# Patient Record
Sex: FEMALE | Race: WHITE | Employment: UNEMPLOYED | URBAN - METROPOLITAN AREA
[De-identification: names, ages, dates, MRNs, and addresses within clinical notes are randomized per-mention and may not be internally consistent; named-entity substitution may affect disease eponyms.]

---

## 2017-01-03 DIAGNOSIS — F98.8 ADD (ATTENTION DEFICIT DISORDER): ICD-10-CM

## 2017-01-06 RX ORDER — DEXTROAMPHETAMINE SACCHARATE, AMPHETAMINE ASPARTATE, DEXTROAMPHETAMINE SULFATE AND AMPHETAMINE SULFATE 3.75; 3.75; 3.75; 3.75 MG/1; MG/1; MG/1; MG/1
TABLET ORAL
Qty: 30 TABLET | Refills: 0 | Status: SHIPPED | OUTPATIENT
Start: 2017-01-06 | End: 2017-02-03 | Stop reason: SDUPTHER

## 2017-01-06 RX ORDER — ONDANSETRON HYDROCHLORIDE 8 MG/1
TABLET, FILM COATED ORAL
Qty: 10 TABLET | Refills: 0 | Status: SHIPPED | OUTPATIENT
Start: 2017-01-06 | End: 2017-01-17 | Stop reason: SDUPTHER

## 2017-01-06 RX ORDER — ONDANSETRON HYDROCHLORIDE 8 MG/1
TABLET, FILM COATED ORAL
Qty: 10 TABLET | Refills: 2 | OUTPATIENT
Start: 2017-01-06

## 2017-01-19 RX ORDER — TRAMADOL HYDROCHLORIDE 50 MG/1
TABLET ORAL
Qty: 180 TABLET | Refills: 0
Start: 2017-01-19 | End: 2017-01-23 | Stop reason: SDUPTHER

## 2017-01-19 RX ORDER — ONDANSETRON HYDROCHLORIDE 8 MG/1
TABLET, FILM COATED ORAL
Qty: 10 TABLET | Refills: 0 | Status: SHIPPED | OUTPATIENT
Start: 2017-01-19 | End: 2017-02-02 | Stop reason: SDUPTHER

## 2017-01-20 RX ORDER — TRAMADOL HYDROCHLORIDE 50 MG/1
TABLET ORAL
Qty: 180 TABLET | Refills: 0 | OUTPATIENT
Start: 2017-01-20

## 2017-01-23 ENCOUNTER — OFFICE VISIT (OUTPATIENT)
Dept: PRIMARY CARE CLINIC | Age: 57
End: 2017-01-23

## 2017-01-23 VITALS
TEMPERATURE: 98.4 F | HEART RATE: 87 BPM | DIASTOLIC BLOOD PRESSURE: 72 MMHG | HEIGHT: 62 IN | RESPIRATION RATE: 18 BRPM | WEIGHT: 114 LBS | SYSTOLIC BLOOD PRESSURE: 126 MMHG | OXYGEN SATURATION: 99 % | BODY MASS INDEX: 20.98 KG/M2

## 2017-01-23 DIAGNOSIS — I10 ESSENTIAL HYPERTENSION: Primary | ICD-10-CM

## 2017-01-23 DIAGNOSIS — K58.1 IRRITABLE BOWEL SYNDROME WITH CONSTIPATION: ICD-10-CM

## 2017-01-23 DIAGNOSIS — M16.12 PRIMARY OSTEOARTHRITIS OF LEFT HIP: ICD-10-CM

## 2017-01-23 DIAGNOSIS — G89.29 OTHER CHRONIC PAIN: ICD-10-CM

## 2017-01-23 PROCEDURE — 99214 OFFICE O/P EST MOD 30 MIN: CPT | Performed by: FAMILY MEDICINE

## 2017-01-23 RX ORDER — DICYCLOMINE HCL 20 MG
TABLET ORAL
COMMUNITY
Start: 2017-01-11 | End: 2017-04-22

## 2017-01-23 RX ORDER — TRAMADOL HYDROCHLORIDE 50 MG/1
TABLET ORAL
Qty: 180 TABLET | Refills: 0 | Status: CANCELLED | OUTPATIENT
Start: 2017-01-23

## 2017-01-23 RX ORDER — TRAMADOL HYDROCHLORIDE 50 MG/1
TABLET ORAL
Qty: 180 TABLET | Refills: 0 | Status: SHIPPED | OUTPATIENT
Start: 2017-01-23 | End: 2017-02-20 | Stop reason: SDUPTHER

## 2017-01-23 RX ORDER — OMEPRAZOLE 40 MG/1
CAPSULE, DELAYED RELEASE ORAL
COMMUNITY
Start: 2017-01-11

## 2017-01-23 ASSESSMENT — ENCOUNTER SYMPTOMS
VOMITING: 1
CHEST TIGHTNESS: 0
CONSTIPATION: 0
DIARRHEA: 0
ABDOMINAL PAIN: 1
SHORTNESS OF BREATH: 0
NAUSEA: 1

## 2017-02-02 RX ORDER — ONDANSETRON HYDROCHLORIDE 8 MG/1
TABLET, FILM COATED ORAL
Qty: 10 TABLET | Refills: 0 | Status: SHIPPED | OUTPATIENT
Start: 2017-02-02 | End: 2017-02-11 | Stop reason: SDUPTHER

## 2017-02-03 DIAGNOSIS — F98.8 ADD (ATTENTION DEFICIT DISORDER): ICD-10-CM

## 2017-02-03 RX ORDER — DEXTROAMPHETAMINE SACCHARATE, AMPHETAMINE ASPARTATE, DEXTROAMPHETAMINE SULFATE AND AMPHETAMINE SULFATE 3.75; 3.75; 3.75; 3.75 MG/1; MG/1; MG/1; MG/1
TABLET ORAL
Qty: 30 TABLET | Refills: 0 | Status: SHIPPED | OUTPATIENT
Start: 2017-02-03 | End: 2017-03-06 | Stop reason: SDUPTHER

## 2017-02-12 RX ORDER — ONDANSETRON HYDROCHLORIDE 8 MG/1
TABLET, FILM COATED ORAL
Qty: 10 TABLET | Refills: 1 | Status: SHIPPED | OUTPATIENT
Start: 2017-02-12 | End: 2017-03-06 | Stop reason: SDUPTHER

## 2017-02-22 RX ORDER — TRAMADOL HYDROCHLORIDE 50 MG/1
TABLET ORAL
Qty: 180 TABLET | Refills: 0 | Status: SHIPPED | OUTPATIENT
Start: 2017-02-22 | End: 2017-03-15 | Stop reason: SDUPTHER

## 2017-02-22 RX ORDER — TRAMADOL HYDROCHLORIDE 50 MG/1
TABLET ORAL
Qty: 180 TABLET | Refills: 0 | OUTPATIENT
Start: 2017-02-22

## 2017-03-06 DIAGNOSIS — F98.8 ADD (ATTENTION DEFICIT DISORDER): ICD-10-CM

## 2017-03-08 RX ORDER — DEXTROAMPHETAMINE SACCHARATE, AMPHETAMINE ASPARTATE, DEXTROAMPHETAMINE SULFATE AND AMPHETAMINE SULFATE 3.75; 3.75; 3.75; 3.75 MG/1; MG/1; MG/1; MG/1
TABLET ORAL
Qty: 30 TABLET | Refills: 0 | Status: SHIPPED | OUTPATIENT
Start: 2017-03-08 | End: 2017-04-11 | Stop reason: SDUPTHER

## 2017-03-08 RX ORDER — ONDANSETRON HYDROCHLORIDE 8 MG/1
TABLET, FILM COATED ORAL
Qty: 10 TABLET | Refills: 1 | Status: SHIPPED | OUTPATIENT
Start: 2017-03-08 | End: 2017-04-01 | Stop reason: SDUPTHER

## 2017-03-18 RX ORDER — TRAMADOL HYDROCHLORIDE 50 MG/1
TABLET ORAL
Qty: 180 TABLET | Refills: 0 | Status: SHIPPED | OUTPATIENT
Start: 2017-03-18 | End: 2017-04-21 | Stop reason: SDUPTHER

## 2017-04-02 RX ORDER — ONDANSETRON HYDROCHLORIDE 8 MG/1
TABLET, FILM COATED ORAL
Qty: 10 TABLET | Refills: 3 | Status: SHIPPED | OUTPATIENT
Start: 2017-04-02 | End: 2017-04-22

## 2017-04-04 RX ORDER — ONDANSETRON HYDROCHLORIDE 8 MG/1
TABLET, FILM COATED ORAL
Qty: 20 TABLET | Refills: 3 | OUTPATIENT
Start: 2017-04-04

## 2017-04-08 ENCOUNTER — OFFICE VISIT (OUTPATIENT)
Dept: PRIMARY CARE CLINIC | Age: 57
End: 2017-04-08

## 2017-04-08 VITALS
TEMPERATURE: 98.4 F | HEIGHT: 62 IN | WEIGHT: 117 LBS | RESPIRATION RATE: 16 BRPM | HEART RATE: 76 BPM | SYSTOLIC BLOOD PRESSURE: 138 MMHG | DIASTOLIC BLOOD PRESSURE: 88 MMHG | BODY MASS INDEX: 21.53 KG/M2

## 2017-04-08 DIAGNOSIS — F98.8 ADD (ATTENTION DEFICIT DISORDER): ICD-10-CM

## 2017-04-08 DIAGNOSIS — K21.00 GASTROESOPHAGEAL REFLUX DISEASE WITH ESOPHAGITIS: ICD-10-CM

## 2017-04-08 DIAGNOSIS — Z72.0 TOBACCO ABUSE: ICD-10-CM

## 2017-04-08 DIAGNOSIS — R10.816 EPIGASTRIC ABDOMINAL TENDERNESS WITHOUT REBOUND TENDERNESS: ICD-10-CM

## 2017-04-08 DIAGNOSIS — R11.0 NAUSEA: Primary | ICD-10-CM

## 2017-04-08 PROCEDURE — 99214 OFFICE O/P EST MOD 30 MIN: CPT | Performed by: FAMILY MEDICINE

## 2017-04-08 RX ORDER — DICYCLOMINE HYDROCHLORIDE 10 MG/1
10 CAPSULE ORAL 3 TIMES DAILY PRN
Qty: 60 CAPSULE | Refills: 0 | Status: SHIPPED | OUTPATIENT
Start: 2017-04-08 | End: 2017-05-08

## 2017-04-08 RX ORDER — PANTOPRAZOLE SODIUM 40 MG/1
40 TABLET, DELAYED RELEASE ORAL DAILY
Qty: 30 TABLET | Refills: 11 | Status: SHIPPED | OUTPATIENT
Start: 2017-04-08 | End: 2018-04-08

## 2017-04-08 RX ORDER — DEXTROAMPHETAMINE SACCHARATE, AMPHETAMINE ASPARTATE, DEXTROAMPHETAMINE SULFATE AND AMPHETAMINE SULFATE 3.75; 3.75; 3.75; 3.75 MG/1; MG/1; MG/1; MG/1
TABLET ORAL
Qty: 30 TABLET | Refills: 0 | Status: CANCELLED | OUTPATIENT
Start: 2017-04-08

## 2017-04-08 ASSESSMENT — ENCOUNTER SYMPTOMS
ABDOMINAL PAIN: 1
NAUSEA: 1
DIARRHEA: 1
CONSTIPATION: 1
BACK PAIN: 0
VOMITING: 0

## 2017-04-11 DIAGNOSIS — F98.8 ADD (ATTENTION DEFICIT DISORDER): ICD-10-CM

## 2017-04-12 RX ORDER — DEXTROAMPHETAMINE SACCHARATE, AMPHETAMINE ASPARTATE, DEXTROAMPHETAMINE SULFATE AND AMPHETAMINE SULFATE 3.75; 3.75; 3.75; 3.75 MG/1; MG/1; MG/1; MG/1
TABLET ORAL
Qty: 30 TABLET | Refills: 0 | Status: SHIPPED | OUTPATIENT
Start: 2017-04-12 | End: 2017-05-22 | Stop reason: SDUPTHER

## 2017-04-22 ENCOUNTER — OFFICE VISIT (OUTPATIENT)
Dept: PRIMARY CARE CLINIC | Age: 57
End: 2017-04-22

## 2017-04-22 VITALS
BODY MASS INDEX: 20.8 KG/M2 | TEMPERATURE: 97.4 F | RESPIRATION RATE: 14 BRPM | HEIGHT: 62 IN | WEIGHT: 113 LBS | SYSTOLIC BLOOD PRESSURE: 126 MMHG | DIASTOLIC BLOOD PRESSURE: 84 MMHG | HEART RATE: 72 BPM

## 2017-04-22 DIAGNOSIS — R10.84 GENERALIZED ABDOMINAL PAIN: ICD-10-CM

## 2017-04-22 DIAGNOSIS — K57.32 DIVERTICULITIS OF LARGE INTESTINE WITHOUT PERFORATION OR ABSCESS WITHOUT BLEEDING: ICD-10-CM

## 2017-04-22 DIAGNOSIS — B18.2 HEP C W/O COMA, CHRONIC (HCC): ICD-10-CM

## 2017-04-22 DIAGNOSIS — R10.84 GENERALIZED ABDOMINAL PAIN: Primary | ICD-10-CM

## 2017-04-22 DIAGNOSIS — L30.9 ECZEMA, UNSPECIFIED TYPE: ICD-10-CM

## 2017-04-22 LAB
ALBUMIN SERPL-MCNC: 4.5 G/DL (ref 3.9–4.9)
ALP BLD-CCNC: 82 U/L (ref 40–130)
ALT SERPL-CCNC: 19 U/L (ref 0–33)
ANION GAP SERPL CALCULATED.3IONS-SCNC: 14 MEQ/L (ref 7–13)
AST SERPL-CCNC: 19 U/L (ref 0–35)
BASOPHILS ABSOLUTE: 0.1 K/UL (ref 0–0.2)
BASOPHILS RELATIVE PERCENT: 0.7 %
BILIRUB SERPL-MCNC: 0.2 MG/DL (ref 0–1.2)
BUN BLDV-MCNC: 14 MG/DL (ref 6–20)
CALCIUM SERPL-MCNC: 10 MG/DL (ref 8.6–10.2)
CHLORIDE BLD-SCNC: 101 MEQ/L (ref 98–107)
CO2: 26 MEQ/L (ref 22–29)
CREAT SERPL-MCNC: 0.64 MG/DL (ref 0.5–0.9)
EOSINOPHILS ABSOLUTE: 0.2 K/UL (ref 0–0.7)
EOSINOPHILS RELATIVE PERCENT: 3.3 %
GFR AFRICAN AMERICAN: >60
GFR NON-AFRICAN AMERICAN: >60
GLOBULIN: 2.6 G/DL (ref 2.3–3.5)
GLUCOSE BLD-MCNC: 81 MG/DL (ref 74–109)
HBV SURFACE AB TITR SER: REACTIVE MIU/ML
HCT VFR BLD CALC: 42.1 % (ref 37–47)
HEMOGLOBIN: 14.3 G/DL (ref 12–16)
LYMPHOCYTES ABSOLUTE: 2.5 K/UL (ref 1–4.8)
LYMPHOCYTES RELATIVE PERCENT: 35.2 %
MCH RBC QN AUTO: 31.5 PG (ref 27–31.3)
MCHC RBC AUTO-ENTMCNC: 33.8 % (ref 33–37)
MCV RBC AUTO: 93.2 FL (ref 82–100)
MONOCYTES ABSOLUTE: 0.5 K/UL (ref 0.2–0.8)
MONOCYTES RELATIVE PERCENT: 7.6 %
NEUTROPHILS ABSOLUTE: 3.7 K/UL (ref 1.4–6.5)
NEUTROPHILS RELATIVE PERCENT: 53.2 %
PDW BLD-RTO: 14.1 % (ref 11.5–14.5)
PLATELET # BLD: 240 K/UL (ref 130–400)
POTASSIUM SERPL-SCNC: 4.3 MEQ/L (ref 3.5–5.1)
RBC # BLD: 4.52 M/UL (ref 4.2–5.4)
SODIUM BLD-SCNC: 141 MEQ/L (ref 132–144)
TOTAL PROTEIN: 7.1 G/DL (ref 6.4–8.1)
WBC # BLD: 7.1 K/UL (ref 4.8–10.8)

## 2017-04-22 PROCEDURE — 99214 OFFICE O/P EST MOD 30 MIN: CPT | Performed by: FAMILY MEDICINE

## 2017-04-22 PROCEDURE — 96372 THER/PROPH/DIAG INJ SC/IM: CPT | Performed by: FAMILY MEDICINE

## 2017-04-22 RX ORDER — METRONIDAZOLE 500 MG/1
TABLET ORAL
Qty: 20 TABLET | Refills: 0 | Status: SHIPPED | OUTPATIENT
Start: 2017-04-22

## 2017-04-22 RX ORDER — DESOXIMETASONE 2.5 MG/G
CREAM TOPICAL
Qty: 30 G | Refills: 3 | Status: SHIPPED | OUTPATIENT
Start: 2017-04-22

## 2017-04-22 RX ORDER — TRAMADOL HYDROCHLORIDE 50 MG/1
TABLET ORAL
Qty: 180 TABLET | Refills: 0 | Status: SHIPPED | OUTPATIENT
Start: 2017-04-22 | End: 2017-05-22 | Stop reason: SDUPTHER

## 2017-04-22 RX ORDER — CIPROFLOXACIN 500 MG/1
TABLET, FILM COATED ORAL
Qty: 20 TABLET | Refills: 0 | Status: SHIPPED | OUTPATIENT
Start: 2017-04-22 | End: 2017-05-02

## 2017-04-22 RX ORDER — KETOROLAC TROMETHAMINE 30 MG/ML
60 INJECTION, SOLUTION INTRAMUSCULAR; INTRAVENOUS ONCE
Status: COMPLETED | OUTPATIENT
Start: 2017-04-22 | End: 2017-04-22

## 2017-04-22 RX ADMIN — KETOROLAC TROMETHAMINE 60 MG: 30 INJECTION, SOLUTION INTRAMUSCULAR; INTRAVENOUS at 12:01

## 2017-04-22 ASSESSMENT — ENCOUNTER SYMPTOMS
VOMITING: 1
NAUSEA: 1
ABDOMINAL PAIN: 1

## 2017-04-24 ENCOUNTER — TELEPHONE (OUTPATIENT)
Dept: PRIMARY CARE CLINIC | Age: 57
End: 2017-04-24

## 2017-04-24 LAB — HIV-1 WESTERN BLOT: NEGATIVE

## 2017-04-25 LAB
Lab: NORMAL
REPORT: NORMAL
THIS TEST SENT TO: NORMAL

## 2017-04-27 LAB
EER HCV RNA QNT PCR: NORMAL
HCV RNA QNT REAL-TIME PCR INTERP: NOT DETECTED
HCV RNA, QUANTITATIVE REAL TIME PCR: <1.2 LOG IU
HEPATITIS C RNA PCR QUANT: <15 IU/ML

## 2017-04-28 LAB
3-OH-COTININE: 178 NG/ML
COTININE: 368 NG/ML
NICOTINE: 16 NG/ML

## 2017-05-01 LAB — HEPATITIS C GENOTYPE: NORMAL

## 2017-05-07 ASSESSMENT — ENCOUNTER SYMPTOMS
WHEEZING: 0
ANAL BLEEDING: 0
APNEA: 0
BLOOD IN STOOL: 0
EYE DISCHARGE: 0
ABDOMINAL DISTENTION: 0
CHOKING: 0

## 2017-05-15 DIAGNOSIS — F98.8 ADD (ATTENTION DEFICIT DISORDER): ICD-10-CM

## 2017-05-16 RX ORDER — ONDANSETRON HYDROCHLORIDE 8 MG/1
TABLET, FILM COATED ORAL
Qty: 10 TABLET | Refills: 0 | OUTPATIENT
Start: 2017-05-16

## 2017-05-16 RX ORDER — DEXTROAMPHETAMINE SACCHARATE, AMPHETAMINE ASPARTATE, DEXTROAMPHETAMINE SULFATE AND AMPHETAMINE SULFATE 3.75; 3.75; 3.75; 3.75 MG/1; MG/1; MG/1; MG/1
TABLET ORAL
Qty: 30 TABLET | Refills: 0 | OUTPATIENT
Start: 2017-05-16

## 2017-05-18 RX ORDER — TRAMADOL HYDROCHLORIDE 50 MG/1
TABLET ORAL
Qty: 180 TABLET | Refills: 0 | OUTPATIENT
Start: 2017-05-18

## 2017-05-18 RX ORDER — ONDANSETRON HYDROCHLORIDE 8 MG/1
TABLET, FILM COATED ORAL
Qty: 10 TABLET | Refills: 0 | OUTPATIENT
Start: 2017-05-18

## 2017-05-19 ENCOUNTER — OFFICE VISIT (OUTPATIENT)
Dept: PRIMARY CARE CLINIC | Age: 57
End: 2017-05-19

## 2017-05-19 VITALS
HEIGHT: 62 IN | DIASTOLIC BLOOD PRESSURE: 80 MMHG | SYSTOLIC BLOOD PRESSURE: 132 MMHG | TEMPERATURE: 98 F | OXYGEN SATURATION: 99 % | WEIGHT: 109 LBS | RESPIRATION RATE: 14 BRPM | HEART RATE: 70 BPM | BODY MASS INDEX: 20.06 KG/M2

## 2017-05-19 DIAGNOSIS — M16.12 PRIMARY OSTEOARTHRITIS OF LEFT HIP: ICD-10-CM

## 2017-05-19 DIAGNOSIS — K21.00 GASTROESOPHAGEAL REFLUX DISEASE WITH ESOPHAGITIS: ICD-10-CM

## 2017-05-19 DIAGNOSIS — I10 ESSENTIAL HYPERTENSION: ICD-10-CM

## 2017-05-19 DIAGNOSIS — B18.2 HEP C W/O COMA, CHRONIC (HCC): ICD-10-CM

## 2017-05-19 DIAGNOSIS — L30.9 ECZEMA, UNSPECIFIED TYPE: Primary | ICD-10-CM

## 2017-05-19 DIAGNOSIS — F98.8 ADD (ATTENTION DEFICIT DISORDER): ICD-10-CM

## 2017-05-19 PROCEDURE — 99214 OFFICE O/P EST MOD 30 MIN: CPT | Performed by: FAMILY MEDICINE

## 2017-05-19 RX ORDER — DEXTROAMPHETAMINE SACCHARATE, AMPHETAMINE ASPARTATE, DEXTROAMPHETAMINE SULFATE AND AMPHETAMINE SULFATE 3.75; 3.75; 3.75; 3.75 MG/1; MG/1; MG/1; MG/1
TABLET ORAL
Qty: 30 TABLET | Refills: 0 | Status: CANCELLED | OUTPATIENT
Start: 2017-05-19

## 2017-05-19 RX ORDER — KETOROLAC TROMETHAMINE 30 MG/ML
60 INJECTION, SOLUTION INTRAMUSCULAR; INTRAVENOUS ONCE
Status: COMPLETED | OUTPATIENT
Start: 2017-05-19 | End: 2017-05-22

## 2017-05-19 RX ORDER — TRAMADOL HYDROCHLORIDE 50 MG/1
TABLET ORAL
Qty: 180 TABLET | Refills: 0 | Status: CANCELLED | OUTPATIENT
Start: 2017-05-19

## 2017-05-19 RX ORDER — ERGOCALCIFEROL 1.25 MG/1
50000 CAPSULE ORAL WEEKLY
Qty: 30 CAPSULE | Refills: 1 | Status: SHIPPED | OUTPATIENT
Start: 2017-05-19 | End: 2017-06-22 | Stop reason: SDUPTHER

## 2017-05-19 RX ORDER — ONDANSETRON HYDROCHLORIDE 8 MG/1
TABLET, FILM COATED ORAL
Qty: 10 TABLET | Refills: 0 | Status: SHIPPED | OUTPATIENT
Start: 2017-05-19 | End: 2017-06-02 | Stop reason: SDUPTHER

## 2017-05-19 ASSESSMENT — ENCOUNTER SYMPTOMS
HEMATOCHEZIA: 0
FLATUS: 0
RECTAL PAIN: 0
VOMITING: 0
CONSTIPATION: 0
ABDOMINAL DISTENTION: 0
ABDOMINAL PAIN: 1
ANAL BLEEDING: 0
DIARRHEA: 0
BLOOD IN STOOL: 0
NAUSEA: 0
BELCHING: 0

## 2017-05-19 ASSESSMENT — PATIENT HEALTH QUESTIONNAIRE - PHQ9
SUM OF ALL RESPONSES TO PHQ QUESTIONS 1-9: 1
SUM OF ALL RESPONSES TO PHQ9 QUESTIONS 1 & 2: 1
2. FEELING DOWN, DEPRESSED OR HOPELESS: 0
1. LITTLE INTEREST OR PLEASURE IN DOING THINGS: 1

## 2017-05-22 DIAGNOSIS — G89.29 OTHER CHRONIC PAIN: ICD-10-CM

## 2017-05-22 PROCEDURE — 96372 THER/PROPH/DIAG INJ SC/IM: CPT | Performed by: FAMILY MEDICINE

## 2017-05-22 RX ORDER — TRAMADOL HYDROCHLORIDE 50 MG/1
TABLET ORAL
Qty: 90 TABLET | Refills: 0 | Status: SHIPPED | OUTPATIENT
Start: 2017-05-22 | End: 2017-06-02 | Stop reason: SDUPTHER

## 2017-05-22 RX ORDER — DEXTROAMPHETAMINE SACCHARATE, AMPHETAMINE ASPARTATE, DEXTROAMPHETAMINE SULFATE AND AMPHETAMINE SULFATE 3.75; 3.75; 3.75; 3.75 MG/1; MG/1; MG/1; MG/1
TABLET ORAL
Qty: 15 TABLET | Refills: 0 | Status: SHIPPED | OUTPATIENT
Start: 2017-05-22 | End: 2017-06-12 | Stop reason: SDUPTHER

## 2017-05-22 RX ADMIN — KETOROLAC TROMETHAMINE 60 MG: 30 INJECTION, SOLUTION INTRAMUSCULAR; INTRAVENOUS at 10:03

## 2017-05-26 LAB
6-ACETYLMORPHINE: NOT DETECTED
7-AMINOCLONAZEPAM: NOT DETECTED
ALPHA-OH-ALPRAZOLAM: NOT DETECTED
ALPRAZOLAM: NOT DETECTED
AMPHETAMINE: PRESENT
BARBITURATES: NOT DETECTED
BENZOYLECGONINE: NOT DETECTED
BUPRENORPHINE: NOT DETECTED
CARISOPRODOL: NOT DETECTED
CLONAZEPAM: NOT DETECTED
CODEINE: NOT DETECTED
CREATININE URINE: 101.9 MG/DL (ref 20–400)
DIAZEPAM: NOT DETECTED
EER PAIN MGT DRUG PANEL, HIGH RES/EMIT U: NORMAL
ETHYL GLUCURONIDE: NOT DETECTED
FENTANYL: NOT DETECTED
HYDROCODONE: NOT DETECTED
HYDROMORPHONE: NOT DETECTED
LORAZEPAM: NOT DETECTED
MARIJUANA METABOLITE: PRESENT
MDA: NOT DETECTED
MDEA: NOT DETECTED
MDMA URINE: NOT DETECTED
MEPERIDINE: NOT DETECTED
METHADONE: NOT DETECTED
METHAMPHETAMINE: NOT DETECTED
METHYLPHENIDATE: NOT DETECTED
MIDAZOLAM: NOT DETECTED
MORPHINE: NOT DETECTED
NORBUPRENORPHINE, FREE: NOT DETECTED
NORDIAZEPAM: NOT DETECTED
NORFENTANYL: NOT DETECTED
NORHYDROCODONE, URINE: NOT DETECTED
NOROXYCODONE: NOT DETECTED
NOROXYMORPHONE, URINE: NOT DETECTED
OXAZEPAM: NOT DETECTED
OXYCODONE: NOT DETECTED
OXYMORPHONE: NOT DETECTED
PAIN MANAGEMENT DRUG PANEL: NORMAL
PCP: NOT DETECTED
PHENTERMINE: NOT DETECTED
PROPOXYPHENE: NOT DETECTED
TAPENTADOL, URINE: NOT DETECTED
TAPENTADOL-O-SULFATE, URINE: NOT DETECTED
TEMAZEPAM: NOT DETECTED
TRAMADOL: PRESENT
ZOLPIDEM: NOT DETECTED

## 2017-06-02 DIAGNOSIS — F98.8 ADD (ATTENTION DEFICIT DISORDER): ICD-10-CM

## 2017-06-02 RX ORDER — TRAMADOL HYDROCHLORIDE 50 MG/1
TABLET ORAL
Qty: 90 TABLET | Refills: 0 | Status: SHIPPED | OUTPATIENT
Start: 2017-06-02 | End: 2017-06-05 | Stop reason: CLARIF

## 2017-06-02 RX ORDER — DEXTROAMPHETAMINE SACCHARATE, AMPHETAMINE ASPARTATE, DEXTROAMPHETAMINE SULFATE AND AMPHETAMINE SULFATE 3.75; 3.75; 3.75; 3.75 MG/1; MG/1; MG/1; MG/1
TABLET ORAL
Qty: 15 TABLET | Refills: 0 | Status: CANCELLED | OUTPATIENT
Start: 2017-06-02

## 2017-06-02 RX ORDER — ONDANSETRON HYDROCHLORIDE 8 MG/1
TABLET, FILM COATED ORAL
Qty: 10 TABLET | Refills: 1 | Status: SHIPPED | OUTPATIENT
Start: 2017-06-02 | End: 2017-06-22 | Stop reason: SDUPTHER

## 2017-06-05 DIAGNOSIS — F98.8 ADD (ATTENTION DEFICIT DISORDER): ICD-10-CM

## 2017-06-05 RX ORDER — ONDANSETRON HYDROCHLORIDE 8 MG/1
TABLET, FILM COATED ORAL
Qty: 10 TABLET | Refills: 0 | OUTPATIENT
Start: 2017-06-05

## 2017-06-05 RX ORDER — TRAMADOL HYDROCHLORIDE 50 MG/1
TABLET ORAL
Qty: 90 TABLET | Refills: 0 | OUTPATIENT
Start: 2017-06-05

## 2017-06-05 RX ORDER — DEXTROAMPHETAMINE SACCHARATE, AMPHETAMINE ASPARTATE, DEXTROAMPHETAMINE SULFATE AND AMPHETAMINE SULFATE 3.75; 3.75; 3.75; 3.75 MG/1; MG/1; MG/1; MG/1
TABLET ORAL
Qty: 30 TABLET | Refills: 0 | OUTPATIENT
Start: 2017-06-05

## 2017-06-09 ENCOUNTER — TELEPHONE (OUTPATIENT)
Dept: PRIMARY CARE CLINIC | Age: 57
End: 2017-06-09

## 2017-06-12 ENCOUNTER — OFFICE VISIT (OUTPATIENT)
Dept: PRIMARY CARE CLINIC | Age: 57
End: 2017-06-12

## 2017-06-12 VITALS
SYSTOLIC BLOOD PRESSURE: 122 MMHG | OXYGEN SATURATION: 97 % | WEIGHT: 109 LBS | DIASTOLIC BLOOD PRESSURE: 84 MMHG | BODY MASS INDEX: 20.06 KG/M2 | RESPIRATION RATE: 14 BRPM | HEIGHT: 62 IN | TEMPERATURE: 97.1 F | HEART RATE: 68 BPM

## 2017-06-12 DIAGNOSIS — K21.00 GASTROESOPHAGEAL REFLUX DISEASE WITH ESOPHAGITIS: ICD-10-CM

## 2017-06-12 DIAGNOSIS — F98.8 ADD (ATTENTION DEFICIT DISORDER): ICD-10-CM

## 2017-06-12 DIAGNOSIS — I10 ESSENTIAL HYPERTENSION: Primary | ICD-10-CM

## 2017-06-12 DIAGNOSIS — M16.12 PRIMARY OSTEOARTHRITIS OF LEFT HIP: ICD-10-CM

## 2017-06-12 DIAGNOSIS — B18.2 HEP C W/O COMA, CHRONIC (HCC): ICD-10-CM

## 2017-06-12 PROCEDURE — 99214 OFFICE O/P EST MOD 30 MIN: CPT | Performed by: FAMILY MEDICINE

## 2017-06-12 RX ORDER — DEXTROAMPHETAMINE SACCHARATE, AMPHETAMINE ASPARTATE, DEXTROAMPHETAMINE SULFATE AND AMPHETAMINE SULFATE 3.75; 3.75; 3.75; 3.75 MG/1; MG/1; MG/1; MG/1
TABLET ORAL
Qty: 30 TABLET | Refills: 0 | Status: SHIPPED | OUTPATIENT
Start: 2017-06-12

## 2017-06-12 RX ORDER — TRAMADOL HYDROCHLORIDE 50 MG/1
TABLET ORAL
COMMUNITY
Start: 2017-06-06 | End: 2017-06-22 | Stop reason: SDUPTHER

## 2017-06-12 ASSESSMENT — ENCOUNTER SYMPTOMS
GLOBUS SENSATION: 1
HEARTBURN: 0
NAUSEA: 1
CONSTIPATION: 0
CHEST TIGHTNESS: 0
BLOOD IN STOOL: 0
ABDOMINAL PAIN: 1
SHORTNESS OF BREATH: 0
ABDOMINAL DISTENTION: 1

## 2017-06-13 ENCOUNTER — TELEPHONE (OUTPATIENT)
Dept: PRIMARY CARE CLINIC | Age: 57
End: 2017-06-13

## 2017-06-16 ENCOUNTER — OFFICE VISIT (OUTPATIENT)
Dept: INFECTIOUS DISEASES | Age: 57
End: 2017-06-16

## 2017-06-16 ENCOUNTER — TELEPHONE (OUTPATIENT)
Dept: PRIMARY CARE CLINIC | Age: 57
End: 2017-06-16

## 2017-06-16 VITALS
RESPIRATION RATE: 18 BRPM | TEMPERATURE: 98.1 F | HEIGHT: 62 IN | BODY MASS INDEX: 20.2 KG/M2 | HEART RATE: 76 BPM | SYSTOLIC BLOOD PRESSURE: 120 MMHG | DIASTOLIC BLOOD PRESSURE: 72 MMHG | WEIGHT: 109.8 LBS

## 2017-06-16 DIAGNOSIS — Z20.5 EXPOSURE TO HEPATITIS C: Primary | ICD-10-CM

## 2017-06-16 DIAGNOSIS — F12.90 MARIJUANA USE: ICD-10-CM

## 2017-06-16 DIAGNOSIS — Z72.0 TOBACCO USE: ICD-10-CM

## 2017-06-16 PROCEDURE — 99204 OFFICE O/P NEW MOD 45 MIN: CPT | Performed by: INTERNAL MEDICINE

## 2017-06-22 ENCOUNTER — OFFICE VISIT (OUTPATIENT)
Dept: PRIMARY CARE CLINIC | Age: 57
End: 2017-06-22

## 2017-06-22 VITALS
DIASTOLIC BLOOD PRESSURE: 86 MMHG | SYSTOLIC BLOOD PRESSURE: 130 MMHG | BODY MASS INDEX: 19.88 KG/M2 | HEART RATE: 66 BPM | TEMPERATURE: 97.4 F | WEIGHT: 108 LBS | HEIGHT: 62 IN | OXYGEN SATURATION: 99 % | RESPIRATION RATE: 16 BRPM

## 2017-06-22 DIAGNOSIS — S90.32XD CONTUSION OF LEFT FOOT, SUBSEQUENT ENCOUNTER: ICD-10-CM

## 2017-06-22 DIAGNOSIS — I10 ESSENTIAL HYPERTENSION: Primary | ICD-10-CM

## 2017-06-22 DIAGNOSIS — G89.29 OTHER CHRONIC PAIN: ICD-10-CM

## 2017-06-22 DIAGNOSIS — B18.2 HEP C W/O COMA, CHRONIC (HCC): ICD-10-CM

## 2017-06-22 DIAGNOSIS — E55.9 VITAMIN D DEFICIENCY: ICD-10-CM

## 2017-06-22 DIAGNOSIS — K21.00 GASTROESOPHAGEAL REFLUX DISEASE WITH ESOPHAGITIS: ICD-10-CM

## 2017-06-22 PROBLEM — R79.89 ELEVATED LFTS: Status: ACTIVE | Noted: 2017-06-22

## 2017-06-22 PROCEDURE — 99214 OFFICE O/P EST MOD 30 MIN: CPT | Performed by: FAMILY MEDICINE

## 2017-06-22 RX ORDER — ONDANSETRON HYDROCHLORIDE 8 MG/1
TABLET, FILM COATED ORAL
Qty: 10 TABLET | Refills: 1 | Status: SHIPPED | OUTPATIENT
Start: 2017-06-22

## 2017-06-22 RX ORDER — ERGOCALCIFEROL 1.25 MG/1
50000 CAPSULE ORAL WEEKLY
Qty: 30 CAPSULE | Refills: 1 | Status: SHIPPED | OUTPATIENT
Start: 2017-06-22

## 2017-06-22 RX ORDER — TRAMADOL HYDROCHLORIDE 50 MG/1
50 TABLET ORAL EVERY 6 HOURS PRN
Qty: 120 TABLET | Refills: 0 | Status: SHIPPED | OUTPATIENT
Start: 2017-06-22

## 2017-06-27 ENCOUNTER — TELEPHONE (OUTPATIENT)
Dept: PRIMARY CARE CLINIC | Age: 57
End: 2017-06-27

## 2017-06-29 ENCOUNTER — TELEPHONE (OUTPATIENT)
Dept: PRIMARY CARE CLINIC | Age: 57
End: 2017-06-29

## 2017-07-02 ASSESSMENT — ENCOUNTER SYMPTOMS
WHEEZING: 0
ABDOMINAL PAIN: 1
ABDOMINAL DISTENTION: 1
APNEA: 0
EYE DISCHARGE: 0

## 2017-07-10 ENCOUNTER — TELEPHONE (OUTPATIENT)
Dept: PRIMARY CARE CLINIC | Age: 57
End: 2017-07-10

## 2017-07-13 ENCOUNTER — TELEPHONE (OUTPATIENT)
Dept: PRIMARY CARE CLINIC | Age: 57
End: 2017-07-13

## 2019-06-12 ENCOUNTER — TELEPHONE (OUTPATIENT)
Dept: ADMINISTRATIVE | Age: 59
End: 2019-06-12

## 2023-03-13 DIAGNOSIS — R11.0 MILD NAUSEA: ICD-10-CM

## 2023-03-13 RX ORDER — ONDANSETRON HYDROCHLORIDE 8 MG/1
TABLET, FILM COATED ORAL
Qty: 90 TABLET | Refills: 0 | Status: SHIPPED | OUTPATIENT
Start: 2023-03-13 | End: 2023-04-11

## 2023-03-22 PROBLEM — F32.A DEPRESSION: Status: ACTIVE | Noted: 2023-03-22

## 2023-03-22 PROBLEM — K83.8 COMMON BILE DUCT DILATION: Status: ACTIVE | Noted: 2023-03-22

## 2023-03-22 PROBLEM — R06.02 SHORTNESS OF BREATH AT REST: Status: ACTIVE | Noted: 2023-03-22

## 2023-03-22 PROBLEM — G44.221 CHRONIC TENSION-TYPE HEADACHE, INTRACTABLE: Status: ACTIVE | Noted: 2023-03-22

## 2023-03-22 PROBLEM — R10.11 RIGHT UPPER QUADRANT ABDOMINAL PAIN: Status: ACTIVE | Noted: 2023-03-22

## 2023-03-22 PROBLEM — M54.16 LEFT LUMBAR RADICULOPATHY: Status: ACTIVE | Noted: 2023-03-22

## 2023-03-22 PROBLEM — F11.90 OPIOID USE: Status: ACTIVE | Noted: 2023-03-22

## 2023-03-22 PROBLEM — E16.2 HYPOGLYCEMIA: Status: ACTIVE | Noted: 2023-03-22

## 2023-03-22 PROBLEM — M48.061 LUMBAR SPINAL STENOSIS: Status: ACTIVE | Noted: 2023-03-22

## 2023-03-22 PROBLEM — K59.00 CONSTIPATION: Status: ACTIVE | Noted: 2023-03-22

## 2023-03-22 PROBLEM — G47.62 NOCTURNAL LEG CRAMPS: Status: ACTIVE | Noted: 2023-03-22

## 2023-03-22 PROBLEM — R47.9 SPEECH DISTURBANCE IN ADULT: Status: ACTIVE | Noted: 2023-03-22

## 2023-03-22 PROBLEM — F41.9 ANXIETY: Status: ACTIVE | Noted: 2023-03-22

## 2023-03-22 PROBLEM — I10 HTN (HYPERTENSION): Status: ACTIVE | Noted: 2023-03-22

## 2023-03-22 PROBLEM — G25.81 RESTLESS LEGS: Status: ACTIVE | Noted: 2023-03-22

## 2023-03-22 PROBLEM — N64.4 BREAST PAIN, LEFT: Status: ACTIVE | Noted: 2023-03-22

## 2023-03-22 PROBLEM — F20.9 SCHIZOPHRENIA (MULTI): Status: ACTIVE | Noted: 2023-03-22

## 2023-03-22 PROBLEM — G89.4 CHRONIC PAIN SYNDROME: Status: ACTIVE | Noted: 2023-03-22

## 2023-03-22 PROBLEM — E55.9 VITAMIN D DEFICIENCY: Status: ACTIVE | Noted: 2023-03-22

## 2023-03-22 PROBLEM — R49.9 VOICE DISTURBANCE: Status: ACTIVE | Noted: 2023-03-22

## 2023-03-22 PROBLEM — R10.9 ABDOMINAL PAIN: Status: ACTIVE | Noted: 2023-03-22

## 2023-03-22 PROBLEM — G93.40 ENCEPHALOPATHY: Status: ACTIVE | Noted: 2023-03-22

## 2023-03-22 PROBLEM — R93.89 ABNORMAL FINDING ON IMAGING: Status: ACTIVE | Noted: 2023-03-22

## 2023-03-22 PROBLEM — B37.0 THRUSH: Status: ACTIVE | Noted: 2023-03-22

## 2023-03-22 PROBLEM — R41.0 CONFUSION AND DISORIENTATION: Status: ACTIVE | Noted: 2023-03-22

## 2023-03-22 PROBLEM — E78.5 HYPERLIPIDEMIA: Status: ACTIVE | Noted: 2023-03-22

## 2023-03-22 PROBLEM — K76.9 CHRONIC LIVER DISEASE: Status: ACTIVE | Noted: 2023-03-22

## 2023-03-22 PROBLEM — F31.9 BIPOLAR AFFECTIVE DISORDER (MULTI): Status: ACTIVE | Noted: 2023-03-22

## 2023-03-22 PROBLEM — F17.200 NICOTINE DEPENDENCE: Status: ACTIVE | Noted: 2023-03-22

## 2023-03-22 PROBLEM — R07.9 CHEST PAIN: Status: ACTIVE | Noted: 2023-03-22

## 2023-03-22 PROBLEM — M79.18 MUSCULOSKELETAL PAIN: Status: ACTIVE | Noted: 2023-03-22

## 2023-03-22 PROBLEM — I73.9 CLAUDICATION, INTERMITTENT (CMS-HCC): Status: ACTIVE | Noted: 2023-03-22

## 2023-03-22 PROBLEM — K76.89 LIVER CYST: Status: ACTIVE | Noted: 2023-03-22

## 2023-03-22 PROBLEM — K21.9 GERD (GASTROESOPHAGEAL REFLUX DISEASE): Status: ACTIVE | Noted: 2023-03-22

## 2023-03-22 PROBLEM — F43.9 STRESS: Status: ACTIVE | Noted: 2023-03-22

## 2023-03-22 PROBLEM — E27.8 ADRENAL NODULE (MULTI): Status: ACTIVE | Noted: 2023-03-22

## 2023-03-22 PROBLEM — E87.6 HYPOKALEMIA: Status: ACTIVE | Noted: 2023-03-22

## 2023-03-22 PROBLEM — N39.0 UTI (URINARY TRACT INFECTION): Status: ACTIVE | Noted: 2023-03-22

## 2023-03-22 PROBLEM — E27.9 ADRENAL NODULE: Status: ACTIVE | Noted: 2023-03-22

## 2023-03-22 PROBLEM — R63.5 WEIGHT GAIN: Status: ACTIVE | Noted: 2023-03-22

## 2023-03-22 PROBLEM — F98.8 ADD (ATTENTION DEFICIT DISORDER): Status: ACTIVE | Noted: 2023-03-22

## 2023-03-22 PROBLEM — R53.83 FATIGUE: Status: ACTIVE | Noted: 2023-03-22

## 2023-03-22 PROBLEM — R56.9 OBSERVED SEIZURE-LIKE ACTIVITY (MULTI): Status: ACTIVE | Noted: 2023-03-22

## 2023-03-22 RX ORDER — GABAPENTIN 600 MG/1
1 TABLET ORAL 3 TIMES DAILY
COMMUNITY
Start: 2022-02-23 | End: 2023-03-23

## 2023-03-22 RX ORDER — NYSTATIN 100000 [USP'U]/ML
1 SUSPENSION ORAL 4 TIMES DAILY
COMMUNITY
Start: 2023-02-21 | End: 2023-03-23

## 2023-03-22 RX ORDER — NALOXONE HYDROCHLORIDE 4 MG/.1ML
SPRAY NASAL
COMMUNITY
Start: 2021-10-05

## 2023-03-22 RX ORDER — HYDROCODONE BITARTRATE AND ACETAMINOPHEN 10; 325 MG/1; MG/1
1 TABLET ORAL EVERY 4 HOURS
COMMUNITY
Start: 2021-11-30

## 2023-03-22 RX ORDER — SUCRALFATE 1 G/1
1 TABLET ORAL 2 TIMES DAILY
COMMUNITY
Start: 2022-06-07 | End: 2023-03-23 | Stop reason: ALTCHOICE

## 2023-03-22 RX ORDER — LISINOPRIL 10 MG/1
1 TABLET ORAL DAILY
COMMUNITY
Start: 2022-02-23 | End: 2023-08-24 | Stop reason: SDUPTHER

## 2023-03-23 ENCOUNTER — OFFICE VISIT (OUTPATIENT)
Dept: PRIMARY CARE | Facility: CLINIC | Age: 63
End: 2023-03-23
Payer: COMMERCIAL

## 2023-03-23 VITALS
WEIGHT: 132 LBS | BODY MASS INDEX: 24.29 KG/M2 | DIASTOLIC BLOOD PRESSURE: 76 MMHG | HEIGHT: 62 IN | OXYGEN SATURATION: 98 % | HEART RATE: 80 BPM | RESPIRATION RATE: 16 BRPM | SYSTOLIC BLOOD PRESSURE: 120 MMHG

## 2023-03-23 DIAGNOSIS — F20.9 SCHIZOPHRENIA, UNSPECIFIED TYPE (MULTI): ICD-10-CM

## 2023-03-23 DIAGNOSIS — I10 PRIMARY HYPERTENSION: Primary | ICD-10-CM

## 2023-03-23 DIAGNOSIS — N39.0 URINARY TRACT INFECTION WITHOUT HEMATURIA, SITE UNSPECIFIED: ICD-10-CM

## 2023-03-23 DIAGNOSIS — I73.9 CLAUDICATION, INTERMITTENT (CMS-HCC): ICD-10-CM

## 2023-03-23 DIAGNOSIS — F31.9 BIPOLAR AFFECTIVE DISORDER, REMISSION STATUS UNSPECIFIED (MULTI): ICD-10-CM

## 2023-03-23 DIAGNOSIS — E27.8 ADRENAL NODULE (MULTI): ICD-10-CM

## 2023-03-23 PROBLEM — R07.9 CHEST PAIN: Status: RESOLVED | Noted: 2023-03-22 | Resolved: 2023-03-23

## 2023-03-23 PROBLEM — R41.0 CONFUSION AND DISORIENTATION: Status: RESOLVED | Noted: 2023-03-22 | Resolved: 2023-03-23

## 2023-03-23 PROBLEM — R56.9 OBSERVED SEIZURE-LIKE ACTIVITY (MULTI): Status: RESOLVED | Noted: 2023-03-22 | Resolved: 2023-03-23

## 2023-03-23 PROBLEM — M79.18 MUSCULOSKELETAL PAIN: Status: RESOLVED | Noted: 2023-03-22 | Resolved: 2023-03-23

## 2023-03-23 PROBLEM — R93.89 ABNORMAL FINDING ON IMAGING: Status: RESOLVED | Noted: 2023-03-22 | Resolved: 2023-03-23

## 2023-03-23 PROBLEM — G47.62 NOCTURNAL LEG CRAMPS: Status: RESOLVED | Noted: 2023-03-22 | Resolved: 2023-03-23

## 2023-03-23 PROBLEM — R49.9 VOICE DISTURBANCE: Status: RESOLVED | Noted: 2023-03-22 | Resolved: 2023-03-23

## 2023-03-23 PROBLEM — M54.16 LEFT LUMBAR RADICULOPATHY: Status: RESOLVED | Noted: 2023-03-22 | Resolved: 2023-03-23

## 2023-03-23 PROBLEM — R11.0 MILD NAUSEA: Status: RESOLVED | Noted: 2023-03-13 | Resolved: 2023-03-23

## 2023-03-23 PROBLEM — R10.9 ABDOMINAL PAIN: Status: RESOLVED | Noted: 2023-03-22 | Resolved: 2023-03-23

## 2023-03-23 PROBLEM — Z86.19 HX OF HEPATITIS C: Status: ACTIVE | Noted: 2023-03-23

## 2023-03-23 PROBLEM — E16.2 HYPOGLYCEMIA: Status: RESOLVED | Noted: 2023-03-22 | Resolved: 2023-03-23

## 2023-03-23 PROBLEM — R06.02 SHORTNESS OF BREATH AT REST: Status: RESOLVED | Noted: 2023-03-22 | Resolved: 2023-03-23

## 2023-03-23 PROBLEM — B37.0 THRUSH: Status: RESOLVED | Noted: 2023-03-22 | Resolved: 2023-03-23

## 2023-03-23 PROBLEM — K59.00 CONSTIPATION: Status: RESOLVED | Noted: 2023-03-22 | Resolved: 2023-03-23

## 2023-03-23 PROBLEM — R63.5 WEIGHT GAIN: Status: RESOLVED | Noted: 2023-03-22 | Resolved: 2023-03-23

## 2023-03-23 PROBLEM — K83.8 COMMON BILE DUCT DILATION: Status: RESOLVED | Noted: 2023-03-22 | Resolved: 2023-03-23

## 2023-03-23 PROBLEM — N64.4 BREAST PAIN, LEFT: Status: RESOLVED | Noted: 2023-03-22 | Resolved: 2023-03-23

## 2023-03-23 PROBLEM — R10.11 RIGHT UPPER QUADRANT ABDOMINAL PAIN: Status: RESOLVED | Noted: 2023-03-22 | Resolved: 2023-03-23

## 2023-03-23 LAB
POC APPEARANCE, URINE: CLEAR
POC BILIRUBIN, URINE: NEGATIVE
POC BLOOD, URINE: NEGATIVE
POC COLOR, URINE: YELLOW
POC GLUCOSE, URINE: NEGATIVE MG/DL
POC KETONES, URINE: ABNORMAL MG/DL
POC LEUKOCYTES, URINE: NEGATIVE
POC NITRITE,URINE: NEGATIVE
POC PH, URINE: 5.5 PH
POC PROTEIN, URINE: NEGATIVE MG/DL
POC SPECIFIC GRAVITY, URINE: >=1.03
POC UROBILINOGEN, URINE: 0.2 EU/DL

## 2023-03-23 PROCEDURE — 81002 URINALYSIS NONAUTO W/O SCOPE: CPT | Performed by: FAMILY MEDICINE

## 2023-03-23 PROCEDURE — 99214 OFFICE O/P EST MOD 30 MIN: CPT | Performed by: FAMILY MEDICINE

## 2023-03-23 RX ORDER — CYCLOBENZAPRINE HCL 5 MG
5 TABLET ORAL 3 TIMES DAILY
COMMUNITY
Start: 2021-09-30

## 2023-03-23 RX ORDER — GABAPENTIN 800 MG/1
800 TABLET ORAL 3 TIMES DAILY
COMMUNITY
Start: 2023-03-21

## 2023-03-23 RX ORDER — NITROFURANTOIN 25; 75 MG/1; MG/1
CAPSULE ORAL
COMMUNITY
Start: 2023-03-16 | End: 2023-08-24 | Stop reason: ALTCHOICE

## 2023-03-23 ASSESSMENT — ENCOUNTER SYMPTOMS
CHILLS: 0
HEMATURIA: 0
VOMITING: 0
NAUSEA: 0
FREQUENCY: 1

## 2023-03-23 NOTE — PROGRESS NOTES
Subjective   Patient ID: Malika Casper is a 62 y.o. female who presents for UTI (Patient was at the ER on 3/15 for uti. She was prescribed macrobid with mild relief. She still c/o abdominal/pelvic pain and frequency. She denies any other associated symptoms.).    UTI   This is a new problem. The current episode started in the past 7 days. The problem has been unchanged. Associated symptoms include frequency. Pertinent negatives include no chills, hematuria, hesitancy, nausea or vomiting. She has tried antibiotics for the symptoms. The treatment provided mild relief.        Review of Systems   Constitutional:  Negative for chills.   Gastrointestinal:  Negative for nausea and vomiting.   Genitourinary:  Positive for frequency. Negative for hematuria and hesitancy.     12 Systems have been reviewed as follows.  Constitutional: Fever, weight gain, weight loss, appetite change, night sweats, fatigue, chills.  Eyes : blurry, double vision, vision, loss, tearing, redness, pain, sensitivity to light, glaucoma.  Ears, nose, mouth, and throat: Hearing loss, ringing in the ears, ear pain, nasal congestion, nasal drainage, nosebleeds, mouth, throat, irritation tooth problem.  Cardiovascular :chest pain, pressure, heart racing, palpitations, sweating, leg swelling, high or low blood pressure  Pulmonary: Cough, yellow or green sputum, blood and sputum, shortness of breath, wheezing  Gastrointestinal: Nausea, vomiting, diarrhea, constipation, pain, blood in stool, or vomitus, heartburn, difficulty swallowing  Genitourinary: incontinence, abnormal bleeding, abnormal discharge, urinary frequency, urinary hesitancy, pain, impotence sexual problem, infection, urinary retention  Musculoskeletal: Pain, stiffness, joint, redness or warmth, arthritis, back pain, weakness, muscle wasting, sprain or fracture  Neuro: Weight weakness, dizziness, change in voice, change in taste change in vision, change in hearing, loss, or change of  sensation, trouble walking, balance problems coordination problems, shaking, speech problem  Endocrine , cold or heat intolerance, blood sugar problem, weight gain or loss missed periods hot flashes, sweats, change in body hair, change in libido, increased thirst, increased urination  Heme/lymph: Swelling, bleeding, problem anemia, bruising, enlarged lymph nodes  Allergic/immunologic: H. plus nasal drip, watery itchy eyes, nasal drainage, immunosuppressed  The above were reviewed and noted negative except as noted in HPI and Problem List.      Objective   /76   Pulse 80   Wt 59.9 kg (132 lb)   BMI 24.14 kg/m²     Physical Exam  Constitutional: Well developed, well nourished, alert and in no acute distress   Eyes: Normal external exam. Pupils equally round and reactive to light with normal accommodation and extraocular movements intact.  Neck: Supple, no lymphadenopathy or masses.   Cardiovascular: Regular rate and rhythm, normal S1 and S2, no murmurs, gallops, or rubs. Radial pulses normal. No peripheral edema.  Pulmonary: No respiratory distress, lungs clear to auscultation bilaterally. No wheezes, rhonchi, rales.  Abdomen: soft,non tender, non distended, without masses or HSM  Skin: Warm, well perfused, normal skin turgor and color.   Neurologic: Cranial nerves II-XII grossly intact.   Psychiatric: Mood calm and affect normal  Musculoskeletal: Moving all extremities without restriction      Assessment/Plan   Problem List Items Addressed This Visit          Circulatory    HTN (hypertension) - Primary       Genitourinary    UTI (urinary tract infection)       Musculoskeletal    Claudication, intermittent (CMS/HCC)       Other    Adrenal nodule (CMS/HCC)    Bipolar affective disorder (CMS/HCC)    Schizophrenia (CMS/HCC)        sees Dr. Lam     To ER if CP     see Dr. Levy    see GYN      UA next

## 2023-04-08 DIAGNOSIS — R11.0 MILD NAUSEA: ICD-10-CM

## 2023-04-11 RX ORDER — ONDANSETRON HYDROCHLORIDE 8 MG/1
TABLET, FILM COATED ORAL
Qty: 30 TABLET | Refills: 0 | Status: SHIPPED | OUTPATIENT
Start: 2023-04-11 | End: 2023-04-21

## 2023-04-19 DIAGNOSIS — R11.0 MILD NAUSEA: ICD-10-CM

## 2023-04-20 ENCOUNTER — TELEPHONE (OUTPATIENT)
Dept: PRIMARY CARE | Facility: CLINIC | Age: 63
End: 2023-04-20
Payer: COMMERCIAL

## 2023-04-21 RX ORDER — ONDANSETRON HYDROCHLORIDE 8 MG/1
TABLET, FILM COATED ORAL
Qty: 30 TABLET | Refills: 0 | Status: SHIPPED | OUTPATIENT
Start: 2023-04-21 | End: 2023-04-28 | Stop reason: SDUPTHER

## 2023-04-21 RX ORDER — ONDANSETRON HYDROCHLORIDE 8 MG/1
TABLET, FILM COATED ORAL
Qty: 90 TABLET | Refills: 0 | OUTPATIENT
Start: 2023-04-21

## 2023-04-28 ENCOUNTER — TELEPHONE (OUTPATIENT)
Dept: PRIMARY CARE | Facility: CLINIC | Age: 63
End: 2023-04-28
Payer: COMMERCIAL

## 2023-04-28 DIAGNOSIS — R11.0 MILD NAUSEA: ICD-10-CM

## 2023-04-30 RX ORDER — ONDANSETRON HYDROCHLORIDE 8 MG/1
TABLET, FILM COATED ORAL
Qty: 90 TABLET | Refills: 0 | Status: SHIPPED | OUTPATIENT
Start: 2023-04-30 | End: 2023-05-31

## 2023-05-30 DIAGNOSIS — R11.0 MILD NAUSEA: ICD-10-CM

## 2023-05-31 RX ORDER — ONDANSETRON HYDROCHLORIDE 8 MG/1
TABLET, FILM COATED ORAL
Qty: 90 TABLET | Refills: 0 | Status: SHIPPED | OUTPATIENT
Start: 2023-05-31 | End: 2023-06-29 | Stop reason: SDUPTHER

## 2023-06-26 DIAGNOSIS — R11.0 MILD NAUSEA: ICD-10-CM

## 2023-06-26 DIAGNOSIS — J40 BRONCHITIS: Primary | ICD-10-CM

## 2023-06-26 RX ORDER — CEPHALEXIN 500 MG/1
500 CAPSULE ORAL 3 TIMES DAILY
Qty: 30 CAPSULE | Refills: 0 | Status: SHIPPED | OUTPATIENT
Start: 2023-06-26 | End: 2023-07-06

## 2023-06-26 NOTE — TELEPHONE ENCOUNTER
PT CALLED IN FOR A REFILL ON ANTIBIOTICS FOR UTI    [facility]

## 2023-06-27 RX ORDER — CEPHALEXIN 500 MG/1
CAPSULE ORAL
Qty: 30 CAPSULE | Refills: 0 | OUTPATIENT
Start: 2023-06-27

## 2023-06-29 NOTE — TELEPHONE ENCOUNTER
PT NEEDS ZOFRAN 8MG REFILLED  SHE SAID SHE KNOWS SHE IS EARLY, BUT DOES NOT WANT TO BE WITHOUT THIS ON THE 4TH.  [facility] [place]

## 2023-06-30 RX ORDER — ONDANSETRON HYDROCHLORIDE 8 MG/1
TABLET, FILM COATED ORAL
Qty: 30 TABLET | Refills: 0 | Status: SHIPPED | OUTPATIENT
Start: 2023-06-30 | End: 2023-07-17

## 2023-07-17 DIAGNOSIS — R11.0 MILD NAUSEA: ICD-10-CM

## 2023-07-17 RX ORDER — ONDANSETRON HYDROCHLORIDE 8 MG/1
TABLET, FILM COATED ORAL
Qty: 30 TABLET | Refills: 0 | Status: SHIPPED | OUTPATIENT
Start: 2023-07-17 | End: 2023-08-01 | Stop reason: SDUPTHER

## 2023-07-17 RX ORDER — ONDANSETRON HYDROCHLORIDE 8 MG/1
TABLET, FILM COATED ORAL
Qty: 30 TABLET | Refills: 0 | Status: SHIPPED | OUTPATIENT
Start: 2023-07-17 | End: 2023-08-15 | Stop reason: SDUPTHER

## 2023-07-18 RX ORDER — ONDANSETRON HYDROCHLORIDE 8 MG/1
TABLET, FILM COATED ORAL
Qty: 30 TABLET | Refills: 0 | OUTPATIENT
Start: 2023-07-18

## 2023-07-19 ENCOUNTER — TELEPHONE (OUTPATIENT)
Dept: PRIMARY CARE | Facility: CLINIC | Age: 63
End: 2023-07-19
Payer: COMMERCIAL

## 2023-07-19 DIAGNOSIS — N39.0 URINARY TRACT INFECTION WITHOUT HEMATURIA, SITE UNSPECIFIED: ICD-10-CM

## 2023-07-19 RX ORDER — NYSTATIN 100000 [USP'U]/ML
SUSPENSION ORAL
Qty: 1 ML | Refills: 1 | Status: SHIPPED | OUTPATIENT
Start: 2023-07-19 | End: 2023-08-24 | Stop reason: ALTCHOICE

## 2023-07-19 NOTE — TELEPHONE ENCOUNTER
Pt calling requesting Nystatin mouth rinse   Has a flare up after an oral surgery    Pharmacy- [facility] - [place], [place] - [id number]  [id number], [place] [address]  Phone: [phone]  Fax: [phone]

## 2023-08-01 DIAGNOSIS — R11.0 MILD NAUSEA: ICD-10-CM

## 2023-08-01 NOTE — TELEPHONE ENCOUNTER
PT OF RASHIDA     PT CALLED IN FOR MED REFILL AS NEEDED     ZOFRAN 8 MG     [facility] STORE [facility]

## 2023-08-02 RX ORDER — ONDANSETRON HYDROCHLORIDE 8 MG/1
TABLET, FILM COATED ORAL
Qty: 15 TABLET | Refills: 0 | Status: SHIPPED | OUTPATIENT
Start: 2023-08-02 | End: 2023-08-15 | Stop reason: SDUPTHER

## 2023-08-10 DIAGNOSIS — R11.0 MILD NAUSEA: ICD-10-CM

## 2023-08-10 NOTE — TELEPHONE ENCOUNTER
Dr Cochran pt    Pt phoned office and is requesting refill on    [facility]     Pt is asking for a 90 day supply

## 2023-08-12 RX ORDER — ONDANSETRON HYDROCHLORIDE 8 MG/1
TABLET, FILM COATED ORAL
Qty: 90 TABLET | Refills: 0 | OUTPATIENT
Start: 2023-08-12

## 2023-08-14 DIAGNOSIS — R11.0 MILD NAUSEA: ICD-10-CM

## 2023-08-15 ENCOUNTER — OFFICE VISIT (OUTPATIENT)
Dept: PRIMARY CARE | Facility: CLINIC | Age: 63
End: 2023-08-15
Payer: COMMERCIAL

## 2023-08-15 DIAGNOSIS — R11.0 MILD NAUSEA: Primary | ICD-10-CM

## 2023-08-15 DIAGNOSIS — J00 NASOPHARYNGITIS: ICD-10-CM

## 2023-08-15 DIAGNOSIS — R05.9 COUGH IN ADULT PATIENT: ICD-10-CM

## 2023-08-15 DIAGNOSIS — R68.89 FLU-LIKE SYMPTOMS: ICD-10-CM

## 2023-08-15 DIAGNOSIS — R06.2 WHEEZING: ICD-10-CM

## 2023-08-15 DIAGNOSIS — R09.81 NASAL CONGESTION WITH RHINORRHEA: ICD-10-CM

## 2023-08-15 DIAGNOSIS — J34.89 NASAL CONGESTION WITH RHINORRHEA: ICD-10-CM

## 2023-08-15 PROCEDURE — 99213 OFFICE O/P EST LOW 20 MIN: CPT | Performed by: NURSE PRACTITIONER

## 2023-08-15 PROCEDURE — 3078F DIAST BP <80 MM HG: CPT | Performed by: NURSE PRACTITIONER

## 2023-08-15 PROCEDURE — 87636 SARSCOV2 & INF A&B AMP PRB: CPT

## 2023-08-15 PROCEDURE — 3075F SYST BP GE 130 - 139MM HG: CPT | Performed by: NURSE PRACTITIONER

## 2023-08-15 RX ORDER — ONDANSETRON HYDROCHLORIDE 8 MG/1
TABLET, FILM COATED ORAL
Qty: 30 TABLET | Refills: 0 | Status: SHIPPED | OUTPATIENT
Start: 2023-08-15 | End: 2023-08-24 | Stop reason: SDUPTHER

## 2023-08-15 RX ORDER — ONDANSETRON HYDROCHLORIDE 8 MG/1
TABLET, FILM COATED ORAL
Qty: 30 TABLET | Refills: 0 | OUTPATIENT
Start: 2023-08-15

## 2023-08-15 RX ORDER — ALBUTEROL SULFATE 90 UG/1
2 AEROSOL, METERED RESPIRATORY (INHALATION) EVERY 4 HOURS PRN
Qty: 6.7 G | Refills: 0 | Status: SHIPPED | OUTPATIENT
Start: 2023-08-15 | End: 2023-10-09 | Stop reason: ALTCHOICE

## 2023-08-15 RX ORDER — CEPHALEXIN 500 MG/1
500 CAPSULE ORAL 2 TIMES DAILY
Qty: 20 CAPSULE | Refills: 0 | Status: SHIPPED | OUTPATIENT
Start: 2023-08-15 | End: 2023-08-25

## 2023-08-15 RX ORDER — ONDANSETRON HYDROCHLORIDE 8 MG/1
TABLET, FILM COATED ORAL
Qty: 90 TABLET | Refills: 0 | OUTPATIENT
Start: 2023-08-15

## 2023-08-15 ASSESSMENT — ENCOUNTER SYMPTOMS
FATIGUE: 1
FLU SYMPTOMS: 1
COUGH: 1

## 2023-08-15 ASSESSMENT — PATIENT HEALTH QUESTIONNAIRE - PHQ9
1. LITTLE INTEREST OR PLEASURE IN DOING THINGS: NOT AT ALL
SUM OF ALL RESPONSES TO PHQ9 QUESTIONS 1 AND 2: 0
2. FEELING DOWN, DEPRESSED OR HOPELESS: NOT AT ALL

## 2023-08-15 NOTE — PROGRESS NOTES
Subjective   Patient ID: Malika Casper is a 62 y.o. female who is with complaint of flu like symptoms.    HPI  Patient is a 62 y.o. female who CONSULTED AT [facility] today. Patient is with complaints of nasal congestion, nasal discharge, sore throat, post nasal drip, cough, intermittent wheezing, fatigue, muscle ache, loss of sense of taste, intermittent diarrhea, nausea, mild hoarseness, chills and fever. She denies having headache / sinus pain, loss of sense of smell, nor vomiting. Patient states that present condition started about 2 days ago after being exposed to her boyfriend who is having similar symptoms. she denies shortness of breath, chest pain, palpitations, nor edema. she stated that she  tried OTC medications which afforded only slight relief of symptoms. she denies nausea, vomiting, abdominal pain, nor any other symptoms. Patient states that a few months ago, she had the same symptoms and her PCP gave Keflex which worked very well.    Patient states she had her COVID vaccine.  Patient states she had the flu shot for this season.    Review of Systems  General: no weight loss, generally healthy, (+) fatigue  Head:  no headaches / sinus pain, no vertigo, no injury  Eyes: no diplopia, no tearing, no pain,   Ears: no change in hearing, no tinnitus, no bleeding, no vertigo  Mouth:  no dental difficulties, no gingival bleeding, (+) sore throat, (+) loss of sense of taste, (+) post nasal drip, (+) mild hoarseness  Nose: (+) congestion, (+) discharge, no bleeding, no obstruction, no loss of sense of smell  Neck: no stiffness, no pain, no tenderness, no masses, no bruit  Pulmonary: no dyspnea, (+) intermittent wheezing, no hemoptysis, no cough  Cardiovascular: no chest pain, no palpitations, no syncope, no orthopnea  Gastrointestinal: no change in appetite, no dysphagia, no abdominal pains, (+) intermittent diarrhea, (+) nausea, no emesis, no melena  Genito Urinary: no dysuria, no  urinary urgency, no nocturia, no incontinence, no change in nature of urine  Musculoskeletal: (+) muscle ache, no joint pain, no limitation of range of motion, no paresthesia, no numbness  Constitutional: (+) fever, (+) chills, no night sweats    Objective   Physical Exam  General: ambulatory, in no acute distress  Head: normocephalic, no lesions, no sinus tenderness  Eyes: pink palpebral conjunctiva, anicteric sclerae, PERRLA, EOM's full  Ears: clear external auditory canals, no ear discharge, no bleeding from the ears, tympanic membrane intact  Nose: (+) congested nasal mucosa, (+) yellow mucoid nasal discharge, no bleeding, no obstruction  Throat: (+) erythema, and (+) exudate on posterior pharyngeal wall, no lesion  Neck: supple, no masses, no bruits, no CLADP  Chest: symmetrical chest expansion, no lagging, no retractions, clear breath sounds, no rales, (+) intermittent wheezes  Heart: regular rate, normal rhythm, no heaves, no thrills, no murmurs    Assessment/Plan   Problem List Items Addressed This Visit    None  Visit Diagnoses       Mild nausea    -  Primary    Relevant Medications    ondansetron (Zofran) 8 mg tablet    Other Relevant Orders    Sars-CoV-2 PCR, Symptomatic    Influenza A, and B PCR    Flu-like symptoms        Relevant Orders    Sars-CoV-2 PCR, Symptomatic    Influenza A, and B PCR    Cough in adult patient        Relevant Medications    cephalexin (Keflex) 500 mg capsule    Other Relevant Orders    Sars-CoV-2 PCR, Symptomatic    Influenza A, and B PCR    Nasal congestion with rhinorrhea        Relevant Medications    cephalexin (Keflex) 500 mg capsule    Other Relevant Orders    Sars-CoV-2 PCR, Symptomatic    Influenza A, and B PCR    Nasopharyngitis        Relevant Medications    cephalexin (Keflex) 500 mg capsule    Other Relevant Orders    Sars-CoV-2 PCR, Symptomatic    Influenza A, and B PCR    Wheezing        Relevant Medications    albuterol (Proventil HFA) 90 mcg/actuation inhaler         DISCHARGE SUMMARY:   Diagnosis, treatment, treatment options, and possible complications of today's illness discussed and explained to patient. Patient to take medication/s associated with this visit. Patient may also take OTC analgesic/antipyretic if needed for pain/fever. Advised to increase oral fluid intake. Advised steam inhalation if needed to relieve congestion. Advised warm saline gargle if needed to relieve throat discomfort.  Advised Listerine antiseptic mouthwash gargle TID. Patient may use Cepacol oral spray as needed to relieve throat discomfort. Patient was advised to discard the old toothbrush and use a new toothbrush beginning on the third of antibiotics. Advised to follow instructions with regards to COVID testing. Advised on social distancing and communicability prevention. Patient to call back if with worsening or persistent symptoms. Patient verbalized understanding of plan of care.    Patient to come back in 7 - 10 days if needed for worsening symptoms.

## 2023-08-15 NOTE — PROGRESS NOTES
"Subjective   Patient ID: Malika Csaper is a 62 y.o. female who presents for Flu Symptoms.    Flu Symptoms  This is a new problem. The current episode started yesterday. The problem occurs constantly. Associated symptoms include chest pain, congestion, coughing and fatigue. The symptoms are aggravated by coughing. She has tried nothing for the symptoms. The treatment provided no relief.        Review of Systems   Constitutional:  Positive for fatigue.   HENT:  Positive for congestion.    Respiratory:  Positive for cough.    Cardiovascular:  Positive for chest pain.       Objective   BP (!) 174/118   Pulse 98   Temp 36.3 °C (97.3 °F) (Temporal)   Resp 16   Ht 1.575 m (5' 2\")   Wt 60.1 kg (132 lb 6.4 oz)   SpO2 95%   BMI 24.22 kg/m²     Physical Exam    Assessment/Plan          "

## 2023-08-15 NOTE — TELEPHONE ENCOUNTER
Pt sched w/ CB on 8/24.   Pt wants to know if you can send over a short script for the Zofran until appt date.    [facility]

## 2023-08-16 ENCOUNTER — DOCUMENTATION (OUTPATIENT)
Dept: PRIMARY CARE | Facility: CLINIC | Age: 63
End: 2023-08-16
Payer: COMMERCIAL

## 2023-08-16 VITALS
RESPIRATION RATE: 16 BRPM | HEIGHT: 62 IN | OXYGEN SATURATION: 95 % | WEIGHT: 132.4 LBS | TEMPERATURE: 97.3 F | HEART RATE: 98 BPM | DIASTOLIC BLOOD PRESSURE: 79 MMHG | BODY MASS INDEX: 24.37 KG/M2 | SYSTOLIC BLOOD PRESSURE: 130 MMHG

## 2023-08-16 LAB
FLU A RESULT: NOT DETECTED
FLU B RESULT: NOT DETECTED
SARS-COV-2 RESULT: NOT DETECTED

## 2023-08-16 ASSESSMENT — ENCOUNTER SYMPTOMS
LOSS OF SENSATION IN FEET: 0
DEPRESSION: 0
OCCASIONAL FEELINGS OF UNSTEADINESS: 0

## 2023-08-16 NOTE — PATIENT INSTRUCTIONS
DISCHARGE SUMMARY:   Diagnosis, treatment, treatment options, and possible complications of today's illness discussed and explained to patient. Patient to take medication/s associated with this visit. Patient may also take OTC analgesic/antipyretic if needed for pain/fever. Advised to increase oral fluid intake. Advised steam inhalation if needed to relieve congestion. Advised warm saline gargle if needed to relieve throat discomfort.  Advised Listerine antiseptic mouthwash gargle TID. Patient may use Cepacol oral spray as needed to relieve throat discomfort. Patient was advised to discard the old toothbrush and use a new toothbrush beginning on the third of antibiotics. Advised to follow instructions with regards to COVID testing. Advised on social distancing and communicability prevention. Patient to call back if with worsening or persistent symptoms. Patient verbalized understanding of plan of care.    Patient to come back in 7 - 10 days if needed for worsening symptoms.

## 2023-08-24 ENCOUNTER — LAB (OUTPATIENT)
Dept: LAB | Facility: LAB | Age: 63
End: 2023-08-24
Payer: COMMERCIAL

## 2023-08-24 ENCOUNTER — OFFICE VISIT (OUTPATIENT)
Dept: PRIMARY CARE | Facility: CLINIC | Age: 63
End: 2023-08-24
Payer: COMMERCIAL

## 2023-08-24 VITALS
HEART RATE: 72 BPM | BODY MASS INDEX: 24.29 KG/M2 | WEIGHT: 132 LBS | RESPIRATION RATE: 16 BRPM | SYSTOLIC BLOOD PRESSURE: 130 MMHG | OXYGEN SATURATION: 98 % | DIASTOLIC BLOOD PRESSURE: 70 MMHG | HEIGHT: 62 IN

## 2023-08-24 DIAGNOSIS — R11.0 MILD NAUSEA: ICD-10-CM

## 2023-08-24 DIAGNOSIS — E78.2 MIXED HYPERLIPIDEMIA: ICD-10-CM

## 2023-08-24 DIAGNOSIS — R53.83 FATIGUE, UNSPECIFIED TYPE: ICD-10-CM

## 2023-08-24 DIAGNOSIS — E55.9 VITAMIN D DEFICIENCY: ICD-10-CM

## 2023-08-24 DIAGNOSIS — I10 HYPERTENSION, UNSPECIFIED TYPE: Primary | ICD-10-CM

## 2023-08-24 DIAGNOSIS — F31.9 BIPOLAR AFFECTIVE DISORDER, REMISSION STATUS UNSPECIFIED (MULTI): ICD-10-CM

## 2023-08-24 DIAGNOSIS — F32.A DEPRESSION, UNSPECIFIED DEPRESSION TYPE: ICD-10-CM

## 2023-08-24 DIAGNOSIS — I10 HYPERTENSION, UNSPECIFIED TYPE: ICD-10-CM

## 2023-08-24 PROBLEM — B18.2 HEP C W/O COMA, CHRONIC (MULTI): Status: ACTIVE | Noted: 2017-04-22

## 2023-08-24 PROBLEM — L30.9 ECZEMA: Status: ACTIVE | Noted: 2017-04-22

## 2023-08-24 PROBLEM — N39.0 UTI (URINARY TRACT INFECTION): Status: RESOLVED | Noted: 2023-03-22 | Resolved: 2023-08-24

## 2023-08-24 PROBLEM — F43.21 ADJUSTMENT DISORDER WITH DEPRESSED MOOD: Status: ACTIVE | Noted: 2023-04-03

## 2023-08-24 PROBLEM — R79.89 ELEVATED LFTS: Status: ACTIVE | Noted: 2017-06-22

## 2023-08-24 PROBLEM — E87.6 HYPOKALEMIA: Status: RESOLVED | Noted: 2023-03-22 | Resolved: 2023-08-24

## 2023-08-24 LAB
ALANINE AMINOTRANSFERASE (SGPT) (U/L) IN SER/PLAS: 15 U/L (ref 7–45)
ALBUMIN (G/DL) IN SER/PLAS: 4.8 G/DL (ref 3.4–5)
ALKALINE PHOSPHATASE (U/L) IN SER/PLAS: 84 U/L (ref 33–136)
ANION GAP IN SER/PLAS: 14 MMOL/L (ref 10–20)
ASPARTATE AMINOTRANSFERASE (SGOT) (U/L) IN SER/PLAS: 18 U/L (ref 9–39)
BASOPHILS (10*3/UL) IN BLOOD BY AUTOMATED COUNT: 0.06 X10E9/L (ref 0–0.1)
BASOPHILS/100 LEUKOCYTES IN BLOOD BY AUTOMATED COUNT: 0.7 % (ref 0–2)
BILIRUBIN TOTAL (MG/DL) IN SER/PLAS: 0.5 MG/DL (ref 0–1.2)
CALCIDIOL (25 OH VITAMIN D3) (NG/ML) IN SER/PLAS: 50 NG/ML
CALCIUM (MG/DL) IN SER/PLAS: 10.1 MG/DL (ref 8.6–10.3)
CARBON DIOXIDE, TOTAL (MMOL/L) IN SER/PLAS: 27 MMOL/L (ref 21–32)
CHLORIDE (MMOL/L) IN SER/PLAS: 103 MMOL/L (ref 98–107)
CHOLESTEROL (MG/DL) IN SER/PLAS: 222 MG/DL (ref 0–199)
CHOLESTEROL IN HDL (MG/DL) IN SER/PLAS: 68.2 MG/DL
CHOLESTEROL/HDL RATIO: 3.3
CREATININE (MG/DL) IN SER/PLAS: 0.76 MG/DL (ref 0.5–1.05)
EOSINOPHILS (10*3/UL) IN BLOOD BY AUTOMATED COUNT: 0.21 X10E9/L (ref 0–0.7)
EOSINOPHILS/100 LEUKOCYTES IN BLOOD BY AUTOMATED COUNT: 2.3 % (ref 0–6)
ERYTHROCYTE DISTRIBUTION WIDTH (RATIO) BY AUTOMATED COUNT: 14.3 % (ref 11.5–14.5)
ERYTHROCYTE MEAN CORPUSCULAR HEMOGLOBIN CONCENTRATION (G/DL) BY AUTOMATED: 33.3 G/DL (ref 32–36)
ERYTHROCYTE MEAN CORPUSCULAR VOLUME (FL) BY AUTOMATED COUNT: 98 FL (ref 80–100)
ERYTHROCYTES (10*6/UL) IN BLOOD BY AUTOMATED COUNT: 4.49 X10E12/L (ref 4–5.2)
GFR FEMALE: 89 ML/MIN/1.73M2
GLUCOSE (MG/DL) IN SER/PLAS: 89 MG/DL (ref 74–99)
HEMATOCRIT (%) IN BLOOD BY AUTOMATED COUNT: 43.9 % (ref 36–46)
HEMOGLOBIN (G/DL) IN BLOOD: 14.6 G/DL (ref 12–16)
IMMATURE GRANULOCYTES/100 LEUKOCYTES IN BLOOD BY AUTOMATED COUNT: 0.3 % (ref 0–0.9)
LDL: 136 MG/DL (ref 0–99)
LEUKOCYTES (10*3/UL) IN BLOOD BY AUTOMATED COUNT: 9.1 X10E9/L (ref 4.4–11.3)
LYMPHOCYTES (10*3/UL) IN BLOOD BY AUTOMATED COUNT: 2.14 X10E9/L (ref 1.2–4.8)
LYMPHOCYTES/100 LEUKOCYTES IN BLOOD BY AUTOMATED COUNT: 23.6 % (ref 13–44)
MONOCYTES (10*3/UL) IN BLOOD BY AUTOMATED COUNT: 0.67 X10E9/L (ref 0.1–1)
MONOCYTES/100 LEUKOCYTES IN BLOOD BY AUTOMATED COUNT: 7.4 % (ref 2–10)
NEUTROPHILS (10*3/UL) IN BLOOD BY AUTOMATED COUNT: 5.97 X10E9/L (ref 1.2–7.7)
NEUTROPHILS/100 LEUKOCYTES IN BLOOD BY AUTOMATED COUNT: 65.7 % (ref 40–80)
PLATELETS (10*3/UL) IN BLOOD AUTOMATED COUNT: 288 X10E9/L (ref 150–450)
POTASSIUM (MMOL/L) IN SER/PLAS: 4.2 MMOL/L (ref 3.5–5.3)
PROTEIN TOTAL: 7.7 G/DL (ref 6.4–8.2)
SODIUM (MMOL/L) IN SER/PLAS: 140 MMOL/L (ref 136–145)
THYROTROPIN (MIU/L) IN SER/PLAS BY DETECTION LIMIT <= 0.05 MIU/L: 1.36 MIU/L (ref 0.44–3.98)
TRIGLYCERIDE (MG/DL) IN SER/PLAS: 90 MG/DL (ref 0–149)
UREA NITROGEN (MG/DL) IN SER/PLAS: 15 MG/DL (ref 6–23)
VLDL: 18 MG/DL (ref 0–40)

## 2023-08-24 PROCEDURE — 99214 OFFICE O/P EST MOD 30 MIN: CPT | Performed by: FAMILY MEDICINE

## 2023-08-24 PROCEDURE — 80053 COMPREHEN METABOLIC PANEL: CPT

## 2023-08-24 PROCEDURE — 84443 ASSAY THYROID STIM HORMONE: CPT

## 2023-08-24 PROCEDURE — 82306 VITAMIN D 25 HYDROXY: CPT

## 2023-08-24 PROCEDURE — 80061 LIPID PANEL: CPT

## 2023-08-24 PROCEDURE — 36415 COLL VENOUS BLD VENIPUNCTURE: CPT

## 2023-08-24 PROCEDURE — 85025 COMPLETE CBC W/AUTO DIFF WBC: CPT

## 2023-08-24 RX ORDER — PANTOPRAZOLE SODIUM 40 MG/1
40 TABLET, DELAYED RELEASE ORAL
COMMUNITY
Start: 2023-04-08 | End: 2023-10-09 | Stop reason: ALTCHOICE

## 2023-08-24 RX ORDER — LISINOPRIL 10 MG/1
10 TABLET ORAL DAILY
Qty: 90 TABLET | Refills: 1 | Status: SHIPPED | OUTPATIENT
Start: 2023-08-24 | End: 2023-12-19 | Stop reason: SDUPTHER

## 2023-08-24 RX ORDER — ONDANSETRON HYDROCHLORIDE 8 MG/1
8 TABLET, FILM COATED ORAL EVERY 8 HOURS PRN
Qty: 60 TABLET | Refills: 0 | Status: SHIPPED | OUTPATIENT
Start: 2023-08-24 | End: 2023-09-11 | Stop reason: SDUPTHER

## 2023-08-24 ASSESSMENT — ENCOUNTER SYMPTOMS
SEIZURES: 0
DYSURIA: 0
HEMATURIA: 0
SHORTNESS OF BREATH: 0
SLEEP DISTURBANCE: 0
COUGH: 0
CONSTIPATION: 0
DECREASED CONCENTRATION: 0
DYSPHORIC MOOD: 0
DIZZINESS: 0
DIARRHEA: 0
HYPERTENSION: 1
BLOOD IN STOOL: 0
FLANK PAIN: 0
SINUS PAIN: 0
PALPITATIONS: 0
CHEST TIGHTNESS: 0
FEVER: 0
POLYDIPSIA: 0
POLYPHAGIA: 0
ABDOMINAL DISTENTION: 0
FATIGUE: 0
RHINORRHEA: 0
SPEECH DIFFICULTY: 0
ARTHRALGIAS: 0
EYE PAIN: 0
SINUS PRESSURE: 0
PHOTOPHOBIA: 0
ADENOPATHY: 0
CONSTITUTIONAL NEGATIVE: 1
AGITATION: 0
RECTAL PAIN: 0
APPETITE CHANGE: 0
NERVOUS/ANXIOUS: 1
HEADACHES: 0
SORE THROAT: 0
STRIDOR: 0
ABDOMINAL PAIN: 0
MYALGIAS: 0
COLOR CHANGE: 0
NECK STIFFNESS: 0
TROUBLE SWALLOWING: 0
ACTIVITY CHANGE: 0
CONFUSION: 0

## 2023-08-24 NOTE — PROGRESS NOTES
Subjective   Patient ID: Malika Casper is a 62 y.o. female who presents for Hypertension ( Pt is currently taking Lisinopril and states she does not check her BP home).    Hypertension  This is a recurrent problem. The current episode started more than 1 month ago. The problem is unchanged. The problem is controlled. Pertinent negatives include no chest pain, headaches, palpitations or shortness of breath. Risk factors for coronary artery disease include dyslipidemia and stress. Past treatments include ACE inhibitors. The current treatment provides moderate improvement. There are no compliance problems.        Review of Systems   Constitutional: Negative.  Negative for activity change, appetite change, fatigue and fever.   HENT:  Negative for congestion, dental problem, ear discharge, ear pain, mouth sores, rhinorrhea, sinus pressure, sinus pain, sore throat, tinnitus and trouble swallowing.    Eyes:  Negative for photophobia, pain and visual disturbance.   Respiratory:  Negative for cough, chest tightness, shortness of breath and stridor.    Cardiovascular:  Negative for chest pain and palpitations.   Gastrointestinal:  Negative for abdominal distention, abdominal pain, blood in stool, constipation, diarrhea and rectal pain.   Endocrine: Negative for cold intolerance, heat intolerance, polydipsia, polyphagia and polyuria.   Genitourinary:  Negative for dysuria, flank pain, hematuria and urgency.   Musculoskeletal:  Negative for arthralgias, gait problem, myalgias and neck stiffness.   Skin:  Negative for color change and rash.   Allergic/Immunologic: Negative for environmental allergies and food allergies.   Neurological:  Negative for dizziness, seizures, syncope, speech difficulty and headaches.   Hematological:  Negative for adenopathy.   Psychiatric/Behavioral:  Negative for agitation, confusion, decreased concentration, dysphoric mood and sleep disturbance. The patient is nervous/anxious.   "      Objective   /70 (BP Location: Left arm, Patient Position: Sitting, BP Cuff Size: Adult)   Pulse 72   Resp 16   Ht 1.575 m (5' 2\")   Wt 59.9 kg (132 lb)   SpO2 98%   BMI 24.14 kg/m²     Physical Exam  Constitutional:       Appearance: Normal appearance.   HENT:      Head: Normocephalic and atraumatic.      Right Ear: Tympanic membrane, ear canal and external ear normal.      Left Ear: Tympanic membrane, ear canal and external ear normal.      Nose: Nose normal.      Mouth/Throat:      Mouth: Mucous membranes are moist.      Pharynx: Oropharynx is clear.   Eyes:      Extraocular Movements: Extraocular movements intact.      Conjunctiva/sclera: Conjunctivae normal.      Pupils: Pupils are equal, round, and reactive to light.   Cardiovascular:      Rate and Rhythm: Normal rate and regular rhythm.      Pulses: Normal pulses.      Heart sounds: Normal heart sounds.   Pulmonary:      Effort: Pulmonary effort is normal.      Breath sounds: Normal breath sounds.   Abdominal:      General: Abdomen is flat. Bowel sounds are normal.      Palpations: Abdomen is soft.   Musculoskeletal:         General: Normal range of motion.      Cervical back: Normal range of motion and neck supple.   Skin:     General: Skin is warm and dry.   Neurological:      General: No focal deficit present.      Mental Status: She is alert and oriented to person, place, and time.   Psychiatric:         Mood and Affect: Mood normal.         Behavior: Behavior normal.         Thought Content: Thought content normal.         Judgment: Judgment normal.         Assessment/Plan   Problem List Items Addressed This Visit       Bipolar affective disorder (CMS/HCC)    Relevant Orders    Follow Up In [facility] - PCP - Established    Fatigue    Relevant Orders    Thyroid Stimulating Hormone    Follow Up [facility] - PCP - Established    Depression    Relevant Orders    Follow Up [facility] - PCP - Established "    HTN (hypertension) - Primary    Relevant Medications    lisinopril 10 mg tablet    Other Relevant Orders    CBC and Auto Differential    Comprehensive Metabolic Panel    Lipid Panel    Follow Up In [facility] - PCP - Established    Hyperlipidemia    Relevant Orders    CBC and Auto Differential    Comprehensive Metabolic Panel    Lipid Panel    Follow Up [facility] - PCP - Established    Vitamin D deficiency    Relevant Orders    Vitamin D, Total    Follow Up [facility] - PCP - Established     Other Visit Diagnoses       Mild nausea        Relevant Medications    ondansetron (Zofran) 8 mg tablet    Other Relevant Orders    Follow Up In [facility] - PCP - Established          Patient was advised importance of proper diet/nutrition in addition adequate hydration. Patient was encouraged moderate exercise program to include 30 minutes daily for 5 days of the week or 150 minutes weekly. Patient will follow-up with us as scheduled.     I have personally reviewed the OAR[facility] report with the patient and have considered the risk of abuse, addiction, dependence and diversion.     Patient's use of medication is allowing patient to be able to perform  ADL's. Patient is always being evaluated for the possibility of lowering the medication dosage.     continue all current medications and therapy for chronic medical conditions     Fasting bw ordered.     sees Dr. Lam      To ER if CP      see Dr. Levy     see GYN     UA next     Scribe Attestation  By signing my name below, I, GE Taylor   , Lorena   attest that this documentation has been prepared under the direction and in the presence of Asaf Cochran MD.     Provider Attestation - Scribe documentation    All medical record entries made by the Scribe were at my direction and personally dictated by me. I have reviewed the chart and agree that the record accurately reflects my personal performance of the  history, physical exam, discussion and plan.

## 2023-09-07 ENCOUNTER — TELEPHONE (OUTPATIENT)
Dept: PRIMARY CARE | Facility: CLINIC | Age: 63
End: 2023-09-07
Payer: COMMERCIAL

## 2023-09-07 DIAGNOSIS — G89.4 CHRONIC PAIN SYNDROME: ICD-10-CM

## 2023-09-07 DIAGNOSIS — M25.532 LEFT WRIST PAIN: ICD-10-CM

## 2023-09-07 DIAGNOSIS — G89.29 OTHER CHRONIC PAIN: ICD-10-CM

## 2023-09-07 NOTE — TELEPHONE ENCOUNTER
Patient of Dr. Cochran    Patient was seen in ER with  arm pain. It is her left arm by her wrist and has a knot in her arm and traveling up her arm and very painful. She is worried about it being blood clot.  I told  her if it is getting worse she might want to go to ER. She wanted me to put message in to DR. Cochran first    She stated they gave her a shot in ER and it helped with pain and they only gave her one tablet to help.     She is asking for something to help with arm pain

## 2023-09-08 RX ORDER — PREDNISONE 10 MG/1
TABLET ORAL
Qty: 30 TABLET | Refills: 0 | Status: SHIPPED | OUTPATIENT
Start: 2023-09-08 | End: 2023-09-19 | Stop reason: SDUPTHER

## 2023-09-11 DIAGNOSIS — R11.0 MILD NAUSEA: ICD-10-CM

## 2023-09-12 RX ORDER — ONDANSETRON HYDROCHLORIDE 8 MG/1
8 TABLET, FILM COATED ORAL EVERY 8 HOURS PRN
Qty: 45 TABLET | Refills: 0 | Status: SHIPPED | OUTPATIENT
Start: 2023-09-12 | End: 2023-10-03

## 2023-09-15 DIAGNOSIS — R11.0 MILD NAUSEA: ICD-10-CM

## 2023-09-15 RX ORDER — ONDANSETRON HYDROCHLORIDE 8 MG/1
8 TABLET, FILM COATED ORAL EVERY 8 HOURS PRN
Qty: 45 TABLET | Refills: 0 | OUTPATIENT
Start: 2023-09-15

## 2023-09-19 ENCOUNTER — OFFICE VISIT (OUTPATIENT)
Dept: PRIMARY CARE | Facility: CLINIC | Age: 63
End: 2023-09-19
Payer: COMMERCIAL

## 2023-09-19 VITALS
SYSTOLIC BLOOD PRESSURE: 126 MMHG | HEART RATE: 105 BPM | BODY MASS INDEX: 21.71 KG/M2 | DIASTOLIC BLOOD PRESSURE: 74 MMHG | OXYGEN SATURATION: 98 % | HEIGHT: 62 IN | RESPIRATION RATE: 16 BRPM | WEIGHT: 118 LBS

## 2023-09-19 DIAGNOSIS — F41.9 ANXIETY: ICD-10-CM

## 2023-09-19 DIAGNOSIS — F31.9 BIPOLAR AFFECTIVE DISORDER, REMISSION STATUS UNSPECIFIED (MULTI): ICD-10-CM

## 2023-09-19 DIAGNOSIS — I10 PRIMARY HYPERTENSION: ICD-10-CM

## 2023-09-19 DIAGNOSIS — B18.2 HEP C W/O COMA, CHRONIC (MULTI): ICD-10-CM

## 2023-09-19 DIAGNOSIS — M16.12 PRIMARY OSTEOARTHRITIS OF LEFT HIP: ICD-10-CM

## 2023-09-19 DIAGNOSIS — I73.9 CLAUDICATION, INTERMITTENT (CMS-HCC): ICD-10-CM

## 2023-09-19 DIAGNOSIS — M10.9 GOUT, ARTHRITIS: ICD-10-CM

## 2023-09-19 DIAGNOSIS — G89.4 CHRONIC PAIN SYNDROME: ICD-10-CM

## 2023-09-19 DIAGNOSIS — F20.9 SCHIZOPHRENIA, UNSPECIFIED TYPE (MULTI): ICD-10-CM

## 2023-09-19 DIAGNOSIS — M25.532 LEFT WRIST PAIN: ICD-10-CM

## 2023-09-19 DIAGNOSIS — M10.9 GOUT OF LEFT ELBOW, UNSPECIFIED CAUSE, UNSPECIFIED CHRONICITY: ICD-10-CM

## 2023-09-19 PROCEDURE — 99214 OFFICE O/P EST MOD 30 MIN: CPT | Performed by: FAMILY MEDICINE

## 2023-09-19 RX ORDER — DICLOFENAC SODIUM 10 MG/G
GEL TOPICAL
COMMUNITY
Start: 2023-09-12 | End: 2023-12-19 | Stop reason: ALTCHOICE

## 2023-09-19 RX ORDER — LIDOCAINE 560 MG/1
PATCH PERCUTANEOUS; TOPICAL; TRANSDERMAL DAILY
COMMUNITY
Start: 2023-09-15

## 2023-09-19 RX ORDER — PREDNISONE 10 MG/1
TABLET ORAL
Qty: 30 TABLET | Refills: 0 | Status: SHIPPED | OUTPATIENT
Start: 2023-09-19 | End: 2023-09-30 | Stop reason: SDUPTHER

## 2023-09-19 RX ORDER — COLCHICINE 0.6 MG/1
0.6 CAPSULE ORAL 2 TIMES DAILY
COMMUNITY
Start: 2023-09-15 | End: 2023-10-09

## 2023-09-19 NOTE — PROGRESS NOTES
Subjective   Patient ID: Malika Casper is a 62 y.o. female who presents for Arm Pain and Hospital Follow-up.    Arm Pain          She went to [facility] on 9/12/23 and 9/15/23 for arm and leg pain. She has CT pelvis wo IV contrast, XR of pelvis, sacrum and wrist. She was given prednisone, colichine and diclofenac. Prednisone was picked up but the other two was not.    Patient is here for left arm pain x  for few weeks.  She believes the pain is due to gout. She has swelling on the thumb joint. Her elbow was red.    She has left leg pain in the groin and behind the knee x 2 days.     She has plates in the left ankle and can not walk. She could not get up her stairs yesterday to go to the bathroom. She did drive here. She has very cold left foot.     She alvin pain in the RUQ region where gall bladder was taken.    Review of Systems  12 Systems have been reviewed as follows.  Constitutional: Fever, weight gain, weight loss, appetite change, night sweats, fatigue, chills.  Eyes : blurry, double vision, vision, loss, tearing, redness, pain, sensitivity to light, glaucoma.  Ears, nose, mouth, and throat: Hearing loss, ringing in the ears, ear pain, nasal congestion, nasal drainage, nosebleeds, mouth, throat, irritation tooth problem.  Cardiovascular :chest pain, pressure, heart racing, palpitations, sweating, leg swelling, high or low blood pressure  Pulmonary: Cough, yellow or green sputum, blood and sputum, shortness of breath, wheezing  Gastrointestinal: Nausea, vomiting, diarrhea, constipation, pain, blood in stool, or vomitus, heartburn, difficulty swallowing  Genitourinary: incontinence, abnormal bleeding, abnormal discharge, urinary frequency, urinary hesitancy, pain, impotence sexual problem, infection, urinary retention  Musculoskeletal: Pain, stiffness, joint, redness or warmth, arthritis, back pain, weakness, muscle wasting, sprain or fracture  Neuro: Weight weakness, dizziness, change in voice, change in taste  "change in vision, change in hearing, loss, or change of sensation, trouble walking, balance problems coordination problems, shaking, speech problem  Endocrine , cold or heat intolerance, blood sugar problem, weight gain or loss missed periods hot flashes, sweats, change in body hair, change in libido, increased thirst, increased urination  Heme/lymph: Swelling, bleeding, problem anemia, bruising, enlarged lymph nodes  Allergic/immunologic: H. plus nasal drip, watery itchy eyes, nasal drainage, immunosuppressed  The above were reviewed and noted negative except as noted in HPI and Problem List.      Objective   /74 (BP Location: Left arm, Patient Position: Sitting, BP Cuff Size: Adult)   Pulse 105   Resp 16   Ht 1.575 m (5' 2\")   Wt 53.5 kg (118 lb)   SpO2 98%   BMI 21.58 kg/m²     Physical Exam  Constitutional: Well developed, well nourished, alert and in no acute distress   Eyes: Normal external exam. Pupils equally round and reactive to light with normal accommodation and extraocular movements intact.  Neck: Supple, no lymphadenopathy or masses.   Cardiovascular: Regular rate and rhythm, normal S1 and S2, no murmurs, gallops, or rubs. Radial pulses normal. No peripheral edema.  Pulmonary: No respiratory distress, lungs clear to auscultation bilaterally. No wheezes, rhonchi, rales.  Abdomen: soft,non tender, non distended, without masses or HSM  Skin: Warm, well perfused, normal skin turgor and color.   Neurologic: Cranial nerves II-XII grossly intact.   Psychiatric: Mood calm and affect normal  Musculoskeletal: Moving all extremities without restriction    Assessment/Plan   Problem List Items Addressed This Visit       Anxiety    Relevant Orders    Follow Up In [facility] - PCP - Established    Chronic pain syndrome    Relevant Medications    predniSONE (Deltasone) 10 mg tablet    Other Relevant Orders    Follow Up In [facility] - PCP - Established    Bipolar affective disorder " (CMS/[facility])    Relevant Orders    Follow Up In [facility] - PCP - Established    Claudication, intermittent (CMS/[facility])    Relevant Orders    Follow Up In [facility] - PCP - Established    HTN (hypertension)    Relevant Orders    Follow Up In [facility] - PCP - Established    Schizophrenia (CMS/[facility])    Relevant Orders    Follow Up In [facility] - PCP - Established    Primary osteoarthritis of left hip    Relevant Orders    Follow Up In [facility] - PCP - Established    Hep C w/o coma, chronic (CMS/[facility])     Continue current medications and therapy for chronic medical conditions           Gout of elbow    Relevant Orders    Follow Up In [facility] - PCP - Established    Gout, arthritis    Relevant Orders    Follow Up In [facility] - PCP - Established    Left wrist pain    Relevant Medications    predniSONE (Deltasone) 10 mg tablet    Other Relevant Orders    Follow Up In [facility] - PCP - Established          Continue current medications and therapy for chronic medical conditions    Provider Attestation - Scribe documentation    All medical record entries made by the Scribe were at my direction and personally dictated by me. I have reviewed the chart and agree that the record accurately reflects my personal performance of the history, physical exam, discussion and plan.    Scribe Attestation  By signing my name below, I, Asaf Cochran MD   , Scribe   attest that this documentation has been prepared under the direction and in the presence of Asaf Cochran MD.    sees Dr. Lam      To ER if CP      see Dr. Levy     see GYN      UA next        Tapering dose of prednisone    Patient advised to go to ER to rule out DVT

## 2023-09-27 ENCOUNTER — TELEMEDICINE (OUTPATIENT)
Dept: PRIMARY CARE | Facility: CLINIC | Age: 63
End: 2023-09-27
Payer: COMMERCIAL

## 2023-09-27 DIAGNOSIS — G89.4 CHRONIC PAIN SYNDROME: ICD-10-CM

## 2023-09-27 DIAGNOSIS — S60.212D CONTUSION OF LEFT WRIST, SUBSEQUENT ENCOUNTER: ICD-10-CM

## 2023-09-27 DIAGNOSIS — F20.9 SCHIZOPHRENIA, UNSPECIFIED TYPE (MULTI): ICD-10-CM

## 2023-09-27 DIAGNOSIS — M25.532 LEFT WRIST PAIN: ICD-10-CM

## 2023-09-27 DIAGNOSIS — K21.9 GASTROESOPHAGEAL REFLUX DISEASE WITHOUT ESOPHAGITIS: ICD-10-CM

## 2023-09-27 DIAGNOSIS — B18.2 HEP C W/O COMA, CHRONIC (MULTI): ICD-10-CM

## 2023-09-27 DIAGNOSIS — F31.77 BIPOLAR DISORDER, IN PARTIAL REMISSION, MOST RECENT EPISODE MIXED (MULTI): ICD-10-CM

## 2023-09-27 DIAGNOSIS — M48.062 SPINAL STENOSIS OF LUMBAR REGION WITH NEUROGENIC CLAUDICATION: Primary | ICD-10-CM

## 2023-09-27 PROBLEM — M79.605 LEG PAIN, LEFT: Status: ACTIVE | Noted: 2023-09-19

## 2023-09-27 PROCEDURE — 99214 OFFICE O/P EST MOD 30 MIN: CPT | Performed by: FAMILY MEDICINE

## 2023-09-27 ASSESSMENT — ENCOUNTER SYMPTOMS
TINGLING: 1
NUMBNESS: 1

## 2023-09-27 NOTE — TELEPHONE ENCOUNTER
PT OF RASHIDA     PT CALLED IN FOR MED REFILL    PREDNISONE       [facility]

## 2023-09-27 NOTE — PROGRESS NOTES
Subjective   Patient ID: Malika Casper is a 62 y.o. female who presents for Arm Pain.    Patient is for a follow up on left arm pain x  for few weeks. Pt states she is in pain    Arm Pain   The incident occurred more than 1 week ago. The pain is present in the left hand, left shoulder and left elbow. The quality of the pain is described as burning. The pain is at a severity of 8/10. Associated symptoms include numbness and tingling. Associated symptoms comments: Swelling . Treatments tried: brace. The treatment provided no relief.        Review of Systems   Neurological:  Positive for tingling and numbness.     12 Systems have been reviewed as follows.  Constitutional: Fever, weight gain, weight loss, appetite change, night sweats, fatigue, chills.  Eyes : blurry, double vision, vision, loss, tearing, redness, pain, sensitivity to light, glaucoma.  Ears, nose, mouth, and throat: Hearing loss, ringing in the ears, ear pain, nasal congestion, nasal drainage, nosebleeds, mouth, throat, irritation tooth problem.  Cardiovascular :chest pain, pressure, heart racing, palpitations, sweating, leg swelling, high or low blood pressure  Pulmonary: Cough, yellow or green sputum, blood and sputum, shortness of breath, wheezing  Gastrointestinal: Nausea, vomiting, diarrhea, constipation, pain, blood in stool, or vomitus, heartburn, difficulty swallowing  Genitourinary: incontinence, abnormal bleeding, abnormal discharge, urinary frequency, urinary hesitancy, pain, impotence sexual problem, infection, urinary retention  Musculoskeletal: Pain, stiffness, joint, redness or warmth, arthritis, back pain, weakness, muscle wasting, sprain or fracture  Neuro: Weight weakness, dizziness, change in voice, change in taste change in vision, change in hearing, loss, or change of sensation, trouble walking, balance problems coordination problems, shaking, speech problem  Endocrine , cold or heat intolerance, blood sugar problem, weight  gain or loss missed periods hot flashes, sweats, change in body hair, change in libido, increased thirst, increased urination  Heme/lymph: Swelling, bleeding, problem anemia, bruising, enlarged lymph nodes  Allergic/immunologic: H. plus nasal drip, watery itchy eyes, nasal drainage, immunosuppressed  The above were reviewed and noted negative except as noted in HPI and Problem List.      Objective   There were no vitals taken for this visit.    Physical Exam  Constitutional: Well developed, well nourished, alert and in no acute distress     Assessment/Plan   Problem List Items Addressed This Visit             ICD-10-CM    Bipolar affective disorder (CMS/HCC) F31.9    GERD (gastroesophageal reflux disease) K21.9    Lumbar spinal stenosis - Primary M48.061    Schizophrenia (CMS/HCC) F20.9    Hep C w/o coma, chronic (CMS/HCC) B18.2     Other Visit Diagnoses         Codes    Contusion of left wrist, subsequent encounter     S60.212D          sees Dr. Lam      To ER if CP      see Dr. Levy     see GYN      UA next     Continue current medications and therapy for chronic medical conditions    Go to ER     Fu after ER    Virtual Visit - Audio and Visual Communication Real Time     Continue current medications and therapy for chronic medical conditions

## 2023-10-01 RX ORDER — PREDNISONE 10 MG/1
TABLET ORAL
Qty: 30 TABLET | Refills: 0 | Status: SHIPPED | OUTPATIENT
Start: 2023-10-01 | End: 2023-10-09 | Stop reason: ALTCHOICE

## 2023-10-02 ENCOUNTER — TELEPHONE (OUTPATIENT)
Dept: PRIMARY CARE | Facility: CLINIC | Age: 63
End: 2023-10-02

## 2023-10-02 NOTE — TELEPHONE ENCOUNTER
Patient called to let Dr Cochran know she is having L arm pain. Patient also wants Dr Cochran to call her at home.

## 2023-10-02 NOTE — TELEPHONE ENCOUNTER
Patient wanted to ask Dr Cochran if she can have home care to help with daily care and her medications.

## 2023-10-03 DIAGNOSIS — R11.0 MILD NAUSEA: ICD-10-CM

## 2023-10-03 RX ORDER — ONDANSETRON HYDROCHLORIDE 8 MG/1
TABLET, FILM COATED ORAL
Qty: 45 TABLET | Refills: 0 | Status: SHIPPED | OUTPATIENT
Start: 2023-10-03 | End: 2023-10-25

## 2023-10-09 ENCOUNTER — OFFICE VISIT (OUTPATIENT)
Dept: PRIMARY CARE | Facility: CLINIC | Age: 63
End: 2023-10-09
Payer: COMMERCIAL

## 2023-10-09 VITALS
RESPIRATION RATE: 16 BRPM | HEART RATE: 100 BPM | WEIGHT: 129 LBS | HEIGHT: 62 IN | BODY MASS INDEX: 23.74 KG/M2 | DIASTOLIC BLOOD PRESSURE: 86 MMHG | SYSTOLIC BLOOD PRESSURE: 134 MMHG | OXYGEN SATURATION: 95 %

## 2023-10-09 DIAGNOSIS — S50.12XA CONTUSION OF LEFT ELBOW AND FOREARM, INITIAL ENCOUNTER: ICD-10-CM

## 2023-10-09 DIAGNOSIS — K21.9 GASTROESOPHAGEAL REFLUX DISEASE WITHOUT ESOPHAGITIS: ICD-10-CM

## 2023-10-09 DIAGNOSIS — S60.212A CONTUSION OF LEFT WRIST, INITIAL ENCOUNTER: Primary | ICD-10-CM

## 2023-10-09 DIAGNOSIS — S60.222A CONTUSION OF LEFT HAND, INITIAL ENCOUNTER: ICD-10-CM

## 2023-10-09 DIAGNOSIS — G89.4 CHRONIC PAIN SYNDROME: ICD-10-CM

## 2023-10-09 DIAGNOSIS — B18.2 HEP C W/O COMA, CHRONIC (MULTI): ICD-10-CM

## 2023-10-09 DIAGNOSIS — F31.9 BIPOLAR AFFECTIVE DISORDER, REMISSION STATUS UNSPECIFIED (MULTI): ICD-10-CM

## 2023-10-09 DIAGNOSIS — I10 PRIMARY HYPERTENSION: ICD-10-CM

## 2023-10-09 DIAGNOSIS — F20.9 SCHIZOPHRENIA, UNSPECIFIED TYPE (MULTI): ICD-10-CM

## 2023-10-09 PROCEDURE — 99214 OFFICE O/P EST MOD 30 MIN: CPT | Performed by: FAMILY MEDICINE

## 2023-10-09 NOTE — PROGRESS NOTES
Subjective   Patient ID: Malika Casper is a 62 y.o. female who presents for Wrist Pain and Bipolar.    HPI   Patient is following up on left wrist pain with shooting pain to the elbow. She stopped the prednisone taper due to causing memory issues.    She is having personal conflict at home and does not feel safe at her apartment. She would like to have a counselor if possible.      Review of Systems  12 Systems have been reviewed as follows.  Constitutional: Fever, weight gain, weight loss, appetite change, night sweats, fatigue, chills.  Eyes : blurry, double vision, vision, loss, tearing, redness, pain, sensitivity to light, glaucoma.  Ears, nose, mouth, and throat: Hearing loss, ringing in the ears, ear pain, nasal congestion, nasal drainage, nosebleeds, mouth, throat, irritation tooth problem.  Cardiovascular :chest pain, pressure, heart racing, palpitations, sweating, leg swelling, high or low blood pressure  Pulmonary: Cough, yellow or green sputum, blood and sputum, shortness of breath, wheezing  Gastrointestinal: Nausea, vomiting, diarrhea, constipation, pain, blood in stool, or vomitus, heartburn, difficulty swallowing  Genitourinary: incontinence, abnormal bleeding, abnormal discharge, urinary frequency, urinary hesitancy, pain, impotence sexual problem, infection, urinary retention  Musculoskeletal: Pain, stiffness, joint, redness or warmth, arthritis, back pain, weakness, muscle wasting, sprain or fracture  Neuro: Weight weakness, dizziness, change in voice, change in taste change in vision, change in hearing, loss, or change of sensation, trouble walking, balance problems coordination problems, shaking, speech problem  Endocrine , cold or heat intolerance, blood sugar problem, weight gain or loss missed periods hot flashes, sweats, change in body hair, change in libido, increased thirst, increased urination  Heme/lymph: Swelling, bleeding, problem anemia, bruising, enlarged lymph  "nodes  Allergic/immunologic: H. plus nasal drip, watery itchy eyes, nasal drainage, immunosuppressed  The above were reviewed and noted negative except as noted in HPI and Problem List.      Objective   /86 (BP Location: Right arm, Patient Position: Sitting, BP Cuff Size: Adult)   Pulse 100   Resp 16   Ht 1.575 m (5' 2\")   Wt 58.5 kg (129 lb)   SpO2 95%   BMI 23.59 kg/m²     Physical Exam  Constitutional: Well developed, well nourished, alert and in no acute distress   Eyes: Normal external exam. Pupils equally round and reactive to light with normal accommodation and extraocular movements intact.  Neck: Supple, no lymphadenopathy or masses.   Cardiovascular: Regular rate and rhythm, normal S1 and S2, no murmurs, gallops, or rubs. Radial pulses normal. No peripheral edema.  Pulmonary: No respiratory distress, lungs clear to auscultation bilaterally. No wheezes, rhonchi, rales.  Abdomen: soft,non tender, non distended, without masses or HSM  Skin: Warm, well perfused, normal skin turgor and color.   Neurologic: Cranial nerves II-XII grossly intact.   Psychiatric: Mood calm and affect normal  Musculoskeletal: Moving all extremities without restriction      Assessment/Plan   Problem List Items Addressed This Visit             ICD-10-CM    Chronic pain syndrome G89.4    Bipolar affective disorder (CMS/[facility]) F31.9    GERD (gastroesophageal reflux disease) K21.9    HTN (hypertension) I10    Schizophrenia (CMS/[facility]) F20.9    Hep C w/o coma, chronic (CMS/[facility]) B18.2     Other Visit Diagnoses         Codes    Contusion of left wrist, initial encounter    -  Primary S60.212A    Relevant Orders    XR wrist left 3+ views    XR hand left 3+ views    XR elbow left 3+ views    Follow Up In [facility] - PCP - Established    Follow Up In [facility] - Care Manager    Contusion of left elbow and forearm, initial encounter     S50.12XA    Relevant Orders    XR wrist left 3+ views    XR hand left 3+ views "    XR elbow left 3+ views    Follow Up In [facility] - PCP - Established    Follow Up In [facility] - Care Manager    Contusion of left hand, initial encounter     S60.222A    Relevant Orders    XR wrist left 3+ views    XR hand left 3+ views    XR elbow left 3+ views    Follow Up In [facility] - PCP - Established    Follow Up In [facility] - Care Manager          Continue current medications and therapy for chronic medical conditions    Take meds    Fu 3 days    sees Dr. Lam      To ER if CP      see Dr. Levy     see GYN      UA next

## 2023-10-12 ENCOUNTER — TELEMEDICINE (OUTPATIENT)
Dept: PRIMARY CARE | Facility: CLINIC | Age: 63
End: 2023-10-12
Payer: COMMERCIAL

## 2023-10-12 VITALS — BODY MASS INDEX: 23.74 KG/M2 | HEIGHT: 62 IN | WEIGHT: 129 LBS

## 2023-10-12 DIAGNOSIS — B18.2 HEP C W/O COMA, CHRONIC (MULTI): ICD-10-CM

## 2023-10-12 DIAGNOSIS — K58.1 IRRITABLE BOWEL SYNDROME WITH CONSTIPATION: ICD-10-CM

## 2023-10-12 DIAGNOSIS — K21.9 GASTROESOPHAGEAL REFLUX DISEASE WITHOUT ESOPHAGITIS: Primary | ICD-10-CM

## 2023-10-12 DIAGNOSIS — I73.9 CLAUDICATION, INTERMITTENT (CMS-HCC): ICD-10-CM

## 2023-10-12 DIAGNOSIS — E27.8 ADRENAL NODULE (MULTI): ICD-10-CM

## 2023-10-12 DIAGNOSIS — F20.9 SCHIZOPHRENIA, UNSPECIFIED TYPE (MULTI): ICD-10-CM

## 2023-10-12 PROCEDURE — 99214 OFFICE O/P EST MOD 30 MIN: CPT | Performed by: FAMILY MEDICINE

## 2023-10-12 NOTE — PROGRESS NOTES
Subjective   Patient ID: Malika Casper is a 62 y.o. female who presents for Arm Pain (Left side).    Patient is having left arm and hand pain for over a month. States she is having a lot of pain she can barely do anything. Rates pain 10 out of 10 currently and has trouble doing things as buckling her pants, taking a shower, putting clothes on. Pain is throbbing, aching, sharp, burning.     Denies any other concerns at this time.          Review of Systems  12 Systems have been reviewed as follows.  Constitutional: Fever, weight gain, weight loss, appetite change, night sweats, fatigue, chills.  Eyes : blurry, double vision, vision, loss, tearing, redness, pain, sensitivity to light, glaucoma.  Ears, nose, mouth, and throat: Hearing loss, ringing in the ears, ear pain, nasal congestion, nasal drainage, nosebleeds, mouth, throat, irritation tooth problem.  Cardiovascular :chest pain, pressure, heart racing, palpitations, sweating, leg swelling, high or low blood pressure  Pulmonary: Cough, yellow or green sputum, blood and sputum, shortness of breath, wheezing  Gastrointestinal: Nausea, vomiting, diarrhea, constipation, pain, blood in stool, or vomitus, heartburn, difficulty swallowing  Genitourinary: incontinence, abnormal bleeding, abnormal discharge, urinary frequency, urinary hesitancy, pain, impotence sexual problem, infection, urinary retention  Musculoskeletal: Pain, stiffness, joint, redness or warmth, arthritis, back pain, weakness, muscle wasting, sprain or fracture  Neuro: Weight weakness, dizziness, change in voice, change in taste change in vision, change in hearing, loss, or change of sensation, trouble walking, balance problems coordination problems, shaking, speech problem  Endocrine , cold or heat intolerance, blood sugar problem, weight gain or loss missed periods hot flashes, sweats, change in body hair, change in libido, increased thirst, increased urination  Heme/lymph: Swelling, bleeding,  "problem anemia, bruising, enlarged lymph nodes  Allergic/immunologic: H. plus nasal drip, watery itchy eyes, nasal drainage, immunosuppressed  The above were reviewed and noted negative except as noted in HPI and Problem List.    Objective   Ht 1.575 m (5' 2\")   Wt 58.5 kg (129 lb)   BMI 23.59 kg/m²     Physical Exam  Constitutional: Well developed, well nourished, alert and in no acute distress     Assessment/Plan   Problem List Items Addressed This Visit             ICD-10-CM    Adrenal nodule (CMS/HCC) E27.8    GERD (gastroesophageal reflux disease) - Primary K21.9    Schizophrenia (CMS/HCC) F20.9    Irritable bowel syndrome with constipation K58.1     Virtual Visit - Audio and Visual Communication Real Time     Continue current medications and therapy for chronic medical conditions    I have personally reviewed the [facility] report with the patient and have considered the risk of abuse, addiction, dependence and diversion.    ees Dr. Lam      To ER if CP      see Dr. Levy     see GYN      UA next     Be compliant    Take meds    See psychiatrist    No controlled meds     "

## 2023-10-16 ENCOUNTER — APPOINTMENT (OUTPATIENT)
Dept: RADIOLOGY | Facility: HOSPITAL | Age: 63
End: 2023-10-16
Payer: COMMERCIAL

## 2023-10-18 ENCOUNTER — TELEPHONE (OUTPATIENT)
Dept: PRIMARY CARE | Facility: CLINIC | Age: 63
End: 2023-10-18
Payer: COMMERCIAL

## 2023-10-18 NOTE — TELEPHONE ENCOUNTER
PT OF RASHIDA DAVIES CALLED IN AND ASKED FOR RASHIDA MORALES TO GIVE HER A CALL TO DISCUSS THING FROM HER VISIT 10/09

## 2023-10-22 ENCOUNTER — HOSPITAL ENCOUNTER (EMERGENCY)
Facility: HOSPITAL | Age: 63
Discharge: HOME | End: 2023-10-22
Attending: EMERGENCY MEDICINE
Payer: COMMERCIAL

## 2023-10-22 ENCOUNTER — APPOINTMENT (OUTPATIENT)
Dept: RADIOLOGY | Facility: HOSPITAL | Age: 63
End: 2023-10-22
Payer: COMMERCIAL

## 2023-10-22 VITALS
DIASTOLIC BLOOD PRESSURE: 88 MMHG | OXYGEN SATURATION: 96 % | WEIGHT: 131.06 LBS | HEIGHT: 62 IN | RESPIRATION RATE: 18 BRPM | HEART RATE: 88 BPM | SYSTOLIC BLOOD PRESSURE: 142 MMHG | TEMPERATURE: 98.2 F | BODY MASS INDEX: 24.12 KG/M2

## 2023-10-22 DIAGNOSIS — M19.90 ARTHRITIS: ICD-10-CM

## 2023-10-22 DIAGNOSIS — M25.539 PAIN IN WRIST, UNSPECIFIED LATERALITY: Primary | ICD-10-CM

## 2023-10-22 LAB
BASOPHILS # BLD AUTO: 0.04 X10*3/UL (ref 0–0.1)
BASOPHILS NFR BLD AUTO: 0.5 %
CRP SERPL-MCNC: 0.72 MG/DL
EOSINOPHIL # BLD AUTO: 0.17 X10*3/UL (ref 0–0.7)
EOSINOPHIL NFR BLD AUTO: 2.1 %
ERYTHROCYTE [DISTWIDTH] IN BLOOD BY AUTOMATED COUNT: 13.8 % (ref 11.5–14.5)
ERYTHROCYTE [SEDIMENTATION RATE] IN BLOOD BY WESTERGREN METHOD: 39 MM/H (ref 0–30)
HCT VFR BLD AUTO: 40.4 % (ref 36–46)
HGB BLD-MCNC: 13.3 G/DL (ref 12–16)
IMM GRANULOCYTES # BLD AUTO: 0.04 X10*3/UL (ref 0–0.7)
IMM GRANULOCYTES NFR BLD AUTO: 0.5 % (ref 0–0.9)
LYMPHOCYTES # BLD AUTO: 2.49 X10*3/UL (ref 1.2–4.8)
LYMPHOCYTES NFR BLD AUTO: 30.7 %
MCH RBC QN AUTO: 32.2 PG (ref 26–34)
MCHC RBC AUTO-ENTMCNC: 32.9 G/DL (ref 32–36)
MCV RBC AUTO: 98 FL (ref 80–100)
MONOCYTES # BLD AUTO: 0.55 X10*3/UL (ref 0.1–1)
MONOCYTES NFR BLD AUTO: 6.8 %
NEUTROPHILS # BLD AUTO: 4.83 X10*3/UL (ref 1.2–7.7)
NEUTROPHILS NFR BLD AUTO: 59.4 %
NRBC BLD-RTO: 0 /100 WBCS (ref 0–0)
PLATELET # BLD AUTO: 279 X10*3/UL (ref 150–450)
PMV BLD AUTO: 9.8 FL (ref 7.5–11.5)
RBC # BLD AUTO: 4.13 X10*6/UL (ref 4–5.2)
WBC # BLD AUTO: 8.1 X10*3/UL (ref 4.4–11.3)

## 2023-10-22 PROCEDURE — 73090 X-RAY EXAM OF FOREARM: CPT | Mod: LT,FY

## 2023-10-22 PROCEDURE — 73090 X-RAY EXAM OF FOREARM: CPT | Mod: LEFT SIDE | Performed by: STUDENT IN AN ORGANIZED HEALTH CARE EDUCATION/TRAINING PROGRAM

## 2023-10-22 PROCEDURE — 73130 X-RAY EXAM OF HAND: CPT | Mod: LEFT SIDE | Performed by: STUDENT IN AN ORGANIZED HEALTH CARE EDUCATION/TRAINING PROGRAM

## 2023-10-22 PROCEDURE — 99284 EMERGENCY DEPT VISIT MOD MDM: CPT | Mod: 25 | Performed by: EMERGENCY MEDICINE

## 2023-10-22 PROCEDURE — 73130 X-RAY EXAM OF HAND: CPT | Mod: LT

## 2023-10-22 PROCEDURE — 86140 C-REACTIVE PROTEIN: CPT

## 2023-10-22 PROCEDURE — 85025 COMPLETE CBC W/AUTO DIFF WBC: CPT

## 2023-10-22 PROCEDURE — 73110 X-RAY EXAM OF WRIST: CPT | Mod: LT,FY

## 2023-10-22 PROCEDURE — 85652 RBC SED RATE AUTOMATED: CPT

## 2023-10-22 PROCEDURE — 36415 COLL VENOUS BLD VENIPUNCTURE: CPT

## 2023-10-22 PROCEDURE — 99284 EMERGENCY DEPT VISIT MOD MDM: CPT | Performed by: EMERGENCY MEDICINE

## 2023-10-22 PROCEDURE — 73110 X-RAY EXAM OF WRIST: CPT | Mod: LEFT SIDE | Performed by: STUDENT IN AN ORGANIZED HEALTH CARE EDUCATION/TRAINING PROGRAM

## 2023-10-22 RX ORDER — HYDROMORPHONE HYDROCHLORIDE 1 MG/ML
1 INJECTION, SOLUTION INTRAMUSCULAR; INTRAVENOUS; SUBCUTANEOUS ONCE
Status: DISCONTINUED | OUTPATIENT
Start: 2023-10-22 | End: 2023-10-22

## 2023-10-22 ASSESSMENT — COLUMBIA-SUICIDE SEVERITY RATING SCALE - C-SSRS
2. HAVE YOU ACTUALLY HAD ANY THOUGHTS OF KILLING YOURSELF?: NO
1. IN THE PAST MONTH, HAVE YOU WISHED YOU WERE DEAD OR WISHED YOU COULD GO TO SLEEP AND NOT WAKE UP?: NO
6. HAVE YOU EVER DONE ANYTHING, STARTED TO DO ANYTHING, OR PREPARED TO DO ANYTHING TO END YOUR LIFE?: NO

## 2023-10-22 ASSESSMENT — LIFESTYLE VARIABLES
EVER HAD A DRINK FIRST THING IN THE MORNING TO STEADY YOUR NERVES TO GET RID OF A HANGOVER: NO
REASON UNABLE TO ASSESS: NO
EVER FELT BAD OR GUILTY ABOUT YOUR DRINKING: NO
HAVE PEOPLE ANNOYED YOU BY CRITICIZING YOUR DRINKING: NO
HAVE YOU EVER FELT YOU SHOULD CUT DOWN ON YOUR DRINKING: NO

## 2023-10-22 ASSESSMENT — PAIN SCALES - GENERAL: PAINLEVEL_OUTOF10: 10 - WORST POSSIBLE PAIN

## 2023-10-22 ASSESSMENT — PAIN DESCRIPTION - ORIENTATION: ORIENTATION: LEFT

## 2023-10-22 ASSESSMENT — PAIN DESCRIPTION - LOCATION: LOCATION: HAND

## 2023-10-22 ASSESSMENT — PAIN DESCRIPTION - PAIN TYPE: TYPE: ACUTE PAIN

## 2023-10-22 ASSESSMENT — PAIN - FUNCTIONAL ASSESSMENT: PAIN_FUNCTIONAL_ASSESSMENT: 0-10

## 2023-10-22 NOTE — ED PROVIDER NOTES
HPI   Chief Complaint   Patient presents with    Hand Injury     Left hand       Malika Casper is a 62-year-old female with a past medical history of chronic back pain who presents to the ED with chief complaint of left wrist pain.  Patient states she has had this pain for over a month.  Describes as a sharp pain that is constant.  States that occasionally radiates up her left forearm.  Rates the pain a 10/10 on the pain scale.  Patient has not experienced any other issues in other joints.  She reports she has recently had a fall about a week ago that progressively made the pain worse but notes that she had the pain prior to the incident.  Denies systemic symptoms.  No fevers, chills, shortness of breath, chest pain, abdominal pain, nausea, vomiting, diarrhea.  She currently sees a pain management specialist for her chronic low back pain.                     No data recorded                Patient History   Past Medical History:   Diagnosis Date    Abnormal findings on diagnostic imaging of other specified body structures     Abnormal finding on imaging    Encounter for screening for malignant neoplasm of colon 04/27/2022    Colon cancer screening    Liver disease, unspecified 12/16/2022    Chronic liver disease    Personal history of other medical treatment     History of transvaginal ultrasound    Personal history of other medical treatment     History of ultrasound, pelvic     Past Surgical History:   Procedure Laterality Date    ANKLE SURGERY  09/18/2017    Ankle Surgery    CT ABDOMEN PELVIS ANGIOGRAM W AND/OR WO IV CONTRAST  5/3/2020    CT ABDOMEN PELVIS ANGIOGRAM W AND/OR WO IV CONTRAST 5/3/2020 [facility]    KNEE SURGERY  09/18/2017    Knee Surgery    OTHER SURGICAL HISTORY  09/10/2015    Adrenal Gland Excision    OTHER SURGICAL HISTORY  12/02/2021    Gallbladder surgery     Family History   Problem Relation Name Age of Onset    Colon cancer Father      Prostate cancer Father       Social  History     Tobacco Use    Smoking status: Every Day     Packs/day: 0.50     Years: 25.00     Additional pack years: 0.00     Total pack years: 12.50     Types: Cigarettes    Smokeless tobacco: Never   Vaping Use    Vaping Use: Never used   Substance Use Topics    Alcohol use: Defer    Drug use: Defer       Physical Exam   ED Triage Vitals [10/22/23 1633]   Temp Heart Rate Resp BP   36.8 °C (98.2 °F) 90 18 147/89      SpO2 Temp Source Heart Rate Source Patient Position   96 % Temporal Monitor Sitting      BP Location FiO2 (%)     Right arm --       REVIEW OF SYSTEMS:    CONSTITUTIONAL: Denies fever, sweats, chills.   NEURO: Denies headache.   HEENT: Denies changes in vision.   CARDIO: Denies chest pain, palpitations.  PULM: Denies shortness of breath, cough.   GI: Denies abdominal pain, nausea, vomiting  MSK: Reports recent trauma, back pain, extremity pain         Physical Exam  Constitutional:       General: She is not in acute distress.  Eyes:      Extraocular Movements: Extraocular movements intact.   Cardiovascular:      Rate and Rhythm: Normal rate and regular rhythm.      Pulses: Normal pulses.      Heart sounds: Normal heart sounds.   Pulmonary:      Effort: Pulmonary effort is normal.      Breath sounds: Normal breath sounds.   Abdominal:      General: Abdomen is flat. Bowel sounds are normal.      Palpations: Abdomen is soft.      Tenderness: There is no abdominal tenderness.   Musculoskeletal:         General: Swelling and tenderness present. Normal range of motion.      Cervical back: Neck supple.      Comments: Erythema and tenderness present in left wrist joint. There was an area of swelling especially on the lateral side of the dorsal wrist.    Skin:     General: Skin is warm and dry.      Capillary Refill: Capillary refill takes less than 2 seconds.   Neurological:      Mental Status: She is alert and oriented to person, place, and time.       ED Course & MDM   ED Course as of 10/22/23 1829   [facility]  22, 2023 1804 XR hand left 3+ views [GB]      ED Course User Index  [GB] Manjit Rodriguez DO       Medical Decision Making  Malika Casper is a 62-year-old female with a past medical history of chronic back pain who presents to the ED with chief complaint of left wrist pain.  On physical exam, there was some erythema, tenderness and swelling in the left wrist joint.  Wrist was not warm to touch.  Patient denies systemic symptoms.  Although the joint is less likely to be septic, we did have some concerns that there may be an inflammatory process going on (RA vs gout vs pseudogout).  Due to her recent trauma, we did complete a x-ray of the left hand, wrist, and forearm.  We completed a CBC, ESR and CRP to evaluate for inflammatory process.  Patient CBC, CRP within normal limits.  ESR was elevated at 39.  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  X-ray of forearm was unremarkable.  Patient was placed in a thumb spica splint and arm sling.  Patient was discharged with instruction to follow-up with Ortho about scaphoid fracture and rheumatology about additional work-up for inflammatory joint process.  Patient was instructed to return to the ED if symptoms worsen or new symptoms arise.        Procedure  Procedures     Muna Daly MD  Resident  10/22/23 2119

## 2023-10-23 ENCOUNTER — PATIENT OUTREACH (OUTPATIENT)
Dept: PRIMARY CARE | Facility: CLINIC | Age: 63
End: 2023-10-23
Payer: COMMERCIAL

## 2023-10-23 ENCOUNTER — HOSPITAL ENCOUNTER (EMERGENCY)
Facility: HOSPITAL | Age: 63
Discharge: HOME | End: 2023-10-23
Attending: STUDENT IN AN ORGANIZED HEALTH CARE EDUCATION/TRAINING PROGRAM
Payer: COMMERCIAL

## 2023-10-23 VITALS
TEMPERATURE: 98.4 F | BODY MASS INDEX: 23.92 KG/M2 | RESPIRATION RATE: 18 BRPM | DIASTOLIC BLOOD PRESSURE: 90 MMHG | HEART RATE: 87 BPM | OXYGEN SATURATION: 100 % | HEIGHT: 62 IN | WEIGHT: 130 LBS | SYSTOLIC BLOOD PRESSURE: 135 MMHG

## 2023-10-23 DIAGNOSIS — M25.532 LEFT WRIST PAIN: Primary | ICD-10-CM

## 2023-10-23 DIAGNOSIS — F31.9 BIPOLAR AFFECTIVE DISORDER, REMISSION STATUS UNSPECIFIED (MULTI): ICD-10-CM

## 2023-10-23 DIAGNOSIS — F20.9 SCHIZOPHRENIA, UNSPECIFIED TYPE (MULTI): ICD-10-CM

## 2023-10-23 DIAGNOSIS — E27.8 ADRENAL NODULE (MULTI): ICD-10-CM

## 2023-10-23 PROCEDURE — 99490 CHRNC CARE MGMT STAFF 1ST 20: CPT | Performed by: FAMILY MEDICINE

## 2023-10-23 PROCEDURE — 96372 THER/PROPH/DIAG INJ SC/IM: CPT

## 2023-10-23 PROCEDURE — 99284 EMERGENCY DEPT VISIT MOD MDM: CPT | Performed by: STUDENT IN AN ORGANIZED HEALTH CARE EDUCATION/TRAINING PROGRAM

## 2023-10-23 PROCEDURE — 2500000004 HC RX 250 GENERAL PHARMACY W/ HCPCS (ALT 636 FOR OP/ED): Performed by: STUDENT IN AN ORGANIZED HEALTH CARE EDUCATION/TRAINING PROGRAM

## 2023-10-23 PROCEDURE — 99283 EMERGENCY DEPT VISIT LOW MDM: CPT | Performed by: STUDENT IN AN ORGANIZED HEALTH CARE EDUCATION/TRAINING PROGRAM

## 2023-10-23 PROCEDURE — 99283 EMERGENCY DEPT VISIT LOW MDM: CPT | Mod: 25

## 2023-10-23 RX ORDER — HYDROMORPHONE HYDROCHLORIDE 1 MG/ML
1 INJECTION, SOLUTION INTRAMUSCULAR; INTRAVENOUS; SUBCUTANEOUS ONCE
Status: COMPLETED | OUTPATIENT
Start: 2023-10-23 | End: 2023-10-23

## 2023-10-23 RX ADMIN — HYDROMORPHONE HYDROCHLORIDE 1 MG: 1 INJECTION, SOLUTION INTRAMUSCULAR; INTRAVENOUS; SUBCUTANEOUS at 07:50

## 2023-10-23 ASSESSMENT — PAIN SCALES - GENERAL: PAINLEVEL_OUTOF10: 10 - WORST POSSIBLE PAIN

## 2023-10-23 ASSESSMENT — COLUMBIA-SUICIDE SEVERITY RATING SCALE - C-SSRS
1. IN THE PAST MONTH, HAVE YOU WISHED YOU WERE DEAD OR WISHED YOU COULD GO TO SLEEP AND NOT WAKE UP?: NO
6. HAVE YOU EVER DONE ANYTHING, STARTED TO DO ANYTHING, OR PREPARED TO DO ANYTHING TO END YOUR LIFE?: NO
2. HAVE YOU ACTUALLY HAD ANY THOUGHTS OF KILLING YOURSELF?: NO

## 2023-10-23 ASSESSMENT — PAIN DESCRIPTION - LOCATION: LOCATION: HAND

## 2023-10-23 ASSESSMENT — LIFESTYLE VARIABLES
EVER HAD A DRINK FIRST THING IN THE MORNING TO STEADY YOUR NERVES TO GET RID OF A HANGOVER: NO
EVER FELT BAD OR GUILTY ABOUT YOUR DRINKING: NO
HAVE YOU EVER FELT YOU SHOULD CUT DOWN ON YOUR DRINKING: NO
REASON UNABLE TO ASSESS: NO
HAVE PEOPLE ANNOYED YOU BY CRITICIZING YOUR DRINKING: NO

## 2023-10-23 ASSESSMENT — PAIN - FUNCTIONAL ASSESSMENT: PAIN_FUNCTIONAL_ASSESSMENT: 0-10

## 2023-10-23 ASSESSMENT — PAIN DESCRIPTION - ORIENTATION: ORIENTATION: LEFT

## 2023-10-23 NOTE — ED PROVIDER NOTES
HPI   Chief Complaint   Patient presents with    Hand Injury     Left hand       HPI                    Christin Coma Scale Score: 15                  Patient History   Past Medical History:   Diagnosis Date    Abnormal findings on diagnostic imaging of other specified body structures     Abnormal finding on imaging    Encounter for screening for malignant neoplasm of colon 04/27/2022    Colon cancer screening    Liver disease, unspecified 12/16/2022    Chronic liver disease    Personal history of other medical treatment     History of transvaginal ultrasound    Personal history of other medical treatment     History of ultrasound, pelvic     Past Surgical History:   Procedure Laterality Date    ANKLE SURGERY  09/18/2017    Ankle Surgery    CT ABDOMEN PELVIS ANGIOGRAM W AND/OR WO IV CONTRAST  5/3/2020    CT ABDOMEN PELVIS ANGIOGRAM W AND/OR WO IV CONTRAST 5/3/2020 [facility]    KNEE SURGERY  09/18/2017    Knee Surgery    OTHER SURGICAL HISTORY  09/10/2015    Adrenal Gland Excision    OTHER SURGICAL HISTORY  12/02/2021    Gallbladder surgery     Family History   Problem Relation Name Age of Onset    Colon cancer Father      Prostate cancer Father       Social History     Tobacco Use    Smoking status: Every Day     Packs/day: 0.50     Years: 25.00     Additional pack years: 0.00     Total pack years: 12.50     Types: Cigarettes    Smokeless tobacco: Never   Vaping Use    Vaping Use: Never used   Substance Use Topics    Alcohol use: Defer    Drug use: Defer       Physical Exam   ED Triage Vitals [10/22/23 1633]   Temp Heart Rate Resp BP   36.8 °C (98.2 °F) 90 18 147/89      SpO2 Temp Source Heart Rate Source Patient Position   96 % Temporal Monitor Sitting      BP Location FiO2 (%)     Right arm --       Physical Exam    ED Course & MDM   ED Course as of 10/23/23 1616   Sun Oct 22, 2023   1804 XR hand left 3+ views [GB]      ED Course User Index  [GB] Manjit Rodriguez DO         Diagnoses as of 10/23/23  1616   Pain in wrist, unspecified laterality   Arthritis       Medical Decision Making    The patient was seen by the resident/fellow.  I have personally performed a substantive portion of the encounter.  I have seen and examined the patient; agree with the workup, evaluation, MDM, management and diagnosis.  The care plan has been discussed with the resident; I have reviewed the resident’s note and agree with the documented findings.    Procedure  Procedures     Manjit Rodriguez, DO  10/23/23 1614       Manjit Rodriguez, DO  10/23/23 1616

## 2023-10-23 NOTE — DISCHARGE INSTRUCTIONS
Please follow-up with your pain management physician as soon as possible for evaluation. Return to the emergency department for any new or worsening symptoms such as changes in strength or sensation, fever, chills, chest pain or difficulty breathing.

## 2023-10-23 NOTE — ED PROVIDER NOTES
"HPI   Chief Complaint   Patient presents with    Arm Injury     Seen here yesterday, states pain is \"too much\"       This is a 62-year-old female with past medical history of chronic back pain, presenting to the ED brought in by self for wrist pain.  Ongoing for over a month, established with chronic pain management.  She denies any subsequent recent injury to the affected extremity.    She denies any systemic symptoms, no fevers chills, nausea/vomiting, pain has been ongoing since her recent ED visit yesterday, where a significant work-up was done, notable for elevated ESR, she is due to visit rheumatology for further work-up. Splint was slightly loosened at patient's request. She was neurovascularly intact and had no evidence of infectious etiology                               No data recorded                Patient History   Past Medical History:   Diagnosis Date    Abnormal findings on diagnostic imaging of other specified body structures     Abnormal finding on imaging    Encounter for screening for malignant neoplasm of colon 04/27/2022    Colon cancer screening    Liver disease, unspecified 12/16/2022    Chronic liver disease    Personal history of other medical treatment     History of transvaginal ultrasound    Personal history of other medical treatment     History of ultrasound, pelvic     Past Surgical History:   Procedure Laterality Date    ANKLE SURGERY  09/18/2017    Ankle Surgery    CT ABDOMEN PELVIS ANGIOGRAM W AND/OR WO IV CONTRAST  5/3/2020    CT ABDOMEN PELVIS ANGIOGRAM W AND/OR WO IV CONTRAST 5/3/2020 [facility]    KNEE SURGERY  09/18/2017    Knee Surgery    OTHER SURGICAL HISTORY  09/10/2015    Adrenal Gland Excision    OTHER SURGICAL HISTORY  12/02/2021    Gallbladder surgery     Family History   Problem Relation Name Age of Onset    Colon cancer Father      Prostate cancer Father       Social History     Tobacco Use    Smoking status: Every Day     Packs/day: 0.50     Years: 25.00 "     Additional pack years: 0.00     Total pack years: 12.50     Types: Cigarettes    Smokeless tobacco: Never   Vaping Use    Vaping Use: Never used   Substance Use Topics    Alcohol use: Defer    Drug use: Defer       Physical Exam   ED Triage Vitals [10/23/23 0721]   Temp Heart Rate Resp BP   36.9 °C (98.4 °F) 87 18 135/90      SpO2 Temp Source Heart Rate Source Patient Position   100 % Temporal -- --      BP Location FiO2 (%)     -- --       Physical Exam  Constitutional:       Appearance: Normal appearance.   HENT:      Head: Normocephalic and atraumatic.      Mouth/Throat:      Mouth: Mucous membranes are moist.   Eyes:      Extraocular Movements: Extraocular movements intact.   Cardiovascular:      Rate and Rhythm: Normal rate and regular rhythm.      Heart sounds: Normal heart sounds. No murmur heard.  Pulmonary:      Effort: Pulmonary effort is normal. No respiratory distress.      Breath sounds: Normal breath sounds. No wheezing.   Abdominal:      General: There is no distension.      Palpations: Abdomen is soft.      Tenderness: There is no abdominal tenderness. There is no guarding.   Musculoskeletal:      Right lower leg: No edema.      Left lower leg: No edema.      Comments: The left upper extremity wrist is in thumb spica splint, neurovascularly intact, able to range all digits without difficulty, no numbness.   Skin:     General: Skin is warm and dry.   Neurological:      General: No focal deficit present.      Mental Status: She is alert and oriented to person, place, and time.   Psychiatric:         Mood and Affect: Mood normal.         Behavior: Behavior normal.         ED Course & MDM   Diagnoses as of 10/23/23 1539   Left wrist pain       Medical Decision Making  Hemodynamically stable with normal vital signs on arrival to the ED, does not appear mildly uncomfortable.  At this time, given that she has chronic pain, will attempt to control pain with IM Dilaudid.  Given previous extensive work-up  without other symptoms and no change in symptoms, do not believe any further laboratory or imaging work-up is indicated at this time given that she denies any subsequent injury.    Patient given dose of Dilaudid and discharged home with outpatient rheumatology follow-up    Discussed with the attending  Daquan Singh DO PGY-4  Emergency Medicine        Procedure  Procedures     Kurt Rosa DO  10/26/23 0848

## 2023-10-23 NOTE — PROGRESS NOTES
Introduced patient to Chronic Care Management Program.    Explained that in my role as Care Manager I will:  -Be a point of contact with questions and concerns  -Focus on chronic conditions and achieving health goals  -Make sure patient is receiving all preventative care she is due for    Verbal consent given to enroll.    My contact info was provided for any needs that may arise.    Aware I will call to check in in the next 2-3 weeks.    Patient's main concerns at this time are:   Pain in wrist  Seeing pain management  Recently moved- end of August 31 and still not settled

## 2023-10-24 ENCOUNTER — HOSPITAL ENCOUNTER (EMERGENCY)
Facility: HOSPITAL | Age: 63
Discharge: HOME | End: 2023-10-25
Attending: STUDENT IN AN ORGANIZED HEALTH CARE EDUCATION/TRAINING PROGRAM
Payer: COMMERCIAL

## 2023-10-24 ENCOUNTER — TELEPHONE (OUTPATIENT)
Dept: PRIMARY CARE | Facility: CLINIC | Age: 63
End: 2023-10-24
Payer: COMMERCIAL

## 2023-10-24 ENCOUNTER — APPOINTMENT (OUTPATIENT)
Dept: RADIOLOGY | Facility: HOSPITAL | Age: 63
End: 2023-10-24
Payer: COMMERCIAL

## 2023-10-24 DIAGNOSIS — J69.0: Primary | ICD-10-CM

## 2023-10-24 LAB
ALBUMIN SERPL BCP-MCNC: 5 G/DL (ref 3.4–5)
ALP SERPL-CCNC: 86 U/L (ref 33–136)
ALT SERPL W P-5'-P-CCNC: 16 U/L (ref 7–45)
ANION GAP SERPL CALC-SCNC: 14 MMOL/L (ref 10–20)
AST SERPL W P-5'-P-CCNC: 18 U/L (ref 9–39)
BASOPHILS # BLD AUTO: 0.05 X10*3/UL (ref 0–0.1)
BASOPHILS NFR BLD AUTO: 0.4 %
BILIRUB SERPL-MCNC: 0.3 MG/DL (ref 0–1.2)
BNP SERPL-MCNC: 93 PG/ML (ref 0–99)
BUN SERPL-MCNC: 12 MG/DL (ref 6–23)
CALCIUM SERPL-MCNC: 10.9 MG/DL (ref 8.6–10.3)
CARDIAC TROPONIN I PNL SERPL HS: 6 NG/L (ref 0–13)
CHLORIDE SERPL-SCNC: 101 MMOL/L (ref 98–107)
CO2 SERPL-SCNC: 29 MMOL/L (ref 21–32)
CREAT SERPL-MCNC: 0.71 MG/DL (ref 0.5–1.05)
D DIMER PPP FEU-MCNC: 649 NG/ML FEU
EOSINOPHIL # BLD AUTO: 0.05 X10*3/UL (ref 0–0.7)
EOSINOPHIL NFR BLD AUTO: 0.4 %
ERYTHROCYTE [DISTWIDTH] IN BLOOD BY AUTOMATED COUNT: 13.9 % (ref 11.5–14.5)
GFR SERPL CREATININE-BSD FRML MDRD: >90 ML/MIN/1.73M*2
GLUCOSE SERPL-MCNC: 120 MG/DL (ref 74–99)
HCT VFR BLD AUTO: 46.6 % (ref 36–46)
HGB BLD-MCNC: 15.8 G/DL (ref 12–16)
IMM GRANULOCYTES # BLD AUTO: 0.05 X10*3/UL (ref 0–0.7)
IMM GRANULOCYTES NFR BLD AUTO: 0.4 % (ref 0–0.9)
INR PPP: 1 (ref 0.9–1.1)
LYMPHOCYTES # BLD AUTO: 1.28 X10*3/UL (ref 1.2–4.8)
LYMPHOCYTES NFR BLD AUTO: 10.8 %
MCH RBC QN AUTO: 32.6 PG (ref 26–34)
MCHC RBC AUTO-ENTMCNC: 33.9 G/DL (ref 32–36)
MCV RBC AUTO: 96 FL (ref 80–100)
MONOCYTES # BLD AUTO: 0.16 X10*3/UL (ref 0.1–1)
MONOCYTES NFR BLD AUTO: 1.4 %
NEUTROPHILS # BLD AUTO: 10.24 X10*3/UL (ref 1.2–7.7)
NEUTROPHILS NFR BLD AUTO: 86.6 %
NRBC BLD-RTO: 0 /100 WBCS (ref 0–0)
PLATELET # BLD AUTO: 337 X10*3/UL (ref 150–450)
PMV BLD AUTO: 10.6 FL (ref 7.5–11.5)
POTASSIUM SERPL-SCNC: 4 MMOL/L (ref 3.5–5.3)
PROT SERPL-MCNC: 7.8 G/DL (ref 6.4–8.2)
PROTHROMBIN TIME: 11.5 SECONDS (ref 9.8–12.8)
RBC # BLD AUTO: 4.84 X10*6/UL (ref 4–5.2)
SODIUM SERPL-SCNC: 140 MMOL/L (ref 136–145)
WBC # BLD AUTO: 11.8 X10*3/UL (ref 4.4–11.3)

## 2023-10-24 PROCEDURE — 71045 X-RAY EXAM CHEST 1 VIEW: CPT | Performed by: RADIOLOGY

## 2023-10-24 PROCEDURE — 2500000005 HC RX 250 GENERAL PHARMACY W/O HCPCS: Performed by: STUDENT IN AN ORGANIZED HEALTH CARE EDUCATION/TRAINING PROGRAM

## 2023-10-24 PROCEDURE — 85610 PROTHROMBIN TIME: CPT | Performed by: STUDENT IN AN ORGANIZED HEALTH CARE EDUCATION/TRAINING PROGRAM

## 2023-10-24 PROCEDURE — 71045 X-RAY EXAM CHEST 1 VIEW: CPT | Mod: FY

## 2023-10-24 PROCEDURE — 71275 CT ANGIOGRAPHY CHEST: CPT | Performed by: RADIOLOGY

## 2023-10-24 PROCEDURE — 85025 COMPLETE CBC W/AUTO DIFF WBC: CPT | Performed by: STUDENT IN AN ORGANIZED HEALTH CARE EDUCATION/TRAINING PROGRAM

## 2023-10-24 PROCEDURE — 99285 EMERGENCY DEPT VISIT HI MDM: CPT | Performed by: STUDENT IN AN ORGANIZED HEALTH CARE EDUCATION/TRAINING PROGRAM

## 2023-10-24 PROCEDURE — 2500000004 HC RX 250 GENERAL PHARMACY W/ HCPCS (ALT 636 FOR OP/ED)

## 2023-10-24 PROCEDURE — 2550000001 HC RX 255 CONTRASTS: Performed by: STUDENT IN AN ORGANIZED HEALTH CARE EDUCATION/TRAINING PROGRAM

## 2023-10-24 PROCEDURE — 80053 COMPREHEN METABOLIC PANEL: CPT | Performed by: STUDENT IN AN ORGANIZED HEALTH CARE EDUCATION/TRAINING PROGRAM

## 2023-10-24 PROCEDURE — 71275 CT ANGIOGRAPHY CHEST: CPT

## 2023-10-24 PROCEDURE — 84484 ASSAY OF TROPONIN QUANT: CPT | Performed by: STUDENT IN AN ORGANIZED HEALTH CARE EDUCATION/TRAINING PROGRAM

## 2023-10-24 PROCEDURE — 96374 THER/PROPH/DIAG INJ IV PUSH: CPT | Mod: 59

## 2023-10-24 PROCEDURE — 83880 ASSAY OF NATRIURETIC PEPTIDE: CPT | Performed by: STUDENT IN AN ORGANIZED HEALTH CARE EDUCATION/TRAINING PROGRAM

## 2023-10-24 PROCEDURE — 85379 FIBRIN DEGRADATION QUANT: CPT | Performed by: STUDENT IN AN ORGANIZED HEALTH CARE EDUCATION/TRAINING PROGRAM

## 2023-10-24 PROCEDURE — 99284 EMERGENCY DEPT VISIT MOD MDM: CPT | Mod: 25 | Performed by: STUDENT IN AN ORGANIZED HEALTH CARE EDUCATION/TRAINING PROGRAM

## 2023-10-24 PROCEDURE — 36415 COLL VENOUS BLD VENIPUNCTURE: CPT | Performed by: STUDENT IN AN ORGANIZED HEALTH CARE EDUCATION/TRAINING PROGRAM

## 2023-10-24 RX ORDER — MORPHINE SULFATE 4 MG/ML
4 INJECTION, SOLUTION INTRAMUSCULAR; INTRAVENOUS ONCE
Status: COMPLETED | OUTPATIENT
Start: 2023-10-24 | End: 2023-10-24

## 2023-10-24 RX ADMIN — MORPHINE SULFATE 4 MG: 4 INJECTION, SOLUTION INTRAMUSCULAR; INTRAVENOUS at 21:02

## 2023-10-24 RX ADMIN — DIPHENHYDRAMINE HYDROCHLORIDE AND LIDOCAINE HYDROCHLORIDE AND ALUMINUM HYDROXIDE AND MAGNESIUM HYDRO 10 ML: KIT at 21:00

## 2023-10-24 RX ADMIN — IOHEXOL 60 ML: 350 INJECTION, SOLUTION INTRAVENOUS at 23:10

## 2023-10-24 ASSESSMENT — LIFESTYLE VARIABLES
EVER FELT BAD OR GUILTY ABOUT YOUR DRINKING: NO
EVER HAD A DRINK FIRST THING IN THE MORNING TO STEADY YOUR NERVES TO GET RID OF A HANGOVER: NO
HAVE PEOPLE ANNOYED YOU BY CRITICIZING YOUR DRINKING: NO
HAVE YOU EVER FELT YOU SHOULD CUT DOWN ON YOUR DRINKING: NO
REASON UNABLE TO ASSESS: NO

## 2023-10-24 ASSESSMENT — COLUMBIA-SUICIDE SEVERITY RATING SCALE - C-SSRS
2. HAVE YOU ACTUALLY HAD ANY THOUGHTS OF KILLING YOURSELF?: NO
6. HAVE YOU EVER DONE ANYTHING, STARTED TO DO ANYTHING, OR PREPARED TO DO ANYTHING TO END YOUR LIFE?: NO
1. IN THE PAST MONTH, HAVE YOU WISHED YOU WERE DEAD OR WISHED YOU COULD GO TO SLEEP AND NOT WAKE UP?: NO

## 2023-10-24 ASSESSMENT — PAIN SCALES - GENERAL
PAINLEVEL_OUTOF10: 10 - WORST POSSIBLE PAIN
PAINLEVEL_OUTOF10: 10 - WORST POSSIBLE PAIN
PAINLEVEL_OUTOF10: 8
PAINLEVEL_OUTOF10: 0 - NO PAIN

## 2023-10-24 ASSESSMENT — PAIN - FUNCTIONAL ASSESSMENT: PAIN_FUNCTIONAL_ASSESSMENT: 0-10

## 2023-10-24 ASSESSMENT — PAIN DESCRIPTION - DESCRIPTORS: DESCRIPTORS: PRESSURE

## 2023-10-25 VITALS
BODY MASS INDEX: 23.92 KG/M2 | HEART RATE: 74 BPM | RESPIRATION RATE: 18 BRPM | TEMPERATURE: 98.2 F | WEIGHT: 130 LBS | SYSTOLIC BLOOD PRESSURE: 115 MMHG | HEIGHT: 62 IN | OXYGEN SATURATION: 94 % | DIASTOLIC BLOOD PRESSURE: 67 MMHG

## 2023-10-25 DIAGNOSIS — R11.0 MILD NAUSEA: ICD-10-CM

## 2023-10-25 RX ORDER — AMOXICILLIN AND CLAVULANATE POTASSIUM 875; 125 MG/1; MG/1
875 TABLET, FILM COATED ORAL 2 TIMES DAILY
Qty: 20 TABLET | Refills: 0 | Status: SHIPPED | OUTPATIENT
Start: 2023-10-25 | End: 2023-11-04

## 2023-10-25 RX ORDER — ONDANSETRON HYDROCHLORIDE 8 MG/1
TABLET, FILM COATED ORAL
Qty: 45 TABLET | Refills: 0 | Status: SHIPPED | OUTPATIENT
Start: 2023-10-25 | End: 2023-11-09 | Stop reason: SDUPTHER

## 2023-10-25 NOTE — ED PROVIDER NOTES
CC: Chest Pain     HPI: Malika Casper is an 62 y.o. female with history including chronic back pain, hypertension, and GERD presenting to the emergency department for chest pain.  Patient does have a history of GERD and active reflux.  Patient states that 4 hours ago she started to have chest pain that the like someone was sitting on her chest.  Patient denies any shortness of breath with it.  She states that she has never had any cardiac disease no family history of cardiac disease.  Nothing has made the chest pain better or worse.  Patient denies any radiation of the pain elsewhere currently 10/10 pain.      PMHx/PSHx:  Per HPI.   - has a past medical history of Abnormal findings on diagnostic imaging of other specified body structures, Encounter for screening for malignant neoplasm of colon (04/27/2022), Liver disease, unspecified (12/16/2022), Personal history of other medical treatment, and Personal history of other medical treatment.  - has a past surgical history that includes Other surgical history (09/10/2015); Other surgical history (12/02/2021); Knee surgery (09/18/2017); Ankle surgery (09/18/2017); and CT angio abdomen pelvis w and or wo IV IV contrast (5/3/2020).    Social History:  - Tobacco:  reports that she has been smoking cigarettes. She has a 12.50 pack-year smoking history. She has never used smokeless tobacco.   - Alcohol: Alcohol use questions deferred to the physician.   - Drugs: Drug use questions deferred to the physician.     Medications: Reviewed in EMR.     Allergies:  Amlodipine, Ibuprofen, and Sulfamethoxazole-trimethoprim    ???????????????????????????????????????????????????????????????  Triage Vitals:  T 36.8 °C (98.2 °F)    /82  RR 16  O2 96 % None (Room air)    Physical Exam  Vitals and nursing note reviewed.   Constitutional:       General: She is not in acute distress.     Appearance: She is well-developed.   HENT:      Head: Normocephalic and atraumatic.    Eyes:      Conjunctiva/sclera: Conjunctivae normal.   Cardiovascular:      Rate and Rhythm: Normal rate and regular rhythm.      Heart sounds: No murmur heard.     No systolic murmur is present.      No diastolic murmur is present.      No S3 or S4 sounds.   Pulmonary:      Effort: Pulmonary effort is normal. No respiratory distress.      Breath sounds: Normal breath sounds.   Abdominal:      Palpations: Abdomen is soft.      Tenderness: There is no abdominal tenderness.   Musculoskeletal:         General: No swelling.      Cervical back: Neck supple.   Skin:     General: Skin is warm and dry.      Capillary Refill: Capillary refill takes less than 2 seconds.   Neurological:      Mental Status: She is alert.   Psychiatric:         Mood and Affect: Mood normal.       ???????????????????????????????????????????????????????????????    ED Course/Medical Decision Making:  Patient is a 62-year-old female with a history of chronic low back pain, GERD and hypertension presenting to the emergency department for acute chest pain.  Patient is hemodynamically stable on arrival.  Initial EKG shows no signs of acute cardiac ischemia.  Cardiac work-up ordered.  Patient was given 324 mg of aspirin by EMS.    External records reviewed: recent inpatient, clinic, and prior ED notes  Diagnostic imaging independently reviewed/interpreted by me (as reflected in MDM) includes: Chest x-ray shows no acute concerning findings.  CT PE is negative.    Labs independently reviewed/interpreted by me includes: Work-up is negative for troponin no major electrolyte abnormalities.  Patient does have a slightly elevated white count which could be secondary to acute stress.  Dimer is elevated CT imaging ordered.    Patient was given morphine and BMX for her discomfort.  She had improvement of her symptoms.      Impression:   Acute gastric reflux    Disposition: Discharged home      Procedures ? [email or web address] last updated 10/25/2023 5:21 PM         Mirian Archibald,   Resident  10/25/23 6563

## 2023-10-30 ENCOUNTER — HOSPITAL ENCOUNTER (OUTPATIENT)
Dept: CARDIOLOGY | Facility: HOSPITAL | Age: 63
Discharge: HOME | End: 2023-10-30
Payer: COMMERCIAL

## 2023-10-30 LAB
ATRIAL RATE: 99 BPM
P AXIS: 58 DEGREES
P OFFSET: 211 MS
P ONSET: 155 MS
PR INTERVAL: 140 MS
Q ONSET: 225 MS
QRS COUNT: 17 BEATS
QRS DURATION: 96 MS
QT INTERVAL: 372 MS
QTC CALCULATION(BAZETT): 477 MS
QTC FREDERICIA: 439 MS
R AXIS: 3 DEGREES
T AXIS: 50 DEGREES
T OFFSET: 411 MS
VENTRICULAR RATE: 99 BPM

## 2023-10-30 PROCEDURE — 93005 ELECTROCARDIOGRAM TRACING: CPT

## 2023-11-01 ENCOUNTER — APPOINTMENT (OUTPATIENT)
Dept: ORTHOPEDIC SURGERY | Facility: CLINIC | Age: 63
End: 2023-11-01
Payer: COMMERCIAL

## 2023-11-09 ENCOUNTER — TELEPHONE (OUTPATIENT)
Dept: PRIMARY CARE | Facility: CLINIC | Age: 63
End: 2023-11-09

## 2023-11-09 ENCOUNTER — PATIENT OUTREACH (OUTPATIENT)
Dept: PRIMARY CARE | Facility: CLINIC | Age: 63
End: 2023-11-09
Payer: COMMERCIAL

## 2023-11-09 DIAGNOSIS — G89.4 CHRONIC PAIN SYNDROME: ICD-10-CM

## 2023-11-09 DIAGNOSIS — F20.9 SCHIZOPHRENIA, UNSPECIFIED TYPE (MULTI): ICD-10-CM

## 2023-11-09 DIAGNOSIS — E27.8 ADRENAL NODULE (MULTI): ICD-10-CM

## 2023-11-09 DIAGNOSIS — R11.0 MILD NAUSEA: ICD-10-CM

## 2023-11-09 DIAGNOSIS — F31.9 BIPOLAR AFFECTIVE DISORDER, REMISSION STATUS UNSPECIFIED (MULTI): ICD-10-CM

## 2023-11-09 PROCEDURE — 99490 CHRNC CARE MGMT STAFF 1ST 20: CPT | Performed by: FAMILY MEDICINE

## 2023-11-09 RX ORDER — BISMUTH SUBSALICYLATE 262 MG
1 TABLET,CHEWABLE ORAL DAILY
COMMUNITY

## 2023-11-09 RX ORDER — ONDANSETRON HYDROCHLORIDE 8 MG/1
TABLET, FILM COATED ORAL
Qty: 45 TABLET | Refills: 0 | Status: SHIPPED | OUTPATIENT
Start: 2023-11-09 | End: 2023-12-07

## 2023-11-09 RX ORDER — ASCORBIC ACID 500 MG
500 TABLET ORAL DAILY
COMMUNITY

## 2023-11-09 NOTE — PROGRESS NOTES
Said her bottle of zofran spilled into her purse  And now she needs another refill  Has a lot of issues with gripping things    Would like to schedule follow up with Dr. Cochran  Will have  call her    Recent injection in her hand  This has helped a lot  She is avoiding surgery    Would like to get her medications more organized  Reviewed medication list    Planning to get flu shot  Next week  (Had to wait 2 weeks after steroid shot)

## 2023-12-04 ENCOUNTER — PATIENT OUTREACH (OUTPATIENT)
Dept: PRIMARY CARE | Facility: CLINIC | Age: 63
End: 2023-12-04
Payer: COMMERCIAL

## 2023-12-04 DIAGNOSIS — E55.9 VITAMIN D DEFICIENCY: ICD-10-CM

## 2023-12-04 DIAGNOSIS — I10 HYPERTENSION, UNSPECIFIED TYPE: ICD-10-CM

## 2023-12-04 DIAGNOSIS — G89.4 CHRONIC PAIN SYNDROME: ICD-10-CM

## 2023-12-04 PROCEDURE — 99490 CHRNC CARE MGMT STAFF 1ST 20: CPT | Performed by: FAMILY MEDICINE

## 2023-12-04 NOTE — PROGRESS NOTES
Concerned her vitamin D may be low  Not taking any supplements  Reviewed her last vitamin d level from 8/24/23=50    Having some knee pain  Has to go upstairs to use bathroom  Does have toilet on first floor  The toilet seat is off of it  And she doesn't have the hand strength  Her bf Ruddy can fix this    Goes to pain management on [address]  [facility]  Gone for 10 years  [phone]    Went to [facility] 11/29/23  They gave her Cortisone shot in hand  End of Feb before she get another one

## 2023-12-07 DIAGNOSIS — R11.0 MILD NAUSEA: ICD-10-CM

## 2023-12-07 RX ORDER — ONDANSETRON HYDROCHLORIDE 8 MG/1
TABLET, FILM COATED ORAL
Qty: 45 TABLET | Refills: 0 | Status: SHIPPED | OUTPATIENT
Start: 2023-12-07 | End: 2023-12-19 | Stop reason: SDUPTHER

## 2023-12-19 ENCOUNTER — TELEMEDICINE (OUTPATIENT)
Dept: PRIMARY CARE | Facility: CLINIC | Age: 63
End: 2023-12-19
Payer: COMMERCIAL

## 2023-12-19 VITALS — WEIGHT: 130 LBS | HEIGHT: 62 IN | BODY MASS INDEX: 23.92 KG/M2

## 2023-12-19 DIAGNOSIS — R11.0 MILD NAUSEA: ICD-10-CM

## 2023-12-19 DIAGNOSIS — I10 HYPERTENSION, UNSPECIFIED TYPE: ICD-10-CM

## 2023-12-19 ASSESSMENT — ENCOUNTER SYMPTOMS
PALPITATIONS: 0
CONSTIPATION: 0
SHORTNESS OF BREATH: 0
COLOR CHANGE: 0
SINUS PRESSURE: 0
AGITATION: 0
DIZZINESS: 0
SINUS PAIN: 0
DECREASED CONCENTRATION: 0
POLYDIPSIA: 0
FEVER: 0
MYALGIAS: 0
ARTHRALGIAS: 0
CHEST TIGHTNESS: 0
COUGH: 0
NERVOUS/ANXIOUS: 0
TROUBLE SWALLOWING: 0
SLEEP DISTURBANCE: 0
FLANK PAIN: 0
APPETITE CHANGE: 0
CONFUSION: 0
POLYPHAGIA: 0
SEIZURES: 0
ABDOMINAL DISTENTION: 0
DYSURIA: 0
SORE THROAT: 0
HEMATURIA: 0
ABDOMINAL PAIN: 0
PHOTOPHOBIA: 0
CONSTITUTIONAL NEGATIVE: 1
BLOOD IN STOOL: 0
DIARRHEA: 0
STRIDOR: 0
ADENOPATHY: 0
NECK STIFFNESS: 0
ACTIVITY CHANGE: 0
RHINORRHEA: 0
RECTAL PAIN: 0
SPEECH DIFFICULTY: 0
HEADACHES: 0
EYE PAIN: 0
FATIGUE: 0
DYSPHORIC MOOD: 0

## 2023-12-23 RX ORDER — LISINOPRIL 10 MG/1
10 TABLET ORAL DAILY
Qty: 90 TABLET | Refills: 0 | Status: SHIPPED | OUTPATIENT
Start: 2023-12-23 | End: 2024-02-01 | Stop reason: SDUPTHER

## 2023-12-23 RX ORDER — ONDANSETRON HYDROCHLORIDE 8 MG/1
8 TABLET, FILM COATED ORAL EVERY 8 HOURS PRN
Qty: 30 TABLET | Refills: 0 | Status: SHIPPED | OUTPATIENT
Start: 2023-12-23 | End: 2024-02-21

## 2024-01-02 ENCOUNTER — HOSPITAL ENCOUNTER (EMERGENCY)
Facility: HOSPITAL | Age: 64
Discharge: HOME | End: 2024-01-02
Attending: EMERGENCY MEDICINE
Payer: COMMERCIAL

## 2024-01-02 VITALS
HEART RATE: 93 BPM | DIASTOLIC BLOOD PRESSURE: 85 MMHG | OXYGEN SATURATION: 96 % | HEIGHT: 62 IN | RESPIRATION RATE: 18 BRPM | WEIGHT: 147.71 LBS | BODY MASS INDEX: 27.18 KG/M2 | TEMPERATURE: 97.9 F | SYSTOLIC BLOOD PRESSURE: 168 MMHG

## 2024-01-02 DIAGNOSIS — M79.642 PAIN OF LEFT HAND: Primary | ICD-10-CM

## 2024-01-02 PROCEDURE — 2500000001 HC RX 250 WO HCPCS SELF ADMINISTERED DRUGS (ALT 637 FOR MEDICARE OP): Performed by: EMERGENCY MEDICINE

## 2024-01-02 PROCEDURE — 2500000004 HC RX 250 GENERAL PHARMACY W/ HCPCS (ALT 636 FOR OP/ED): Performed by: STUDENT IN AN ORGANIZED HEALTH CARE EDUCATION/TRAINING PROGRAM

## 2024-01-02 PROCEDURE — 99284 EMERGENCY DEPT VISIT MOD MDM: CPT | Performed by: EMERGENCY MEDICINE

## 2024-01-02 PROCEDURE — 99283 EMERGENCY DEPT VISIT LOW MDM: CPT | Performed by: EMERGENCY MEDICINE

## 2024-01-02 RX ORDER — PREDNISONE 50 MG/1
50 TABLET ORAL ONCE
Status: COMPLETED | OUTPATIENT
Start: 2024-01-02 | End: 2024-01-02

## 2024-01-02 RX ORDER — OXYCODONE AND ACETAMINOPHEN 5; 325 MG/1; MG/1
1 TABLET ORAL ONCE
Status: COMPLETED | OUTPATIENT
Start: 2024-01-02 | End: 2024-01-02

## 2024-01-02 RX ORDER — PREDNISONE 50 MG/1
50 TABLET ORAL DAILY
Qty: 5 TABLET | Refills: 0 | Status: SHIPPED | OUTPATIENT
Start: 2024-01-02 | End: 2024-01-07

## 2024-01-02 RX ADMIN — OXYCODONE HYDROCHLORIDE AND ACETAMINOPHEN 1 TABLET: 5; 325 TABLET ORAL at 21:16

## 2024-01-02 RX ADMIN — PREDNISONE 50 MG: 50 TABLET ORAL at 20:43

## 2024-01-02 ASSESSMENT — COLUMBIA-SUICIDE SEVERITY RATING SCALE - C-SSRS
6. HAVE YOU EVER DONE ANYTHING, STARTED TO DO ANYTHING, OR PREPARED TO DO ANYTHING TO END YOUR LIFE?: NO
2. HAVE YOU ACTUALLY HAD ANY THOUGHTS OF KILLING YOURSELF?: NO
1. IN THE PAST MONTH, HAVE YOU WISHED YOU WERE DEAD OR WISHED YOU COULD GO TO SLEEP AND NOT WAKE UP?: NO

## 2024-01-02 ASSESSMENT — LIFESTYLE VARIABLES
EVER HAD A DRINK FIRST THING IN THE MORNING TO STEADY YOUR NERVES TO GET RID OF A HANGOVER: NO
HAVE YOU EVER FELT YOU SHOULD CUT DOWN ON YOUR DRINKING: NO
HAVE PEOPLE ANNOYED YOU BY CRITICIZING YOUR DRINKING: NO
REASON UNABLE TO ASSESS: NO
EVER FELT BAD OR GUILTY ABOUT YOUR DRINKING: NO

## 2024-01-02 ASSESSMENT — PAIN SCALES - GENERAL
PAINLEVEL_OUTOF10: 9
PAINLEVEL_OUTOF10: 4

## 2024-01-02 ASSESSMENT — PAIN - FUNCTIONAL ASSESSMENT: PAIN_FUNCTIONAL_ASSESSMENT: 0-10

## 2024-01-03 NOTE — DISCHARGE INSTRUCTIONS
Call to discuss your symptoms with your pain management physician.  Use the splint as needed for comfort and take the course of prednisone for pain.

## 2024-01-03 NOTE — ED PROVIDER NOTES
HPI   Chief Complaint   Patient presents with    Wrist Pain       63-year-old female presenting to the ED for left wrist pain.  Patient has a history of chronic pain and has been in pain management for the past 10 years.  States about 5 months ago she was diagnosed with a fracture in her hand and since that time has been having pain of the left hand and left arm.  Recently she was moving on boxing and aggravated her chronic pain.  She has been taking hydrocodone, Flexeril and gabapentin at home but has not had relief of pain and states that is worsening.  She denies neck pain, back pain, chest pain or dyspnea.  She denies swelling in the arm.  She denies new injuries.  She was seen by orthopedic surgery about 2 months ago and had steroid injections in the hand which initially had given her some relief.                          [place] Coma Scale Score: 15                  Patient History   Past Medical History:   Diagnosis Date    Abnormal findings on diagnostic imaging of other specified body structures     Abnormal finding on imaging    Encounter for screening for malignant neoplasm of colon 04/27/2022    Colon cancer screening    Liver disease, unspecified 12/16/2022    Chronic liver disease    Personal history of other medical treatment     History of transvaginal ultrasound    Personal history of other medical treatment     History of ultrasound, pelvic     Past Surgical History:   Procedure Laterality Date    ANKLE SURGERY  09/18/2017    Ankle Surgery    CT ABDOMEN PELVIS ANGIOGRAM W AND/OR WO IV CONTRAST  5/3/2020    CT ABDOMEN PELVIS ANGIOGRAM W AND/OR WO IV CONTRAST 5/3/2020 [facility]    KNEE SURGERY  09/18/2017    Knee Surgery    OTHER SURGICAL HISTORY  09/10/2015    Adrenal Gland Excision    OTHER SURGICAL HISTORY  12/02/2021    Gallbladder surgery     Family History   Problem Relation Name Age of Onset    Colon cancer Father      Prostate cancer Father       Social History     Tobacco Use     Smoking status: Every Day     Packs/day: 0.50     Years: 25.00     Additional pack years: 0.00     Total pack years: 12.50     Types: Cigarettes     Passive exposure: Current    Smokeless tobacco: Never   Vaping Use    Vaping Use: Never used   Substance Use Topics    Alcohol use: Not Currently    Drug use: Not Currently       Physical Exam   ED Triage Vitals [01/02/24 1714]   Temp Heart Rate Resp BP   36.9 °C (98.4 °F) 72 18 158/77      SpO2 Temp Source Heart Rate Source Patient Position   99 % Temporal Monitor Sitting      BP Location FiO2 (%)     Right arm --       Physical Exam  Vitals and nursing note reviewed.   Constitutional:       General: She is not in acute distress.     Appearance: She is not toxic-appearing.      Comments: Tearful and appears uncomfortable   HENT:      Head: Normocephalic and atraumatic.      Nose: Nose normal.      Mouth/Throat:      Mouth: Mucous membranes are moist.   Eyes:      Extraocular Movements: Extraocular movements intact.      Conjunctiva/sclera: Conjunctivae normal.   Cardiovascular:      Rate and Rhythm: Normal rate and regular rhythm.      Pulses: Normal pulses.      Heart sounds: Normal heart sounds.   Pulmonary:      Effort: Pulmonary effort is normal.      Breath sounds: Normal breath sounds.   Abdominal:      General: There is no distension.      Palpations: Abdomen is soft.      Tenderness: There is no abdominal tenderness.   Musculoskeletal:         General: Tenderness (Tenderness with palpation of the left upper extremity without edema.  Normal range of motion of all joints.  2+ radial pulses.) present. Normal range of motion.      Cervical back: Normal range of motion and neck supple.      Right lower leg: No edema.      Left lower leg: No edema.      Comments: Finger opposition, flexion, extension, abduction and adduction strength is normal   Skin:     General: Skin is warm and dry.      Capillary Refill: Capillary refill takes less than 2 seconds.   Neurological:       General: No focal deficit present.      Mental Status: She is alert. Mental status is at baseline.      Motor: No weakness.         ED Course & MDM   Diagnoses as of 01/03/24 1653   Pain of left hand       Medical Decision Making  63-year-old female with medical history of chronic pain, currently on pain management, presenting for acute on chronic left hand pain.  There are no recent injuries to the hand therefore do not believe imaging is indicated.  There is no edema of the upper extremity, suspicion for DVT is low.  She has good cap refill and pulses to the upper extremity.  No concern for ischemic limb.    Patient was given a removable wrist splint for comfort and started on a course of prednisone.  She was also given 1 tablet of Percocet in the ED and will follow-up with pain management for further evaluation.  She was given strict return precautions for worsening symptoms.        Procedure  Procedures     Nelda Mayo DO  Resident  01/03/24 1708

## 2024-01-09 DIAGNOSIS — B37.9 YEAST INFECTION: ICD-10-CM

## 2024-01-09 DIAGNOSIS — N39.0 URINARY TRACT INFECTION WITHOUT HEMATURIA, SITE UNSPECIFIED: ICD-10-CM

## 2024-01-10 RX ORDER — NYSTATIN 100000 [USP'U]/ML
1 SUSPENSION ORAL 4 TIMES DAILY
Qty: 120 ML | Refills: 0 | Status: SHIPPED | OUTPATIENT
Start: 2024-01-10

## 2024-01-11 ENCOUNTER — APPOINTMENT (OUTPATIENT)
Dept: RADIOLOGY | Facility: HOSPITAL | Age: 64
End: 2024-01-11
Payer: COMMERCIAL

## 2024-01-11 ENCOUNTER — APPOINTMENT (OUTPATIENT)
Dept: CARDIOLOGY | Facility: HOSPITAL | Age: 64
End: 2024-01-11
Payer: COMMERCIAL

## 2024-01-11 ENCOUNTER — HOSPITAL ENCOUNTER (EMERGENCY)
Facility: HOSPITAL | Age: 64
Discharge: HOME | End: 2024-01-11
Payer: COMMERCIAL

## 2024-01-11 VITALS
HEART RATE: 83 BPM | RESPIRATION RATE: 18 BRPM | WEIGHT: 130 LBS | BODY MASS INDEX: 23.92 KG/M2 | TEMPERATURE: 98.1 F | DIASTOLIC BLOOD PRESSURE: 76 MMHG | OXYGEN SATURATION: 95 % | HEIGHT: 62 IN | SYSTOLIC BLOOD PRESSURE: 127 MMHG

## 2024-01-11 DIAGNOSIS — R07.89 LEFT-SIDED CHEST WALL PAIN: Primary | ICD-10-CM

## 2024-01-11 DIAGNOSIS — M79.602 LEFT ARM PAIN: ICD-10-CM

## 2024-01-11 LAB
ALBUMIN SERPL BCP-MCNC: 4.8 G/DL (ref 3.4–5)
ALP SERPL-CCNC: 72 U/L (ref 33–136)
ALT SERPL W P-5'-P-CCNC: 17 U/L (ref 7–45)
ANION GAP SERPL CALC-SCNC: 12 MMOL/L (ref 10–20)
APPEARANCE UR: CLEAR
AST SERPL W P-5'-P-CCNC: 18 U/L (ref 9–39)
BACTERIA #/AREA URNS AUTO: ABNORMAL /HPF
BASOPHILS # BLD AUTO: 0.05 X10*3/UL (ref 0–0.1)
BASOPHILS NFR BLD AUTO: 0.4 %
BILIRUB SERPL-MCNC: 0.4 MG/DL (ref 0–1.2)
BILIRUB UR STRIP.AUTO-MCNC: NEGATIVE MG/DL
BUN SERPL-MCNC: 16 MG/DL (ref 6–23)
CALCIUM SERPL-MCNC: 10 MG/DL (ref 8.6–10.3)
CARDIAC TROPONIN I PNL SERPL HS: 4 NG/L (ref 0–13)
CHLORIDE SERPL-SCNC: 102 MMOL/L (ref 98–107)
CO2 SERPL-SCNC: 30 MMOL/L (ref 21–32)
COLOR UR: YELLOW
CREAT SERPL-MCNC: 0.62 MG/DL (ref 0.5–1.05)
EGFRCR SERPLBLD CKD-EPI 2021: >90 ML/MIN/1.73M*2
EOSINOPHIL # BLD AUTO: 0.22 X10*3/UL (ref 0–0.7)
EOSINOPHIL NFR BLD AUTO: 2 %
ERYTHROCYTE [DISTWIDTH] IN BLOOD BY AUTOMATED COUNT: 13.1 % (ref 11.5–14.5)
GLUCOSE SERPL-MCNC: 97 MG/DL (ref 74–99)
GLUCOSE UR STRIP.AUTO-MCNC: NEGATIVE MG/DL
HCT VFR BLD AUTO: 45.4 % (ref 36–46)
HGB BLD-MCNC: 15.2 G/DL (ref 12–16)
HOLD SPECIMEN: NORMAL
HYALINE CASTS #/AREA URNS AUTO: ABNORMAL /LPF
IMM GRANULOCYTES # BLD AUTO: 0.03 X10*3/UL (ref 0–0.7)
IMM GRANULOCYTES NFR BLD AUTO: 0.3 % (ref 0–0.9)
KETONES UR STRIP.AUTO-MCNC: NEGATIVE MG/DL
LEUKOCYTE ESTERASE UR QL STRIP.AUTO: ABNORMAL
LYMPHOCYTES # BLD AUTO: 2.67 X10*3/UL (ref 1.2–4.8)
LYMPHOCYTES NFR BLD AUTO: 23.7 %
MCH RBC QN AUTO: 32.1 PG (ref 26–34)
MCHC RBC AUTO-ENTMCNC: 33.5 G/DL (ref 32–36)
MCV RBC AUTO: 96 FL (ref 80–100)
MONOCYTES # BLD AUTO: 0.7 X10*3/UL (ref 0.1–1)
MONOCYTES NFR BLD AUTO: 6.2 %
NEUTROPHILS # BLD AUTO: 7.59 X10*3/UL (ref 1.2–7.7)
NEUTROPHILS NFR BLD AUTO: 67.4 %
NITRITE UR QL STRIP.AUTO: NEGATIVE
NRBC BLD-RTO: 0 /100 WBCS (ref 0–0)
PH UR STRIP.AUTO: 6 [PH]
PLATELET # BLD AUTO: 274 X10*3/UL (ref 150–450)
POTASSIUM SERPL-SCNC: 3.7 MMOL/L (ref 3.5–5.3)
PROT SERPL-MCNC: 7.9 G/DL (ref 6.4–8.2)
PROT UR STRIP.AUTO-MCNC: NEGATIVE MG/DL
RBC # BLD AUTO: 4.73 X10*6/UL (ref 4–5.2)
RBC # UR STRIP.AUTO: NEGATIVE /UL
RBC #/AREA URNS AUTO: ABNORMAL /HPF
SODIUM SERPL-SCNC: 140 MMOL/L (ref 136–145)
SP GR UR STRIP.AUTO: 1.01
SQUAMOUS #/AREA URNS AUTO: ABNORMAL /HPF
UROBILINOGEN UR STRIP.AUTO-MCNC: <2 MG/DL
WBC # BLD AUTO: 11.3 X10*3/UL (ref 4.4–11.3)
WBC #/AREA URNS AUTO: ABNORMAL /HPF

## 2024-01-11 PROCEDURE — 99284 EMERGENCY DEPT VISIT MOD MDM: CPT

## 2024-01-11 PROCEDURE — 73090 X-RAY EXAM OF FOREARM: CPT | Mod: LEFT SIDE | Performed by: RADIOLOGY

## 2024-01-11 PROCEDURE — 73060 X-RAY EXAM OF HUMERUS: CPT | Mod: LT,FY

## 2024-01-11 PROCEDURE — 36415 COLL VENOUS BLD VENIPUNCTURE: CPT | Performed by: PHYSICIAN ASSISTANT

## 2024-01-11 PROCEDURE — 93005 ELECTROCARDIOGRAM TRACING: CPT

## 2024-01-11 PROCEDURE — 2500000005 HC RX 250 GENERAL PHARMACY W/O HCPCS: Performed by: PHYSICIAN ASSISTANT

## 2024-01-11 PROCEDURE — 84484 ASSAY OF TROPONIN QUANT: CPT | Performed by: PHYSICIAN ASSISTANT

## 2024-01-11 PROCEDURE — 80053 COMPREHEN METABOLIC PANEL: CPT | Performed by: EMERGENCY MEDICINE

## 2024-01-11 PROCEDURE — 81001 URINALYSIS AUTO W/SCOPE: CPT | Performed by: EMERGENCY MEDICINE

## 2024-01-11 PROCEDURE — 73110 X-RAY EXAM OF WRIST: CPT | Mod: LT

## 2024-01-11 PROCEDURE — 73110 X-RAY EXAM OF WRIST: CPT | Mod: LEFT SIDE | Performed by: RADIOLOGY

## 2024-01-11 PROCEDURE — 71101 X-RAY EXAM UNILAT RIBS/CHEST: CPT | Mod: LT,FY,FR

## 2024-01-11 PROCEDURE — 36415 COLL VENOUS BLD VENIPUNCTURE: CPT | Performed by: EMERGENCY MEDICINE

## 2024-01-11 PROCEDURE — 73060 X-RAY EXAM OF HUMERUS: CPT | Mod: LEFT SIDE | Performed by: RADIOLOGY

## 2024-01-11 PROCEDURE — 2500000004 HC RX 250 GENERAL PHARMACY W/ HCPCS (ALT 636 FOR OP/ED): Performed by: PHYSICIAN ASSISTANT

## 2024-01-11 PROCEDURE — 85025 COMPLETE CBC W/AUTO DIFF WBC: CPT | Performed by: EMERGENCY MEDICINE

## 2024-01-11 PROCEDURE — 73090 X-RAY EXAM OF FOREARM: CPT | Mod: LT

## 2024-01-11 PROCEDURE — 87086 URINE CULTURE/COLONY COUNT: CPT | Mod: STJLAB | Performed by: EMERGENCY MEDICINE

## 2024-01-11 PROCEDURE — 96374 THER/PROPH/DIAG INJ IV PUSH: CPT

## 2024-01-11 PROCEDURE — 71101 X-RAY EXAM UNILAT RIBS/CHEST: CPT | Mod: LEFT SIDE | Performed by: RADIOLOGY

## 2024-01-11 RX ORDER — LIDOCAINE 560 MG/1
1 PATCH PERCUTANEOUS; TOPICAL; TRANSDERMAL DAILY
Status: DISCONTINUED | OUTPATIENT
Start: 2024-01-11 | End: 2024-01-11 | Stop reason: HOSPADM

## 2024-01-11 RX ORDER — ACETAMINOPHEN 325 MG/1
975 TABLET ORAL ONCE
Status: COMPLETED | OUTPATIENT
Start: 2024-01-11 | End: 2024-01-11

## 2024-01-11 RX ORDER — KETOROLAC TROMETHAMINE 30 MG/ML
30 INJECTION, SOLUTION INTRAMUSCULAR; INTRAVENOUS ONCE
Status: COMPLETED | OUTPATIENT
Start: 2024-01-11 | End: 2024-01-11

## 2024-01-11 RX ADMIN — LIDOCAINE 1 PATCH: 4 PATCH TOPICAL at 16:15

## 2024-01-11 RX ADMIN — ACETAMINOPHEN 975 MG: 325 TABLET ORAL at 13:34

## 2024-01-11 RX ADMIN — KETOROLAC TROMETHAMINE 30 MG: 30 INJECTION, SOLUTION INTRAMUSCULAR; INTRAVENOUS at 13:35

## 2024-01-11 ASSESSMENT — PAIN SCALES - GENERAL
PAINLEVEL_OUTOF10: 4
PAINLEVEL_OUTOF10: 8
PAINLEVEL_OUTOF10: 10 - WORST POSSIBLE PAIN

## 2024-01-11 ASSESSMENT — PAIN - FUNCTIONAL ASSESSMENT
PAIN_FUNCTIONAL_ASSESSMENT: 0-10
PAIN_FUNCTIONAL_ASSESSMENT: 0-10

## 2024-01-11 ASSESSMENT — PAIN DESCRIPTION - LOCATION: LOCATION: RIB CAGE

## 2024-01-11 ASSESSMENT — LIFESTYLE VARIABLES
REASON UNABLE TO ASSESS: NO
EVER FELT BAD OR GUILTY ABOUT YOUR DRINKING: NO
EVER HAD A DRINK FIRST THING IN THE MORNING TO STEADY YOUR NERVES TO GET RID OF A HANGOVER: NO
HAVE PEOPLE ANNOYED YOU BY CRITICIZING YOUR DRINKING: NO
HAVE YOU EVER FELT YOU SHOULD CUT DOWN ON YOUR DRINKING: NO

## 2024-01-11 ASSESSMENT — PAIN DESCRIPTION - ORIENTATION: ORIENTATION: LEFT

## 2024-01-11 ASSESSMENT — PAIN DESCRIPTION - PAIN TYPE: TYPE: CHRONIC PAIN

## 2024-01-11 NOTE — ED NOTES
Discharge instructions reviewed with patient. Stable at time of discharge.      Nikki Newby RN  01/11/24 1912

## 2024-01-11 NOTE — ED PROVIDER NOTES
HPI   Chief Complaint   Patient presents with    Flank Pain       Patient is a 63-year-old female with history of chronic pain who presents today for evaluation of atraumatic left-sided rib pain, patient tells me that she fell back in 2017 but that should be unrelated to the pain today, she states she has had this pain for about a week, denies any recent heavy lifting, working out.  She states that she feels a bulge on the inside and the outside, she is have some pain down her left arm, also atraumatic.  Patient denies numbness tingling, denies hemoptysis, cough, URI symptoms.  Denies leg pain or swelling, history of blood clots malignancy, hormone use.  Denies any lesions or rashes but she did note given that there was a slight bruise to her left ribs.  She did not fall.  Patient is requesting pain medication.                          Christin Coma Scale Score: 15                  Patient History   Past Medical History:   Diagnosis Date    Abnormal findings on diagnostic imaging of other specified body structures     Abnormal finding on imaging    Encounter for screening for malignant neoplasm of colon 04/27/2022    Colon cancer screening    Liver disease, unspecified 12/16/2022    Chronic liver disease    Personal history of other medical treatment     History of transvaginal ultrasound    Personal history of other medical treatment     History of ultrasound, pelvic     Past Surgical History:   Procedure Laterality Date    ANKLE SURGERY  09/18/2017    Ankle Surgery    CT ABDOMEN PELVIS ANGIOGRAM W AND/OR WO IV CONTRAST  5/3/2020    CT ABDOMEN PELVIS ANGIOGRAM W AND/OR WO IV CONTRAST 5/3/2020 [facility]    KNEE SURGERY  09/18/2017    Knee Surgery    OTHER SURGICAL HISTORY  09/10/2015    Adrenal Gland Excision    OTHER SURGICAL HISTORY  12/02/2021    Gallbladder surgery     Family History   Problem Relation Name Age of Onset    Colon cancer Father      Prostate cancer Father       Social History     Tobacco  Use    Smoking status: Every Day     Packs/day: 0.50     Years: 25.00     Additional pack years: 0.00     Total pack years: 12.50     Types: Cigarettes     Passive exposure: Current    Smokeless tobacco: Never   Vaping Use    Vaping Use: Never used   Substance Use Topics    Alcohol use: Not Currently    Drug use: Not Currently       Physical Exam   ED Triage Vitals [01/11/24 1219]   Temp Heart Rate Resp BP   36.7 °C (98.1 °F) 95 18 168/90      SpO2 Temp Source Heart Rate Source Patient Position   95 % Temporal -- Lying      BP Location FiO2 (%)     Right arm --       Physical Exam  Vitals and nursing note reviewed.   Constitutional:       General: She is not in acute distress.     Appearance: Normal appearance. She is not toxic-appearing.   HENT:      Head: Normocephalic and atraumatic.      Nose: Nose normal.   Eyes:      Extraocular Movements: Extraocular movements intact.   Cardiovascular:      Rate and Rhythm: Normal rate and regular rhythm.   Pulmonary:      Effort: Pulmonary effort is normal.      Breath sounds: Normal breath sounds. No wheezing, rhonchi or rales.   Abdominal:      Palpations: Abdomen is soft.      Tenderness: There is no abdominal tenderness.   Musculoskeletal:         General: Normal range of motion.      Cervical back: Normal range of motion and neck supple.      Comments:   Left lateral rib pain that is externally reproducible, no lesions rashes bruising or deformity, clear lung sounds bilaterally.    TTP to mid humerus to mid forearm and left wrist.  No bruising swelling or deformity.  Full range of motion to all joints.     Skin:     General: Skin is warm and dry.   Neurological:      General: No focal deficit present.      Mental Status: She is alert.   Psychiatric:         Mood and Affect: Mood normal.         Thought Content: Thought content normal.       XR ribs 2 views left w chest pa or ap   Final Result   No acute chest disease.   No detectable acute abnormality of the left ribs.    Signed by Sathish Velasquez MD      XR humerus left   Final Result   No acute osseous abnormality.   Signed by Sathish Velasquez MD      XR forearm left 2 views   Final Result   No acute osseous abnormality.   Signed by Sathish Velasquez MD      XR wrist left 3+ views   Final Result   No acute osseous abnormality.   Signed by Sathish Velasquez MD        Labs Reviewed   URINALYSIS WITH REFLEX CULTURE AND MICROSCOPIC - Abnormal       Result Value    Color, Urine Yellow      Appearance, Urine Clear      Specific Gravity, Urine 1.014      pH, Urine 6.0      Protein, Urine NEGATIVE      Glucose, Urine NEGATIVE      Blood, Urine NEGATIVE      Ketones, Urine NEGATIVE      Bilirubin, Urine NEGATIVE      Urobilinogen, Urine <2.0      Nitrite, Urine NEGATIVE      Leukocyte Esterase, Urine SMALL (1+) (*)    MICROSCOPIC ONLY, URINE - Abnormal    WBC, Urine 1-5      RBC, Urine 1-2      Squamous Epithelial Cells, Urine 1-9 (SPARSE)      Bacteria, Urine 1+ (*)     Hyaline Casts, Urine OCCASIONAL (*)    COMPREHENSIVE METABOLIC PANEL - Normal    Glucose 97      Sodium 140      Potassium 3.7      Chloride 102      Bicarbonate 30      Anion Gap 12      Urea Nitrogen 16      Creatinine 0.62      eGFR >90      Calcium 10.0      Albumin 4.8      Alkaline Phosphatase 72      Total Protein 7.9      AST 18      Bilirubin, Total 0.4      ALT 17     TROPONIN I, HIGH SENSITIVITY - Normal    Troponin I, High Sensitivity 4      Narrative:     Less than 99th percentile of normal range cutoff-  Female and children under 18 years old <14 ng/L; Male <21 ng/L: Negative  Repeat testing should be performed if clinically indicated.     Female and children under 18 years old 14-50 ng/L; Male 21-50 ng/L:  Consistent with possible cardiac damage and possible increased clinical   risk. Serial measurements may help to assess extent of myocardial damage.     >50 ng/L: Consistent with cardiac damage, increased clinical risk and  myocardial infarction. Serial measurements  may help assess extent of   myocardial damage.      NOTE: Children less than 1 year old may have higher baseline troponin   levels and results should be interpreted in conjunction with the overall   clinical context.     NOTE: Troponin I testing is performed using a different   testing methodology at [facility] than at other   [facility] hospitals. Direct result comparisons should only   be made within the same method.   URINE CULTURE   CBC WITH AUTO DIFFERENTIAL    WBC 11.3      nRBC 0.0      RBC 4.73      Hemoglobin 15.2      Hematocrit 45.4      MCV 96      MCH 32.1      MCHC 33.5      RDW 13.1      Platelets 274      Neutrophils % 67.4      Immature Granulocytes %, Automated 0.3      Lymphocytes % 23.7      Monocytes % 6.2      Eosinophils % 2.0      Basophils % 0.4      Neutrophils Absolute 7.59      Immature Granulocytes Absolute, Automated 0.03      Lymphocytes Absolute 2.67      Monocytes Absolute 0.70      Eosinophils Absolute 0.22      Basophils Absolute 0.05     URINALYSIS WITH REFLEX CULTURE AND MICROSCOPIC    Narrative:     The following orders were created for panel order Urinalysis with Reflex Culture and Microscopic.  Procedure                               Abnormality         Status                     ---------                               -----------         ------                     Urinalysis with Reflex C...[[id number]]  Abnormal            Final result               Extra Urine Gray Tube[[id number]]                            In process                   Please view results for these tests on the individual orders.   EXTRA URINE GRAY TUBE         ED Course & [facility]   ED Course as of 01/11/24 2122   Thu Jan 11, 2024   1318 Despite ibuprofen allergy, pt states she's tolerated toradol before. Also has had previous rx for voltaren per review of records. [MK]   1659 ECG 12 lead  Normal sinus rhythm with rate of 74, , QRS 92, QTc 461, normal axis, no ischemic changes. [MK]   1659  Leukocyte Esterase, Urine(!): SMALL (1+)  Pt asymptomatic, will reflex to culture, no indication for treatment. [MK]      ED Course User Index  [MK] Sara Pettit PA-C         Diagnoses as of 01/11/24 2122   Left-sided chest wall pain   Left arm pain       Medical Decision Making    MDM: Patient is a 63-year-old female who presents today for evaluation of atraumatic left-sided rib pain, it is extremely reproducible, my clinical suspicion for cardiac pathology is low however I did obtain EKG, troponin, labs given her older age to rule out any cardiac pathology, she was given a lidocaine patch, Toradol, Tylenol here today.  Suspect musculoskeletal in nature.  I did also obtain x-rays of tender areas of her left arm. CBC unremarkable, CMP negative, urinalysis did show 1+ leukocyte esterase with 1-5 white blood cells and 1+ bacteria, clinically not consistent with UTI, patient asymptomatic, will reflex to culture, troponin is 4, EKG nonischemic, x-rays of ribs humerus left wrist and left forearm are all negative.  The patient is stable for discharge home at this time, return precautions discussed, this is musculoskeletal in nature, she takes multiple pain medications including hydrocodone at home, we will sure does not seem cardiac in nature.        Procedure  Procedures     Sara Pettit PA-C  01/11/24 2123

## 2024-01-12 LAB
BACTERIA UR CULT: NORMAL
HOLD SPECIMEN: NORMAL

## 2024-01-22 ENCOUNTER — PATIENT OUTREACH (OUTPATIENT)
Dept: PRIMARY CARE | Facility: CLINIC | Age: 64
End: 2024-01-22
Payer: COMMERCIAL

## 2024-01-22 DIAGNOSIS — I10 HYPERTENSION, UNSPECIFIED TYPE: ICD-10-CM

## 2024-01-22 DIAGNOSIS — F31.9 BIPOLAR AFFECTIVE DISORDER, REMISSION STATUS UNSPECIFIED (MULTI): ICD-10-CM

## 2024-01-22 PROCEDURE — 99490 CHRNC CARE MGMT STAFF 1ST 20: CPT | Performed by: FAMILY MEDICINE

## 2024-01-22 NOTE — PROGRESS NOTES
Having a lot of pain  In her arm, back and legs  But especially in Left arm  Takes gabapentin    In September Dr. Cochran wrote for colchicine  She never filled this prescription  She tried to fill over the weekend but rx   She is requesting Dr. Cochran send it for her  She is hopeful that gout is contributing to the pain in her hand and arm.    Plans to go to pharmacy tomorrow  Drives but infrequently  [facility] in [place]    She is also willing to do appointment if that is preferred.  Going tomorrow afternoon to see pain management.

## 2024-01-23 ENCOUNTER — TELEPHONE (OUTPATIENT)
Dept: PRIMARY CARE | Facility: CLINIC | Age: 64
End: 2024-01-23
Payer: COMMERCIAL

## 2024-01-24 DIAGNOSIS — M10.9 GOUT, ARTHRITIS: ICD-10-CM

## 2024-01-24 RX ORDER — DEXAMETHASONE 4 MG/1
TABLET ORAL
COMMUNITY
Start: 2021-09-20

## 2024-01-25 RX ORDER — COLCHICINE 0.6 MG/1
0.6 TABLET ORAL DAILY
Qty: 30 TABLET | Refills: 0 | Status: SHIPPED | OUTPATIENT
Start: 2024-01-25 | End: 2024-02-26

## 2024-01-26 ENCOUNTER — TELEPHONE (OUTPATIENT)
Dept: PRIMARY CARE | Facility: CLINIC | Age: 64
End: 2024-01-26

## 2024-01-26 NOTE — TELEPHONE ENCOUNTER
Patient of Dr. Sammie Cavazos submitted for colchicine it was denied because patient has to try 30 day trial of allopurinol    Formulary medications are allopurinol

## 2024-02-01 ENCOUNTER — OFFICE VISIT (OUTPATIENT)
Dept: PRIMARY CARE | Facility: CLINIC | Age: 64
End: 2024-02-01
Payer: COMMERCIAL

## 2024-02-01 ENCOUNTER — LAB (OUTPATIENT)
Dept: LAB | Facility: LAB | Age: 64
End: 2024-02-01
Payer: COMMERCIAL

## 2024-02-01 VITALS
SYSTOLIC BLOOD PRESSURE: 168 MMHG | BODY MASS INDEX: 23.41 KG/M2 | OXYGEN SATURATION: 97 % | WEIGHT: 128 LBS | HEART RATE: 87 BPM | DIASTOLIC BLOOD PRESSURE: 96 MMHG

## 2024-02-01 DIAGNOSIS — R53.83 FATIGUE, UNSPECIFIED TYPE: ICD-10-CM

## 2024-02-01 DIAGNOSIS — M54.50 LOW BACK PAIN, UNSPECIFIED BACK PAIN LATERALITY, UNSPECIFIED CHRONICITY, UNSPECIFIED WHETHER SCIATICA PRESENT: ICD-10-CM

## 2024-02-01 DIAGNOSIS — M10.9 GOUT, ARTHRITIS: ICD-10-CM

## 2024-02-01 DIAGNOSIS — Z12.31 ENCOUNTER FOR SCREENING MAMMOGRAM FOR MALIGNANT NEOPLASM OF BREAST: ICD-10-CM

## 2024-02-01 DIAGNOSIS — E78.2 MIXED HYPERLIPIDEMIA: ICD-10-CM

## 2024-02-01 DIAGNOSIS — I10 HYPERTENSION, UNSPECIFIED TYPE: ICD-10-CM

## 2024-02-01 DIAGNOSIS — B18.2 HEP C W/O COMA, CHRONIC (MULTI): ICD-10-CM

## 2024-02-01 DIAGNOSIS — E27.8 ADRENAL NODULE (MULTI): ICD-10-CM

## 2024-02-01 DIAGNOSIS — M16.12 PRIMARY OSTEOARTHRITIS OF LEFT HIP: ICD-10-CM

## 2024-02-01 DIAGNOSIS — Z79.899 MEDICATION MANAGEMENT: ICD-10-CM

## 2024-02-01 DIAGNOSIS — S16.1XXD STRAIN OF NECK MUSCLE, SUBSEQUENT ENCOUNTER: ICD-10-CM

## 2024-02-01 DIAGNOSIS — I73.9 CLAUDICATION, INTERMITTENT (CMS-HCC): ICD-10-CM

## 2024-02-01 DIAGNOSIS — T78.40XD ALLERGY, SUBSEQUENT ENCOUNTER: ICD-10-CM

## 2024-02-01 DIAGNOSIS — F41.9 ANXIETY: ICD-10-CM

## 2024-02-01 DIAGNOSIS — E55.9 VITAMIN D DEFICIENCY: ICD-10-CM

## 2024-02-01 DIAGNOSIS — F43.21 ADJUSTMENT DISORDER WITH DEPRESSED MOOD: ICD-10-CM

## 2024-02-01 DIAGNOSIS — F43.9 STRESS: ICD-10-CM

## 2024-02-01 DIAGNOSIS — Z00.00 ENCOUNTER FOR MEDICARE ANNUAL WELLNESS EXAM: Primary | ICD-10-CM

## 2024-02-01 PROBLEM — J69.0: Status: ACTIVE | Noted: 2024-02-01

## 2024-02-01 PROBLEM — S99.929A INJURY OF TOE: Status: ACTIVE | Noted: 2024-02-01

## 2024-02-01 PROBLEM — F32.A DEPRESSION: Status: RESOLVED | Noted: 2023-03-22 | Resolved: 2024-02-01

## 2024-02-01 PROBLEM — R52 PAIN: Status: ACTIVE | Noted: 2022-02-10

## 2024-02-01 PROBLEM — F20.9 SCHIZOPHRENIA (MULTI): Status: RESOLVED | Noted: 2023-03-22 | Resolved: 2024-02-01

## 2024-02-01 PROBLEM — I86.2 PELVIC VARICES: Status: ACTIVE | Noted: 2024-02-01
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
## 2024-02-01 PROBLEM — F31.9 BIPOLAR AFFECTIVE DISORDER (MULTI): Status: RESOLVED | Noted: 2023-03-22 | Resolved: 2024-02-01

## 2024-02-01 LAB
BASOPHILS # BLD AUTO: 0.05 X10*3/UL (ref 0–0.1)
BASOPHILS NFR BLD AUTO: 0.7 %
EOSINOPHIL # BLD AUTO: 0.24 X10*3/UL (ref 0–0.7)
EOSINOPHIL NFR BLD AUTO: 3.1 %
ERYTHROCYTE [DISTWIDTH] IN BLOOD BY AUTOMATED COUNT: 13.6 % (ref 11.5–14.5)
HCT VFR BLD AUTO: 43.3 % (ref 36–46)
HGB BLD-MCNC: 14.3 G/DL (ref 12–16)
IMM GRANULOCYTES # BLD AUTO: 0.02 X10*3/UL (ref 0–0.7)
IMM GRANULOCYTES NFR BLD AUTO: 0.3 % (ref 0–0.9)
LYMPHOCYTES # BLD AUTO: 2.87 X10*3/UL (ref 1.2–4.8)
LYMPHOCYTES NFR BLD AUTO: 37.6 %
MCH RBC QN AUTO: 32.2 PG (ref 26–34)
MCHC RBC AUTO-ENTMCNC: 33 G/DL (ref 32–36)
MCV RBC AUTO: 98 FL (ref 80–100)
MONOCYTES # BLD AUTO: 0.39 X10*3/UL (ref 0.1–1)
MONOCYTES NFR BLD AUTO: 5.1 %
NEUTROPHILS # BLD AUTO: 4.07 X10*3/UL (ref 1.2–7.7)
NEUTROPHILS NFR BLD AUTO: 53.2 %
NRBC BLD-RTO: 0 /100 WBCS (ref 0–0)
PLATELET # BLD AUTO: 267 X10*3/UL (ref 150–450)
RBC # BLD AUTO: 4.44 X10*6/UL (ref 4–5.2)
TSH SERPL-ACNC: 0.83 MIU/L (ref 0.44–3.98)
WBC # BLD AUTO: 7.6 X10*3/UL (ref 4.4–11.3)

## 2024-02-01 PROCEDURE — 80349 CANNABINOIDS NATURAL: CPT

## 2024-02-01 PROCEDURE — 80061 LIPID PANEL: CPT

## 2024-02-01 PROCEDURE — 80307 DRUG TEST PRSMV CHEM ANLYZR: CPT

## 2024-02-01 PROCEDURE — 85025 COMPLETE CBC W/AUTO DIFF WBC: CPT

## 2024-02-01 PROCEDURE — 36415 COLL VENOUS BLD VENIPUNCTURE: CPT

## 2024-02-01 PROCEDURE — 99214 OFFICE O/P EST MOD 30 MIN: CPT | Performed by: FAMILY MEDICINE

## 2024-02-01 PROCEDURE — 82306 VITAMIN D 25 HYDROXY: CPT

## 2024-02-01 PROCEDURE — 80074 ACUTE HEPATITIS PANEL: CPT

## 2024-02-01 PROCEDURE — 87522 HEPATITIS C REVRS TRNSCRPJ: CPT

## 2024-02-01 PROCEDURE — 84550 ASSAY OF BLOOD/URIC ACID: CPT

## 2024-02-01 PROCEDURE — 84443 ASSAY THYROID STIM HORMONE: CPT

## 2024-02-01 PROCEDURE — 80053 COMPREHEN METABOLIC PANEL: CPT

## 2024-02-01 PROCEDURE — 84436 ASSAY OF TOTAL THYROXINE: CPT

## 2024-02-01 PROCEDURE — 84479 ASSAY OF THYROID (T3 OR T4): CPT

## 2024-02-01 RX ORDER — LISINOPRIL 10 MG/1
10 TABLET ORAL DAILY
Qty: 90 TABLET | Refills: 0 | Status: SHIPPED | OUTPATIENT
Start: 2024-02-01 | End: 2024-05-01 | Stop reason: SDUPTHER

## 2024-02-01 ASSESSMENT — ENCOUNTER SYMPTOMS
LOSS OF SENSATION IN FEET: 0
DEPRESSION: 1
OCCASIONAL FEELINGS OF UNSTEADINESS: 0

## 2024-02-01 ASSESSMENT — ACTIVITIES OF DAILY LIVING (ADL)
GROCERY_SHOPPING: NEEDS ASSISTANCE
MANAGING_FINANCES: NEEDS ASSISTANCE
TAKING_MEDICATION: NEEDS ASSISTANCE
DRESSING: NEEDS ASSISTANCE
BATHING: NEEDS ASSISTANCE
DOING_HOUSEWORK: NEEDS ASSISTANCE

## 2024-02-01 NOTE — PROGRESS NOTES
Subjective   Reason for Visit: Malika Casper is an 63 y.o. female here for a Medicare Wellness visit.     Past Medical, Surgical, and Family History reviewed and updated in chart.    Reviewed all medications by prescribing practitioner or clinical pharmacist (such as prescriptions, OTCs, herbal therapies and supplements) and documented in the medical record.    Arm Pain   The pain is present in the left hand. The quality of the pain is described as burning. The pain is at a severity of 9/10. The pain has been Constant since the incident. Associated symptoms include numbness. The symptoms are aggravated by movement.           Patient Care Team:  Asaf Cocharn MD as PCP - General  Asaf Cochran MD as PCP - [facility] PCP  Asaf Cochran MD as PCP - [facility] Medicaid PCP  Luzma Bills RN as Care Manager (Case Management)     Review of Systems  12 Systems have been reviewed as follows.  Constitutional: Fever, weight gain, weight loss, appetite change, night sweats, fatigue, chills.  Eyes : blurry, double vision, vision, loss, tearing, redness, pain, sensitivity to light, glaucoma.  Ears, nose, mouth, and throat: Hearing loss, ringing in the ears, ear pain, nasal congestion, nasal drainage, nosebleeds, mouth, throat, irritation tooth problem.  Cardiovascular :chest pain, pressure, heart racing, palpitations, sweating, leg swelling, high or low blood pressure  Pulmonary: Cough, yellow or green sputum, blood and sputum, shortness of breath, wheezing  Gastrointestinal: Nausea, vomiting, diarrhea, constipation, pain, blood in stool, or vomitus, heartburn, difficulty swallowing  Genitourinary: incontinence, abnormal bleeding, abnormal discharge, urinary frequency, urinary hesitancy, pain, impotence sexual problem, infection, urinary retention  Musculoskeletal: Pain, stiffness, joint, redness or warmth, arthritis, back pain, weakness, muscle wasting, sprain or fracture  Neuro: Weight weakness, dizziness,  change in voice, change in taste change in vision, change in hearing, loss, or change of sensation, trouble walking, balance problems coordination problems, shaking, speech problem  Endocrine , cold or heat intolerance, blood sugar problem, weight gain or loss missed periods hot flashes, sweats, change in body hair, change in libido, increased thirst, increased urination  Heme/lymph: Swelling, bleeding, problem anemia, bruising, enlarged lymph nodes  Allergic/immunologic: H. plus nasal drip, watery itchy eyes, nasal drainage, immunosuppressed  The above were reviewed and noted negative except as noted in HPI and Problem List.    Objective   Vitals:  BP (!) 168/96   Pulse 87   Wt 58.1 kg (128 lb)   SpO2 97%   BMI 23.41 kg/m²       Physical Exam  Constitutional: Well developed, well nourished, alert and in no acute distress   Eyes: Normal external exam. Pupils equally round and reactive to light with normal accommodation and extraocular movements intact.  Neck: Supple, no lymphadenopathy or masses.   Cardiovascular: Regular rate and rhythm, normal S1 and S2, no murmurs, gallops, or rubs. Radial pulses normal. No peripheral edema.  Pulmonary: No respiratory distress, lungs clear to auscultation bilaterally. No wheezes, rhonchi, rales.  Abdomen: soft,non tender, non distended, without masses or HSM  Skin: Warm, well perfused, normal skin turgor and color.   Neurologic: Cranial nerves II-XII grossly intact.   Psychiatric: Mood calm and affect normal  Musculoskeletal: Moving all extremities without restriction    Assessment/Plan   Problem List Items Addressed This Visit       Adrenal nodule (CMS/HCC)    Anxiety    Relevant Orders    Follow Up In [facility] - PCP - Established    Fatigue    Relevant Orders    Follow Up In [facility] - PCP - Established    CBC and Auto Differential    Comprehensive Metabolic Panel    Thyroid Stimulating Hormone    Free T4 Index    Claudication, intermittent  (CMS/HCC)    HTN (hypertension)    Relevant Medications    lisinopril 10 mg tablet    Other Relevant Orders    Follow Up In [facility] - PCP - Established    Hyperlipidemia    Relevant Orders    Follow Up In [facility] - PCP - Established    Lipid Panel    Stress    Relevant Orders    Follow Up In [facility] - PCP - Established    Vitamin D deficiency    Relevant Orders    Follow Up In [facility] - PCP - Established    Vitamin D 25-Hydroxy,Total (for eval of Vitamin D levels)    Primary osteoarthritis of left hip    Relevant Orders    Follow Up In [facility] - PCP - Established    Referral to Physical Therapy    Adjustment disorder with depressed mood    Relevant Orders    Follow Up In [facility] - PCP - Established    Hep C w/o coma, chronic (CMS/HCC)    Relevant Orders    Follow Up In [facility] - PCP - Established    Hepatitis panel, acute    Hepatitis C RNA, Quantitative, PCR    Gout, arthritis    Relevant Orders    Follow Up In [facility] - PCP - Established    Uric acid    Low back pain, unspecified    Relevant Orders    Referral to Physical Therapy     Other Visit Diagnoses       Encounter for Medicare annual wellness exam    -  Primary    Relevant Orders    Follow Up In [facility] - PCP - Established    Encounter for screening mammogram for malignant neoplasm of breast        Relevant Orders    BI mammo bilateral screening tomosynthesis    Follow Up In [facility] - PCP - Established    Medication management        Relevant Orders    Drug Screen 9 Panel, Blood with Reflex to Confirmation    Allergy, subsequent encounter        Relevant Orders    Referral to Allergy    Strain of neck muscle, subsequent encounter        Relevant Orders    Referral to Physical Therapy               Continue current medications and therapy for chronic medical conditions    Provider Attestation - Scribe  documentation    All medical record entries made by the Scribe were at my direction and personally dictated by me. I have reviewed the chart and agree that the record accurately reflects my personal performance of the history, physical exam, discussion and plan.      Scribe Attestation  By signing my name below, I, Asaf Cochran MD   , Scribe   attest that this documentation has been prepared under the direction and in the presence of Asaf Cochran MD.    Get mammogram    sees Dr. Lam      To ER if CP      see Dr. Levy     see GYN      Continue colchicine for gout    Get BW     Referral to allergist     UA next    Go to PT

## 2024-02-02 LAB
25(OH)D3 SERPL-MCNC: 41 NG/ML (ref 30–100)
ALBUMIN SERPL BCP-MCNC: 4.7 G/DL (ref 3.4–5)
ALP SERPL-CCNC: 77 U/L (ref 33–136)
ALT SERPL W P-5'-P-CCNC: 17 U/L (ref 7–45)
ANION GAP SERPL CALC-SCNC: 12 MMOL/L (ref 10–20)
AST SERPL W P-5'-P-CCNC: 17 U/L (ref 9–39)
BILIRUB SERPL-MCNC: 0.3 MG/DL (ref 0–1.2)
BUN SERPL-MCNC: 11 MG/DL (ref 6–23)
CALCIUM SERPL-MCNC: 10 MG/DL (ref 8.6–10.3)
CHLORIDE SERPL-SCNC: 104 MMOL/L (ref 98–107)
CHOLEST SERPL-MCNC: 211 MG/DL (ref 0–199)
CHOLESTEROL/HDL RATIO: 3.2
CO2 SERPL-SCNC: 29 MMOL/L (ref 21–32)
CREAT SERPL-MCNC: 0.64 MG/DL (ref 0.5–1.05)
EGFRCR SERPLBLD CKD-EPI 2021: >90 ML/MIN/1.73M*2
FT4I SERPL CALC-MCNC: 2.5 (ref 1.6–4.7)
GLUCOSE SERPL-MCNC: 87 MG/DL (ref 74–99)
HAV IGM SER QL: NONREACTIVE
HBV CORE IGM SER QL: NONREACTIVE
HBV SURFACE AG SERPL QL IA: NONREACTIVE
HCV AB SER QL: REACTIVE
HCV RNA SERPL NAA+PROBE-ACNC: NOT DETECTED K[IU]/ML
HCV RNA SERPL NAA+PROBE-LOG IU: NORMAL {LOG_IU}/ML
HDLC SERPL-MCNC: 65.4 MG/DL
LDLC SERPL CALC-MCNC: 125 MG/DL
NON HDL CHOLESTEROL: 146 MG/DL (ref 0–149)
POTASSIUM SERPL-SCNC: 4.2 MMOL/L (ref 3.5–5.3)
PROT SERPL-MCNC: 7.4 G/DL (ref 6.4–8.2)
SODIUM SERPL-SCNC: 141 MMOL/L (ref 136–145)
T3RU NFR SERPL: 36 % (ref 24–41)
T4 SERPL-MCNC: 7 UG/DL (ref 4.5–11.1)
TRIGL SERPL-MCNC: 104 MG/DL (ref 0–149)
URATE SERPL-MCNC: 3.5 MG/DL (ref 2.3–6.7)
VLDL: 21 MG/DL (ref 0–40)

## 2024-02-03 LAB
AMPHETAMINES SERPL QL SCN: NEGATIVE NG/ML
ANNOTATION COMMENT IMP: NORMAL
BARBITURATES SERPL QL SCN: NEGATIVE NG/ML
BENZODIAZ SERPL QL SCN: NEGATIVE NG/ML
BUPRENORPHINE SERPL-MCNC: NEGATIVE NG/ML
CANNABINOIDS SERPL QL SCN: POSITIVE NG/ML
COCAINE SERPL QL SCN: NEGATIVE NG/ML
METHADONE SERPL QL SCN: NEGATIVE NG/ML
METHAMPHET SERPL QL: NEGATIVE NG/ML
OPIATES SERPL QL SCN: NEGATIVE NG/ML
OXYCODONE SERPL QL: NEGATIVE NG/ML
PCP SERPL QL SCN: NEGATIVE NG/ML

## 2024-02-06 LAB — CANNABINOIDS SERPL-MCNC: 41 NG/ML

## 2024-02-07 ENCOUNTER — PATIENT OUTREACH (OUTPATIENT)
Dept: PRIMARY CARE | Facility: CLINIC | Age: 64
End: 2024-02-07
Payer: COMMERCIAL

## 2024-02-07 DIAGNOSIS — I10 HYPERTENSION, UNSPECIFIED TYPE: ICD-10-CM

## 2024-02-07 DIAGNOSIS — G89.4 CHRONIC PAIN SYNDROME: ICD-10-CM

## 2024-02-07 DIAGNOSIS — E27.8 ADRENAL NODULE (MULTI): ICD-10-CM

## 2024-02-07 PROCEDURE — 99490 CHRNC CARE MGMT STAFF 1ST 20: CPT | Performed by: FAMILY MEDICINE

## 2024-02-07 NOTE — PROGRESS NOTES
Patient having a lot of burning in her hand today  It's ice cold to the touch  But hurts and burns like fire  To the point of tears  This has been ongoing for months   She feels the bumps in her arms are getting larger    She would like to review lab results  She would like to see if this is r/t gout  Confirmed she has appointment 2/15/24 with Dr. Cochran  Reviewed Vitamin D results

## 2024-02-08 NOTE — PROGRESS NOTES
Confirmed with patient her labs show  Past infection of hep C  No longer has Hep C anymore  Labs do not look like she has gout  Malika wants to know what she can do to treat her arm  To relieve the burning

## 2024-02-14 LAB
ATRIAL RATE: 74 BPM
P AXIS: 65 DEGREES
P OFFSET: 210 MS
P ONSET: 155 MS
PR INTERVAL: 144 MS
Q ONSET: 227 MS
QRS COUNT: 12 BEATS
QRS DURATION: 92 MS
QT INTERVAL: 416 MS
QTC CALCULATION(BAZETT): 461 MS
QTC FREDERICIA: 446 MS
R AXIS: -11 DEGREES
T AXIS: 50 DEGREES
T OFFSET: 435 MS
VENTRICULAR RATE: 74 BPM

## 2024-02-15 ENCOUNTER — APPOINTMENT (OUTPATIENT)
Dept: PRIMARY CARE | Facility: CLINIC | Age: 64
End: 2024-02-15
Payer: COMMERCIAL

## 2024-02-20 DIAGNOSIS — R11.0 MILD NAUSEA: ICD-10-CM

## 2024-02-21 RX ORDER — ONDANSETRON HYDROCHLORIDE 8 MG/1
TABLET, FILM COATED ORAL
Qty: 45 TABLET | Refills: 0 | Status: SHIPPED | OUTPATIENT
Start: 2024-02-21 | End: 2024-03-29

## 2024-02-23 ENCOUNTER — PATIENT OUTREACH (OUTPATIENT)
Dept: PRIMARY CARE | Facility: CLINIC | Age: 64
End: 2024-02-23
Payer: COMMERCIAL

## 2024-02-23 DIAGNOSIS — G89.4 CHRONIC PAIN SYNDROME: ICD-10-CM

## 2024-02-23 DIAGNOSIS — I10 HYPERTENSION, UNSPECIFIED TYPE: ICD-10-CM

## 2024-02-23 DIAGNOSIS — E27.8 ADRENAL NODULE (MULTI): ICD-10-CM

## 2024-02-23 NOTE — PROGRESS NOTES
Requesting refill for zofran  Confirmed that Dr. Cochran sent this earlier this week    She has poor appetite  Really forces herself to eat  Typically eats yogurt with fruit    Sleeps a lot   Feels depressed   Discussed trying a medication for anxiety/depression  She already feels like she is on too many medications  She recently broke up with her boyfriend Eduardo  He was cheating on her  And has problems with alcohol    She will consider talking with Dr. Cochran about this at her next visit

## 2024-02-24 DIAGNOSIS — M10.9 GOUT, ARTHRITIS: ICD-10-CM

## 2024-02-26 ENCOUNTER — HOSPITAL ENCOUNTER (EMERGENCY)
Facility: HOSPITAL | Age: 64
Discharge: HOME | End: 2024-02-26
Payer: COMMERCIAL

## 2024-02-26 VITALS
HEART RATE: 90 BPM | WEIGHT: 117 LBS | BODY MASS INDEX: 21.53 KG/M2 | OXYGEN SATURATION: 98 % | DIASTOLIC BLOOD PRESSURE: 74 MMHG | HEIGHT: 62 IN | TEMPERATURE: 96.8 F | RESPIRATION RATE: 18 BRPM | SYSTOLIC BLOOD PRESSURE: 138 MMHG

## 2024-02-26 DIAGNOSIS — H04.129 DRY EYE: Primary | ICD-10-CM

## 2024-02-26 PROCEDURE — 99284 EMERGENCY DEPT VISIT MOD MDM: CPT | Performed by: PHYSICIAN ASSISTANT

## 2024-02-26 PROCEDURE — 99283 EMERGENCY DEPT VISIT LOW MDM: CPT

## 2024-02-26 RX ORDER — COLCHICINE 0.6 MG/1
TABLET ORAL
Qty: 30 TABLET | Refills: 0 | Status: SHIPPED | OUTPATIENT
Start: 2024-02-26 | End: 2024-03-30

## 2024-02-26 ASSESSMENT — VISUAL ACUITY
OS: 20/25
OD: 20/30
OU: 20/30

## 2024-02-27 NOTE — ED PROVIDER NOTES
"HPI   Chief Complaint   Patient presents with    Eye Pain     Patient states her eyes have been burning for 6 months from \"furnace fumes\"        Patient is a 63-year-old female who presents today for evaluation of bilateral eye burning for the last 6 months from \"furnace fumes.  Patient states that she feels like her eyes are always teary and watery, she states she scheduled an eye doctor appointment at [facility] this upcoming Friday but states that her eyes were bothering her and she feels like they might need to be flushed.  Patient denies any pain, trauma, visual disturbances.  Visual acuity is normal.  She denies any recent injury, denies any redness, she states she wakes up with sleep in her eyes in the morning skin no mucopurulent discharge.  No pain with eye movements.                          Christin Coma Scale Score: 15                     Patient History   Past Medical History:   Diagnosis Date    Abnormal findings on diagnostic imaging of other specified body structures     Abnormal finding on imaging    Encounter for screening for malignant neoplasm of colon 04/27/2022    Colon cancer screening    Liver disease, unspecified 12/16/2022    Chronic liver disease    Personal history of other medical treatment     History of transvaginal ultrasound    Personal history of other medical treatment     History of ultrasound, pelvic     Past Surgical History:   Procedure Laterality Date    ANKLE SURGERY  09/18/2017    Ankle Surgery    CT ABDOMEN PELVIS ANGIOGRAM W AND/OR WO IV CONTRAST  5/3/2020    CT ABDOMEN PELVIS ANGIOGRAM W AND/OR WO IV CONTRAST 5/3/2020 [facility]    KNEE SURGERY  09/18/2017    Knee Surgery    OTHER SURGICAL HISTORY  09/10/2015    Adrenal Gland Excision    OTHER SURGICAL HISTORY  12/02/2021    Gallbladder surgery     Family History   Problem Relation Name Age of Onset    Colon cancer Father      Prostate cancer Father       Social History     Tobacco Use    Smoking status: " Every Day     Packs/day: 0.50     Years: 25.00     Additional pack years: 0.00     Total pack years: 12.50     Types: Cigarettes     Passive exposure: Current    Smokeless tobacco: Never   Vaping Use    Vaping Use: Never used   Substance Use Topics    Alcohol use: Not Currently    Drug use: Not Currently       Physical Exam   ED Triage Vitals [02/26/24 1817]   Temperature Heart Rate Respirations BP   36 °C (96.8 °F) 92 18 142/77      Pulse Ox Temp Source Heart Rate Source Patient Position   99 % Temporal Monitor Sitting      BP Location FiO2 (%)     Right arm --       Physical Exam  Vitals and nursing note reviewed.   Constitutional:       General: She is not in acute distress.     Appearance: Normal appearance. She is not toxic-appearing.   HENT:      Head: Normocephalic and atraumatic.      Nose: Nose normal.   Eyes:      General: Lids are normal. Lids are everted, no foreign bodies appreciated.         Right eye: No foreign body, discharge or hordeolum.         Left eye: No foreign body, discharge or hordeolum.      Extraocular Movements: Extraocular movements intact.      Right eye: Normal extraocular motion and no nystagmus.      Left eye: Normal extraocular motion and no nystagmus.      Conjunctiva/sclera: Conjunctivae normal.      Right eye: Right conjunctiva is not injected. No chemosis, exudate or hemorrhage.     Left eye: Left conjunctiva is not injected. No chemosis, exudate or hemorrhage.     Pupils: Pupils are equal, round, and reactive to light. Pupils are equal.      Right eye: Pupil is round, reactive and not sluggish. No corneal abrasion or fluorescein uptake. Conrado exam negative.      Left eye: Pupil is round, reactive and not sluggish. No corneal abrasion or fluorescein uptake. Conrado exam negative.     Slit lamp exam:     Right eye: No foreign body.      Left eye: No foreign body.   Cardiovascular:      Rate and Rhythm: Normal rate and regular rhythm.   Pulmonary:      Effort: Pulmonary effort  is normal.   Abdominal:      Palpations: Abdomen is soft.   Musculoskeletal:         General: Normal range of motion.      Cervical back: Normal range of motion and neck supple.   Skin:     General: Skin is warm and dry.   Neurological:      General: No focal deficit present.      Mental Status: She is alert.   Psychiatric:         Mood and Affect: Mood normal.         Thought Content: Thought content normal.     Visual acuity:  R: 20/30  L: 20/25  Bilat: 20/30    ED Course & MDM   Diagnoses as of 02/26/24 2127   Dry eye       Medical Decision Making      MDM: Patient is a 63-year-old female presents today for evaluation of bilateral eye tearing and burning, her eyes appear completely normal on examination, visual acuity on the right is 20/30, on the left 20/25, bilaterally 20/30, she has no fluorescein dye uptake on examination, normal extraocular movements, pupils equal round reactive, there are no external abnormalities.  She has close ophthalmology follow-up on Friday, I did flush out her eyes with normal saline per her request we will prescribe her artificial tears.  With chronicity of symptoms feel that she is safe and stable to follow-up with ophthalmology as an outpatient.  She is encouraged to return with new or worsening symptoms as needed.        Procedure  Procedures     Sara Pettit PA-C  02/26/24 2127

## 2024-03-04 ENCOUNTER — APPOINTMENT (OUTPATIENT)
Dept: CARDIOLOGY | Facility: HOSPITAL | Age: 64
End: 2024-03-04
Payer: COMMERCIAL

## 2024-03-04 ENCOUNTER — APPOINTMENT (OUTPATIENT)
Dept: RADIOLOGY | Facility: HOSPITAL | Age: 64
End: 2024-03-04
Payer: COMMERCIAL

## 2024-03-04 ENCOUNTER — HOSPITAL ENCOUNTER (EMERGENCY)
Facility: HOSPITAL | Age: 64
Discharge: HOME | End: 2024-03-04
Payer: COMMERCIAL

## 2024-03-04 VITALS
WEIGHT: 123.9 LBS | TEMPERATURE: 98.1 F | HEIGHT: 62 IN | OXYGEN SATURATION: 100 % | SYSTOLIC BLOOD PRESSURE: 124 MMHG | RESPIRATION RATE: 18 BRPM | DIASTOLIC BLOOD PRESSURE: 82 MMHG | BODY MASS INDEX: 22.8 KG/M2 | HEART RATE: 86 BPM

## 2024-03-04 DIAGNOSIS — M94.0 COSTOCHONDRITIS, ACUTE: Primary | ICD-10-CM

## 2024-03-04 LAB
ALBUMIN SERPL BCP-MCNC: 4.7 G/DL (ref 3.4–5)
ALP SERPL-CCNC: 72 U/L (ref 33–136)
ALT SERPL W P-5'-P-CCNC: 18 U/L (ref 7–45)
ANION GAP SERPL CALC-SCNC: 13 MMOL/L (ref 10–20)
AST SERPL W P-5'-P-CCNC: 19 U/L (ref 9–39)
BASOPHILS # BLD AUTO: 0.04 X10*3/UL (ref 0–0.1)
BASOPHILS NFR BLD AUTO: 0.4 %
BILIRUB SERPL-MCNC: 0.3 MG/DL (ref 0–1.2)
BUN SERPL-MCNC: 15 MG/DL (ref 6–23)
CALCIUM SERPL-MCNC: 9.9 MG/DL (ref 8.6–10.3)
CARDIAC TROPONIN I PNL SERPL HS: 3 NG/L (ref 0–13)
CARDIAC TROPONIN I PNL SERPL HS: 3 NG/L (ref 0–13)
CHLORIDE SERPL-SCNC: 103 MMOL/L (ref 98–107)
CO2 SERPL-SCNC: 25 MMOL/L (ref 21–32)
CREAT SERPL-MCNC: 0.7 MG/DL (ref 0.5–1.05)
D DIMER PPP FEU-MCNC: 359 NG/ML FEU
EGFRCR SERPLBLD CKD-EPI 2021: >90 ML/MIN/1.73M*2
EOSINOPHIL # BLD AUTO: 0.2 X10*3/UL (ref 0–0.7)
EOSINOPHIL NFR BLD AUTO: 2.2 %
ERYTHROCYTE [DISTWIDTH] IN BLOOD BY AUTOMATED COUNT: 13.7 % (ref 11.5–14.5)
FLUAV RNA RESP QL NAA+PROBE: NOT DETECTED
FLUBV RNA RESP QL NAA+PROBE: NOT DETECTED
GLUCOSE SERPL-MCNC: 157 MG/DL (ref 74–99)
HCT VFR BLD AUTO: 43.4 % (ref 36–46)
HGB BLD-MCNC: 14.1 G/DL (ref 12–16)
IMM GRANULOCYTES # BLD AUTO: 0.02 X10*3/UL (ref 0–0.7)
IMM GRANULOCYTES NFR BLD AUTO: 0.2 % (ref 0–0.9)
LYMPHOCYTES # BLD AUTO: 1.87 X10*3/UL (ref 1.2–4.8)
LYMPHOCYTES NFR BLD AUTO: 20.2 %
MCH RBC QN AUTO: 32 PG (ref 26–34)
MCHC RBC AUTO-ENTMCNC: 32.5 G/DL (ref 32–36)
MCV RBC AUTO: 99 FL (ref 80–100)
MONOCYTES # BLD AUTO: 0.33 X10*3/UL (ref 0.1–1)
MONOCYTES NFR BLD AUTO: 3.6 %
NEUTROPHILS # BLD AUTO: 6.79 X10*3/UL (ref 1.2–7.7)
NEUTROPHILS NFR BLD AUTO: 73.4 %
NRBC BLD-RTO: 0 /100 WBCS (ref 0–0)
PLATELET # BLD AUTO: 286 X10*3/UL (ref 150–450)
POTASSIUM SERPL-SCNC: 3.9 MMOL/L (ref 3.5–5.3)
PROT SERPL-MCNC: 7.9 G/DL (ref 6.4–8.2)
RBC # BLD AUTO: 4.4 X10*6/UL (ref 4–5.2)
RSV RNA RESP QL NAA+PROBE: NOT DETECTED
SARS-COV-2 RNA RESP QL NAA+PROBE: NOT DETECTED
SODIUM SERPL-SCNC: 137 MMOL/L (ref 136–145)
WBC # BLD AUTO: 9.3 X10*3/UL (ref 4.4–11.3)

## 2024-03-04 PROCEDURE — 84484 ASSAY OF TROPONIN QUANT: CPT | Performed by: EMERGENCY MEDICINE

## 2024-03-04 PROCEDURE — 71046 X-RAY EXAM CHEST 2 VIEWS: CPT

## 2024-03-04 PROCEDURE — 85025 COMPLETE CBC W/AUTO DIFF WBC: CPT | Performed by: EMERGENCY MEDICINE

## 2024-03-04 PROCEDURE — 36415 COLL VENOUS BLD VENIPUNCTURE: CPT | Performed by: EMERGENCY MEDICINE

## 2024-03-04 PROCEDURE — 99283 EMERGENCY DEPT VISIT LOW MDM: CPT | Mod: 25

## 2024-03-04 PROCEDURE — 87637 SARSCOV2&INF A&B&RSV AMP PRB: CPT | Performed by: PHYSICIAN ASSISTANT

## 2024-03-04 PROCEDURE — 99285 EMERGENCY DEPT VISIT HI MDM: CPT | Performed by: PHYSICIAN ASSISTANT

## 2024-03-04 PROCEDURE — 71046 X-RAY EXAM CHEST 2 VIEWS: CPT | Mod: FOREIGN READ | Performed by: RADIOLOGY

## 2024-03-04 PROCEDURE — 85379 FIBRIN DEGRADATION QUANT: CPT | Performed by: EMERGENCY MEDICINE

## 2024-03-04 PROCEDURE — 2500000005 HC RX 250 GENERAL PHARMACY W/O HCPCS: Performed by: PHYSICIAN ASSISTANT

## 2024-03-04 PROCEDURE — 93005 ELECTROCARDIOGRAM TRACING: CPT

## 2024-03-04 PROCEDURE — 84075 ASSAY ALKALINE PHOSPHATASE: CPT | Performed by: EMERGENCY MEDICINE

## 2024-03-04 PROCEDURE — 2500000001 HC RX 250 WO HCPCS SELF ADMINISTERED DRUGS (ALT 637 FOR MEDICARE OP): Performed by: PHYSICIAN ASSISTANT

## 2024-03-04 RX ORDER — OXYCODONE AND ACETAMINOPHEN 5; 325 MG/1; MG/1
1 TABLET ORAL ONCE
Status: COMPLETED | OUTPATIENT
Start: 2024-03-04 | End: 2024-03-04

## 2024-03-04 RX ORDER — LIDOCAINE 560 MG/1
1 PATCH PERCUTANEOUS; TOPICAL; TRANSDERMAL ONCE
Status: DISCONTINUED | OUTPATIENT
Start: 2024-03-04 | End: 2024-03-04 | Stop reason: HOSPADM

## 2024-03-04 RX ADMIN — LIDOCAINE 1 PATCH: 4 PATCH TOPICAL at 16:27

## 2024-03-04 RX ADMIN — OXYCODONE HYDROCHLORIDE AND ACETAMINOPHEN 1 TABLET: 5; 325 TABLET ORAL at 16:27

## 2024-03-04 ASSESSMENT — LIFESTYLE VARIABLES
EVER HAD A DRINK FIRST THING IN THE MORNING TO STEADY YOUR NERVES TO GET RID OF A HANGOVER: NO
HAVE YOU EVER FELT YOU SHOULD CUT DOWN ON YOUR DRINKING: NO
HAVE PEOPLE ANNOYED YOU BY CRITICIZING YOUR DRINKING: NO
EVER FELT BAD OR GUILTY ABOUT YOUR DRINKING: NO

## 2024-03-04 ASSESSMENT — HEART SCORE
RISK FACTORS: 1-2 RISK FACTORS
AGE: 45-64
TROPONIN: LESS THAN OR EQUAL TO NORMAL LIMIT
HEART SCORE: 2
HISTORY: SLIGHTLY SUSPICIOUS
ECG: NORMAL

## 2024-03-04 ASSESSMENT — PAIN DESCRIPTION - LOCATION: LOCATION: RIB CAGE

## 2024-03-04 ASSESSMENT — PAIN DESCRIPTION - ORIENTATION: ORIENTATION: LEFT

## 2024-03-04 ASSESSMENT — PAIN SCALES - GENERAL: PAINLEVEL_OUTOF10: 10 - WORST POSSIBLE PAIN

## 2024-03-04 ASSESSMENT — PAIN - FUNCTIONAL ASSESSMENT: PAIN_FUNCTIONAL_ASSESSMENT: 0-10

## 2024-03-04 NOTE — ED PROVIDER NOTES
"This is a 63-year-old female with past medical history of gout, hypertension as well as chronic pain and remote history of PE not currently on anticoagulation who presents to the ED with left-sided rib cage pain for the past 1 day.  She has noticed a productive cough for the past 2 to 3 days and today woke up with left-sided rib cage pain.  She states that she does have some chronic pain in this area due to a fall approximately 10 years ago.  Her pain is worse with coughing, deep inspiration and movement.  No lower extremity swelling or pain.  No recent travel or prolonged immobility.  She is not on exogenous hormones.  She is unsure of why she had the blood clot previously and if it was provoked.  She denies any fevers but does endorse chills.  She denies any recent falls, trauma, or injuries.  She states that she took her home gabapentin without any relief of her pain.  She does follow with pain management.      History provided by:  Patient   used: No             Visit Vitals  /82   Pulse 86   Temp 36.7 °C (98.1 °F) (Temporal)   Resp 18   Ht 1.575 m (5' 2\")   Wt 56.2 kg (123 lb 14.4 oz)   SpO2 100%   BMI 22.66 kg/m²   Smoking Status Every Day   BSA 1.57 m²          Physical Exam     Physical exam:   General: Vitals noted, no distress. Afebrile.   EENT:  Hearing grossly intact. Normal phonation. MMM. Airway patient. PERRL. EOMI.   Neck: No midline tenderness or paraspinal tenderness. FROM.   Cardiac: Regular, rate, rhythm. Normal S1 and S2.  No murmurs, gallops, rubs.  Tender to palpation across left lateral chest wall without any obvious deformity or step-off.  No rashes or lesions.  Pulmonary: Good air exchange. Lungs clear bilaterally. No wheezes, rhonchi, rales. No accessory muscle use.   Abdomen: Soft, nonsurgical. Nontender. No peritoneal signs. Normoactive bowel sounds.   Back: No CVA tenderness. No midline tenderness or paraspinal tenderness. No obvious deformity.   Extremities: " No peripheral edema.  Full range of motion. Moves all extremities freely. No tenderness throughout extremities.  Specifically no posterior calf tenderness to palpation and no lower extremity edema.  Skin: No rash. Warm and Dry.   Neuro: No focal neurologic deficits. CN 2-12 grossly intact. Sensation equal bilaterally. No weakness.         Labs Reviewed   COMPREHENSIVE METABOLIC PANEL - Abnormal       Result Value    Glucose 157 (*)     Sodium 137      Potassium 3.9      Chloride 103      Bicarbonate 25      Anion Gap 13      Urea Nitrogen 15      Creatinine 0.70      eGFR >90      Calcium 9.9      Albumin 4.7      Alkaline Phosphatase 72      Total Protein 7.9      AST 19      Bilirubin, Total 0.3      ALT 18     SERIAL TROPONIN-INITIAL - Normal    Troponin I, High Sensitivity 3      Narrative:     Less than 99th percentile of normal range cutoff-  Female and children under 18 years old <14 ng/L; Male <21 ng/L: Negative  Repeat testing should be performed if clinically indicated.     Female and children under 18 years old 14-50 ng/L; Male 21-50 ng/L:  Consistent with possible cardiac damage and possible increased clinical   risk. Serial measurements may help to assess extent of myocardial damage.     >50 ng/L: Consistent with cardiac damage, increased clinical risk and  myocardial infarction. Serial measurements may help assess extent of   myocardial damage.      NOTE: Children less than 1 year old may have higher baseline troponin   levels and results should be interpreted in conjunction with the overall   clinical context.     NOTE: Troponin I testing is performed using a different   testing methodology at [facility] than at other   [facility]. Direct result comparisons should only   be made within the same method.   D-DIMER, NON VTE - Normal    D-Dimer Non VTE, Quant (ng/mL FEU) 359      Narrative:     The D-Dimer assay is reported in ng/mL Fibrinogen Equivalent Units (FEU). The results of  this assay should NOT be used for the exclusion of Deep Vein Thrombosis and/or Pulmonary Embolism.   SERIAL TROPONIN, 1 HOUR - Normal    Troponin I, High Sensitivity 3      Narrative:     Less than 99th percentile of normal range cutoff-  Female and children under 18 years old <14 ng/L; Male <21 ng/L: Negative  Repeat testing should be performed if clinically indicated.     Female and children under 18 years old 14-50 ng/L; Male 21-50 ng/L:  Consistent with possible cardiac damage and possible increased clinical   risk. Serial measurements may help to assess extent of myocardial damage.     >50 ng/L: Consistent with cardiac damage, increased clinical risk and  myocardial infarction. Serial measurements may help assess extent of   myocardial damage.      NOTE: Children less than 1 year old may have higher baseline troponin   levels and results should be interpreted in conjunction with the overall   clinical context.     NOTE: Troponin I testing is performed using a different   testing methodology at [facility] than at other   [facility]. Direct result comparisons should only   be made within the same method.   SARS-COV-2 AND INFLUENZA A/B PCR - Normal    Flu A Result Not Detected      Flu B Result Not Detected      Coronavirus 2019, PCR Not Detected      Narrative:     This assay has received FDA Emergency Use Authorization (EUA) and  is only authorized for the duration of time that circumstances exist to justify the authorization of the emergency use of in vitro diagnostic tests for the detection of SARS-CoV-2 virus and/or diagnosis of COVID-19 infection under section 564(b)(1) of the Act, 21 U.S.C. 360bbb-3(b)(1). Testing for SARS-CoV-2 is only recommended for patients who meet current clinical and/or epidemiological criteria as defined by federal, state, or local public health directives. This assay is an in vitro diagnostic nucleic acid amplification test for the qualitative detection of  SARS-CoV-2, Influenza A, and Influenza B from nasopharyngeal specimens and has been validated for use at [facility]. Negative results do not preclude COVID-19 infections or Influenza A/B infections, and should not be used as the sole basis for diagnosis, treatment, or other management decisions. If Influenza A/B and RSV PCR results are negative, testing for Parainfluenza virus, Adenovirus and Metapneumovirus is routinely performed for [facility] pediatric oncology and intensive care inpatients, and is available on other patients by placing an add-on request.    RSV PCR - Normal    RSV PCR Not Detected      Narrative:     This assay is an FDA-cleared, in vitro diagnostic nucleic acid amplification test for the detection of RSV from nasopharyngeal specimens, and has been validated for use at [facility]. Negative results do not preclude RSV infections, and should not be used as the sole basis for diagnosis, treatment, or other management decisions. If Influenza A/B and RSV PCR results are negative, testing for Parainfluenza virus, Adenovirus and Metapneumovirus is routinely performed for pediatric oncology and intensive care inpatients at [facility], and is available on other patients by placing an add-on request.       CBC WITH AUTO DIFFERENTIAL    WBC 9.3      nRBC 0.0      RBC 4.40      Hemoglobin 14.1      Hematocrit 43.4      MCV 99      MCH 32.0      MCHC 32.5      RDW 13.7      Platelets 286      Neutrophils % 73.4      Immature Granulocytes %, Automated 0.2      Lymphocytes % 20.2      Monocytes % 3.6      Eosinophils % 2.2      Basophils % 0.4      Neutrophils Absolute 6.79      Immature Granulocytes Absolute, Automated 0.02      Lymphocytes Absolute 1.87      Monocytes Absolute 0.33      Eosinophils Absolute 0.20      Basophils Absolute 0.04     TROPONIN SERIES- (INITIAL, 1 HR)    Narrative:     The following orders were created for panel order Troponin Series, (0, 1  HR).  Procedure                               Abnormality         Status                     ---------                               -----------         ------                     Troponin I, High Sensiti...[[id number]]  Normal              Final result               Troponin, High Sensitivi...[[id number]]  Normal              Final result                 Please view results for these tests on the individual orders.       XR chest 2 views   Final Result   No acute pulmonary pathology.   Signed by Seng Pretty MD            ED Course & MDM     Medical Decision Making  This is a 63-year-old female with a past medical history of chronic pain, gout, hypertension as well as remote PE not on anticoagulation who presents to the ED with left-sided rib cage pain with associated cough.  Vital stable upon arrival to the ED.  On examination she had no increased work of breathing.  She was able to ambulate without difficulty.  Lungs clear to auscultation.  No audible cardiac murmurs.  She did have reproducible left lateral chest wall tenderness palpation without any obvious deformity or step-off.  No rashes or lesions that would be suggestive of shingles.  Laboratory studies had already been obtained prior to my evaluation which showed normal troponin.  CBC and CMP both grossly unremarkable.  D-dimer had also been obtained which was within normal limits so I have low suspicion for PE venous source of patient's symptoms today.  Being the source of the patient's symptoms today.  She was ordered 1 Percocet and a Lidoderm patch for her symptoms.  Viral swab ordered as well as repeat troponin level.  EKG had been obtained which showed no acute ischemic changes and was grossly unchanged from previous EKGs.  Viral swab is negative.  Repeat troponin within normal limits.  On reevaluation she was feeling significantly improved.  Patient's heart score today is 2.  Based on her description of her symptoms and her unremarkable workup of a  very low clinical suspicion for ACS at this time.  Patient's exam and description of symptoms is consistent with costochondritis.  She will be treated for this today and was advised to use over-the-counter medications as well as lidocaine patches as needed for her symptoms.  She was agreeable to this plan.  She was offered prescriptions for the lidocaine patches, however declined as she states she already has this at home.  She was advised to follow-up with her primary care provider.  She is given sided symptoms return to the ED with and was discharged from the ED in stable condition.    Amount and/or Complexity of Data Reviewed  Labs: ordered.  Radiology: ordered and independent interpretation performed.     Details: Chest x-ray without visualized pneumonia or pneumothorax  ECG/medicine tests: ordered and independent interpretation performed.     Details: EKG normal sinus rhythm at 95 bpm without any acute ST elevation or depression.  No T wave inversions.  Normal axis.  EKG grossly unchanged from EKG performed on January 11, 2024.    Risk  OTC drugs.         Diagnoses as of 03/04/24 1739   Costochondritis, acute       Procedures    DONNY Middleton, PA-C     Lissette Stanford PA-C  03/04/24 1742

## 2024-03-05 ENCOUNTER — PATIENT OUTREACH (OUTPATIENT)
Dept: PRIMARY CARE | Facility: CLINIC | Age: 64
End: 2024-03-05
Payer: COMMERCIAL

## 2024-03-05 DIAGNOSIS — I10 HYPERTENSION, UNSPECIFIED TYPE: ICD-10-CM

## 2024-03-05 DIAGNOSIS — E27.8 ADRENAL NODULE (MULTI): ICD-10-CM

## 2024-03-05 DIAGNOSIS — G89.4 CHRONIC PAIN SYNDROME: ICD-10-CM

## 2024-03-05 PROCEDURE — 99490 CHRNC CARE MGMT STAFF 1ST 20: CPT | Performed by: FAMILY MEDICINE

## 2024-03-05 PROCEDURE — 99439 CHRNC CARE MGMT STAF EA ADDL: CPT | Performed by: FAMILY MEDICINE

## 2024-03-05 NOTE — PROGRESS NOTES
Patient went to ER for L-sided chest discomfort  Doing a little better    She is going to call the [place] center  She would like help with counseling/mental health  She would also like to discuss getting more affordable housing  She has the phone number and will reach out    CHART REVIEW  3/4/24 ER Note Reviewed  MDM  Diagnosis: Costochondritis, acute   ..Based on her description of her symptoms and her unremarkable workup of a very low clinical suspicion for ACS at this time. Patient's exam and description of symptoms is consistent with costochondritis. She will be treated for this today and was advised to use over-the-counter medications as well as lidocaine patches as needed for her symptoms. She was agreeable to this plan. She was offered prescriptions for the lidocaine patches, however declined as she states she already has this at home. She was advised to follow-up with her primary care provider. She is given sided symptoms return to the ED with and was discharged from the ED in stable condition.

## 2024-03-08 LAB
ATRIAL RATE: 95 BPM
P AXIS: 63 DEGREES
P OFFSET: 210 MS
P ONSET: 157 MS
PR INTERVAL: 138 MS
Q ONSET: 226 MS
QRS COUNT: 15 BEATS
QRS DURATION: 80 MS
QT INTERVAL: 382 MS
QTC CALCULATION(BAZETT): 480 MS
QTC FREDERICIA: 445 MS
R AXIS: -23 DEGREES
T AXIS: 39 DEGREES
T OFFSET: 417 MS
VENTRICULAR RATE: 95 BPM

## 2024-03-13 DIAGNOSIS — N64.4 BREAST PAIN: Primary | ICD-10-CM

## 2024-03-15 ENCOUNTER — PATIENT OUTREACH (OUTPATIENT)
Dept: PRIMARY CARE | Facility: CLINIC | Age: 64
End: 2024-03-15
Payer: COMMERCIAL

## 2024-03-15 DIAGNOSIS — E27.8 ADRENAL NODULE (MULTI): ICD-10-CM

## 2024-03-15 DIAGNOSIS — G89.4 CHRONIC PAIN SYNDROME: ICD-10-CM

## 2024-03-15 DIAGNOSIS — I10 HYPERTENSION, UNSPECIFIED TYPE: ICD-10-CM

## 2024-03-15 NOTE — PROGRESS NOTES
Has knots on her elbow  wants to see Dr Cochran  Suggested she call  to schedule appointment    Feeling depressed- a lot of crying  Again recommended she call [facility]  She told me several years ago she was working with [facility]  Eventually her  left and she did not establish with a new one  She missed too many appointments and they dismissed her    Discussed if she would be open to medications  She is unsure about this    Encouraged her again to see psychiatrist  Both in [place] as driving is very difficult for her  Dr. Onelia Kaplan [phone]  Lara Gnozalez [phone]

## 2024-03-29 DIAGNOSIS — M10.9 GOUT, ARTHRITIS: ICD-10-CM

## 2024-03-29 DIAGNOSIS — R11.0 MILD NAUSEA: ICD-10-CM

## 2024-03-29 RX ORDER — ONDANSETRON HYDROCHLORIDE 8 MG/1
TABLET, FILM COATED ORAL
Qty: 30 TABLET | Refills: 0 | Status: SHIPPED | OUTPATIENT
Start: 2024-03-29 | End: 2024-04-23

## 2024-03-30 RX ORDER — COLCHICINE 0.6 MG/1
TABLET ORAL
Qty: 30 TABLET | Refills: 0 | Status: SHIPPED | OUTPATIENT
Start: 2024-03-30 | End: 2024-04-23

## 2024-04-05 ENCOUNTER — APPOINTMENT (OUTPATIENT)
Dept: RADIOLOGY | Facility: HOSPITAL | Age: 64
End: 2024-04-05
Payer: COMMERCIAL

## 2024-04-05 ENCOUNTER — PATIENT OUTREACH (OUTPATIENT)
Dept: PRIMARY CARE | Facility: CLINIC | Age: 64
End: 2024-04-05
Payer: COMMERCIAL

## 2024-04-05 DIAGNOSIS — G89.4 CHRONIC PAIN SYNDROME: ICD-10-CM

## 2024-04-05 DIAGNOSIS — I10 HYPERTENSION, UNSPECIFIED TYPE: ICD-10-CM

## 2024-04-05 DIAGNOSIS — E27.8 ADRENAL NODULE (MULTI): ICD-10-CM
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
## 2024-04-18 ENCOUNTER — HOSPITAL ENCOUNTER (EMERGENCY)
Facility: HOSPITAL | Age: 64
Discharge: HOME | End: 2024-04-19
Attending: EMERGENCY MEDICINE
Payer: COMMERCIAL

## 2024-04-18 ENCOUNTER — APPOINTMENT (OUTPATIENT)
Dept: RADIOLOGY | Facility: HOSPITAL | Age: 64
End: 2024-04-18
Payer: COMMERCIAL

## 2024-04-18 DIAGNOSIS — N94.89 PELVIC CONGESTION SYNDROME: ICD-10-CM

## 2024-04-18 DIAGNOSIS — K45.8 HERNIA OF FLANK: Primary | ICD-10-CM

## 2024-04-18 LAB
ALBUMIN SERPL BCP-MCNC: 4.4 G/DL (ref 3.4–5)
ALP SERPL-CCNC: 62 U/L (ref 33–136)
ALT SERPL W P-5'-P-CCNC: 15 U/L (ref 7–45)
ANION GAP SERPL CALC-SCNC: 11 MMOL/L (ref 10–20)
AST SERPL W P-5'-P-CCNC: 28 U/L (ref 9–39)
BASOPHILS # BLD AUTO: 0.05 X10*3/UL (ref 0–0.1)
BASOPHILS NFR BLD AUTO: 0.6 %
BILIRUB SERPL-MCNC: 0.4 MG/DL (ref 0–1.2)
BUN SERPL-MCNC: 9 MG/DL (ref 6–23)
CALCIUM SERPL-MCNC: 9.4 MG/DL (ref 8.6–10.3)
CHLORIDE SERPL-SCNC: 100 MMOL/L (ref 98–107)
CO2 SERPL-SCNC: 29 MMOL/L (ref 21–32)
CREAT SERPL-MCNC: 0.61 MG/DL (ref 0.5–1.05)
EGFRCR SERPLBLD CKD-EPI 2021: >90 ML/MIN/1.73M*2
EOSINOPHIL # BLD AUTO: 0.26 X10*3/UL (ref 0–0.7)
EOSINOPHIL NFR BLD AUTO: 3.3 %
ERYTHROCYTE [DISTWIDTH] IN BLOOD BY AUTOMATED COUNT: 13.5 % (ref 11.5–14.5)
GLUCOSE SERPL-MCNC: 93 MG/DL (ref 74–99)
HCT VFR BLD AUTO: 41.2 % (ref 36–46)
HGB BLD-MCNC: 13.9 G/DL (ref 12–16)
IMM GRANULOCYTES # BLD AUTO: 0.01 X10*3/UL (ref 0–0.7)
IMM GRANULOCYTES NFR BLD AUTO: 0.1 % (ref 0–0.9)
LYMPHOCYTES # BLD AUTO: 2.7 X10*3/UL (ref 1.2–4.8)
LYMPHOCYTES NFR BLD AUTO: 33.9 %
MCH RBC QN AUTO: 32.6 PG (ref 26–34)
MCHC RBC AUTO-ENTMCNC: 33.7 G/DL (ref 32–36)
MCV RBC AUTO: 97 FL (ref 80–100)
MONOCYTES # BLD AUTO: 0.51 X10*3/UL (ref 0.1–1)
MONOCYTES NFR BLD AUTO: 6.4 %
NEUTROPHILS # BLD AUTO: 4.44 X10*3/UL (ref 1.2–7.7)
NEUTROPHILS NFR BLD AUTO: 55.7 %
NRBC BLD-RTO: 0 /100 WBCS (ref 0–0)
PLATELET # BLD AUTO: 254 X10*3/UL (ref 150–450)
POTASSIUM SERPL-SCNC: 4.5 MMOL/L (ref 3.5–5.3)
PROT SERPL-MCNC: 7 G/DL (ref 6.4–8.2)
RBC # BLD AUTO: 4.27 X10*6/UL (ref 4–5.2)
SODIUM SERPL-SCNC: 135 MMOL/L (ref 136–145)
WBC # BLD AUTO: 8 X10*3/UL (ref 4.4–11.3)

## 2024-04-18 PROCEDURE — 2550000001 HC RX 255 CONTRASTS: Performed by: EMERGENCY MEDICINE

## 2024-04-18 PROCEDURE — 2500000004 HC RX 250 GENERAL PHARMACY W/ HCPCS (ALT 636 FOR OP/ED)

## 2024-04-18 PROCEDURE — 80053 COMPREHEN METABOLIC PANEL: CPT

## 2024-04-18 PROCEDURE — 96374 THER/PROPH/DIAG INJ IV PUSH: CPT | Mod: 59

## 2024-04-18 PROCEDURE — 36415 COLL VENOUS BLD VENIPUNCTURE: CPT

## 2024-04-18 PROCEDURE — 99284 EMERGENCY DEPT VISIT MOD MDM: CPT | Mod: 25

## 2024-04-18 PROCEDURE — 99285 EMERGENCY DEPT VISIT HI MDM: CPT | Performed by: EMERGENCY MEDICINE

## 2024-04-18 PROCEDURE — 85025 COMPLETE CBC W/AUTO DIFF WBC: CPT

## 2024-04-18 PROCEDURE — 96361 HYDRATE IV INFUSION ADD-ON: CPT

## 2024-04-18 PROCEDURE — 74177 CT ABD & PELVIS W/CONTRAST: CPT | Mod: FOREIGN READ | Performed by: RADIOLOGY

## 2024-04-18 PROCEDURE — 74177 CT ABD & PELVIS W/CONTRAST: CPT

## 2024-04-18 RX ORDER — MORPHINE SULFATE 4 MG/ML
4 INJECTION, SOLUTION INTRAMUSCULAR; INTRAVENOUS ONCE
Status: COMPLETED | OUTPATIENT
Start: 2024-04-18 | End: 2024-04-18

## 2024-04-18 RX ADMIN — MORPHINE SULFATE 4 MG: 4 INJECTION, SOLUTION INTRAMUSCULAR; INTRAVENOUS at 21:22

## 2024-04-18 RX ADMIN — IOHEXOL 75 ML: 350 INJECTION, SOLUTION INTRAVENOUS at 22:43

## 2024-04-18 RX ADMIN — SODIUM CHLORIDE 1000 ML: 9 INJECTION, SOLUTION INTRAVENOUS at 21:23

## 2024-04-18 ASSESSMENT — PAIN - FUNCTIONAL ASSESSMENT
PAIN_FUNCTIONAL_ASSESSMENT: 0-10

## 2024-04-18 ASSESSMENT — PAIN DESCRIPTION - LOCATION
LOCATION: BACK
LOCATION: BACK

## 2024-04-18 ASSESSMENT — LIFESTYLE VARIABLES
TOTAL SCORE: 0
HAVE YOU EVER FELT YOU SHOULD CUT DOWN ON YOUR DRINKING: NO
HAVE PEOPLE ANNOYED YOU BY CRITICIZING YOUR DRINKING: NO
EVER HAD A DRINK FIRST THING IN THE MORNING TO STEADY YOUR NERVES TO GET RID OF A HANGOVER: NO
EVER FELT BAD OR GUILTY ABOUT YOUR DRINKING: NO

## 2024-04-18 ASSESSMENT — PAIN DESCRIPTION - DESCRIPTORS: DESCRIPTORS: CRUSHING

## 2024-04-18 ASSESSMENT — PAIN DESCRIPTION - FREQUENCY: FREQUENCY: CONSTANT/CONTINUOUS

## 2024-04-18 ASSESSMENT — PAIN SCALES - GENERAL
PAINLEVEL_OUTOF10: 8
PAINLEVEL_OUTOF10: 1
PAINLEVEL_OUTOF10: 8

## 2024-04-18 ASSESSMENT — PAIN DESCRIPTION - PAIN TYPE
TYPE: ACUTE PAIN
TYPE: ACUTE PAIN

## 2024-04-18 ASSESSMENT — COLUMBIA-SUICIDE SEVERITY RATING SCALE - C-SSRS
1. IN THE PAST MONTH, HAVE YOU WISHED YOU WERE DEAD OR WISHED YOU COULD GO TO SLEEP AND NOT WAKE UP?: NO
2. HAVE YOU ACTUALLY HAD ANY THOUGHTS OF KILLING YOURSELF?: NO
6. HAVE YOU EVER DONE ANYTHING, STARTED TO DO ANYTHING, OR PREPARED TO DO ANYTHING TO END YOUR LIFE?: NO

## 2024-04-18 ASSESSMENT — PAIN DESCRIPTION - PROGRESSION: CLINICAL_PROGRESSION: RAPIDLY IMPROVING

## 2024-04-18 NOTE — ED PROVIDER NOTES
EMERGENCY DEPARTMENT ENCOUNTER      Pt Name: Malika Casper  MRN: [id number]  Birthdate 1960  Date of evaluation: 4/18/2024  Provider: Eze Mccoy MD    CHIEF COMPLAINT       Chief Complaint   Patient presents with    Back Pain     Crushing Lower Back Pain - Pain Scale 8/10. Predominately right sided. Patient denies injury or recent fall. Patient denies chest pain/SOB.           HISTORY OF PRESENT ILLNESS    HPI  Malika is a 63-year-old female who presents to the ED with complaint of right lower back pain.  Medical history is significant for lumbar spinal stenosis, hypertension, hyperlipidemia, encephalopathy, GERD, hepatitis C.  Her pain started a week ago with associated 9 x 6 cm palpable mass paraspinal of the right lumbar region. She describes the pain as sharp, constant, localized, with 3-4 severity, and 10/ 10 tenderness.  She reports diffuse lower quadrant abdominal discomfort.  She denies any trauma to the back, or fall.  The patient endorses smoking cigarettes and marijuana, and denies alcohol or any other illicit drug use.  The patient denies chest pain, shortness of breath, fever, nausea, vomiting, diarrhea or urinary symptom.             Nursing Notes were reviewed.    PAST MEDICAL HISTORY     Past Medical History:   Diagnosis Date    Abnormal findings on diagnostic imaging of other specified body structures     Abnormal finding on imaging    Encounter for screening for malignant neoplasm of colon 04/27/2022    Colon cancer screening    Liver disease, unspecified 12/16/2022    Chronic liver disease    Personal history of other medical treatment     History of transvaginal ultrasound    Personal history of other medical treatment     History of ultrasound, pelvic         SURGICAL HISTORY       Past Surgical History:   Procedure Laterality Date    ANKLE SURGERY  09/18/2017    Ankle Surgery    CT ABDOMEN PELVIS ANGIOGRAM W AND/OR WO IV CONTRAST  5/3/2020    CT ABDOMEN PELVIS ANGIOGRAM W  AND/OR WO IV CONTRAST 5/3/2020 [facility]    KNEE SURGERY  09/18/2017    Knee Surgery    OTHER SURGICAL HISTORY  09/10/2015    Adrenal Gland Excision    OTHER SURGICAL HISTORY  12/02/2021    Gallbladder surgery         CURRENT MEDICATIONS       Previous Medications    ASCORBIC ACID (VITAMIN C) 500 MG TABLET    Take 1 tablet (500 mg) by mouth once daily.    COLCHICINE 0.6 MG TABLET    TAKE ONE TABLET (0.6 MG) BY MOUTH DAILY    CYCLOBENZAPRINE (FLEXERIL) 5 MG TABLET    Take 1 tablet (5 mg) by mouth 3 times a day.    DEXAMETHASONE (DECADRON) 4 MG TABLET    Take by mouth.    GABAPENTIN (NEURONTIN) 800 MG TABLET    Take 1 tablet (800 mg) by mouth 3 times a day.    HYDROCODONE-ACETAMINOPHEN (NORCO)  MG TABLET    1 tablet every 4 hours. Max 5 tablets daily    LIDOCAINE 4 % PATCH    Place on the skin once daily.    LISINOPRIL 10 MG TABLET    Take 1 tablet (10 mg) by mouth once daily.    MULTIVITAMIN TABLET    Take 1 tablet by mouth once daily.    NALOXONE (NARCAN) 4 MG/0.1 ML NASAL SPRAY    Narcan 4 MG/0.1ML Nasal Liquid   Quantity: 2  Refills: 0        Start : 5-Oct-2021   Active    NYSTATIN (MYCOSTATIN) 100,000 UNIT/ML SUSPENSION    Take 1 mL (100,000 Units) by mouth 4 times a day.    ONDANSETRON (ZOFRAN) 8 MG TABLET    TAKE ONE TABLET (8 MG) BY MOUTH EVERY EIGHT HOURS IF NEEDED FOR NAUSEA OR VOMITING.       ALLERGIES     Amlodipine, Ibuprofen, and Sulfamethoxazole-trimethoprim    FAMILY HISTORY       Family History   Problem Relation Name Age of Onset    Colon cancer Father      Prostate cancer Father            SOCIAL HISTORY       Social History     Socioeconomic History    Marital status: Single     Spouse name: Not on file    Number of children: Not on file    Years of education: Not on file    Highest education level: Not on file   Occupational History    Not on file   Tobacco Use    Smoking status: Every Day     Current packs/day: 0.50     Average packs/day: 0.5 packs/day for 25.0 years (12.5 ttl  pk-yrs)     Types: Cigarettes     Passive exposure: Current    Smokeless tobacco: Never   Vaping Use    Vaping status: Never Used   Substance and Sexual Activity    Alcohol use: Not Currently    Drug use: Not Currently    Sexual activity: Defer   Other Topics Concern    Not on file   Social History Narrative    Not on file     Social Determinants of Health     Financial Resource Strain: At Risk (4/3/2023)    Received from [facility]     Financial Resource Strain     Financial Resource Strain: 2   Food Insecurity: Not at Risk (4/3/2023)    Received from [facility]     Food Insecurity     Food: 1   Transportation Needs: Not at Risk (4/3/2023)    Received from [facility]     Transportation Needs     Transportation: 1   Physical Activity: Not on File (3/28/2023)    Received from [facility]     Physical Activity     Physical Activity: 0   Stress: Not at Risk (4/3/2023)    Received from [facility]     Stress     Stress: 1   Social Connections: Not at Risk (4/3/2023)    Received from [facility]     Social Connections     Social Connections and Isolation: 1   Intimate Partner Violence: Not on file   Housing Stability: Not at Risk (4/3/2023)    Received from [facility]     Housing Stability     Housin       SCREENINGS                        PHYSICAL EXAM    (up to 7 for level 4, 8 or more for level 5)     ED Triage Vitals [24 1804]   Temperature Heart Rate Respirations BP   36.4 °C (97.5 °F) 88 18 166/88      Pulse Ox Temp Source Heart Rate Source Patient Position   96 % Temporal -- Sitting      BP Location FiO2 (%)     Right arm --       Physical Exam  Vitals reviewed.   Constitutional:       General: She is not in acute distress.     Appearance: Normal appearance. She is normal weight. She is not ill-appearing, toxic-appearing or diaphoretic.   HENT:      Head: Normocephalic and atraumatic.      Right Ear: External ear normal.      Left Ear: External ear normal.      Nose: No congestion or rhinorrhea.      Mouth/Throat:      Mouth: Mucous  membranes are moist.      Pharynx: Oropharynx is clear. No oropharyngeal exudate or posterior oropharyngeal erythema.   Eyes:      General: No scleral icterus.        Right eye: No discharge.         Left eye: No discharge.      Extraocular Movements: Extraocular movements intact.      Conjunctiva/sclera: Conjunctivae normal.      Pupils: Pupils are equal, round, and reactive to light.   Cardiovascular:      Rate and Rhythm: Normal rate and regular rhythm.      Pulses: Normal pulses.      Heart sounds: Normal heart sounds. No murmur heard.     No friction rub. No gallop.   Pulmonary:      Effort: Pulmonary effort is normal. No respiratory distress.      Breath sounds: Normal breath sounds. No stridor. No wheezing, rhonchi or rales.   Chest:      Chest wall: No tenderness.   Abdominal:      General: Abdomen is flat. Bowel sounds are normal. There is no distension.      Palpations: Abdomen is soft. There is no mass.      Tenderness: There is no abdominal tenderness. There is no guarding or rebound.      Hernia: No hernia is present.   Musculoskeletal:         General: Swelling (9X6 cm mass paraspinal of the right lumbar region) and tenderness (Right lumbar/flank) present. No deformity or signs of injury.      Cervical back: Normal range of motion and neck supple. No rigidity or tenderness.      Right lower leg: No edema.      Left lower leg: No edema.   Skin:     General: Skin is warm.      Capillary Refill: Capillary refill takes less than 2 seconds.      Coloration: Skin is not jaundiced or pale.      Findings: No bruising, erythema, lesion or rash.   Neurological:      General: No focal deficit present.      Mental Status: She is alert and oriented to person, place, and time. Mental status is at baseline.      Sensory: No sensory deficit.      Motor: No weakness.   Psychiatric:         Mood and Affect: Mood normal.         Behavior: Behavior normal.         Thought Content: Thought content normal.         Judgment:  Judgment normal.        DIAGNOSTIC RESULTS     LABS:  Labs Reviewed   CBC WITH AUTO DIFFERENTIAL   COMPREHENSIVE METABOLIC PANEL   URINALYSIS WITH REFLEX CULTURE AND MICROSCOPIC    Narrative:     The following orders were created for panel order Urinalysis with Reflex Culture and Microscopic.  Procedure                               Abnormality         Status                     ---------                               -----------         ------                     Urinalysis with Reflex C...[[id number]]                                                 Extra Urine Gray Tube[[id number]]                                                         Please view results for these tests on the individual orders.   URINALYSIS WITH REFLEX CULTURE AND MICROSCOPIC   EXTRA URINE GRAY TUBE       All other labs were within normal range or not returned as of this dictation.    Imaging  CT abdomen pelvis w IV contrast    (Results Pending)        Procedures  Procedures     EMERGENCY DEPARTMENT COURSE/MDM:     ED Course as of 04/19/24 0140   Fri Apr 19, 2024   0135 CT scan with a concern of hernia containing fat.  The hernia is very reducible and it goes and is spontaneously upon changing the posterior.  Pain improved, will be able to discharge the patient home to follow-up with general surgery team. [ME]   0136 Will also provide the patient with OB/GYN referral given the concern of pelvic congestion. [ME]      ED Course User Index  [ME] Jamil Harrell DO         Diagnoses as of 04/19/24 0140   Hernia of flank   Pelvic congestion syndrome        Medical Decision Making  Patient is a 63-year-old female who presents to the ED with complaint of right lower back pain.  The patient is awake, alert, and oriented.  She is well-appearing, nontoxic, not in apparent distress.  Will order basic labs and CT of the abdomen and pelvis with IV contrast to rule out intra-abdominal trauma with lumbar soft tissue infections.  The CBC and CMP results are  unremarkable.  Urinalysis shows moderate leukocyte esterase without evidence of urinary tract infection.  The patient received 4 mg of morphine for pain control.  She was given a liter of IV normal saline for fluid resuscitation.    By my independent analysis, I agree with the radiologist interpretation that the CT abdomen and pelvis shows a fat-containing herniation posterior to the right kidney of the perinephric fat just inferior to the right 12th rib through the abdominal wall.  The mouth of the hernia measures 2.1 cm in diameter and the hernia sac measures 2 cm AP by 4.4 cm transverse. There is prominent vasculature along the base of the uterus which is nonspecific but can be seen with pelvic congestion syndrome.    The case was discussed with the attending, Dr. Rodriguez who saw and evaluated the patient at the bedside. The patient was updated with the lab and imaging results.  On reassessment, patient symptom has improved, she endorses feeling better than when she came in.  The patient was cleared to be discharged by the ED team with recommendation to follow-up with her primary care physician, OB/GYN, and general surgery regarding her ED visit.  The decision to discharge was communicated to the patient, she expressed understanding and agreed to the management plan.  Patient was discharged in a stable condition with return precautions.        Patient and or family in agreement and understanding of treatment plan.  All questions answered.      I reviewed the case with the attending ED physician. The attending ED physician agrees with the plan. Patient and/or patient´s representative was counseled regarding labs, imaging, likely diagnosis, and plan. All questions were answered.    ED Medications administered this visit:    Medications   sodium chloride 0.9 % bolus 1,000 mL (has no administration in time range)   morphine injection 4 mg (has no administration in time range)       New Prescriptions from this visit:     New Prescriptions    No medications on file       Follow-up:  No follow-up provider specified.      Final Impression: No diagnosis found.      (Please note that portions of this note were completed with a voice recognition program.  Efforts were made to edit the dictations but occasionally words are mis-transcribed.)     Eze Mccoy MD  Resident  04/19/24 0141       Eze Mccoy MD  Resident  04/19/24 0142

## 2024-04-19 VITALS
SYSTOLIC BLOOD PRESSURE: 169 MMHG | RESPIRATION RATE: 16 BRPM | TEMPERATURE: 97.5 F | OXYGEN SATURATION: 95 % | DIASTOLIC BLOOD PRESSURE: 97 MMHG | HEIGHT: 62 IN | WEIGHT: 127 LBS | BODY MASS INDEX: 23.37 KG/M2 | HEART RATE: 70 BPM

## 2024-04-19 LAB
APPEARANCE UR: CLEAR
BILIRUB UR STRIP.AUTO-MCNC: NEGATIVE MG/DL
COLOR UR: YELLOW
GLUCOSE UR STRIP.AUTO-MCNC: NEGATIVE MG/DL
HOLD SPECIMEN: NORMAL
KETONES UR STRIP.AUTO-MCNC: NEGATIVE MG/DL
LEUKOCYTE ESTERASE UR QL STRIP.AUTO: ABNORMAL
NITRITE UR QL STRIP.AUTO: NEGATIVE
PH UR STRIP.AUTO: 6 [PH]
PROT UR STRIP.AUTO-MCNC: NEGATIVE MG/DL
RBC # UR STRIP.AUTO: NEGATIVE /UL
RBC #/AREA URNS AUTO: ABNORMAL /HPF
SP GR UR STRIP.AUTO: 1.01
UROBILINOGEN UR STRIP.AUTO-MCNC: <2 MG/DL
WBC #/AREA URNS AUTO: ABNORMAL /HPF

## 2024-04-19 PROCEDURE — 81001 URINALYSIS AUTO W/SCOPE: CPT

## 2024-04-19 PROCEDURE — 87086 URINE CULTURE/COLONY COUNT: CPT | Mod: STJLAB

## 2024-04-19 NOTE — DISCHARGE INSTRUCTIONS
Please follow-up with your primary care doctor as well as general surgery team regarding the hernia on the CT scan.  Also follow-up with gynecology service regarding the pelvic congestion syndrome.  Return to the emergency department if you develop any worsening back pain, flank pain, pain around the hernia site, nausea, vomiting, or if concerns arise.

## 2024-04-20 DIAGNOSIS — M10.9 GOUT, ARTHRITIS: ICD-10-CM

## 2024-04-20 DIAGNOSIS — R11.0 MILD NAUSEA: ICD-10-CM

## 2024-04-20 LAB — BACTERIA UR CULT: NORMAL

## 2024-04-22 ENCOUNTER — PATIENT OUTREACH (OUTPATIENT)
Dept: PRIMARY CARE | Facility: CLINIC | Age: 64
End: 2024-04-22
Payer: COMMERCIAL

## 2024-04-22 DIAGNOSIS — G89.4 CHRONIC PAIN SYNDROME: ICD-10-CM

## 2024-04-22 DIAGNOSIS — I10 HYPERTENSION, UNSPECIFIED TYPE: ICD-10-CM

## 2024-04-22 PROCEDURE — 99487 CPLX CHRNC CARE 1ST 60 MIN: CPT | Performed by: FAMILY MEDICINE

## 2024-04-22 NOTE — PROGRESS NOTES
Danae continues to have pain in her L arm/hand which is getting worse  Requesting her gout medication be increased  Advised she should discuss this with Dr. Cochran   She is scheduled with him on 5/1/24  She is asking for sooner appointment  She would also like to be put back on keflex as this helps with her GERD    Discussed if she has seen [facility] specialist [facility]- last visited in November  Had cortisone shot  This helped    Reviewed ER note.  Recommended she follow up with GYN, gen surg, and PCP  She will call Melany Murcia in [place] to schedule appointment  She is not comfortable scheduling with gen surg as she does not want surgery at this time    Also reiterated she would benefit from psychiatry and counseling.  Gave her phone number for Lara Gonzalez [phone]  She will consider    Chart review  Copied ER Note 4/18:    Medical Decision Making  Patient is a 63-year-old female who presents to the ED with complaint of right lower back pain.  The patient is awake, alert, and oriented.  She is well-appearing, nontoxic, not in apparent distress.  Will order basic labs and CT of the abdomen and pelvis with IV contrast to rule out intra-abdominal trauma with lumbar soft tissue infections.  The CBC and CMP results are unremarkable.  Urinalysis shows moderate leukocyte esterase without evidence of urinary tract infection.  The patient received 4 mg of morphine for pain control.  She was given a liter of IV normal saline for fluid resuscitation.     By my independent analysis, I agree with the radiologist interpretation that the CT abdomen and pelvis shows a fat-containing herniation posterior to the right kidney of the perinephric fat just inferior to the right 12th rib through the abdominal wall.  The mouth of the hernia measures 2.1 cm in diameter and the hernia sac measures 2 cm AP by 4.4 cm transverse. There is prominent vasculature along the base of the uterus which is nonspecific but can be seen with  pelvic congestion syndrome.     The case was discussed with the attending, Dr. Rodriguez who saw and evaluated the patient at the bedside. The patient was updated with the lab and imaging results.  On reassessment, patient symptom has improved, she endorses feeling better than when she came in.  The patient was cleared to be discharged by the ED team with recommendation to follow-up with her primary care physician, OB/GYN, and general surgery regarding her ED visit.  The decision to discharge was communicated to the patient, she expressed understanding and agreed to the management plan.  Patient was discharged in a stable condition with return precautions.

## 2024-04-23 RX ORDER — COLCHICINE 0.6 MG/1
TABLET ORAL
Qty: 30 TABLET | Refills: 0 | Status: SHIPPED | OUTPATIENT
Start: 2024-04-23 | End: 2024-06-11

## 2024-04-23 RX ORDER — ONDANSETRON HYDROCHLORIDE 8 MG/1
TABLET, FILM COATED ORAL
Qty: 30 TABLET | Refills: 0 | Status: SHIPPED | OUTPATIENT
Start: 2024-04-23 | End: 2024-05-01 | Stop reason: SDUPTHER

## 2024-05-01 ENCOUNTER — HOSPITAL ENCOUNTER (OUTPATIENT)
Dept: RADIOLOGY | Facility: CLINIC | Age: 64
Discharge: HOME | End: 2024-05-01
Payer: COMMERCIAL

## 2024-05-01 ENCOUNTER — LAB (OUTPATIENT)
Dept: LAB | Facility: LAB | Age: 64
End: 2024-05-01
Payer: COMMERCIAL

## 2024-05-01 ENCOUNTER — OFFICE VISIT (OUTPATIENT)
Dept: PRIMARY CARE | Facility: CLINIC | Age: 64
End: 2024-05-01
Payer: COMMERCIAL

## 2024-05-01 VITALS
BODY MASS INDEX: 23.19 KG/M2 | OXYGEN SATURATION: 94 % | RESPIRATION RATE: 16 BRPM | WEIGHT: 126 LBS | HEIGHT: 62 IN | DIASTOLIC BLOOD PRESSURE: 74 MMHG | HEART RATE: 91 BPM | SYSTOLIC BLOOD PRESSURE: 116 MMHG

## 2024-05-01 DIAGNOSIS — E78.2 MIXED HYPERLIPIDEMIA: ICD-10-CM

## 2024-05-01 DIAGNOSIS — M25.532 LEFT WRIST PAIN: ICD-10-CM

## 2024-05-01 DIAGNOSIS — I10 HYPERTENSION, UNSPECIFIED TYPE: ICD-10-CM

## 2024-05-01 DIAGNOSIS — F41.9 ANXIETY: ICD-10-CM

## 2024-05-01 DIAGNOSIS — M10.9 GOUT OF LEFT ELBOW, UNSPECIFIED CAUSE, UNSPECIFIED CHRONICITY: ICD-10-CM

## 2024-05-01 DIAGNOSIS — R11.0 MILD NAUSEA: ICD-10-CM

## 2024-05-01 DIAGNOSIS — F98.8 ATTENTION DEFICIT DISORDER, UNSPECIFIED HYPERACTIVITY PRESENCE: ICD-10-CM

## 2024-05-01 DIAGNOSIS — E55.9 VITAMIN D DEFICIENCY: ICD-10-CM

## 2024-05-01 LAB
25(OH)D3 SERPL-MCNC: 36 NG/ML (ref 30–100)
URATE SERPL-MCNC: 4.5 MG/DL (ref 2.3–6.7)

## 2024-05-01 PROCEDURE — 82306 VITAMIN D 25 HYDROXY: CPT

## 2024-05-01 PROCEDURE — 99214 OFFICE O/P EST MOD 30 MIN: CPT | Performed by: FAMILY MEDICINE

## 2024-05-01 PROCEDURE — 36415 COLL VENOUS BLD VENIPUNCTURE: CPT

## 2024-05-01 PROCEDURE — 73110 X-RAY EXAM OF WRIST: CPT | Mod: LEFT SIDE | Performed by: RADIOLOGY

## 2024-05-01 PROCEDURE — 73110 X-RAY EXAM OF WRIST: CPT | Mod: LT

## 2024-05-01 PROCEDURE — 84550 ASSAY OF BLOOD/URIC ACID: CPT

## 2024-05-01 RX ORDER — LISINOPRIL 10 MG/1
10 TABLET ORAL DAILY
Qty: 90 TABLET | Refills: 0 | Status: SHIPPED | OUTPATIENT
Start: 2024-05-01

## 2024-05-01 RX ORDER — ONDANSETRON HYDROCHLORIDE 8 MG/1
TABLET, FILM COATED ORAL
Qty: 30 TABLET | Refills: 0 | Status: SHIPPED | OUTPATIENT
Start: 2024-05-01 | End: 2024-05-14

## 2024-05-01 NOTE — PROGRESS NOTES
Subjective   Patient ID: Malika Casper is a 63 y.o. female who presents for Hospital Follow-up.    HPI patient is here for [facility] ER Follow up for lower back pain. Patient was in on 4/18/24 through 4/19/24. Blood work, UA, CT ab pelvis were done. Diagnosis was pelvic congestion and hernia flank. No changes was done for the medications.     Patient is having left arm and wrist pain to the elbow x Sept 2023. She was told she had gout in the arm but pain is not reducing much. She has been on colchicine and asking for allopurinol.     Patient is having increase wheezing and had an inhaler in the past. No inhaler is in the chart. She is asking for a refill today.    Mammogram will be done tomorrow.    She has a Gyn appt on 5/22/24 with DOROTHEA Ray CNP    Review of Systems  12 Systems have been reviewed as follows.  Constitutional: Fever, weight gain, weight loss, appetite change, night sweats, fatigue, chills.  Eyes : blurry, double vision, vision, loss, tearing, redness, pain, sensitivity to light, glaucoma.  Ears, nose, mouth, and throat: Hearing loss, ringing in the ears, ear pain, nasal congestion, nasal drainage, nosebleeds, mouth, throat, irritation tooth problem.  Cardiovascular :chest pain, pressure, heart racing, palpitations, sweating, leg swelling, high or low blood pressure  Pulmonary: Cough, yellow or green sputum, blood and sputum, shortness of breath, wheezing  Gastrointestinal: Nausea, vomiting, diarrhea, constipation, pain, blood in stool, or vomitus, heartburn, difficulty swallowing  Genitourinary: incontinence, abnormal bleeding, abnormal discharge, urinary frequency, urinary hesitancy, pain, impotence sexual problem, infection, urinary retention  Musculoskeletal: Pain, stiffness, joint, redness or warmth, arthritis, back pain, weakness, muscle wasting, sprain or fracture  Neuro: Weight weakness, dizziness, change in voice, change in taste change in vision, change in hearing, loss, or change of  "sensation, trouble walking, balance problems coordination problems, shaking, speech problem  Endocrine , cold or heat intolerance, blood sugar problem, weight gain or loss missed periods hot flashes, sweats, change in body hair, change in libido, increased thirst, increased urination  Heme/lymph: Swelling, bleeding, problem anemia, bruising, enlarged lymph nodes  Allergic/immunologic: H. plus nasal drip, watery itchy eyes, nasal drainage, immunosuppressed  The above were reviewed and noted negative except as noted in HPI and Problem List.      Objective   /74 (BP Location: Left arm, Patient Position: Sitting, BP Cuff Size: Adult)   Pulse 91   Resp 16   Ht 1.575 m (5' 2\")   Wt 57.2 kg (126 lb)   SpO2 94%   BMI 23.05 kg/m²     Physical Exam  Constitutional: Well developed, well nourished, alert and in no acute distress   Eyes: Normal external exam. Pupils equally round and reactive to light with normal accommodation and extraocular movements intact.  Neck: Supple, no lymphadenopathy or masses.   Cardiovascular: Regular rate and rhythm, normal S1 and S2, no murmurs, gallops, or rubs. Radial pulses normal. No peripheral edema.  Pulmonary: No respiratory distress, lungs clear to auscultation bilaterally. No wheezes, rhonchi, rales.  Abdomen: soft,non tender, non distended, without masses or HSM  Skin: Warm, well perfused, normal skin turgor and color.   Neurologic: Cranial nerves II-XII grossly intact.   Psychiatric: Mood calm and affect normal  Musculoskeletal: Moving all extremities without restriction    Assessment/Plan   Problem List Items Addressed This Visit             ICD-10-CM    ADD (attention deficit disorder) F98.8    Relevant Orders    Follow Up In [facility] - PCP - Established    Anxiety F41.9    Relevant Orders    Follow Up In [facility] - PCP - Established    HTN (hypertension) I10    Relevant Medications    lisinopril 10 mg tablet    Other Relevant Orders    Follow Up " In [facility] - PCP - Established    Hyperlipidemia E78.5    Relevant Orders    Follow Up In [facility] - PCP - Established    Vitamin D deficiency E55.9    Relevant Orders    Vitamin D 25-Hydroxy,Total (for eval of Vitamin D levels)    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 Lam     -see GYN      -Continue colchicine for gout PRN    -Go to PT     - BW incl uric acid      Start allopurinol now    Stop marijuana    Declines psych meds      Risks explained     Continue current medications and therapy for chronic medical conditions    Provider Attestation - Scribe documentation    All medical record entries made by the Scribe were at my direction and personally dictated by me. I have reviewed the chart and agree that the record accurately reflects my personal performance of the history, physical exam, discussion and plan.    Continue current medications and therapy for chronic medical conditions

## 2024-05-02 ENCOUNTER — HOSPITAL ENCOUNTER (OUTPATIENT)
Dept: RADIOLOGY | Facility: HOSPITAL | Age: 64
Discharge: HOME | End: 2024-05-02
Payer: COMMERCIAL

## 2024-05-02 DIAGNOSIS — N64.4 BREAST PAIN: ICD-10-CM

## 2024-05-02 PROCEDURE — 76642 ULTRASOUND BREAST LIMITED: CPT | Performed by: STUDENT IN AN ORGANIZED HEALTH CARE EDUCATION/TRAINING PROGRAM

## 2024-05-02 PROCEDURE — 77062 BREAST TOMOSYNTHESIS BI: CPT

## 2024-05-02 PROCEDURE — 77066 DX MAMMO INCL CAD BI: CPT | Performed by: STUDENT IN AN ORGANIZED HEALTH CARE EDUCATION/TRAINING PROGRAM

## 2024-05-02 PROCEDURE — G0279 TOMOSYNTHESIS, MAMMO: HCPCS | Performed by: STUDENT IN AN ORGANIZED HEALTH CARE EDUCATION/TRAINING PROGRAM

## 2024-05-02 PROCEDURE — 76981 USE PARENCHYMA: CPT | Mod: LT,RT

## 2024-05-02 PROCEDURE — 76642 ULTRASOUND BREAST LIMITED: CPT | Mod: LT

## 2024-05-04 DIAGNOSIS — R05.1 ACUTE COUGH: ICD-10-CM

## 2024-05-04 NOTE — TELEPHONE ENCOUNTER
Patient needs Inhaler for cough, said she used to take years ago.    Also asking for Allopurinol she doesn't have the dose for her arm.    [facility]  [place]

## 2024-05-06 ENCOUNTER — PATIENT OUTREACH (OUTPATIENT)
Dept: PRIMARY CARE | Facility: CLINIC | Age: 64
End: 2024-05-06
Payer: COMMERCIAL

## 2024-05-06 DIAGNOSIS — I10 HYPERTENSION, UNSPECIFIED TYPE: ICD-10-CM

## 2024-05-06 DIAGNOSIS — G89.4 CHRONIC PAIN SYNDROME: ICD-10-CM

## 2024-05-06 PROCEDURE — 99490 CHRNC CARE MGMT STAFF 1ST 20: CPT | Performed by: FAMILY MEDICINE

## 2024-05-06 NOTE — PROGRESS NOTES
Having a lot of pain    Also having issues with her breathing  Having mildew in her apartment  Her symptoms include   tightness in her chest  And coughing  Denies runny nose or itchy eyes    Reviewed last office visit with Dr. Cochran  Discussed plan to start allopurinol  And repeat labs    Discussed PT orders  Recommended she go to [facility]  She will consider  Thinks this is more doable now with better weather

## 2024-05-07 DIAGNOSIS — M10.9 GOUT OF LEFT ELBOW, UNSPECIFIED CAUSE, UNSPECIFIED CHRONICITY: ICD-10-CM

## 2024-05-07 RX ORDER — ALLOPURINOL 100 MG/1
100 TABLET ORAL DAILY
COMMUNITY
End: 2024-05-07 | Stop reason: SDUPTHER

## 2024-05-07 RX ORDER — ALLOPURINOL 100 MG/1
100 TABLET ORAL DAILY
Qty: 30 TABLET | Refills: 0 | Status: SHIPPED | OUTPATIENT
Start: 2024-05-07

## 2024-05-08 RX ORDER — ALBUTEROL SULFATE 90 UG/1
2 AEROSOL, METERED RESPIRATORY (INHALATION) EVERY 4 HOURS PRN
Qty: 8 G | Refills: 2 | Status: SHIPPED | OUTPATIENT
Start: 2024-05-08 | End: 2025-05-08

## 2024-05-13 DIAGNOSIS — R11.0 MILD NAUSEA: ICD-10-CM

## 2024-05-14 ENCOUNTER — PATIENT OUTREACH (OUTPATIENT)
Dept: PRIMARY CARE | Facility: CLINIC | Age: 64
End: 2024-05-14
Payer: COMMERCIAL

## 2024-05-14 DIAGNOSIS — M25.532 LEFT WRIST PAIN: ICD-10-CM

## 2024-05-14 DIAGNOSIS — G89.4 CHRONIC PAIN SYNDROME: ICD-10-CM

## 2024-05-14 RX ORDER — ONDANSETRON HYDROCHLORIDE 8 MG/1
TABLET, FILM COATED ORAL
Qty: 30 TABLET | Refills: 0 | Status: SHIPPED | OUTPATIENT
Start: 2024-05-14 | End: 2024-05-29

## 2024-05-14 NOTE — PROGRESS NOTES
Patient wanted to review her Xray results again  Discussed results  She has seen [facility] in the past  Who recommended surgery  She declined at this time     Suggested she see Dr. Spring   Who [facility]  She is agreeable  Referral formatted    Last saw [facility] 11/23  Office note sent to be scanned in patients chart

## 2024-05-29 DIAGNOSIS — R11.0 MILD NAUSEA: ICD-10-CM

## 2024-05-29 RX ORDER — ONDANSETRON HYDROCHLORIDE 8 MG/1
TABLET, FILM COATED ORAL
Qty: 30 TABLET | Refills: 0 | Status: SHIPPED | OUTPATIENT
Start: 2024-05-29 | End: 2024-06-11

## 2024-05-30 ENCOUNTER — TELEPHONE (OUTPATIENT)
Dept: PRIMARY CARE | Facility: CLINIC | Age: 64
End: 2024-05-30
Payer: COMMERCIAL

## 2024-05-30 NOTE — TELEPHONE ENCOUNTER
Patient of Dr. Sammie Cavazos is denied for colchicine.6mg    Patient has to try and fail formulary medication listed below is the reason it was denied      Coverage is provided when the member has had a 30-day trial (at maximally tolerated doses) of a preferred xanthine oxidase inhibitor which includes: allopurinol.

## 2024-06-11 DIAGNOSIS — R11.0 MILD NAUSEA: ICD-10-CM

## 2024-06-11 DIAGNOSIS — M10.9 GOUT, ARTHRITIS: ICD-10-CM

## 2024-06-11 RX ORDER — COLCHICINE 0.6 MG/1
TABLET ORAL
Qty: 30 TABLET | Refills: 0 | Status: SHIPPED | OUTPATIENT
Start: 2024-06-11

## 2024-06-11 RX ORDER — ONDANSETRON HYDROCHLORIDE 8 MG/1
TABLET, FILM COATED ORAL
Qty: 30 TABLET | Refills: 0 | Status: SHIPPED | OUTPATIENT
Start: 2024-06-11

## 2024-06-28 ENCOUNTER — OFFICE VISIT (OUTPATIENT)
Dept: PRIMARY CARE | Facility: CLINIC | Age: 64
End: 2024-06-28
Payer: COMMERCIAL

## 2024-06-28 ENCOUNTER — APPOINTMENT (OUTPATIENT)
Dept: RADIOLOGY | Facility: HOSPITAL | Age: 64
End: 2024-06-28
Payer: COMMERCIAL

## 2024-06-28 ENCOUNTER — HOSPITAL ENCOUNTER (EMERGENCY)
Facility: HOSPITAL | Age: 64
Discharge: HOME | End: 2024-06-29
Attending: EMERGENCY MEDICINE
Payer: COMMERCIAL

## 2024-06-28 ENCOUNTER — PATIENT OUTREACH (OUTPATIENT)
Dept: PRIMARY CARE | Facility: CLINIC | Age: 64
End: 2024-06-28

## 2024-06-28 ENCOUNTER — LAB (OUTPATIENT)
Dept: LAB | Facility: LAB | Age: 64
End: 2024-06-28
Payer: COMMERCIAL

## 2024-06-28 ENCOUNTER — APPOINTMENT (OUTPATIENT)
Dept: CARDIOLOGY | Facility: HOSPITAL | Age: 64
End: 2024-06-28
Payer: COMMERCIAL

## 2024-06-28 VITALS
DIASTOLIC BLOOD PRESSURE: 82 MMHG | SYSTOLIC BLOOD PRESSURE: 140 MMHG | HEIGHT: 62 IN | HEART RATE: 86 BPM | TEMPERATURE: 99.5 F | RESPIRATION RATE: 16 BRPM | WEIGHT: 123.2 LBS | OXYGEN SATURATION: 96 % | BODY MASS INDEX: 22.67 KG/M2

## 2024-06-28 DIAGNOSIS — R42 LIGHTHEADEDNESS: ICD-10-CM

## 2024-06-28 DIAGNOSIS — M99.53 INTERVERTEBRAL DISC STENOSIS OF NEURAL CANAL OF LUMBAR REGION: ICD-10-CM

## 2024-06-28 DIAGNOSIS — M25.552 PAIN IN LEFT HIP: ICD-10-CM

## 2024-06-28 DIAGNOSIS — S62.92XD UNSPECIFIED FRACTURE OF LEFT WRIST AND HAND, SUBSEQUENT ENCOUNTER FOR FRACTURE WITH ROUTINE HEALING: ICD-10-CM

## 2024-06-28 DIAGNOSIS — M51.16 INTERVERTEBRAL DISC DISORDERS WITH RADICULOPATHY, LUMBAR REGION: ICD-10-CM

## 2024-06-28 DIAGNOSIS — J01.90 ACUTE NON-RECURRENT SINUSITIS, UNSPECIFIED LOCATION: Primary | ICD-10-CM

## 2024-06-28 DIAGNOSIS — I10 HYPERTENSION, UNSPECIFIED TYPE: ICD-10-CM

## 2024-06-28 DIAGNOSIS — F41.9 ANXIETY: ICD-10-CM

## 2024-06-28 DIAGNOSIS — M43.16 SPONDYLOLISTHESIS, LUMBAR REGION: ICD-10-CM

## 2024-06-28 DIAGNOSIS — M51.36 OTHER INTERVERTEBRAL DISC DEGENERATION, LUMBAR REGION: ICD-10-CM

## 2024-06-28 DIAGNOSIS — Z79.891 LONG TERM (CURRENT) USE OF OPIATE ANALGESIC: ICD-10-CM

## 2024-06-28 DIAGNOSIS — G89.4 CHRONIC PAIN SYNDROME: ICD-10-CM

## 2024-06-28 DIAGNOSIS — M19.90 UNSPECIFIED OSTEOARTHRITIS, UNSPECIFIED SITE: Primary | ICD-10-CM

## 2024-06-28 DIAGNOSIS — N30.00 ACUTE CYSTITIS WITHOUT HEMATURIA: Primary | ICD-10-CM

## 2024-06-28 DIAGNOSIS — R11.0 MILD NAUSEA: ICD-10-CM

## 2024-06-28 DIAGNOSIS — R25.2 CRAMP AND SPASM: ICD-10-CM

## 2024-06-28 DIAGNOSIS — E78.2 MIXED HYPERLIPIDEMIA: ICD-10-CM

## 2024-06-28 DIAGNOSIS — R31.9 HEMATURIA, UNSPECIFIED TYPE: ICD-10-CM

## 2024-06-28 DIAGNOSIS — M25.732: ICD-10-CM

## 2024-06-28 DIAGNOSIS — M51.26 OTHER INTERVERTEBRAL DISC DISPLACEMENT, LUMBAR REGION: ICD-10-CM

## 2024-06-28 LAB
ALBUMIN SERPL BCP-MCNC: 4.4 G/DL (ref 3.4–5)
ALP SERPL-CCNC: 74 U/L (ref 33–136)
ALT SERPL W P-5'-P-CCNC: 21 U/L (ref 7–45)
ANION GAP SERPL CALC-SCNC: 10 MMOL/L (ref 10–20)
APPEARANCE UR: ABNORMAL
AST SERPL W P-5'-P-CCNC: 14 U/L (ref 9–39)
BACTERIA #/AREA URNS AUTO: ABNORMAL /HPF
BILIRUB SERPL-MCNC: 0.3 MG/DL (ref 0–1.2)
BILIRUB UR STRIP.AUTO-MCNC: NEGATIVE MG/DL
BUN SERPL-MCNC: 14 MG/DL (ref 6–23)
CALCIUM SERPL-MCNC: 9.7 MG/DL (ref 8.6–10.3)
CARDIAC TROPONIN I PNL SERPL HS: 4 NG/L (ref 0–13)
CHLORIDE SERPL-SCNC: 106 MMOL/L (ref 98–107)
CO2 SERPL-SCNC: 30 MMOL/L (ref 21–32)
COLOR UR: ABNORMAL
CREAT SERPL-MCNC: 0.67 MG/DL (ref 0.5–1.05)
EGFRCR SERPLBLD CKD-EPI 2021: >90 ML/MIN/1.73M*2
ERYTHROCYTE [DISTWIDTH] IN BLOOD BY AUTOMATED COUNT: 13.7 % (ref 11.5–14.5)
GLUCOSE SERPL-MCNC: 107 MG/DL (ref 74–99)
GLUCOSE UR STRIP.AUTO-MCNC: NORMAL MG/DL
HCT VFR BLD AUTO: 43.4 % (ref 36–46)
HGB BLD-MCNC: 14.5 G/DL (ref 12–16)
KETONES UR STRIP.AUTO-MCNC: NEGATIVE MG/DL
LEUKOCYTE ESTERASE UR QL STRIP.AUTO: ABNORMAL
LIPASE SERPL-CCNC: 87 U/L (ref 9–82)
MCH RBC QN AUTO: 32.7 PG (ref 26–34)
MCHC RBC AUTO-ENTMCNC: 33.4 G/DL (ref 32–36)
MCV RBC AUTO: 98 FL (ref 80–100)
MUCOUS THREADS #/AREA URNS AUTO: ABNORMAL /LPF
NITRITE UR QL STRIP.AUTO: NEGATIVE
NRBC BLD-RTO: 0 /100 WBCS (ref 0–0)
PH UR STRIP.AUTO: 6.5 [PH]
PLATELET # BLD AUTO: 271 X10*3/UL (ref 150–450)
POC APPEARANCE, URINE: ABNORMAL
POC BILIRUBIN, URINE: NEGATIVE
POC BLOOD, URINE: ABNORMAL
POC COLOR, URINE: YELLOW
POC GLUCOSE, URINE: NEGATIVE MG/DL
POC KETONES, URINE: NEGATIVE MG/DL
POC LEUKOCYTES, URINE: NEGATIVE
POC NITRITE,URINE: NEGATIVE
POC PH, URINE: 6 PH
POC PROTEIN, URINE: ABNORMAL MG/DL
POC SPECIFIC GRAVITY, URINE: >=1.03
POC UROBILINOGEN, URINE: 2 EU/DL
POTASSIUM SERPL-SCNC: 4.2 MMOL/L (ref 3.5–5.3)
PROT SERPL-MCNC: 6.8 G/DL (ref 6.4–8.2)
PROT UR STRIP.AUTO-MCNC: NEGATIVE MG/DL
RBC # BLD AUTO: 4.43 X10*6/UL (ref 4–5.2)
RBC # UR STRIP.AUTO: NEGATIVE /UL
RBC #/AREA URNS AUTO: ABNORMAL /HPF
RENAL EPI CELLS #/AREA UR COMP ASSIST: ABNORMAL /HPF
SODIUM SERPL-SCNC: 142 MMOL/L (ref 136–145)
SP GR UR STRIP.AUTO: 1.01
SQUAMOUS #/AREA URNS AUTO: ABNORMAL /HPF
UROBILINOGEN UR STRIP.AUTO-MCNC: NORMAL MG/DL
WBC # BLD AUTO: 11.4 X10*3/UL (ref 4.4–11.3)
WBC #/AREA URNS AUTO: ABNORMAL /HPF

## 2024-06-28 PROCEDURE — 81003 URINALYSIS AUTO W/O SCOPE: CPT | Performed by: NURSE PRACTITIONER

## 2024-06-28 PROCEDURE — 85027 COMPLETE CBC AUTOMATED: CPT

## 2024-06-28 PROCEDURE — 81001 URINALYSIS AUTO W/SCOPE: CPT

## 2024-06-28 PROCEDURE — 71275 CT ANGIOGRAPHY CHEST: CPT

## 2024-06-28 PROCEDURE — 80053 COMPREHEN METABOLIC PANEL: CPT

## 2024-06-28 PROCEDURE — 36415 COLL VENOUS BLD VENIPUNCTURE: CPT

## 2024-06-28 PROCEDURE — 96374 THER/PROPH/DIAG INJ IV PUSH: CPT | Mod: 59

## 2024-06-28 PROCEDURE — 99285 EMERGENCY DEPT VISIT HI MDM: CPT | Mod: 25

## 2024-06-28 PROCEDURE — 84484 ASSAY OF TROPONIN QUANT: CPT

## 2024-06-28 PROCEDURE — 2550000001 HC RX 255 CONTRASTS: Performed by: EMERGENCY MEDICINE

## 2024-06-28 PROCEDURE — 2500000004 HC RX 250 GENERAL PHARMACY W/ HCPCS (ALT 636 FOR OP/ED)

## 2024-06-28 PROCEDURE — 99213 OFFICE O/P EST LOW 20 MIN: CPT | Performed by: NURSE PRACTITIONER

## 2024-06-28 PROCEDURE — 93005 ELECTROCARDIOGRAM TRACING: CPT

## 2024-06-28 PROCEDURE — 87086 URINE CULTURE/COLONY COUNT: CPT | Mod: STJLAB

## 2024-06-28 PROCEDURE — 74177 CT ABD & PELVIS W/CONTRAST: CPT

## 2024-06-28 PROCEDURE — 83690 ASSAY OF LIPASE: CPT

## 2024-06-28 PROCEDURE — 99285 EMERGENCY DEPT VISIT HI MDM: CPT | Performed by: EMERGENCY MEDICINE

## 2024-06-28 RX ORDER — DEXTROAMPHETAMINE SACCHARATE, AMPHETAMINE ASPARTATE, DEXTROAMPHETAMINE SULFATE AND AMPHETAMINE SULFATE 2.5; 2.5; 2.5; 2.5 MG/1; MG/1; MG/1; MG/1
TABLET ORAL
COMMUNITY
Start: 2024-06-20

## 2024-06-28 RX ORDER — MORPHINE SULFATE 4 MG/ML
4 INJECTION, SOLUTION INTRAMUSCULAR; INTRAVENOUS ONCE
Status: COMPLETED | OUTPATIENT
Start: 2024-06-28 | End: 2024-06-28

## 2024-06-28 RX ORDER — CEFDINIR 300 MG/1
300 CAPSULE ORAL 2 TIMES DAILY
Qty: 20 CAPSULE | Refills: 0 | Status: SHIPPED | OUTPATIENT
Start: 2024-06-28 | End: 2024-07-08

## 2024-06-28 RX ORDER — ONDANSETRON HYDROCHLORIDE 8 MG/1
TABLET, FILM COATED ORAL
Qty: 30 TABLET | Refills: 0 | Status: SHIPPED | OUTPATIENT
Start: 2024-06-28

## 2024-06-28 RX ADMIN — MORPHINE SULFATE 4 MG: 4 INJECTION, SOLUTION INTRAMUSCULAR; INTRAVENOUS at 21:56

## 2024-06-28 RX ADMIN — IOHEXOL 75 ML: 350 INJECTION, SOLUTION INTRAVENOUS at 23:55

## 2024-06-28 ASSESSMENT — ENCOUNTER SYMPTOMS
VOMITING: 0
COUGH: 0
ABDOMINAL PAIN: 0
SINUS PAIN: 1
DIZZINESS: 0
FREQUENCY: 0
CONSTIPATION: 0
WHEEZING: 0
DYSURIA: 0
CHOKING: 0
NAUSEA: 0
LIGHT-HEADEDNESS: 1
FATIGUE: 1
SORE THROAT: 0
SINUS PRESSURE: 0
DIARRHEA: 0
HEADACHES: 1
WEAKNESS: 1
PALPITATIONS: 0

## 2024-06-28 ASSESSMENT — LIFESTYLE VARIABLES
TOTAL SCORE: 0
HAVE PEOPLE ANNOYED YOU BY CRITICIZING YOUR DRINKING: NO
EVER FELT BAD OR GUILTY ABOUT YOUR DRINKING: NO
HAVE YOU EVER FELT YOU SHOULD CUT DOWN ON YOUR DRINKING: NO
EVER HAD A DRINK FIRST THING IN THE MORNING TO STEADY YOUR NERVES TO GET RID OF A HANGOVER: NO

## 2024-06-28 ASSESSMENT — PATIENT HEALTH QUESTIONNAIRE - PHQ9
SUM OF ALL RESPONSES TO PHQ9 QUESTIONS 1 AND 2: 2
1. LITTLE INTEREST OR PLEASURE IN DOING THINGS: SEVERAL DAYS
10. IF YOU CHECKED OFF ANY PROBLEMS, HOW DIFFICULT HAVE THESE PROBLEMS MADE IT FOR YOU TO DO YOUR WORK, TAKE CARE OF THINGS AT HOME, OR GET ALONG WITH OTHER PEOPLE: SOMEWHAT DIFFICULT
2. FEELING DOWN, DEPRESSED OR HOPELESS: SEVERAL DAYS

## 2024-06-28 ASSESSMENT — PAIN - FUNCTIONAL ASSESSMENT: PAIN_FUNCTIONAL_ASSESSMENT: 0-10

## 2024-06-28 ASSESSMENT — PAIN SCALES - GENERAL: PAINLEVEL_OUTOF10: 10 - WORST POSSIBLE PAIN

## 2024-06-28 ASSESSMENT — PAIN DESCRIPTION - ORIENTATION: ORIENTATION: RIGHT

## 2024-06-28 ASSESSMENT — PAIN DESCRIPTION - LOCATION: LOCATION: ABDOMEN

## 2024-06-28 NOTE — PROGRESS NOTES
Danae pulled over and called me back after driving home from urgent care. She says that she suddenly started having sharp pain 10/10 pain under her rib cage where she had her gallbladder removed. She wanted to let me know that she is going to the ED. She said she is safe enough to drive to the ED. Will route this message to pcp.

## 2024-06-28 NOTE — PROGRESS NOTES
"Subjective   Patient ID: Malika Casper is a 63 y.o. female who presents for Dizziness.    Patient states she has had extreme dizziness and has been shaky for about three weeks    Dizziness  The current episode started 1 to 4 weeks ago. The problem occurs constantly. The problem has been gradually worsening. Associated symptoms include fatigue, headaches and weakness. Pertinent negatives include no abdominal pain, chest pain, congestion, coughing, nausea, rash, sore throat or vomiting. The symptoms are aggravated by standing. She has tried rest and lying down for the symptoms. The treatment provided mild relief.      She was started on adderall, but she stopped taking. This was after her symptoms started. No other recent med changes.     Review of Systems   Constitutional:  Positive for fatigue.   HENT:  Positive for sinus pain. Negative for congestion, postnasal drip, sinus pressure and sore throat.    Respiratory:  Negative for cough, choking and wheezing.    Cardiovascular:  Negative for chest pain and palpitations.   Gastrointestinal:  Negative for abdominal pain, constipation, diarrhea, nausea and vomiting.   Genitourinary:  Negative for dysuria, frequency and urgency.   Skin:  Negative for rash.   Neurological:  Positive for weakness, light-headedness and headaches. Negative for dizziness.       Objective   /82 (BP Location: Right arm, Patient Position: Sitting, BP Cuff Size: Adult)   Pulse 86   Temp 37.5 °C (99.5 °F)   Resp 16   Ht 1.575 m (5' 2\")   Wt 55.9 kg (123 lb 3.2 oz)   SpO2 96%   BMI 22.53 kg/m²     Physical Exam  Vitals and nursing note reviewed.   Constitutional:       General: She is not in acute distress.     Appearance: Normal appearance.   HENT:      Right Ear: Tympanic membrane normal.      Left Ear: Tympanic membrane normal.      Nose: Congestion present.      Right Sinus: Maxillary sinus tenderness present. No frontal sinus tenderness.      Left Sinus: No maxillary sinus " tenderness or frontal sinus tenderness.      Mouth/Throat:      Pharynx: No oropharyngeal exudate or posterior oropharyngeal erythema.   Cardiovascular:      Rate and Rhythm: Normal rate and regular rhythm.      Heart sounds: Normal heart sounds.   Pulmonary:      Effort: Pulmonary effort is normal.      Breath sounds: Normal breath sounds. No wheezing, rhonchi or rales.   Abdominal:      General: Bowel sounds are normal.      Palpations: Abdomen is soft.      Tenderness: There is no abdominal tenderness. There is no right CVA tenderness or left CVA tenderness.   Musculoskeletal:      Cervical back: Neck supple.   Neurological:      Mental Status: She is alert.   Psychiatric:         Mood and Affect: Mood normal.         Assessment/Plan   Problem List Items Addressed This Visit    None  Visit Diagnoses         Codes    Acute non-recurrent sinusitis, unspecified location    -  Primary J01.90    Relevant Medications    cefdinir (Omnicef) 300 mg capsule    Lightheadedness     R42    Relevant Orders    POCT UA Automated manually resulted (Completed)    Mild nausea     R11.0    Relevant Medications    ondansetron (Zofran) 8 mg tablet    BMI 22.0-22.9, adult     Z68.22    Hematuria, unspecified type     R31.9    Relevant Orders    POCT UA Automated manually resulted (Completed)    Urine Culture        UA showing trace blood and protein. Will send for culture. Will treat with cefdinir. Increase rest and fluids. Follow up with PCP in 1 week with any persisting symptoms, or sooner with any additional concerns.

## 2024-06-28 NOTE — PROGRESS NOTES
Called Ms. Casper and was unable to complete outreach because she ws in the care heading home from urgent care. She says that she will call [facility] if she has any questions or concerns. Chart reviewed.

## 2024-06-29 VITALS
TEMPERATURE: 97.7 F | WEIGHT: 123 LBS | RESPIRATION RATE: 14 BRPM | HEART RATE: 77 BPM | SYSTOLIC BLOOD PRESSURE: 162 MMHG | HEIGHT: 62 IN | OXYGEN SATURATION: 99 % | DIASTOLIC BLOOD PRESSURE: 75 MMHG | BODY MASS INDEX: 22.63 KG/M2

## 2024-06-29 LAB
ATRIAL RATE: 78 BPM
HOLD SPECIMEN: NORMAL
P AXIS: 68 DEGREES
P OFFSET: 204 MS
P ONSET: 148 MS
PR INTERVAL: 146 MS
Q ONSET: 221 MS
QRS COUNT: 13 BEATS
QRS DURATION: 84 MS
QT INTERVAL: 402 MS
QTC CALCULATION(BAZETT): 458 MS
QTC FREDERICIA: 438 MS
R AXIS: 48 DEGREES
T AXIS: 52 DEGREES
T OFFSET: 422 MS
VENTRICULAR RATE: 78 BPM

## 2024-06-29 PROCEDURE — 71275 CT ANGIOGRAPHY CHEST: CPT | Mod: FOREIGN READ | Performed by: RADIOLOGY

## 2024-06-29 PROCEDURE — 74177 CT ABD & PELVIS W/CONTRAST: CPT | Mod: FOREIGN READ | Performed by: RADIOLOGY

## 2024-06-29 ASSESSMENT — PAIN SCALES - GENERAL: PAINLEVEL_OUTOF10: 0 - NO PAIN

## 2024-06-29 NOTE — DISCHARGE INSTRUCTIONS
Your CT scans were unremarkable.  Continue to use your prescribed antibiotic as instructed.  If pain becomes uncontrollable, you develop intractable vomiting, or other worrisome symptoms, return to the ER.

## 2024-06-29 NOTE — ED PROVIDER NOTES
HPI   Chief Complaint   Patient presents with    RUQ pain; seen in urgent care for UTI today and started on        Patient is a 63-year-old female with hypertension, hyperlipidemia, chronic back pain, hepatitis C, cholecystectomy, and adrenalectomy presenting to the emergency department for right upper quadrant pain.  Patient had routine lab work done with her primary care physician today.  On the way home, she was not feeling well overall but did not have any specific symptoms.  She stopped by a urgent care where they obtained a urine sample urinalysis which was positive for a UTI so they started her on Keflex.  She has taken 1 dose of it so far.  However, she is also been having pain in her right upper abdomen for about 1 week.  The pain improves when she eats or drinks.  It is intermittent and she has not noticed any pattern to the pain onset.  She has not had any nausea, vomiting, fevers, diarrhea, chest pain, or shortness of breath.  Patient denies any alcohol use, but does smoke tobacco marijuana.  She states that she has a marijuana card.      History provided by:  Patient                      Christin Coma Scale Score: 15                     Patient History   Past Medical History:   Diagnosis Date    Abnormal findings on diagnostic imaging of other specified body structures     Abnormal finding on imaging    Encounter for screening for malignant neoplasm of colon 04/27/2022    Colon cancer screening    Liver disease, unspecified 12/16/2022    Chronic liver disease    Personal history of other medical treatment     History of transvaginal ultrasound    Personal history of other medical treatment     History of ultrasound, pelvic     Past Surgical History:   Procedure Laterality Date    ANKLE SURGERY  09/18/2017    Ankle Surgery    CT ABDOMEN PELVIS ANGIOGRAM W AND/OR WO IV CONTRAST  5/3/2020    CT ABDOMEN PELVIS ANGIOGRAM W AND/OR WO IV CONTRAST 5/3/2020 [facility] EMERGENCY LEGACY    KNEE SURGERY  09/18/2017    Knee  [facility] Hospitalist Discharge Summary     Name:  Marcelina Dsouza    Age:50 y.o. Sex:female  :  1971       MRN:  [id number]       Admitting Physician: Raquel Palencia MD Admit Date: 2021  4:29 AM   Attending Physician: Lory Lew DO  Primary Care Physician: Pepe Soliman MD       Discharge Physician: Estela Ward DO  Discharge date: 21   Discharged Condition: Stable    Indication for Admission:   Chief Complaint   Patient presents with    Drug Overdose        Reasons for hospitalization:  Hospital Problems as of 2021 Never Reviewed        Codes Class Noted - Resolved POA    SIRS (systemic inflammatory response syndrome) ([facility] ICD-10-CM: R65.10  ICD-9-CM: 995.90  2021 - Present Unknown        Obesity ICD-10-CM: E66.9  ICD-9-CM: 278.00  2021 - Present Unknown        * (Principal) Heroin overdose ([facility] ICD-10-CM: T40.1X1A  ICD-9-CM: 965.01, E980.0  2021 - Present Unknown        PREMA on CPAP ICD-10-CM: G47.33, Z99.89  ICD-9-CM: 327.23, V46.8  2014 - Present Yes        HTN (hypertension) ICD-10-CM: I10  ICD-9-CM: 401.9  2014 - Present Yes                 Discharge Diagnosis: Unintentional drug overdose  Did Patient have Sepsis (YES OR NO): No no evidence of sepsis      Hospital Course :    Copied from prior note summary:  53 y/o F with PMH of asthma, obesity class III, HTN, ?HF, depression, chronic pain, that presents in the setting of unresponsiveness. History mainly obtained by chart since patient unable to provide detailed history due to current state of somnolence. Apparently EMS was called since patient was found unresponsive. On arrival patient was found apneic with concerns of PEA. She received 4mg of narcan and subsequently regained pulses and consciousness. Patient seen and examined at bedside. This morning patient is awake and oriented. States that inhaled heroin for the first time in her life since she was having severe back pain.  Denies Surgery    OTHER SURGICAL HISTORY  09/10/2015    Adrenal Gland Excision    OTHER SURGICAL HISTORY  12/02/2021    Gallbladder surgery     Family History   Problem Relation Name Age of Onset    Colon cancer Father      Prostate cancer Father      Breast cancer Mother's Sister  31     Social History     Tobacco Use    Smoking status: Every Day     Current packs/day: 0.50     Average packs/day: 0.5 packs/day for 25.0 years (12.5 ttl pk-yrs)     Types: Cigarettes     Passive exposure: Current    Smokeless tobacco: Never   Vaping Use    Vaping status: Never Used   Substance Use Topics    Alcohol use: Not Currently    Drug use: Not Currently       Physical Exam   ED Triage Vitals   Temperature Heart Rate Respirations BP   06/28/24 1720 06/28/24 1720 06/28/24 1720 06/28/24 1720   36.5 °C (97.7 °F) 84 16 175/80      Pulse Ox Temp Source Heart Rate Source Patient Position   06/28/24 1720 06/28/24 1720 06/28/24 1919 06/28/24 1919   98 % Temporal Monitor Lying      BP Location FiO2 (%)     06/28/24 1919 --     Left arm        Physical Exam  Vitals and nursing note reviewed.   Constitutional:       General: She is not in acute distress.     Appearance: She is well-developed.   HENT:      Head: Normocephalic and atraumatic.      Right Ear: External ear normal.      Left Ear: External ear normal.      Nose: Nose normal.      Mouth/Throat:      Mouth: Mucous membranes are moist.   Eyes:      General: No scleral icterus.     Extraocular Movements: Extraocular movements intact.      Conjunctiva/sclera: Conjunctivae normal.      Pupils: Pupils are equal, round, and reactive to light.   Cardiovascular:      Rate and Rhythm: Normal rate and regular rhythm.      Heart sounds: No murmur heard.  Pulmonary:      Effort: Pulmonary effort is normal. No respiratory distress.      Breath sounds: Normal breath sounds.   Abdominal:      Palpations: Abdomen is soft.      Tenderness: There is abdominal tenderness. There is no guarding or rebound.  chest pain, no abdominal pain, no nausea. Hospital course-patient admitted following brought by EMS and she reports that she had CPR. There was initially concern due to SIRS criteria for possible sepsis but work-up for possible source has been negative with negative chest x-ray, rapid resolution of leukocytosis. Suspect SIRS criteria due to cardiorespiratory arrest due to narcotic overdosage. She denies suicidal thoughts or ideations-apparently unintentional overdosing. She reports that she has tolerated no pain medications in the past and will no longer take narcotic pain medications. She has prescription for Valium. We did a 6-minute walk test because of the patient's initial hypoxia and does not qualify for home oxygen. She is encouraged to follow-up with repeat sleep study for CPAP as she has features of obstructive sleep apnea. She was given empiric Zosyn and vancomycin with cultures of blood negative. As a precaution due to possible aspiration pneumonitis due to events, she will be given a prescription for Vantin p.o. for 5 more days. She appears stable for discharge home with outpatient follow-up. She denies suicidal thoughts or ideations or anhedonia. Consults: None     Disposition: Home or Self Care  Diet: DIET CARDIAC  Code Status: Full Code      Discharge Info:     Current Discharge Medication List      START taking these medications    Details   potassium chloride (K-DUR, KLOR-CON) 20 mEq tablet Take 2 Tablets by mouth once for 1 dose. Qty: 2 Tablet, Refills: 0  Start date: 5/20/2021, End date: 5/20/2021      cefpodoxime (VANTIN) 200 mg tablet Take 1 Tablet by mouth every twelve (12) hours. Qty: 10 Tablet, Refills: 0  Start date: 5/20/2021         CONTINUE these medications which have NOT CHANGED    Details   DULoxetine (CYMBALTA) 60 mg capsule Take 60 mg by mouth two (2) times a day. carvedilol (COREG) 12.5 mg tablet Take  by mouth daily.       diazepam (VALIUM) 10 mg tablet Take      Comments: Mild tenderness in the right upper quadrant.   Musculoskeletal:         General: No swelling.      Cervical back: Neck supple.      Comments: Tenderness in the right lower anterior lateral chest wall.   Skin:     General: Skin is warm and dry.   Neurological:      General: No focal deficit present.      Mental Status: She is alert.   Psychiatric:         Mood and Affect: Mood normal.         ED Course & MDM   ED Course as of 06/29/24 0109 Fri Jun 28, 2024 2032 EKG independently interpreted by myself: NSR, HR 78, normal axis, normal intervals, no ST elevations [FF]      ED Course User Index  [FF] Loy Bellamy MD         Diagnoses as of 06/29/24 0109   Acute cystitis without hematuria       Medical Decision Making  Patient is a 63-year-old female presenting to the emergency department for right upper quadrant pain.  On arrival, she is afebrile and hemodynamically stable.  On examination, she patient is pointing at her right lower anterior lateral chest wall.  She does have some discomfort in the right upper quadrant, but mostly in the chest.  It is tender with palpation and with inspiration.  Given that the pain is at the border between the chest and the abdomen, we discussed CT imaging of her chest as well as her abdomen and pelvis.  She is agreeable to this plan.  She is given morphine for her pain.  She had outpatient lab work done today, which showed a mild leukocytosis of 11.4, but otherwise unremarkable.  CMP was unremarkable and she had normal renal function.  Given the recent lab work, CBC and CMP was not obtained again.    Urinalysis is still positive for 1+ bacteria, white cells, leukocyte Estrace.  She is already on Keflex.  Her lipase is slightly elevated at 87, but her pain is not in the epigastric region.  CT is still pending.  Troponin is within normal limits.    CT imaging for chest for pulmonary embolism and the abdomen pelvis are pending at time of signout to Dr. Martins.  Patient's  10 mg by mouth three (3) times daily. Indications: ANXIETY      ondansetron (ZOFRAN ODT) 4 mg disintegrating tablet Take 1 Tab by mouth every eight (8) hours as needed for Nausea. Qty: 28 Tab, Refills: 0      magnesium oxide (MAG-OX) 400 mg tablet Take 400 mg by mouth daily. promethazine (PHENERGAN) 25 mg tablet Take 25 mg by mouth every six (6) hours as needed for Nausea. albuterol (PROVENTIL HFA, VENTOLIN HFA, PROAIR HFA) 90 mcg/actuation inhaler Take  by inhalation. prazosin (MINIPRESS) 1 mg capsule Take  by mouth nightly. Indications: NIGHTMARES      Cholecalciferol, Vitamin D3, (VITAMIN D3) 1,000 unit cap Take 1 Tab by mouth every morning. STOP taking these medications       oxyCODONE (OXYIR) 5 mg capsule Comments:   Reason for Stopping:         HYDROcodone-acetaminophen (NORCO)  mg tablet Comments:   Reason for Stopping:               Medications Discontinued During This Encounter   Medication Reason    vancomycin (VANCOCIN) 1,000 mg in 0.9% sodium chloride 250 mL (VIAL-MATE) REORDER    enoxaparin (LOVENOX) injection 40 mg     lactated Ringers infusion     Vancomycin Pharmacy Intermittent Dosing     insulin lispro (HUMALOG) injection          Follow Up Orders: Follow-up Appointments   Procedures    FOLLOW UP VISIT Appointment in: One Week Follow up primary care as soon as possible     Follow up primary care as soon as possible     Standing Status:   Standing     Number of Occurrences:   1     Order Specific Question:   Appointment in     Answer:    One Week       Follow-up Information     Follow up With Specialties Details Why Contact Info    Ying De MD Internal Medicine On 5/26/2021 4:00 vertual phone appointment [address] [facility]  [phone]              Discharge Exam:    Patient Vitals for the past 24 hrs:   Temp Pulse Resp BP SpO2   05/20/21 1120 98.3 °F (36.8 °C) 72 18 (!) 159/96 92 %   05/20/21 0748 98 °F (36.7 °C) 63 18 (!) 152/85 97 %   05/20/21 0405 98.2 °F (36.8 °C) 70 18 135/80 95 %   05/20/21 0400 -- 69 -- -- --   05/20/21 0016 98.2 °F (36.8 °C) 87 18 (!) 150/92 97 %   05/20/21 0000 -- 64 -- -- --   05/19/21 2000 -- 87 -- -- --   05/19/21 1939 98.1 °F (36.7 °C) 81 18 (!) 152/73 98 %   05/19/21 1507 98.1 °F (36.7 °C) 74 18 136/77 95 %   05/19/21 1427 -- -- -- -- 97 %     Oxygen Therapy  O2 Sat (%): 92 % (05/20/21 1120)  Pulse via Oximetry: 89 beats per minute (05/19/21 1427)  O2 Device: Nasal cannula (05/20/21 0405)  Skin Assessment: Clean, dry, & intact (05/18/21 2025)  O2 Flow Rate (L/min): 2 l/min (05/20/21 0902)    Estimated body mass index is 46.63 kg/m² as calculated from the following:    Height as of this encounter: 5' 7\" (1.702 m). Weight as of this encounter: 135 kg (297 lb 11.2 oz). Intake/Output Summary (Last 24 hours) at 5/20/2021 1233  Last data filed at 5/19/2021 1839  Gross per 24 hour   Intake 240 ml   Output --   Net 240 ml       *Note that automatically entered I/Os may not be accurate; dependent on patient compliance with collection and accurate  by assistants.     Physical Exam:   General: No acute distress, speaking in full sentences, no use of accessory muscles   HEENT: Pupils equal and reactive to light and accommodation, oropharynx is clear   Neck: Supple, no lymphadenopathy, no JVD   Lungs: Clear to auscultation bilaterally   Cardiovascular: Regular rate and rhythm with normal S1 and S2   Abdomen: Soft, nontender, nondistended, normoactive bowel sounds   Extremities: No cyanosis clubbing or edema   Neuro: Nonfocal, A&O x3   Psych: Normal affect       All Labs from Last 24 Hrs:  Recent Results (from the past 24 hour(s))   CBC WITH AUTOMATED DIFF    Collection Time: 05/20/21  6:55 AM   Result Value Ref Range    WBC 7.5 4.3 - 11.1 K/uL    RBC 4.11 4.05 - 5.2 M/uL    HGB 11.9 11.7 - 15.4 g/dL    HCT 37.8 35.8 - 46.3 %    MCV 92.0 79.6 - 97.8 FL    MCH 29.0 26.1 - 32.9 PG    MCHC 31.5 31.4 - 35.0 g/dL pain has improved with the morphine.  Disposition pending results.    Maurilio Nicole DO, PGY 3  Emergency Medicine Resident    Amount and/or Complexity of Data Reviewed  Labs: ordered. Decision-making details documented in ED Course.  Radiology: ordered. Decision-making details documented in ED Course.  ECG/medicine tests: ordered and independent interpretation performed. Decision-making details documented in ED Course.        Procedure  Procedures     Maurilio Nicole DO  Resident  06/29/24 0126     RDW 14.5 11.9 - 14.6 %    PLATELET 039 892 - 633 K/uL    MPV 10.5 9.4 - 12.3 FL    ABSOLUTE NRBC 0.00 0.0 - 0.2 K/uL    DF AUTOMATED      NEUTROPHILS 52 43 - 78 %    LYMPHOCYTES 37 13 - 44 %    MONOCYTES 7 4.0 - 12.0 %    EOSINOPHILS 3 0.5 - 7.8 %    BASOPHILS 0 0.0 - 2.0 %    IMMATURE GRANULOCYTES 1 0.0 - 5.0 %    ABS. NEUTROPHILS 3.9 1.7 - 8.2 K/UL    ABS. LYMPHOCYTES 2.8 0.5 - 4.6 K/UL    ABS. MONOCYTES 0.5 0.1 - 1.3 K/UL    ABS. EOSINOPHILS 0.3 0.0 - 0.8 K/UL    ABS. BASOPHILS 0.0 0.0 - 0.2 K/UL    ABS. IMM.  GRANS. 0.0 0.0 - 0.5 K/UL   METABOLIC PANEL, BASIC    Collection Time: 05/20/21  6:55 AM   Result Value Ref Range    Sodium 141 136 - 145 mmol/L    Potassium 3.4 (L) 3.5 - 5.1 mmol/L    Chloride 106 98 - 107 mmol/L    CO2 31 21 - 32 mmol/L    Anion gap 4 (L) 7 - 16 mmol/L    Glucose 80 65 - 100 mg/dL    BUN 9 6 - 23 MG/DL    Creatinine 0.55 (L) 0.6 - 1.0 MG/DL    GFR est AA >60 >60 ml/min/1.73m2    GFR est non-AA >60 >60 ml/min/1.73m2    Calcium 8.0 (L) 8.3 - 10.4 MG/DL   GLUCOSE, POC    Collection Time: 05/20/21 11:45 AM   Result Value Ref Range    Glucose (POC) 86 65 - 100 mg/dL    [facility]        All Micro Results     Procedure Component Value Units Date/Time    CULTURE, BLOOD [[id number]] Collected: 05/18/21 0549    Order Status: Completed Specimen: Blood Updated: 05/20/21 0654     Special Requests: --        RIGHT  Antecubital       Culture result: NO GROWTH 2 DAYS       CULTURE, BLOOD [[id number]] Collected: 05/18/21 0617    Order Status: Completed Specimen: Blood Updated: 05/20/21 0654     Special Requests: --        LEFT  HAND       Culture result: NO GROWTH 2 DAYS             SARS-CoV-2 Lab Results  \"Novel Coronavirus\" Test: No results found for: COV2NT   \"Emergent Disease\" Test: No results found for: EDPR  \"SARS-COV-2\" Test: No results found for: XGCOVT  Rapid Test: No results found for: COVR         Diagnostic Imaging/Tests:   CT HEAD WO CONT    Result Date: 5/18/2021  EXAM: CT HEAD WO CONT HISTORY:  Overdose. TECHNIQUE: Axial images of the brain were performed without the administration of intravenous contrast. Images were obtained axial plane and coronal reformatted images were submitted. CT scan performed using appropriate/available dose optimization/reduction/ALARA techniques. COMPARISON: None. FINDINGS: There is no evidence of acute intracranial hemorrhage, midline shift, or mass effect. No intra or extra-axial fluid collections are observed. No evidence of acute confluent territorial infarction. Ventricles and basal cisterns are patent and symmetric. Visualized paranasal sinuses and mastoid air cells are without significant fluid. Scalp, soft tissues, and calvarium are within normal limits. 1. No CT evidence of acute intracranial process. XR CHEST PORT    Result Date: 2021  EXAM: XR CHEST PORT HISTORY: chest pain. TECHNIQUE: A single frontal view of the chest was submitted. COMPARISON: None available. FINDINGS: There is a shallow inspiration. The cardiac silhouette, mediastinum, are within normal limits. The pulmonary vasculature appears prominent. This could be due to vascular crowding due to the shallow inspiration. There is no consolidation, pleural effusion, or pneumothorax. No significant osseous abnormalities are observed. No evidence of an acute intrathoracic process.        Echocardiogram/EKG results:  Results for orders placed or performed during the hospital encounter of 21   2D ECHO COMPLETE ADULT (TTE) W OR WO CONTR    Narrative    [facility] [address]  [phone]    Transthoracic Echocardiogram  2D, M-mode, Doppler, and Color Doppler    Patient: Filemon Melton  MR #: [id number]  : 1971  Age: 52 years  Gender: Female  Study date: 19-May-2021  Account #: [de-identified]  Height: 67 in  Weight: 323.4 lb  BSA: 2.48 mï¾²  Status:Routine  Location: 818  BP: 130/ 70    Allergies: NO KNOWN ALLERGENS    Sonographer:  Sarah Wilkes, Technologist  Group:  [facility]  Referring Physician:  Jessy Ga DO  Reading Physician:  Zoltan Yoder. Alise Gaines MD [facility]    INDICATIONS: Abnormal ECG. PROCEDURE: This was a routine study. A transthoracic echocardiogram was  performed. The study included complete 2D imaging, M-mode, complete spectral  Doppler, and color Doppler. Image quality was adequate. LEFT VENTRICLE: Size was normal. Systolic function was hyperdynamic. Ejection  fraction was estimated in the range of 65 % to 70 %. There were no regional  wall motion abnormalities. There was mild concentric hypertrophy. Left  ventricular diastolic function is normal with an average E/e' of 11.6. RIGHT VENTRICLE: The size was normal. Systolic function was normal. Estimated  peak pressure was in the range of 35-40 mmHg. LEFT ATRIUM: Size was at the upper limits of normal.    RIGHT ATRIUM: Size was normal.    SYSTEMIC VEINS: IVC: The inferior vena cava was dilated. The respirophasic  change in diameter was more than 50%. AORTIC VALVE: The valve was structurally normal, tri-commissural. There is  turbulent flow with no evidence of stenosis. There was no insufficiency. MITRAL VALVE: There was mild annular thickening. There was no evidence for  stenosis. There was mild regurgitation. TRICUSPID VALVE: The valve structure was normal. There was no evidence for  stenosis. There was trace regurgitation. PULMONIC VALVE: The valve structure was normal. There was no evidence for  stenosis. There was trivial regurgitation. PERICARDIUM: There was no pericardial effusion. A pericardial fat pad was  present. AORTA: The root exhibited normal size. SUMMARY:    -  Left ventricle: Systolic function was hyperdynamic. Ejection fraction was  estimated in the range of 65 % to 70 %. There were no regional wall motion  abnormalities. There was mild concentric hypertrophy.     - Inferior vena cava, hepatic veins: The inferior vena cava was dilated. The  respirophasic change in diameter was more than 50%. -  Mitral valve: There was mild annular thickening. There was mild  regurgitation. SYSTEM MEASUREMENT TABLES    2D mode  LA Dimension (2D): 3.8 cm  Left Atrium Systolic Volume Index; Method of Disks, Biplane; 2D mode;: 28.5  ml/m2  IVS/LVPW (2D): 1.1  IVSd (2D): 1.4 cm  LVIDd (2D): 4.6 cm  LVIDs (2D): 2.6 cm  LVOT Area (2D): 4.5 cm2  LVPWd (2D): 1.3 cm  RVIDd (2D): 3.5 cm    M mode  AoR Diam (MM): 3.2 cm    Unspecified Scan Mode  Peak Grad; Mean; Antegrade Flow: 18 mm[Hg]  Vmax; Antegrade Flow: 215 cm/s  LVOT Diam: 2.4 cm  Peak Grad; Mean; Antegrade Flow: 8 mm[Hg]  Vmax; Antegrade Flow: 138 cm/s    Prepared and signed by    Stephanie Lynch MD [facility]  Signed 19-May-2021 17:06:09         EKG Results     Procedure 720 Value Units Date/Time    EKG 12 LEAD INITIAL [[id number]] Collected: 05/18/21 0508    Order Status: Completed Updated: 05/18/21 2305     Ventricular Rate 116 BPM      Atrial Rate 116 BPM      P-R Interval 156 ms      QRS Duration 100 ms      Q-T Interval 352 ms      QTC Calculation (Bezet) 489 ms      Calculated P Axis 54 degrees      Calculated R Axis 19 degrees      Calculated T Axis 33 degrees      Diagnosis --     !! AGE AND GENDER SPECIFIC ECG ANALYSIS !!   Sinus tachycardia  Possible Left atrial enlargement  Nonspecific ST abnormality  Abnormal ECG  No previous ECGs available  Confirmed by ST JAMEY VALENZUELA MD (), DIANA SOLANO ([id number]) on 5/18/2021 9:24:06 AM            Results for orders placed or performed during the hospital encounter of 05/18/21   EKG, 12 LEAD, INITIAL   Result Value Ref Range    Ventricular Rate 116 BPM    Atrial Rate 116 BPM    P-R Interval 156 ms    QRS Duration 100 ms    Q-T Interval 352 ms    QTC Calculation (Bezet) 489 ms    Calculated P Axis 54 degrees    Calculated R Axis 19 degrees    Calculated T Axis 33 degrees    Diagnosis       !! AGE AND GENDER SPECIFIC ECG ANALYSIS !! Sinus tachycardia  Possible Left atrial enlargement  Nonspecific ST abnormality  Abnormal ECG  No previous ECGs available  Confirmed by ST JAMEY VALENZUELA MD (), DIANA SOLANO ([id number]) on 2021 9:24:06 AM         Procedures done this admission:  * No surgery found *        Time spent in patient discharge planning and coordination 40 minutes.       Signed By: DO [facility]    May 20, 2021  12:22 PM

## 2024-06-29 NOTE — ED PROVIDER NOTES
Patient received on handoff with CT pending.  CT abdomen pelvis as well as angio of the chest was negative for acute findings.  Patient given reassurance and discharged home in satisfactory condition.  She was instructed to keep her Keflex prescription and to use as prescribed.  All questions answered and return precautions discussed.     Sly Martins MD  Resident  06/29/24 [id number]

## 2024-06-30 LAB — BACTERIA UR CULT: NO GROWTH

## 2024-07-02 ENCOUNTER — APPOINTMENT (OUTPATIENT)
Dept: PRIMARY CARE | Facility: CLINIC | Age: 64
End: 2024-07-02
Payer: COMMERCIAL

## 2024-07-02 VITALS
OXYGEN SATURATION: 97 % | HEIGHT: 62 IN | BODY MASS INDEX: 22.19 KG/M2 | HEART RATE: 83 BPM | WEIGHT: 120.6 LBS | SYSTOLIC BLOOD PRESSURE: 110 MMHG | DIASTOLIC BLOOD PRESSURE: 64 MMHG | RESPIRATION RATE: 16 BRPM

## 2024-07-02 DIAGNOSIS — F43.9 STRESS: ICD-10-CM

## 2024-07-02 DIAGNOSIS — R73.9 HYPERGLYCEMIA: ICD-10-CM

## 2024-07-02 DIAGNOSIS — E55.9 VITAMIN D DEFICIENCY: ICD-10-CM

## 2024-07-02 DIAGNOSIS — F41.9 ANXIETY: ICD-10-CM

## 2024-07-02 DIAGNOSIS — Z79.899 MEDICATION MANAGEMENT: ICD-10-CM

## 2024-07-02 DIAGNOSIS — M16.12 PRIMARY OSTEOARTHRITIS OF LEFT HIP: ICD-10-CM

## 2024-07-02 DIAGNOSIS — R53.83 FATIGUE, UNSPECIFIED TYPE: ICD-10-CM

## 2024-07-02 DIAGNOSIS — F43.21 ADJUSTMENT DISORDER WITH DEPRESSED MOOD: ICD-10-CM

## 2024-07-02 DIAGNOSIS — E78.2 MIXED HYPERLIPIDEMIA: ICD-10-CM

## 2024-07-02 DIAGNOSIS — I10 PRIMARY HYPERTENSION: ICD-10-CM

## 2024-07-02 DIAGNOSIS — F98.8 ATTENTION DEFICIT DISORDER, UNSPECIFIED HYPERACTIVITY PRESENCE: ICD-10-CM

## 2024-07-02 DIAGNOSIS — M10.9 GOUT, UNSPECIFIED CAUSE, UNSPECIFIED CHRONICITY, UNSPECIFIED SITE: Primary | ICD-10-CM

## 2024-07-02 PROCEDURE — 99214 OFFICE O/P EST MOD 30 MIN: CPT | Performed by: FAMILY MEDICINE

## 2024-07-02 PROCEDURE — 3008F BODY MASS INDEX DOCD: CPT | Performed by: FAMILY MEDICINE

## 2024-07-02 PROCEDURE — 3074F SYST BP LT 130 MM HG: CPT | Performed by: FAMILY MEDICINE

## 2024-07-02 PROCEDURE — 3078F DIAST BP <80 MM HG: CPT | Performed by: FAMILY MEDICINE

## 2024-07-02 ASSESSMENT — ENCOUNTER SYMPTOMS
DYSURIA: 0
COUGH: 0
ACTIVITY CHANGE: 0
POLYPHAGIA: 0
SPEECH DIFFICULTY: 0
SORE THROAT: 0
RECTAL PAIN: 0
SINUS PRESSURE: 0
AGITATION: 0
DIARRHEA: 0
DIZZINESS: 1
ADENOPATHY: 0
SEIZURES: 0
CONSTITUTIONAL NEGATIVE: 1
HEADACHES: 0
RHINORRHEA: 0
DECREASED CONCENTRATION: 0
CHEST TIGHTNESS: 0
BLOOD IN STOOL: 0
POLYDIPSIA: 0
PALPITATIONS: 0
EYE PAIN: 0
TROUBLE SWALLOWING: 0
FATIGUE: 0
ABDOMINAL PAIN: 0
CONSTIPATION: 0
ABDOMINAL DISTENTION: 0
SHORTNESS OF BREATH: 0
STRIDOR: 0
PHOTOPHOBIA: 0
FLANK PAIN: 0
CONFUSION: 0
COLOR CHANGE: 0
DYSPHORIC MOOD: 0
SINUS PAIN: 0
ARTHRALGIAS: 0
NERVOUS/ANXIOUS: 0
FEVER: 0
NECK STIFFNESS: 0
HEMATURIA: 0
MYALGIAS: 0
SLEEP DISTURBANCE: 0
APPETITE CHANGE: 0

## 2024-07-02 NOTE — PROGRESS NOTES
Subjective   Patient ID: Malika Casper is a 63 y.o. female who presents for Hosptial follow up.    HPI   Patient is following up after being seen at [facility] ED on 6/28/24 for right RUQ pain.   Patient had EKG,  CT angio chest for PE and CT abd Pelvis with contrast done on 6/29/24 and blood work done. CT screens were normal per pt.   Patient is still getting dizzy, feet are turning purple. RUQ pain has reduced. Patient is concerned about blood work and elevated glucose noted.     Patient was seen by Paloma Norman on same day for dizziness and shaky feeling for 3 weeks. She has UA done with trace of blood and protein and UA sent for culture. She was treated with Cefdinir. At ride home she was having sharp pain in rib and told to go to ED.     She is drinking water. Patient is taking the Cefdinir given by Paloma Norman CNP.     She is asking to increase the gout medication due to the arm pain.     Review of Systems   Constitutional: Negative.  Negative for activity change, appetite change, fatigue and fever.   HENT:  Negative for congestion, dental problem, ear discharge, ear pain, mouth sores, rhinorrhea, sinus pressure, sinus pain, sore throat, tinnitus and trouble swallowing.    Eyes:  Negative for photophobia, pain and visual disturbance.   Respiratory:  Negative for cough, chest tightness, shortness of breath and stridor.    Cardiovascular:  Negative for chest pain and palpitations.   Gastrointestinal:  Negative for abdominal distention, abdominal pain, blood in stool, constipation, diarrhea and rectal pain.   Endocrine: Negative for cold intolerance, heat intolerance, polydipsia, polyphagia and polyuria.   Genitourinary:  Negative for dysuria, flank pain, hematuria and urgency.   Musculoskeletal:  Negative for arthralgias, gait problem, myalgias and neck stiffness.   Skin:  Negative for color change and rash.   Allergic/Immunologic: Negative for environmental allergies and food allergies.   Neurological:   "Positive for dizziness. Negative for seizures, syncope, speech difficulty and headaches.   Hematological:  Negative for adenopathy.   Psychiatric/Behavioral:  Negative for agitation, confusion, decreased concentration, dysphoric mood and sleep disturbance. The patient is not nervous/anxious.          Objective   /64 (BP Location: Right arm, Patient Position: Sitting, BP Cuff Size: Adult)   Pulse 83   Resp 16   Ht 1.575 m (5' 2\")   Wt 54.7 kg (120 lb 9.6 oz)   SpO2 97%   BMI 22.06 kg/m²     Physical Exam  Constitutional: Well developed, well nourished, alert and in no acute distress   Eyes: Normal external exam. Pupils equally round and reactive to light with normal accommodation and extraocular movements intact.  Neck: Supple, no lymphadenopathy or masses.   Cardiovascular: Regular rate and rhythm, normal S1 and S2, no murmurs, gallops, or rubs. Radial pulses normal. No peripheral edema.  Pulmonary: No respiratory distress, lungs clear to auscultation bilaterally. No wheezes, rhonchi, rales.  Abdomen: soft,non tender, non distended, without masses or HSM  Skin: Warm, well perfused, normal skin turgor and color.   Neurologic: Cranial nerves II-XII grossly intact.   Psychiatric: Mood calm and affect normal  Musculoskeletal: Moving all extremities without restriction    Assessment/Plan   Problem List Items Addressed This Visit             ICD-10-CM    ADD (attention deficit disorder) F98.8    Relevant Orders    Follow Up In [facility] - PCP - Established    Anxiety F41.9    Relevant Orders    Follow Up In [facility] - PCP - Established    Fatigue R53.83    Relevant Orders    Follow Up In [facility] - PCP - Established    CBC and Auto Differential    Thyroid Stimulating Hormone    Free T4 Index    HTN (hypertension) I10    Relevant Orders    Follow Up In [facility] - PCP - Established    Hyperlipidemia E78.5    Relevant Orders    Follow Up In [facility] " - PCP - Established    Comprehensive Metabolic Panel    Lipid Panel    Stress F43.9    Relevant Orders    Follow Up In [facility] - PCP - Established    Vitamin D deficiency E55.9    Relevant Orders    Follow Up In [facility] - PCP - Established    Vitamin D 25-Hydroxy,Total (for eval of Vitamin D levels)    Primary osteoarthritis of left hip M16.12    Relevant Orders    Follow Up In [facility] - PCP - Established    Adjustment disorder with depressed mood F43.21    Relevant Orders    Follow Up In [facility] - PCP - Established     Other Visit Diagnoses         Codes    Gout, unspecified cause, unspecified chronicity, unspecified site    -  Primary M10.9    Relevant Orders    Uric acid    Medication management     Z79.899    Relevant Orders    Follow Up In [facility] - PCP - Established    Drug Screen 9 Panel, Blood with Reflex to Confirmation    Hyperglycemia     R73.9    Relevant Orders    Follow Up In [facility] - PCP - Established    Hemoglobin A1C             -sees Dr. Lam      -see GYN      -Continue colchicine for gout PRN     -Go to PT      Stop marijuana     Declines psych meds      Risks explained      Continue current medications and therapy for chronic medical conditions.    Patient was advised importance of proper diet/nutrition in addition adequate hydration. Patient was encouraged moderate exercise program to include 30 minutes daily for 5 days of the week or 150 minutes weekly. Patient will follow-up with us as scheduled.    I personally reviewed the O[facility] report for this patient. I have considered the risks of abuse, dependence, addiction, and diversion.    UDS/CSA:      Scribe Attestation  By signing my name below, I, Asaf Cochran MD   , Scribe   attest that this documentation has been prepared under the direction and in the presence of Asaf Cochran MD.      Provider Attestation - Scribe documentation    All medical  record entries made by the Scribe were at my direction and personally dictated by me. I have reviewed the chart and agree that the record accurately reflects my personal performance of the history, physical exam, discussion and plan.

## 2024-07-05 ENCOUNTER — LAB (OUTPATIENT)
Dept: LAB | Facility: LAB | Age: 64
End: 2024-07-05
Payer: COMMERCIAL

## 2024-07-05 ENCOUNTER — PATIENT OUTREACH (OUTPATIENT)
Dept: PRIMARY CARE | Facility: CLINIC | Age: 64
End: 2024-07-05

## 2024-07-05 DIAGNOSIS — E55.9 VITAMIN D DEFICIENCY: ICD-10-CM

## 2024-07-05 DIAGNOSIS — E78.2 MIXED HYPERLIPIDEMIA: ICD-10-CM

## 2024-07-05 DIAGNOSIS — R53.83 FATIGUE, UNSPECIFIED TYPE: ICD-10-CM

## 2024-07-05 DIAGNOSIS — M10.9 GOUT, UNSPECIFIED CAUSE, UNSPECIFIED CHRONICITY, UNSPECIFIED SITE: ICD-10-CM

## 2024-07-05 DIAGNOSIS — R31.9 HEMATURIA, UNSPECIFIED TYPE: ICD-10-CM

## 2024-07-05 DIAGNOSIS — R73.9 HYPERGLYCEMIA: ICD-10-CM

## 2024-07-05 DIAGNOSIS — Z79.899 MEDICATION MANAGEMENT: ICD-10-CM

## 2024-07-05 DIAGNOSIS — I10 PRIMARY HYPERTENSION: ICD-10-CM

## 2024-07-05 LAB
25(OH)D3 SERPL-MCNC: 64 NG/ML (ref 30–100)
ALBUMIN SERPL BCP-MCNC: 4.8 G/DL (ref 3.4–5)
ALP SERPL-CCNC: 84 U/L (ref 33–136)
ALT SERPL W P-5'-P-CCNC: 16 U/L (ref 7–45)
ANION GAP SERPL CALC-SCNC: 13 MMOL/L (ref 10–20)
AST SERPL W P-5'-P-CCNC: 16 U/L (ref 9–39)
BASOPHILS # BLD AUTO: 0.04 X10*3/UL (ref 0–0.1)
BASOPHILS NFR BLD AUTO: 0.6 %
BILIRUB SERPL-MCNC: 0.4 MG/DL (ref 0–1.2)
BUN SERPL-MCNC: 13 MG/DL (ref 6–23)
CALCIUM SERPL-MCNC: 10.2 MG/DL (ref 8.6–10.3)
CHLORIDE SERPL-SCNC: 102 MMOL/L (ref 98–107)
CHOLEST SERPL-MCNC: 211 MG/DL (ref 0–199)
CHOLESTEROL/HDL RATIO: 3.2
CO2 SERPL-SCNC: 29 MMOL/L (ref 21–32)
CREAT SERPL-MCNC: 0.72 MG/DL (ref 0.5–1.05)
EGFRCR SERPLBLD CKD-EPI 2021: >90 ML/MIN/1.73M*2
EOSINOPHIL # BLD AUTO: 0.32 X10*3/UL (ref 0–0.7)
EOSINOPHIL NFR BLD AUTO: 4.4 %
ERYTHROCYTE [DISTWIDTH] IN BLOOD BY AUTOMATED COUNT: 14 % (ref 11.5–14.5)
EST. AVERAGE GLUCOSE BLD GHB EST-MCNC: 105 MG/DL
FT4I SERPL CALC-MCNC: 2.5 (ref 1.6–4.7)
GLUCOSE SERPL-MCNC: 112 MG/DL (ref 74–99)
HBA1C MFR BLD: 5.3 %
HCT VFR BLD AUTO: 45.6 % (ref 36–46)
HDLC SERPL-MCNC: 66.9 MG/DL
HGB BLD-MCNC: 15.1 G/DL (ref 12–16)
IMM GRANULOCYTES # BLD AUTO: 0.02 X10*3/UL (ref 0–0.7)
IMM GRANULOCYTES NFR BLD AUTO: 0.3 % (ref 0–0.9)
LDLC SERPL CALC-MCNC: 122 MG/DL
LYMPHOCYTES # BLD AUTO: 1.6 X10*3/UL (ref 1.2–4.8)
LYMPHOCYTES NFR BLD AUTO: 22.2 %
MCH RBC QN AUTO: 32.4 PG (ref 26–34)
MCHC RBC AUTO-ENTMCNC: 33.1 G/DL (ref 32–36)
MCV RBC AUTO: 98 FL (ref 80–100)
MONOCYTES # BLD AUTO: 0.49 X10*3/UL (ref 0.1–1)
MONOCYTES NFR BLD AUTO: 6.8 %
NEUTROPHILS # BLD AUTO: 4.75 X10*3/UL (ref 1.2–7.7)
NEUTROPHILS NFR BLD AUTO: 65.7 %
NON HDL CHOLESTEROL: 144 MG/DL (ref 0–149)
NRBC BLD-RTO: 0 /100 WBCS (ref 0–0)
PLATELET # BLD AUTO: 293 X10*3/UL (ref 150–450)
POTASSIUM SERPL-SCNC: 4.2 MMOL/L (ref 3.5–5.3)
PROT SERPL-MCNC: 7.6 G/DL (ref 6.4–8.2)
RBC # BLD AUTO: 4.66 X10*6/UL (ref 4–5.2)
SODIUM SERPL-SCNC: 140 MMOL/L (ref 136–145)
T3RU NFR SERPL: 37 % (ref 24–41)
T4 SERPL-MCNC: 6.8 UG/DL (ref 4.5–11.1)
TRIGL SERPL-MCNC: 109 MG/DL (ref 0–149)
TSH SERPL-ACNC: 2.03 MIU/L (ref 0.44–3.98)
URATE SERPL-MCNC: 3.9 MG/DL (ref 2.3–6.7)
VLDL: 22 MG/DL (ref 0–40)
WBC # BLD AUTO: 7.2 X10*3/UL (ref 4.4–11.3)

## 2024-07-05 PROCEDURE — 80061 LIPID PANEL: CPT

## 2024-07-05 PROCEDURE — 84443 ASSAY THYROID STIM HORMONE: CPT

## 2024-07-05 PROCEDURE — 80364 OPIOID &OPIATE ANALOG 5/MORE: CPT

## 2024-07-05 PROCEDURE — 36415 COLL VENOUS BLD VENIPUNCTURE: CPT

## 2024-07-05 PROCEDURE — 80349 CANNABINOIDS NATURAL: CPT

## 2024-07-05 PROCEDURE — 84550 ASSAY OF BLOOD/URIC ACID: CPT

## 2024-07-05 PROCEDURE — 85025 COMPLETE CBC W/AUTO DIFF WBC: CPT

## 2024-07-05 PROCEDURE — 84436 ASSAY OF TOTAL THYROXINE: CPT

## 2024-07-05 PROCEDURE — 83036 HEMOGLOBIN GLYCOSYLATED A1C: CPT

## 2024-07-05 PROCEDURE — 84479 ASSAY OF THYROID (T3 OR T4): CPT

## 2024-07-05 PROCEDURE — 82306 VITAMIN D 25 HYDROXY: CPT

## 2024-07-05 PROCEDURE — 87086 URINE CULTURE/COLONY COUNT: CPT

## 2024-07-05 PROCEDURE — 80053 COMPREHEN METABOLIC PANEL: CPT

## 2024-07-05 PROCEDURE — 80307 DRUG TEST PRSMV CHEM ANLYZR: CPT

## 2024-07-05 NOTE — PROGRESS NOTES
Danae is working with [facility]  Has  in [facility]   Has appointment Monday 7/8  Will be leaving by 11am    Requesting to over lab results  They are still in process  Will call patient next week to review with her  She has concerns regarding her glucose levels  See hgb A1c pending

## 2024-07-07 LAB
ATRIAL RATE: 78 BPM
BACTERIA UR CULT: NO GROWTH
P AXIS: 68 DEGREES
P OFFSET: 204 MS
P ONSET: 148 MS
PR INTERVAL: 146 MS
Q ONSET: 221 MS
QRS COUNT: 13 BEATS
QRS DURATION: 84 MS
QT INTERVAL: 402 MS
QTC CALCULATION(BAZETT): 458 MS
QTC FREDERICIA: 438 MS
R AXIS: 48 DEGREES
T AXIS: 52 DEGREES
T OFFSET: 422 MS
VENTRICULAR RATE: 78 BPM

## 2024-07-08 DIAGNOSIS — R11.0 MILD NAUSEA: ICD-10-CM

## 2024-07-08 RX ORDER — ONDANSETRON HYDROCHLORIDE 8 MG/1
TABLET, FILM COATED ORAL
Qty: 30 TABLET | Refills: 0 | Status: SHIPPED | OUTPATIENT
Start: 2024-07-08

## 2024-07-09 LAB
AMPHETAMINES SERPL QL SCN: NEGATIVE NG/ML
ANNOTATION COMMENT IMP: NORMAL
BARBITURATES SERPL QL SCN: NEGATIVE NG/ML
BENZODIAZ SERPL QL SCN: NEGATIVE NG/ML
BUPRENORPHINE SERPL-MCNC: NEGATIVE NG/ML
CANNABINOIDS SERPL QL SCN: POSITIVE NG/ML
COCAINE SERPL QL SCN: NEGATIVE NG/ML
METHADONE SERPL QL SCN: NEGATIVE NG/ML
METHAMPHET SERPL QL: NEGATIVE NG/ML
OPIATES SERPL QL SCN: POSITIVE NG/ML
OXYCODONE SERPL QL: NEGATIVE NG/ML
PCP SERPL QL SCN: NEGATIVE NG/ML

## 2024-07-11 LAB
6MAM SERPL-MCNC: <2 NG/ML
CODEINE SERPL-MCNC: <2 NG/ML
HYDROCODONE SERPL-MCNC: 47 NG/ML
HYDROMORPHONE SERPL-MCNC: <2 NG/ML
MORPHINE SERPL-MCNC: <2 NG/ML
OXYCODONE SERPL-MCNC: <2 NG/ML
OXYMORPHONE SERPL-MCNC: <2 NG/ML

## 2024-07-12 ENCOUNTER — HOSPITAL ENCOUNTER (EMERGENCY)
Facility: HOSPITAL | Age: 64
Discharge: HOME | End: 2024-07-12
Attending: STUDENT IN AN ORGANIZED HEALTH CARE EDUCATION/TRAINING PROGRAM
Payer: COMMERCIAL

## 2024-07-12 ENCOUNTER — APPOINTMENT (OUTPATIENT)
Dept: RADIOLOGY | Facility: HOSPITAL | Age: 64
End: 2024-07-12
Payer: COMMERCIAL

## 2024-07-12 ENCOUNTER — APPOINTMENT (OUTPATIENT)
Dept: CARDIOLOGY | Facility: HOSPITAL | Age: 64
End: 2024-07-12
Payer: COMMERCIAL

## 2024-07-12 ENCOUNTER — TELEPHONE (OUTPATIENT)
Dept: PRIMARY CARE | Facility: CLINIC | Age: 64
End: 2024-07-12
Payer: COMMERCIAL

## 2024-07-12 VITALS
WEIGHT: 130 LBS | BODY MASS INDEX: 23.92 KG/M2 | SYSTOLIC BLOOD PRESSURE: 187 MMHG | RESPIRATION RATE: 18 BRPM | TEMPERATURE: 97.7 F | OXYGEN SATURATION: 98 % | HEART RATE: 74 BPM | HEIGHT: 62 IN | DIASTOLIC BLOOD PRESSURE: 98 MMHG

## 2024-07-12 DIAGNOSIS — R07.89 OTHER CHEST PAIN: Primary | ICD-10-CM

## 2024-07-12 LAB
ALBUMIN SERPL BCP-MCNC: 4.7 G/DL (ref 3.4–5)
ALP SERPL-CCNC: 75 U/L (ref 33–136)
ALT SERPL W P-5'-P-CCNC: 23 U/L (ref 7–45)
ANION GAP SERPL CALC-SCNC: 14 MMOL/L (ref 10–20)
AST SERPL W P-5'-P-CCNC: 77 U/L (ref 9–39)
ATRIAL RATE: 76 BPM
ATRIAL RATE: 89 BPM
BASOPHILS # BLD AUTO: 0.05 X10*3/UL (ref 0–0.1)
BASOPHILS NFR BLD AUTO: 0.6 %
BILIRUB SERPL-MCNC: 0.3 MG/DL (ref 0–1.2)
BNP SERPL-MCNC: 60 PG/ML (ref 0–99)
BUN SERPL-MCNC: 17 MG/DL (ref 6–23)
CALCIUM SERPL-MCNC: 10.1 MG/DL (ref 8.6–10.3)
CANNABINOIDS SERPL-MCNC: 101 NG/ML
CARDIAC TROPONIN I PNL SERPL HS: 3 NG/L (ref 0–13)
CARDIAC TROPONIN I PNL SERPL HS: 5 NG/L (ref 0–13)
CHLORIDE SERPL-SCNC: 103 MMOL/L (ref 98–107)
CO2 SERPL-SCNC: 26 MMOL/L (ref 21–32)
CREAT SERPL-MCNC: 0.71 MG/DL (ref 0.5–1.05)
D DIMER PPP FEU-MCNC: 379 NG/ML FEU
EGFRCR SERPLBLD CKD-EPI 2021: >90 ML/MIN/1.73M*2
EOSINOPHIL # BLD AUTO: 0.2 X10*3/UL (ref 0–0.7)
EOSINOPHIL NFR BLD AUTO: 2.3 %
ERYTHROCYTE [DISTWIDTH] IN BLOOD BY AUTOMATED COUNT: 13.8 % (ref 11.5–14.5)
GLUCOSE SERPL-MCNC: 104 MG/DL (ref 74–99)
HCT VFR BLD AUTO: 45 % (ref 36–46)
HGB BLD-MCNC: 15 G/DL (ref 12–16)
IMM GRANULOCYTES # BLD AUTO: 0.02 X10*3/UL (ref 0–0.7)
IMM GRANULOCYTES NFR BLD AUTO: 0.2 % (ref 0–0.9)
INR PPP: 1 (ref 0.9–1.1)
LYMPHOCYTES # BLD AUTO: 1.73 X10*3/UL (ref 1.2–4.8)
LYMPHOCYTES NFR BLD AUTO: 20.2 %
MCH RBC QN AUTO: 32.1 PG (ref 26–34)
MCHC RBC AUTO-ENTMCNC: 33.3 G/DL (ref 32–36)
MCV RBC AUTO: 96 FL (ref 80–100)
MONOCYTES # BLD AUTO: 0.63 X10*3/UL (ref 0.1–1)
MONOCYTES NFR BLD AUTO: 7.4 %
NEUTROPHILS # BLD AUTO: 5.94 X10*3/UL (ref 1.2–7.7)
NEUTROPHILS NFR BLD AUTO: 69.3 %
NRBC BLD-RTO: 0 /100 WBCS (ref 0–0)
P AXIS: 73 DEGREES
P AXIS: 79 DEGREES
P OFFSET: 209 MS
P OFFSET: 213 MS
P ONSET: 150 MS
P ONSET: 159 MS
PLATELET # BLD AUTO: 294 X10*3/UL (ref 150–450)
POTASSIUM SERPL-SCNC: 5.2 MMOL/L (ref 3.5–5.3)
PR INTERVAL: 132 MS
PR INTERVAL: 146 MS
PROT SERPL-MCNC: 7.7 G/DL (ref 6.4–8.2)
PROTHROMBIN TIME: 11.1 SECONDS (ref 9.8–12.8)
Q ONSET: 223 MS
Q ONSET: 225 MS
QRS COUNT: 12 BEATS
QRS COUNT: 15 BEATS
QRS DURATION: 84 MS
QRS DURATION: 90 MS
QT INTERVAL: 374 MS
QT INTERVAL: 406 MS
QTC CALCULATION(BAZETT): 455 MS
QTC CALCULATION(BAZETT): 456 MS
QTC FREDERICIA: 426 MS
QTC FREDERICIA: 439 MS
R AXIS: -25 DEGREES
R AXIS: -42 DEGREES
RBC # BLD AUTO: 4.67 X10*6/UL (ref 4–5.2)
SODIUM SERPL-SCNC: 138 MMOL/L (ref 136–145)
T AXIS: 61 DEGREES
T AXIS: 69 DEGREES
T OFFSET: 412 MS
T OFFSET: 426 MS
VENTRICULAR RATE: 76 BPM
VENTRICULAR RATE: 89 BPM
WBC # BLD AUTO: 8.6 X10*3/UL (ref 4.4–11.3)

## 2024-07-12 PROCEDURE — 93005 ELECTROCARDIOGRAM TRACING: CPT

## 2024-07-12 PROCEDURE — 83880 ASSAY OF NATRIURETIC PEPTIDE: CPT | Performed by: STUDENT IN AN ORGANIZED HEALTH CARE EDUCATION/TRAINING PROGRAM

## 2024-07-12 PROCEDURE — 84484 ASSAY OF TROPONIN QUANT: CPT | Performed by: STUDENT IN AN ORGANIZED HEALTH CARE EDUCATION/TRAINING PROGRAM

## 2024-07-12 PROCEDURE — 71045 X-RAY EXAM CHEST 1 VIEW: CPT

## 2024-07-12 PROCEDURE — 85379 FIBRIN DEGRADATION QUANT: CPT

## 2024-07-12 PROCEDURE — 99283 EMERGENCY DEPT VISIT LOW MDM: CPT | Mod: 25

## 2024-07-12 PROCEDURE — 2500000001 HC RX 250 WO HCPCS SELF ADMINISTERED DRUGS (ALT 637 FOR MEDICARE OP): Performed by: STUDENT IN AN ORGANIZED HEALTH CARE EDUCATION/TRAINING PROGRAM

## 2024-07-12 PROCEDURE — 80053 COMPREHEN METABOLIC PANEL: CPT | Performed by: STUDENT IN AN ORGANIZED HEALTH CARE EDUCATION/TRAINING PROGRAM

## 2024-07-12 PROCEDURE — 36415 COLL VENOUS BLD VENIPUNCTURE: CPT | Performed by: STUDENT IN AN ORGANIZED HEALTH CARE EDUCATION/TRAINING PROGRAM

## 2024-07-12 PROCEDURE — 36415 COLL VENOUS BLD VENIPUNCTURE: CPT

## 2024-07-12 PROCEDURE — 2500000005 HC RX 250 GENERAL PHARMACY W/O HCPCS: Performed by: STUDENT IN AN ORGANIZED HEALTH CARE EDUCATION/TRAINING PROGRAM

## 2024-07-12 PROCEDURE — 85610 PROTHROMBIN TIME: CPT | Performed by: STUDENT IN AN ORGANIZED HEALTH CARE EDUCATION/TRAINING PROGRAM

## 2024-07-12 PROCEDURE — 85025 COMPLETE CBC W/AUTO DIFF WBC: CPT | Performed by: STUDENT IN AN ORGANIZED HEALTH CARE EDUCATION/TRAINING PROGRAM

## 2024-07-12 PROCEDURE — 71045 X-RAY EXAM CHEST 1 VIEW: CPT | Performed by: RADIOLOGY

## 2024-07-12 RX ORDER — ONDANSETRON 4 MG/1
4 TABLET, ORALLY DISINTEGRATING ORAL ONCE
Status: COMPLETED | OUTPATIENT
Start: 2024-07-12 | End: 2024-07-12

## 2024-07-12 RX ORDER — OXYCODONE AND ACETAMINOPHEN 5; 325 MG/1; MG/1
1 TABLET ORAL ONCE
Status: COMPLETED | OUTPATIENT
Start: 2024-07-12 | End: 2024-07-12

## 2024-07-12 ASSESSMENT — PAIN DESCRIPTION - DESCRIPTORS: DESCRIPTORS: DISCOMFORT

## 2024-07-12 ASSESSMENT — PAIN DESCRIPTION - ORIENTATION: ORIENTATION: LEFT

## 2024-07-12 ASSESSMENT — PAIN SCALES - GENERAL
PAINLEVEL_OUTOF10: 9
PAINLEVEL_OUTOF10: 7
PAINLEVEL_OUTOF10: 9

## 2024-07-12 ASSESSMENT — PAIN DESCRIPTION - LOCATION
LOCATION: CHEST
LOCATION: CHEST

## 2024-07-12 ASSESSMENT — COLUMBIA-SUICIDE SEVERITY RATING SCALE - C-SSRS
6. HAVE YOU EVER DONE ANYTHING, STARTED TO DO ANYTHING, OR PREPARED TO DO ANYTHING TO END YOUR LIFE?: NO
1. IN THE PAST MONTH, HAVE YOU WISHED YOU WERE DEAD OR WISHED YOU COULD GO TO SLEEP AND NOT WAKE UP?: NO
2. HAVE YOU ACTUALLY HAD ANY THOUGHTS OF KILLING YOURSELF?: NO

## 2024-07-12 ASSESSMENT — PAIN - FUNCTIONAL ASSESSMENT: PAIN_FUNCTIONAL_ASSESSMENT: 0-10

## 2024-07-12 ASSESSMENT — PAIN DESCRIPTION - PAIN TYPE: TYPE: ACUTE PAIN

## 2024-07-12 NOTE — TELEPHONE ENCOUNTER
Pt needs prescription sent over for high cholesterol please, her lab results were abnormal. Did speak to Venecia about this yesterday as well.   [facility] - [place], [place] - [id number]  [id number] [place] [address]

## 2024-07-12 NOTE — ED PROVIDER NOTES
EMERGENCY DEPARTMENT ENCOUNTER      Pt Name: Malika Casper  MRN: [id number]  Birthdate 1960  Date of evaluation: 7/12/2024  Provider: Aries Zarate DO    CHIEF COMPLAINT       Chief Complaint   Patient presents with    Chest Pain     Chest pain for the past couple of weeks. States pain is 9/10 today and on the left side. Pt c/o SOB. SPO2 97% RA         HISTORY OF PRESENT ILLNESS    Patient is a 63-year-old female presenting with a chief plaint of chest pain.  Is localized to the left side of her chest, is been present for the past 2 weeks.  States that it is worse when she is moving her arm, worse when she takes deep breath in, worse with palpation, also worse when she is exerting herself.  This does not sound like a typical picture for angina in nature.  She is a smoker, has history of hypertension as well as hyperlipidemia.  Denies any close family history of CAD.  Is not diabetic.  Does not feel lightheaded or dizzy.  No recent illnesses fevers or chills.  No abdominal pain nausea vomiting.          Nursing Notes were reviewed.    PAST MEDICAL HISTORY     Past Medical History:   Diagnosis Date    Abnormal findings on diagnostic imaging of other specified body structures     Abnormal finding on imaging    Encounter for screening for malignant neoplasm of colon 04/27/2022    Colon cancer screening    Liver disease, unspecified 12/16/2022    Chronic liver disease    Personal history of other medical treatment     History of transvaginal ultrasound    Personal history of other medical treatment     History of ultrasound, pelvic         SURGICAL HISTORY       Past Surgical History:   Procedure Laterality Date    ANKLE SURGERY  09/18/2017    Ankle Surgery    CT ABDOMEN PELVIS ANGIOGRAM W AND/OR WO IV CONTRAST  5/3/2020    CT ABDOMEN PELVIS ANGIOGRAM W AND/OR WO IV CONTRAST 5/3/2020 [facility]    KNEE SURGERY  09/18/2017    Knee Surgery    OTHER SURGICAL HISTORY  09/10/2015    Adrenal Gland Excision     OTHER SURGICAL HISTORY  12/02/2021    Gallbladder surgery         CURRENT MEDICATIONS       Previous Medications    ALBUTEROL (VENTOLIN HFA) 90 MCG/ACTUATION INHALER    Inhale 2 puffs every 4 hours if needed for wheezing or shortness of breath.    ALLOPURINOL (ZYLOPRIM) 100 MG TABLET    Take 1 tablet (100 mg) by mouth once daily.    AMPHETAMINE-DEXTROAMPHETAMINE (ADDERALL) 10 MG TABLET        ASCORBIC ACID (VITAMIN C) 500 MG TABLET    Take 1 tablet (500 mg) by mouth once daily.    COLCHICINE 0.6 MG TABLET    TAKE ONE TABLET (0.6 MG) BY MOUTH DAILY    CYCLOBENZAPRINE (FLEXERIL) 5 MG TABLET    Take 1 tablet (5 mg) by mouth 3 times a day.    DEXAMETHASONE (DECADRON) 4 MG TABLET    Take by mouth.    GABAPENTIN (NEURONTIN) 800 MG TABLET    Take 1 tablet (800 mg) by mouth 3 times a day.    HYDROCODONE-ACETAMINOPHEN (NORCO)  MG TABLET    1 tablet every 4 hours. Max 5 tablets daily    LIDOCAINE 4 % PATCH    Place on the skin once daily.    LISINOPRIL 10 MG TABLET    Take 1 tablet (10 mg) by mouth once daily.    MULTIVITAMIN TABLET    Take 1 tablet by mouth once daily.    NALOXONE (NARCAN) 4 MG/0.1 ML NASAL SPRAY    Narcan 4 MG/0.1ML Nasal Liquid   Quantity: 2  Refills: 0        Start : 5-Oct-2021   Active    ONDANSETRON (ZOFRAN) 8 MG TABLET    TAKE ONE TABLET (8 MG) BY MOUTH EVERY EIGHT HOURS IF NEEDED FOR NAUSEA OR VOMITING.       ALLERGIES     Amlodipine, Ibuprofen, and Sulfamethoxazole-trimethoprim    FAMILY HISTORY       Family History   Problem Relation Name Age of Onset    Colon cancer Father      Prostate cancer Father      Breast cancer Mother's Sister  31          SOCIAL HISTORY       Social History     Socioeconomic History    Marital status: Single   Tobacco Use    Smoking status: Every Day     Current packs/day: 0.50     Average packs/day: 0.5 packs/day for 25.0 years (12.5 ttl pk-yrs)     Types: Cigarettes     Passive exposure: Current    Smokeless tobacco: Never   Vaping Use    Vaping status: Never  Used   Substance and Sexual Activity    Alcohol use: Not Currently    Drug use: Not Currently    Sexual activity: Defer     Social Determinants of Health     Financial Resource Strain: At Risk (4/3/2023)    Received from [facility]    Financial Resource Strain     Financial Resource Strain: 2   Food Insecurity: Not at Risk (4/3/2023)    Received from [facility]    Food Insecurity     Food: 1   Transportation Needs: Not at Risk (4/3/2023)    Received from [facility]    Transportation Needs     Transportation: 1   Physical Activity: Not on File (3/28/2023)    Received from [facility]    Physical Activity     Physical Activity: 0   Stress: Not at Risk (4/3/2023)    Received from [facility]    Stress     Stress: 1   Social Connections: Not at Risk (4/3/2023)    Received from [facility]    Social Connections     Social Connections and Isolation: 1   Housing Stability: Not at Risk (4/3/2023)    Received from [facility]    Housing Stability     Housin       SCREENINGS                        PHYSICAL EXAM    (up to 7 for level 4, 8 or more for level 5)     ED Triage Vitals [24 1152]   Temperature Heart Rate Respirations BP   36.5 °C (97.7 °F) 89 20 (!) 177/98      Pulse Ox Temp src Heart Rate Source Patient Position   97 % -- Monitor Sitting      BP Location FiO2 (%)     Right arm --       Physical Exam  Vitals and nursing note reviewed.   Constitutional:       General: She is not in acute distress.     Appearance: Normal appearance. She is not ill-appearing or toxic-appearing.   HENT:      Head: Normocephalic and atraumatic.      Right Ear: External ear normal.      Left Ear: External ear normal.      Nose: Nose normal.   Eyes:      General:         Right eye: No discharge.         Left eye: No discharge.      Extraocular Movements: Extraocular movements intact.      Conjunctiva/sclera: Conjunctivae normal.      Pupils: Pupils are equal, round, and reactive to light.   Cardiovascular:      Rate and  Rhythm: Normal rate and regular rhythm.      Pulses: Normal pulses.      Heart sounds: Normal heart sounds.   Pulmonary:      Effort: Pulmonary effort is normal.      Breath sounds: Normal breath sounds. No decreased breath sounds.   Abdominal:      General: There is no distension.      Palpations: Abdomen is soft. There is no mass.      Tenderness: There is no abdominal tenderness. There is no guarding.   Musculoskeletal:         General: No deformity or signs of injury.   Skin:     General: Skin is warm and dry.   Neurological:      General: No focal deficit present.      Mental Status: She is alert and oriented to person, place, and time. Mental status is at baseline.   Psychiatric:         Mood and Affect: Mood normal.         Behavior: Behavior normal.          DIAGNOSTIC RESULTS     LABS:  Labs Reviewed   COMPREHENSIVE METABOLIC PANEL - Abnormal       Result Value    Glucose 104 (*)     Sodium 138      Potassium 5.2      Chloride 103      Bicarbonate 26      Anion Gap 14      Urea Nitrogen 17      Creatinine 0.71      eGFR >90      Calcium 10.1      Albumin 4.7      Alkaline Phosphatase 75      Total Protein 7.7      AST 77 (*)     Bilirubin, Total 0.3      ALT 23     PROTIME-INR - Normal    Protime 11.1      INR 1.0     SERIAL TROPONIN-INITIAL - Normal    Troponin I, High Sensitivity 3      Narrative:     Less than 99th percentile of normal range cutoff-  Female and children under 18 years old <14 ng/L; Male <21 ng/L: Negative  Repeat testing should be performed if clinically indicated.     Female and children under 18 years old 14-50 ng/L; Male 21-50 ng/L:  Consistent with possible cardiac damage and possible increased clinical   risk. Serial measurements may help to assess extent of myocardial damage.     >50 ng/L: Consistent with cardiac damage, increased clinical risk and  myocardial infarction. Serial measurements may help assess extent of   myocardial damage.      NOTE: Children less than 1 year old may  have higher baseline troponin   levels and results should be interpreted in conjunction with the overall   clinical context.     NOTE: Troponin I testing is performed using a different   testing methodology at [facility] than at other   [facility]. Direct result comparisons should only   be made within the same method.   D-DIMER, VTE EXCLUSION - Normal    D-Dimer, Quantitative VTE Exclusion 379      Narrative:     The VTE Exclusion D-Dimer assay is reported in ng/mL Fibrinogen Equivalent Units (FEU).    Per 's instructions for use, a value of less than 500 ng/mL (FEU) may help to exclude DVT or PE in outpatients when the assay is used with a clinical pretest probability assessment.(AE must utilize and document eCalc 'Wells Score Deep Vein Thrombosis Risk' for DVT exclusion only. Emergency Department should utilize  Guidelines for Emergency Department Use of the VTE Exclusion D-Dimer and Clinical Pretest probability assessment model for DVT or PE exclusion.)   B-TYPE NATRIURETIC PEPTIDE - Normal    BNP 60      Narrative:        <100 pg/mL - Heart failure unlikely  100-299 pg/mL - Intermediate probability of acute heart                  failure exacerbation. Correlate with clinical                  context and patient history.    >=300 pg/mL - Heart Failure likely. Correlate with clinical                  context and patient history.    BNP testing is performed using different testing methodology at [facility] than at other [facility]. Direct result comparisons should only be made within the same method.      SERIAL TROPONIN, 1 HOUR - Normal    Troponin I, High Sensitivity 5      Narrative:     Less than 99th percentile of normal range cutoff-  Female and children under 18 years old <14 ng/L; Male <21 ng/L: Negative  Repeat testing should be performed if clinically indicated.     Female and children under 18 years old 14-50 ng/L; Male 21-50 ng/L:  Consistent with  possible cardiac damage and possible increased clinical   risk. Serial measurements may help to assess extent of myocardial damage.     >50 ng/L: Consistent with cardiac damage, increased clinical risk and  myocardial infarction. Serial measurements may help assess extent of   myocardial damage.      NOTE: Children less than 1 year old may have higher baseline troponin   levels and results should be interpreted in conjunction with the overall   clinical context.     NOTE: Troponin I testing is performed using a different   testing methodology at [facility] than at other   [facility]. Direct result comparisons should only   be made within the same method.   CBC WITH AUTO DIFFERENTIAL    WBC 8.6      nRBC 0.0      RBC 4.67      Hemoglobin 15.0      Hematocrit 45.0      MCV 96      MCH 32.1      MCHC 33.3      RDW 13.8      Platelets 294      Neutrophils % 69.3      Immature Granulocytes %, Automated 0.2      Lymphocytes % 20.2      Monocytes % 7.4      Eosinophils % 2.3      Basophils % 0.6      Neutrophils Absolute 5.94      Immature Granulocytes Absolute, Automated 0.02      Lymphocytes Absolute 1.73      Monocytes Absolute 0.63      Eosinophils Absolute 0.20      Basophils Absolute 0.05     TROPONIN SERIES- (INITIAL, 1 HR)    Narrative:     The following orders were created for panel order Troponin I Series, High Sensitivity (0, 1 HR).  Procedure                               Abnormality         Status                     ---------                               -----------         ------                     Troponin I, High Sensiti...[[id number]]  Normal              Final result               Troponin, High Sensitivi...[[id number]]  Normal              Final result                 Please view results for these tests on the individual orders.       All other labs were within normal range or not returned as of this dictation.    Imaging  XR chest 1 view   Final Result   1.  No evidence of acute  cardiopulmonary process.             Signed by: Virgilio Wiggins 7/12/2024 12:32 PM   Dictation workstation:   [id number]           Procedures  Procedures     EMERGENCY DEPARTMENT COURSE/MDM:   Medical Decision Making  63-year-old female with past ministry of hypertension, hyperlipidemia, cigarette smoking presented today with a chief complaint of chest pain.  Is been present for the past 2 weeks.  Does not sound typical in nature.  Vital signs are stable except for mildly elevated blood pressure 177/98.  Differential includes but is not limited to ACS, pneumonia, costochondritis, muscle strain, PE. We will proceed with usual chest pain work-up including EKG, chest x-ray, CBC, CMP, troponin, and D-dimer to rule out acute coronary syndrome as well as pulmonary embolism.         Please see ED course for additional MDM.    ED Course as of 07/12/24 1342   Fri Jul 12, 2024   1338 D-dimer negative this was a PE. [CL]   1340 EKG was without ischemic changes.  Lab work was unremarkable with normal/adequately downtrending troponin.  Chest x-ray without evidence of pneumothorax, pneumonia, pneumomediastinum, or other abnormalities that would require admission.  Very low concern for ACS at this time as a cause of the patient's symptoms.  Admission was considered, but not indicated given a heart score less than 4 and low clinical suspicion for ACS.  Patient was given information for follow-up with their primary care provider as well as with cardiology.  Referral was placed for this follow-up.  They were in agreement with plan for discharge home.  Return precautions were discussed. [CL]      ED Course User Index  [CL] Aries Zarate DO         Diagnoses as of 07/12/24 1342   Other chest pain        Patient and or family in agreement and understanding of treatment plan.  All questions answered.      I reviewed the case with the attending ED physician. The attending ED physician agrees with the plan. Patient and/or patient´s  representative was counseled regarding labs, imaging, likely diagnosis, and plan. All questions were answered.    ED Medications administered this visit:    Medications   oxyCODONE-acetaminophen (Percocet) 5-325 mg per tablet 1 tablet (1 tablet oral Given 7/12/24 1300)   ondansetron ODT (Zofran-ODT) disintegrating tablet 4 mg (4 mg oral Given 7/12/24 1259)       New Prescriptions from this visit:    New Prescriptions    No medications on file       Follow-up:  No follow-up provider specified.      Final Impression:   1. Other chest pain          (Please note that portions of this note were completed with a voice recognition program.  Efforts were made to edit the dictations but occasionally words are mis-transcribed.)    Emergency Medicine Attending Attestation:     ED Course as of 07/12/24 1342 Fri Jul 12, 2024   1338 D-dimer negative this was a PE. [CL]   1340 EKG was without ischemic changes.  Lab work was unremarkable with normal/adequately downtrending troponin.  Chest x-ray without evidence of pneumothorax, pneumonia, pneumomediastinum, or other abnormalities that would require admission.  Very low concern for ACS at this time as a cause of the patient's symptoms.  Admission was considered, but not indicated given a heart score less than 4 and low clinical suspicion for ACS.  Patient was given information for follow-up with their primary care provider as well as with cardiology.  Referral was placed for this follow-up.  They were in agreement with plan for discharge home.  Return precautions were discussed. [CL]      ED Course User Index  [CL] Aries Zarate, DO         Diagnoses as of 07/12/24 1342   Other chest pain      ED Course as of 07/12/24 1342 Fri Jul 12, 2024   1338 D-dimer negative this was a PE. [CL]   1340 EKG was without ischemic changes.  Lab work was unremarkable with normal/adequately downtrending troponin.  Chest x-ray without evidence of pneumothorax, pneumonia, pneumomediastinum, or other  abnormalities that would require admission.  Very low concern for ACS at this time as a cause of the patient's symptoms.  Admission was considered, but not indicated given a heart score less than 4 and low clinical suspicion for ACS.  Patient was given information for follow-up with their primary care provider as well as with cardiology.  Referral was placed for this follow-up.  They were in agreement with plan for discharge home.  Return precautions were discussed. [CL]      ED Course User Index  [CL] Aries W Elliot,          Diagnoses as of 07/12/24 1342   Other chest pain         The patient was seen by the resident/fellow.  I have personally performed a substantive portion of the encounter.  I have seen and examined the patient; agree with the workup, evaluation, MDM, management and diagnosis.  The care plan has been discussed with the resident/fellow; I have reviewed the resident/fellow’s note and agree with the documented findings with the exception/addition of the following:    EKG taken at 1310 showing normal sinus rate and rhythm, normal axis, normal intervals, no signs of acute ST elevation or pathologic T wave inversions    Patient is a 63-year-old female presenting to the emergency department with  intermittent left-sided chest pain for the past 2 weeks.  No radiation to the back, abdomen, and equal pulses in all extremities, hemodynamically stable without evidence of extremis, so overall low clinical gestalt for a acute aortic dissection.  D-dimer negative, no concern for PE, chest x-ray without evidence of pneumothorax or large consolidative pneumonia or new large suspicious mass in the chest.  Serial troponin testing normal, EKG without evidence of acute STEMI, troponins showing no evidence of NSTEMI, heart score of 3 with overall low clinical gestalt for ACS at this time.  Most suspect costochondritis or other MSK sources.  Recommended follow-up with PCP.  Will be discharged with pain medicines to  help manage her symptoms.  Patient alert, oriented, in no acute distress, hemodynamically stable and well-hydrated in appearance at the time of discharge.    I independently interpreted patient's EKG and agree with the above mentioned interpretation.      DO Reggie Jansen MD  07/12/24 1345       Aries Zarate DO  Resident  07/12/24 1348

## 2024-07-12 NOTE — DISCHARGE INSTRUCTIONS
Please follow up with cardiology, Dr. Christy, as soon as you are able to schedule an appointment for further evaluation and establishment of care given your symptoms today.  Please also follow-up with your primary care provider in the next few days.  Your work-up today did not reveal any acute, emergent findings requiring intervention that would be a cause of your symptoms.     If you have a return or worsening of symptoms including worsening chest pain, shortness of breath, lightheadedness, dizziness, sweating, chest pain at rest, worsening exercise tolerance, or any new or concerning symptoms please seek immediate medical attention or come back to the ER.

## 2024-07-12 NOTE — PROGRESS NOTES
Spoke with Danae again   very concerned about cholesterol results  Reiterated (as I previously discussed with her) that Dr. Cochran would discuss labs results on 8/2  None of her results were urgent   advised her to keep appointment     She believes she is having symptoms for several months from the cholesterol levels:   pain around her heart  Fatigue  Waking up in full sweat  Nausea/vomiting on and off    Explained that those symptoms are not likely from cholesterol levels  She wants to come to walk in clinic  Explained that walk-in clinic would not be appropriate for chest pain  She is going to go to ER

## 2024-07-13 ENCOUNTER — APPOINTMENT (OUTPATIENT)
Dept: CARDIOLOGY | Facility: HOSPITAL | Age: 64
End: 2024-07-13
Payer: COMMERCIAL

## 2024-07-13 ENCOUNTER — APPOINTMENT (OUTPATIENT)
Dept: RADIOLOGY | Facility: HOSPITAL | Age: 64
End: 2024-07-13
Payer: COMMERCIAL

## 2024-07-13 ENCOUNTER — HOSPITAL ENCOUNTER (OUTPATIENT)
Facility: HOSPITAL | Age: 64
Setting detail: OBSERVATION
Discharge: HOME | End: 2024-07-14
Attending: STUDENT IN AN ORGANIZED HEALTH CARE EDUCATION/TRAINING PROGRAM | Admitting: STUDENT IN AN ORGANIZED HEALTH CARE EDUCATION/TRAINING PROGRAM
Payer: COMMERCIAL

## 2024-07-13 DIAGNOSIS — E78.2 MIXED HYPERLIPIDEMIA: ICD-10-CM

## 2024-07-13 DIAGNOSIS — R07.89 OTHER CHEST PAIN: ICD-10-CM

## 2024-07-13 DIAGNOSIS — R07.9 CHEST PAIN, UNSPECIFIED TYPE: Primary | ICD-10-CM

## 2024-07-13 LAB
ALBUMIN SERPL BCP-MCNC: 4.1 G/DL (ref 3.4–5)
ALP SERPL-CCNC: 61 U/L (ref 33–136)
ALT SERPL W P-5'-P-CCNC: 16 U/L (ref 7–45)
ANION GAP SERPL CALC-SCNC: 11 MMOL/L (ref 10–20)
AST SERPL W P-5'-P-CCNC: 15 U/L (ref 9–39)
BASOPHILS # BLD AUTO: 0.05 X10*3/UL (ref 0–0.1)
BASOPHILS NFR BLD AUTO: 0.5 %
BILIRUB SERPL-MCNC: 0.2 MG/DL (ref 0–1.2)
BUN SERPL-MCNC: 21 MG/DL (ref 6–23)
CALCIUM SERPL-MCNC: 9.5 MG/DL (ref 8.6–10.3)
CARDIAC TROPONIN I PNL SERPL HS: 4 NG/L (ref 0–13)
CHLORIDE SERPL-SCNC: 105 MMOL/L (ref 98–107)
CO2 SERPL-SCNC: 27 MMOL/L (ref 21–32)
CREAT SERPL-MCNC: 0.66 MG/DL (ref 0.5–1.05)
EGFRCR SERPLBLD CKD-EPI 2021: >90 ML/MIN/1.73M*2
EOSINOPHIL # BLD AUTO: 0.35 X10*3/UL (ref 0–0.7)
EOSINOPHIL NFR BLD AUTO: 3.5 %
ERYTHROCYTE [DISTWIDTH] IN BLOOD BY AUTOMATED COUNT: 14 % (ref 11.5–14.5)
GLUCOSE SERPL-MCNC: 94 MG/DL (ref 74–99)
HCT VFR BLD AUTO: 41.4 % (ref 36–46)
HGB BLD-MCNC: 13.6 G/DL (ref 12–16)
IMM GRANULOCYTES # BLD AUTO: 0.04 X10*3/UL (ref 0–0.7)
IMM GRANULOCYTES NFR BLD AUTO: 0.4 % (ref 0–0.9)
INR PPP: 1 (ref 0.9–1.1)
LYMPHOCYTES # BLD AUTO: 2.67 X10*3/UL (ref 1.2–4.8)
LYMPHOCYTES NFR BLD AUTO: 27 %
MCH RBC QN AUTO: 32.3 PG (ref 26–34)
MCHC RBC AUTO-ENTMCNC: 32.9 G/DL (ref 32–36)
MCV RBC AUTO: 98 FL (ref 80–100)
MONOCYTES # BLD AUTO: 0.73 X10*3/UL (ref 0.1–1)
MONOCYTES NFR BLD AUTO: 7.4 %
NEUTROPHILS # BLD AUTO: 6.06 X10*3/UL (ref 1.2–7.7)
NEUTROPHILS NFR BLD AUTO: 61.2 %
NRBC BLD-RTO: 0 /100 WBCS (ref 0–0)
PLATELET # BLD AUTO: 274 X10*3/UL (ref 150–450)
POTASSIUM SERPL-SCNC: 3.4 MMOL/L (ref 3.5–5.3)
PROT SERPL-MCNC: 6.6 G/DL (ref 6.4–8.2)
PROTHROMBIN TIME: 11.1 SECONDS (ref 9.8–12.8)
RBC # BLD AUTO: 4.21 X10*6/UL (ref 4–5.2)
SODIUM SERPL-SCNC: 140 MMOL/L (ref 136–145)
WBC # BLD AUTO: 9.9 X10*3/UL (ref 4.4–11.3)

## 2024-07-13 PROCEDURE — 93005 ELECTROCARDIOGRAM TRACING: CPT | Mod: 59

## 2024-07-13 PROCEDURE — 84075 ASSAY ALKALINE PHOSPHATASE: CPT | Performed by: STUDENT IN AN ORGANIZED HEALTH CARE EDUCATION/TRAINING PROGRAM

## 2024-07-13 PROCEDURE — 85025 COMPLETE CBC W/AUTO DIFF WBC: CPT | Performed by: STUDENT IN AN ORGANIZED HEALTH CARE EDUCATION/TRAINING PROGRAM

## 2024-07-13 PROCEDURE — 85610 PROTHROMBIN TIME: CPT | Performed by: STUDENT IN AN ORGANIZED HEALTH CARE EDUCATION/TRAINING PROGRAM

## 2024-07-13 PROCEDURE — 84484 ASSAY OF TROPONIN QUANT: CPT | Performed by: STUDENT IN AN ORGANIZED HEALTH CARE EDUCATION/TRAINING PROGRAM

## 2024-07-13 PROCEDURE — 99285 EMERGENCY DEPT VISIT HI MDM: CPT | Performed by: STUDENT IN AN ORGANIZED HEALTH CARE EDUCATION/TRAINING PROGRAM

## 2024-07-13 PROCEDURE — 99285 EMERGENCY DEPT VISIT HI MDM: CPT

## 2024-07-13 PROCEDURE — 36415 COLL VENOUS BLD VENIPUNCTURE: CPT | Performed by: STUDENT IN AN ORGANIZED HEALTH CARE EDUCATION/TRAINING PROGRAM

## 2024-07-13 PROCEDURE — 71045 X-RAY EXAM CHEST 1 VIEW: CPT | Performed by: RADIOLOGY

## 2024-07-13 PROCEDURE — 93005 ELECTROCARDIOGRAM TRACING: CPT

## 2024-07-13 PROCEDURE — 71045 X-RAY EXAM CHEST 1 VIEW: CPT

## 2024-07-13 ASSESSMENT — LIFESTYLE VARIABLES
TOTAL SCORE: 0
HAVE PEOPLE ANNOYED YOU BY CRITICIZING YOUR DRINKING: NO
EVER HAD A DRINK FIRST THING IN THE MORNING TO STEADY YOUR NERVES TO GET RID OF A HANGOVER: NO
HAVE PEOPLE ANNOYED YOU BY CRITICIZING YOUR DRINKING: NO
EVER FELT BAD OR GUILTY ABOUT YOUR DRINKING: NO
HAVE YOU EVER FELT YOU SHOULD CUT DOWN ON YOUR DRINKING: NO
EVER HAD A DRINK FIRST THING IN THE MORNING TO STEADY YOUR NERVES TO GET RID OF A HANGOVER: NO
TOTAL SCORE: 0
HAVE YOU EVER FELT YOU SHOULD CUT DOWN ON YOUR DRINKING: NO
EVER FELT BAD OR GUILTY ABOUT YOUR DRINKING: NO

## 2024-07-13 ASSESSMENT — PAIN SCALES - GENERAL
PAINLEVEL_OUTOF10: 1
PAINLEVEL_OUTOF10: 4
PAINLEVEL_OUTOF10: 9

## 2024-07-13 ASSESSMENT — PAIN DESCRIPTION - FREQUENCY: FREQUENCY: CONSTANT/CONTINUOUS

## 2024-07-13 ASSESSMENT — PAIN DESCRIPTION - ORIENTATION: ORIENTATION: LEFT

## 2024-07-13 ASSESSMENT — PAIN DESCRIPTION - DESCRIPTORS: DESCRIPTORS: SHARP

## 2024-07-13 ASSESSMENT — PAIN DESCRIPTION - LOCATION: LOCATION: CHEST

## 2024-07-13 ASSESSMENT — PAIN DESCRIPTION - PAIN TYPE: TYPE: CHRONIC PAIN

## 2024-07-13 ASSESSMENT — PAIN - FUNCTIONAL ASSESSMENT: PAIN_FUNCTIONAL_ASSESSMENT: 0-10

## 2024-07-14 VITALS
BODY MASS INDEX: 23.92 KG/M2 | OXYGEN SATURATION: 95 % | HEART RATE: 74 BPM | RESPIRATION RATE: 18 BRPM | HEIGHT: 62 IN | TEMPERATURE: 97.5 F | DIASTOLIC BLOOD PRESSURE: 67 MMHG | WEIGHT: 130 LBS | SYSTOLIC BLOOD PRESSURE: 139 MMHG

## 2024-07-14 LAB
ALBUMIN SERPL BCP-MCNC: 3.8 G/DL (ref 3.4–5)
ANION GAP SERPL CALC-SCNC: 11 MMOL/L (ref 10–20)
BUN SERPL-MCNC: 16 MG/DL (ref 6–23)
CALCIUM SERPL-MCNC: 9.4 MG/DL (ref 8.6–10.3)
CARDIAC TROPONIN I PNL SERPL HS: 5 NG/L (ref 0–13)
CHLORIDE SERPL-SCNC: 106 MMOL/L (ref 98–107)
CO2 SERPL-SCNC: 28 MMOL/L (ref 21–32)
CREAT SERPL-MCNC: 0.57 MG/DL (ref 0.5–1.05)
EGFRCR SERPLBLD CKD-EPI 2021: >90 ML/MIN/1.73M*2
ERYTHROCYTE [DISTWIDTH] IN BLOOD BY AUTOMATED COUNT: 13.8 % (ref 11.5–14.5)
GLUCOSE SERPL-MCNC: 92 MG/DL (ref 74–99)
HCT VFR BLD AUTO: 40.4 % (ref 36–46)
HGB BLD-MCNC: 13.2 G/DL (ref 12–16)
MAGNESIUM SERPL-MCNC: 1.85 MG/DL (ref 1.6–2.4)
MCH RBC QN AUTO: 31.7 PG (ref 26–34)
MCHC RBC AUTO-ENTMCNC: 32.7 G/DL (ref 32–36)
MCV RBC AUTO: 97 FL (ref 80–100)
NRBC BLD-RTO: 0 /100 WBCS (ref 0–0)
PHOSPHATE SERPL-MCNC: 3.4 MG/DL (ref 2.5–4.9)
PLATELET # BLD AUTO: 259 X10*3/UL (ref 150–450)
POTASSIUM SERPL-SCNC: 3.5 MMOL/L (ref 3.5–5.3)
RBC # BLD AUTO: 4.16 X10*6/UL (ref 4–5.2)
SODIUM SERPL-SCNC: 141 MMOL/L (ref 136–145)
WBC # BLD AUTO: 9 X10*3/UL (ref 4.4–11.3)

## 2024-07-14 PROCEDURE — G0378 HOSPITAL OBSERVATION PER HR: HCPCS

## 2024-07-14 PROCEDURE — 2500000005 HC RX 250 GENERAL PHARMACY W/O HCPCS

## 2024-07-14 PROCEDURE — 85027 COMPLETE CBC AUTOMATED: CPT

## 2024-07-14 PROCEDURE — 83735 ASSAY OF MAGNESIUM: CPT

## 2024-07-14 PROCEDURE — 36415 COLL VENOUS BLD VENIPUNCTURE: CPT | Performed by: STUDENT IN AN ORGANIZED HEALTH CARE EDUCATION/TRAINING PROGRAM

## 2024-07-14 PROCEDURE — 36415 COLL VENOUS BLD VENIPUNCTURE: CPT

## 2024-07-14 PROCEDURE — 99222 1ST HOSP IP/OBS MODERATE 55: CPT | Performed by: INTERNAL MEDICINE

## 2024-07-14 PROCEDURE — 84484 ASSAY OF TROPONIN QUANT: CPT | Performed by: STUDENT IN AN ORGANIZED HEALTH CARE EDUCATION/TRAINING PROGRAM

## 2024-07-14 PROCEDURE — 2500000001 HC RX 250 WO HCPCS SELF ADMINISTERED DRUGS (ALT 637 FOR MEDICARE OP)

## 2024-07-14 PROCEDURE — 2500000002 HC RX 250 W HCPCS SELF ADMINISTERED DRUGS (ALT 637 FOR MEDICARE OP, ALT 636 FOR OP/ED)

## 2024-07-14 PROCEDURE — 84100 ASSAY OF PHOSPHORUS: CPT

## 2024-07-14 PROCEDURE — 2500000004 HC RX 250 GENERAL PHARMACY W/ HCPCS (ALT 636 FOR OP/ED)

## 2024-07-14 PROCEDURE — 96372 THER/PROPH/DIAG INJ SC/IM: CPT

## 2024-07-14 PROCEDURE — 99239 HOSP IP/OBS DSCHRG MGMT >30: CPT | Performed by: STUDENT IN AN ORGANIZED HEALTH CARE EDUCATION/TRAINING PROGRAM

## 2024-07-14 RX ORDER — ATORVASTATIN CALCIUM 40 MG/1
40 TABLET, FILM COATED ORAL NIGHTLY
Qty: 30 TABLET | Refills: 0 | Status: SHIPPED | OUTPATIENT
Start: 2024-07-14 | End: 2024-08-19 | Stop reason: SDUPTHER

## 2024-07-14 RX ORDER — CYCLOBENZAPRINE HCL 5 MG
5 TABLET ORAL 3 TIMES DAILY
Status: DISCONTINUED | OUTPATIENT
Start: 2024-07-14 | End: 2024-07-14 | Stop reason: HOSPADM

## 2024-07-14 RX ORDER — HYDROCODONE BITARTRATE AND ACETAMINOPHEN 10; 325 MG/1; MG/1
1 TABLET ORAL EVERY 4 HOURS PRN
Status: DISCONTINUED | OUTPATIENT
Start: 2024-07-14 | End: 2024-07-14

## 2024-07-14 RX ORDER — POTASSIUM CHLORIDE 20 MEQ/1
20 TABLET, EXTENDED RELEASE ORAL ONCE
Status: COMPLETED | OUTPATIENT
Start: 2024-07-14 | End: 2024-07-14

## 2024-07-14 RX ORDER — LIDOCAINE 560 MG/1
1 PATCH PERCUTANEOUS; TOPICAL; TRANSDERMAL DAILY
Status: DISCONTINUED | OUTPATIENT
Start: 2024-07-14 | End: 2024-07-14 | Stop reason: HOSPADM

## 2024-07-14 RX ORDER — ALBUTEROL SULFATE 90 UG/1
2 INHALANT RESPIRATORY (INHALATION) EVERY 4 HOURS PRN
Status: DISCONTINUED | OUTPATIENT
Start: 2024-07-14 | End: 2024-07-14

## 2024-07-14 RX ORDER — LISINOPRIL 10 MG/1
10 TABLET ORAL DAILY
Status: DISCONTINUED | OUTPATIENT
Start: 2024-07-14 | End: 2024-07-14

## 2024-07-14 RX ORDER — ALLOPURINOL 100 MG/1
100 TABLET ORAL DAILY
Status: DISCONTINUED | OUTPATIENT
Start: 2024-07-14 | End: 2024-07-14

## 2024-07-14 RX ORDER — ENOXAPARIN SODIUM 100 MG/ML
40 INJECTION SUBCUTANEOUS EVERY 24 HOURS
Status: DISCONTINUED | OUTPATIENT
Start: 2024-07-14 | End: 2024-07-14 | Stop reason: HOSPADM

## 2024-07-14 RX ORDER — LIDOCAINE 560 MG/1
1 PATCH PERCUTANEOUS; TOPICAL; TRANSDERMAL DAILY
Status: DISCONTINUED | OUTPATIENT
Start: 2024-07-14 | End: 2024-07-14

## 2024-07-14 RX ORDER — COLCHICINE 0.6 MG/1
0.6 TABLET ORAL DAILY
Status: DISCONTINUED | OUTPATIENT
Start: 2024-07-14 | End: 2024-07-14

## 2024-07-14 RX ORDER — GABAPENTIN 400 MG/1
800 CAPSULE ORAL 3 TIMES DAILY
Status: DISCONTINUED | OUTPATIENT
Start: 2024-07-14 | End: 2024-07-14

## 2024-07-14 RX ORDER — POTASSIUM CHLORIDE 750 MG/1
10 TABLET, FILM COATED, EXTENDED RELEASE ORAL ONCE
Status: CANCELLED | OUTPATIENT
Start: 2024-07-14 | End: 2024-07-14

## 2024-07-14 RX ORDER — POLYETHYLENE GLYCOL 3350 17 G/17G
17 POWDER, FOR SOLUTION ORAL DAILY
Status: DISCONTINUED | OUTPATIENT
Start: 2024-07-14 | End: 2024-07-14 | Stop reason: HOSPADM

## 2024-07-14 RX ORDER — HYDROCODONE BITARTRATE AND ACETAMINOPHEN 5; 325 MG/1; MG/1
1 TABLET ORAL ONCE
Status: COMPLETED | OUTPATIENT
Start: 2024-07-14 | End: 2024-07-14

## 2024-07-14 RX ORDER — ALBUTEROL SULFATE 0.83 MG/ML
2.5 SOLUTION RESPIRATORY (INHALATION) EVERY 6 HOURS PRN
Status: DISCONTINUED | OUTPATIENT
Start: 2024-07-14 | End: 2024-07-14 | Stop reason: HOSPADM

## 2024-07-14 RX ORDER — DEXAMETHASONE 4 MG/1
4 TABLET ORAL DAILY
Status: DISCONTINUED | OUTPATIENT
Start: 2024-07-14 | End: 2024-07-14

## 2024-07-14 RX ORDER — ATORVASTATIN CALCIUM 40 MG/1
40 TABLET, FILM COATED ORAL NIGHTLY
Status: DISCONTINUED | OUTPATIENT
Start: 2024-07-14 | End: 2024-07-14 | Stop reason: HOSPADM

## 2024-07-14 RX ORDER — CYCLOBENZAPRINE HCL 5 MG
5 TABLET ORAL 3 TIMES DAILY
Status: DISCONTINUED | OUTPATIENT
Start: 2024-07-14 | End: 2024-07-14

## 2024-07-14 RX ADMIN — ENOXAPARIN SODIUM 40 MG: 40 INJECTION SUBCUTANEOUS at 02:15

## 2024-07-14 RX ADMIN — HYDROCODONE BITARTRATE AND ACETAMINOPHEN 1 TABLET: 5; 325 TABLET ORAL at 06:25

## 2024-07-14 RX ADMIN — ATORVASTATIN CALCIUM 40 MG: 40 TABLET, FILM COATED ORAL at 02:15

## 2024-07-14 RX ADMIN — CYCLOBENZAPRINE HYDROCHLORIDE 5 MG: 5 TABLET, FILM COATED ORAL at 02:17

## 2024-07-14 RX ADMIN — LIDOCAINE 4% 1 PATCH: 40 PATCH TOPICAL at 02:17

## 2024-07-14 RX ADMIN — CYCLOBENZAPRINE HYDROCHLORIDE 5 MG: 5 TABLET, FILM COATED ORAL at 08:15

## 2024-07-14 RX ADMIN — POTASSIUM CHLORIDE 20 MEQ: 1500 TABLET, EXTENDED RELEASE ORAL at 06:06

## 2024-07-14 SDOH — SOCIAL STABILITY: SOCIAL INSECURITY: WERE YOU ABLE TO COMPLETE ALL THE BEHAVIORAL HEALTH SCREENINGS?: YES

## 2024-07-14 SDOH — SOCIAL STABILITY: SOCIAL INSECURITY: ABUSE: ADULT

## 2024-07-14 SDOH — SOCIAL STABILITY: SOCIAL INSECURITY: DO YOU FEEL UNSAFE GOING BACK TO THE PLACE WHERE YOU ARE LIVING?: NO

## 2024-07-14 SDOH — SOCIAL STABILITY: SOCIAL INSECURITY: HAS ANYONE EVER THREATENED TO HURT YOUR FAMILY OR YOUR PETS?: NO

## 2024-07-14 SDOH — SOCIAL STABILITY: SOCIAL INSECURITY: DOES ANYONE TRY TO KEEP YOU FROM HAVING/CONTACTING OTHER FRIENDS OR DOING THINGS OUTSIDE YOUR HOME?: NO

## 2024-07-14 SDOH — SOCIAL STABILITY: SOCIAL INSECURITY: HAVE YOU HAD THOUGHTS OF HARMING ANYONE ELSE?: NO

## 2024-07-14 SDOH — SOCIAL STABILITY: SOCIAL INSECURITY: HAVE YOU HAD ANY THOUGHTS OF HARMING ANYONE ELSE?: NO

## 2024-07-14 SDOH — SOCIAL STABILITY: SOCIAL INSECURITY: DO YOU FEEL ANYONE HAS EXPLOITED OR TAKEN ADVANTAGE OF YOU FINANCIALLY OR OF YOUR PERSONAL PROPERTY?: NO

## 2024-07-14 SDOH — SOCIAL STABILITY: SOCIAL INSECURITY: ARE YOU OR HAVE YOU BEEN THREATENED OR ABUSED PHYSICALLY, EMOTIONALLY, OR SEXUALLY BY ANYONE?: NO

## 2024-07-14 SDOH — SOCIAL STABILITY: SOCIAL INSECURITY: ARE THERE ANY APPARENT SIGNS OF INJURIES/BEHAVIORS THAT COULD BE RELATED TO ABUSE/NEGLECT?: NO

## 2024-07-14 ASSESSMENT — COGNITIVE AND FUNCTIONAL STATUS - GENERAL
PATIENT BASELINE BEDBOUND: NO
WALKING IN HOSPITAL ROOM: A LITTLE
MOBILITY SCORE: 23
MOBILITY SCORE: 22
CLIMB 3 TO 5 STEPS WITH RAILING: A LITTLE
DAILY ACTIVITIY SCORE: 24
CLIMB 3 TO 5 STEPS WITH RAILING: A LITTLE
DRESSING REGULAR LOWER BODY CLOTHING: A LITTLE
DAILY ACTIVITIY SCORE: 23

## 2024-07-14 ASSESSMENT — ENCOUNTER SYMPTOMS
DIARRHEA: 0
LIGHT-HEADEDNESS: 0
HEADACHES: 0
SHORTNESS OF BREATH: 1
NAUSEA: 0
DIZZINESS: 1
VOMITING: 0
FREQUENCY: 0
CONSTIPATION: 0
DYSURIA: 1
PALPITATIONS: 0
FREQUENCY: 1
NEUROLOGICAL NEGATIVE: 1
NERVOUS/ANXIOUS: 1
ARTHRALGIAS: 1
DYSURIA: 0
SHORTNESS OF BREATH: 0
BACK PAIN: 1
ABDOMINAL DISTENTION: 1
CHILLS: 0
FEVER: 0
ABDOMINAL PAIN: 1
FEVER: 1
ABDOMINAL PAIN: 0

## 2024-07-14 ASSESSMENT — LIFESTYLE VARIABLES
HOW OFTEN DO YOU HAVE 6 OR MORE DRINKS ON ONE OCCASION: NEVER
AUDIT-C TOTAL SCORE: 0
SKIP TO QUESTIONS 9-10: 1
HOW MANY STANDARD DRINKS CONTAINING ALCOHOL DO YOU HAVE ON A TYPICAL DAY: PATIENT DOES NOT DRINK
HOW OFTEN DO YOU HAVE A DRINK CONTAINING ALCOHOL: NEVER
AUDIT-C TOTAL SCORE: 0

## 2024-07-14 ASSESSMENT — ACTIVITIES OF DAILY LIVING (ADL)
PATIENT'S MEMORY ADEQUATE TO SAFELY COMPLETE DAILY ACTIVITIES?: YES
WALKS IN HOME: NEEDS ASSISTANCE
GROOMING: INDEPENDENT
FEEDING YOURSELF: INDEPENDENT
ADEQUATE_TO_COMPLETE_ADL: YES
DRESSING YOURSELF: NEEDS ASSISTANCE
TOILETING: NEEDS ASSISTANCE
HEARING - LEFT EAR: FUNCTIONAL
HEARING - RIGHT EAR: FUNCTIONAL
JUDGMENT_ADEQUATE_SAFELY_COMPLETE_DAILY_ACTIVITIES: YES
LACK_OF_TRANSPORTATION: NO
BATHING: NEEDS ASSISTANCE

## 2024-07-14 ASSESSMENT — PAIN - FUNCTIONAL ASSESSMENT
PAIN_FUNCTIONAL_ASSESSMENT: 0-10
PAIN_FUNCTIONAL_ASSESSMENT: 0-10

## 2024-07-14 ASSESSMENT — PATIENT HEALTH QUESTIONNAIRE - PHQ9
2. FEELING DOWN, DEPRESSED OR HOPELESS: SEVERAL DAYS
1. LITTLE INTEREST OR PLEASURE IN DOING THINGS: SEVERAL DAYS
SUM OF ALL RESPONSES TO PHQ9 QUESTIONS 1 & 2: 2

## 2024-07-14 ASSESSMENT — PAIN SCALES - GENERAL
PAINLEVEL_OUTOF10: 7
PAINLEVEL_OUTOF10: 0 - NO PAIN
PAINLEVEL_OUTOF10: 5 - MODERATE PAIN
PAINLEVEL_OUTOF10: 3

## 2024-07-14 ASSESSMENT — PAIN DESCRIPTION - ORIENTATION: ORIENTATION: LOWER

## 2024-07-14 ASSESSMENT — PAIN DESCRIPTION - LOCATION: LOCATION: BACK

## 2024-07-14 NOTE — DISCHARGE INSTRUCTIONS
Please call your primary care provider and schedule a follow up appointment in 2-3 days.     Please keep your appointment with cardiologist, Dr. Mcconnell on 7/19.    Please take all of your medications as directed.     New medications:  Atorvastatin 40 mg: Please take 1 tablet once a day for the treatment of high cholesterol    If you have any concerning symptoms, or worsening symptoms please call or return, such as chest pain, shortness of breath, new onset confusion, loss of sensation, or fever >103F.    Thank you for allowing us to participate in your health care.    -[facility] Inpatient Medicine Teaching Service.

## 2024-07-14 NOTE — H&P
History Of Present Illness  Malika Casper is a 63 y.o. female with PMHx of HTN, HLD, ADD, Anxiety, Vitamin D Deficiency, OA of Left Hip, and Gout who presents to [facility] with c/o chest pain. Patient reports left sided chest pain onset 2 weeks ago, which is described as constant, sharp, and aching. Pain does not radiate. It is worse with exertion and with palpation of the chest. It also became sharper in quality when she smoked medical marijuana today, so she stopped smoking. Reports associated SOB and dizziness. Patient visited the ED yesterday for this pain and states that it feels the same as it did yesterday. She has never has this kind of chest pain in her life before the last 2 weeks. Also notes that she has been tired for the last 2 days. Notes urinary frequency with no burning. Denies fever, chills, lightheadedness, palpitations, jaw pain, arm tightness, abdominal pain, N/V, diarrhea, constipation, or leg swelling. Patient denies any recent upper respiratory infection. Denies any personal or family history of heart disease. She smokes half a pack a day for 30 years, she does not drink alcohol, and she smokes medical marijuana (last time was today).     Patient visited [facility] ED yesterday (7/12/2024) with similar symptoms. She had a negative troponin and D-dimer. Also unremarkable EKG and CXR. Patient heart score of 3 at that time with low suspicion for ACS. Patient was given Percocet and Zofran, then discharged with pain medications. Patient was recommended follow up with her PCP and she was referred to cardiology.     In the ED, temp of 97.2, HR 89, RR 20, /87, O2 96% on RA. Labs significant for K 3.4. Troponin negative. EKG showed NSR with possible left atrial enlargement, with HR 80, MT interval 138, QT of 380, no ischemic changes. CXR with no acute cardiopulmonary process. No interventions were done in the ED.       Past Medical History  HTN, HLD, ADD, Anxiety, Vitamin D deficiency, OA of left Hip  Gout      Surgical History  She has a past surgical history that includes Other surgical history (09/10/2015); Other surgical history (12/02/2021); Knee surgery (09/18/2017); Ankle surgery (09/18/2017); and CT angio abdomen pelvis w and or wo IV IV contrast (5/3/2020).     Social History  Patient smokes 1 pack a day for 30 years. She does not drink alcohol. She smokes medical marijuana.     Family History  Family History   Problem Relation Name Age of Onset    Colon cancer Father      Prostate cancer Father      Breast cancer Mother's Sister  31        Allergies  Amlodipine, Ibuprofen, and Sulfamethoxazole-trimethoprim    Review of Systems   Constitutional:  Negative for chills and fever.   Respiratory:  Positive for shortness of breath.    Cardiovascular:  Positive for chest pain (left sided). Negative for palpitations and leg swelling.   Gastrointestinal:  Negative for abdominal pain, constipation, diarrhea, nausea and vomiting.   Genitourinary:  Positive for dysuria. Negative for frequency.   Neurological:  Positive for dizziness. Negative for light-headedness and headaches.        Physical Exam  Constitutional:       General: She is not in acute distress.     Appearance: Normal appearance.   HENT:      Head: Normocephalic and atraumatic.      Right Ear: External ear normal.      Left Ear: External ear normal.   Cardiovascular:      Rate and Rhythm: Normal rate and regular rhythm.      Pulses: Normal pulses.      Heart sounds: Normal heart sounds.   Pulmonary:      Effort: Pulmonary effort is normal. No respiratory distress.      Breath sounds: No wheezing.      Comments: Diminished breath sounds throughout   Chest:      Chest wall: Tenderness (left side) present.   Abdominal:      General: Abdomen is flat. There is no distension.      Palpations: Abdomen is soft.      Tenderness: There is no abdominal tenderness. There is no guarding or rebound.   Musculoskeletal:      Right lower leg: No edema.      Left lower  leg: No edema.   Skin:     General: Skin is warm and dry.   Neurological:      General: No focal deficit present.      Mental Status: She is alert and oriented to person, place, and time.   Psychiatric:         Mood and Affect: Mood normal.         Behavior: Behavior normal.          Last Recorded Vitals  /80 (BP Location: Right arm, Patient Position: Lying)   Pulse 74   Temp 36.2 °C (97.2 °F) (Temporal)   Resp 16   Wt 59 kg (130 lb)   SpO2 95%     Relevant Results    Scheduled medications  allopurinol, 100 mg, oral, Daily  atorvastatin, 40 mg, oral, Nightly  colchicine, 0.6 mg, oral, Daily  cyclobenzaprine, 5 mg, oral, TID  dexAMETHasone, 4 mg, oral, Daily  enoxaparin, 40 mg, subcutaneous, q24h  gabapentin, 800 mg, oral, TID  lidocaine, 1 patch, transdermal, Daily  lisinopril, 10 mg, oral, Daily  polyethylene glycol, 17 g, oral, Daily      Continuous medications     PRN medications  PRN medications: albuterol, HYDROcodone-acetaminophen     Results for orders placed or performed during the hospital encounter of 07/13/24 (from the past 24 hour(s))   CBC with Differential   Result Value Ref Range    WBC 9.9 4.4 - 11.3 x10*3/uL    nRBC 0.0 0.0 - 0.0 /100 WBCs    RBC 4.21 4.00 - 5.20 x10*6/uL    Hemoglobin 13.6 12.0 - 16.0 g/dL    Hematocrit 41.4 36.0 - 46.0 %    MCV 98 80 - 100 fL    MCH 32.3 26.0 - 34.0 pg    MCHC 32.9 32.0 - 36.0 g/dL    RDW 14.0 11.5 - 14.5 %    Platelets 274 150 - 450 x10*3/uL    Neutrophils % 61.2 40.0 - 80.0 %    Immature Granulocytes %, Automated 0.4 0.0 - 0.9 %    Lymphocytes % 27.0 13.0 - 44.0 %    Monocytes % 7.4 2.0 - 10.0 %    Eosinophils % 3.5 0.0 - 6.0 %    Basophils % 0.5 0.0 - 2.0 %    Neutrophils Absolute 6.06 1.20 - 7.70 x10*3/uL    Immature Granulocytes Absolute, Automated 0.04 0.00 - 0.70 x10*3/uL    Lymphocytes Absolute 2.67 1.20 - 4.80 x10*3/uL    Monocytes Absolute 0.73 0.10 - 1.00 x10*3/uL    Eosinophils Absolute 0.35 0.00 - 0.70 x10*3/uL    Basophils Absolute 0.05  0.00 - 0.10 x10*3/uL   Comprehensive Metabolic Panel   Result Value Ref Range    Glucose 94 74 - 99 mg/dL    Sodium 140 136 - 145 mmol/L    Potassium 3.4 (L) 3.5 - 5.3 mmol/L    Chloride 105 98 - 107 mmol/L    Bicarbonate 27 21 - 32 mmol/L    Anion Gap 11 10 - 20 mmol/L    Urea Nitrogen 21 6 - 23 mg/dL    Creatinine 0.66 0.50 - 1.05 mg/dL    eGFR >90 >60 mL/min/1.73m*2    Calcium 9.5 8.6 - 10.3 mg/dL    Albumin 4.1 3.4 - 5.0 g/dL    Alkaline Phosphatase 61 33 - 136 U/L    Total Protein 6.6 6.4 - 8.2 g/dL    AST 15 9 - 39 U/L    Bilirubin, Total 0.2 0.0 - 1.2 mg/dL    ALT 16 7 - 45 U/L   Protime-INR   Result Value Ref Range    Protime 11.1 9.8 - 12.8 seconds    INR 1.0 0.9 - 1.1   Troponin I, High Sensitivity, Initial   Result Value Ref Range    Troponin I, High Sensitivity 4 0 - 13 ng/L        XR chest 1 view    Result Date: 7/13/2024  Interpreted By:  Nupur Yip, STUDY: Chest, single AP view.   INDICATION: Signs/Symptoms:Chest Pain.   COMPARISON: CT chest 06/29/2024.   ACCESSION NUMBER(S): [id number]   ORDERING CLINICIAN: MERRY MONTANEZ   FINDINGS: The cardiac silhouette size is within normal limits. There is no focal consolidation, edema or pneumothorax. No sizeable pleural effusion. No acute osseous abnormality.       1. No acute cardiopulmonary process.   MACRO: None.   Signed by: Nupur Yip 7/13/2024 9:08 PM Dictation workstation:   [id number]    ECG 12 lead    Result Date: 7/12/2024  Normal sinus rhythm Right atrial enlargement Left axis deviation Abnormal ECG When compared with ECG of 28-JUN-2024 20:28, QRS axis Shifted left    ECG 12 lead    Result Date: 7/12/2024  Normal sinus rhythm Normal ECG When compared with ECG of 12-JUL-2024 11:52, (unconfirmed) No significant change was found    XR chest 1 view    Result Date: 7/12/2024  Interpreted By:  Stupin, Virgilio, STUDY: XR CHEST 1 VIEW;  7/12/2024 12:22 pm   INDICATION: Signs/Symptoms:Chest Pain.   COMPARISON: Chest radiograph 03/04/2024    ACCESSION NUMBER(S): [id number]   ORDERING CLINICIAN: MERRY MONTANEZ   FINDINGS:     CARDIOMEDIASTINAL SILHOUETTE: Cardiomediastinal silhouette is normal in size and configuration.   LUNGS: No consolidation, pneumothorax, or significant effusion.   ABDOMEN: No remarkable upper abdominal findings.   BONES: No acute osseous changes.       1.  No evidence of acute cardiopulmonary process.     Signed by: Virgilio Wiggins 7/12/2024 12:32 PM Dictation workstation:   [id number]        Assessment/Plan   Patient is a 63 year old female with PMHX of HTN, HLD, ADD, Anxiety, Vitamin D Deficiency, OA of Left Hip, and Gout who  presented to [facility] with c/o chest pain. Labs with negative troponin and D-dimer.  EKG with no ischemic changes and CXR  with no acute cardiopulmonary process. Patient was admitted to general medicine for further evaluation. Patient was started on atorvastatin and made NPO for possible intervention.     #Chest pain  #Costochondritis vs ACS   #HTN  #HLD  Patient reports left sided, constant sharp and aching pain onset 2 weeks ago. Most likely costochondritis as pain is reproducible on palpation of the left chest wall. Though patient has high risk factors of HTN and HLD, a recent lipid panel on 7/4 showed only an elevated LDL of 122, and a recent A1c on 7/5 was normal. ACS is also less likely due to reproducible pain upon palpation of chest which is atypical for ACS, as well as negative troponin, and no ischemic changes on EKG. Last stress test (2/2023) was normal. Low concern for PE as patient had a negative d-dimer.   -Troponin negative   -EKG -NSR with HR 80, NV interval 138, QT of 380, no acute ST changes  -CXR- no acute cardiopulmonary process.   -Start atorvastatin   -Considered starting aspirin however patient has allergy (anaphylaxis) to ibuprofen   -Consider cardiology consult- will defer to day team   -Telemetry   -NPO for possible intervention       Chronic Problems:  ADD  Anxiety  Vitamin D  Deficiency  OA of Left Hip  Gout    Continue home medications as appropriate.       Global Plan of Care  IVF: none  Diet: NPO  DVT prophylaxis: Lovenox subqu  Consults: none  Code Status: FULL code    Dispo: Awaiting further evaluation of chest pain. Anticipate 1-2 midnight stays.       Jyoti Cee DO  Internal Medicine, PGY1     Senior Addendum:    Patient is 62 y/o F with PMHx significant for HTN, HLD, ADD, anxiety, OA, gout who is presenting due to chest pain.  The patient describes a constant left-sided chest pain that is sharp, dull, and achy all simultaneously and began abruptly 2 weeks ago.  She notes that she has chest pain with shortness of breath on exertion that is improved with rest.  Denies any radiation of symptoms into the arms or jaw.  She was recently seen in the ED yesterday for these exact same symptoms, and at that time had a negative cardiac workup including troponins, BNP, dimer, CXR, and EKG and was discharged home with instructions to follow-up with PCP.  She is a current1 pack/day smoker for 35+ years, and notes that her uncle  of a heart attack.  For this writer, she denies any fevers, chills, nausea, vomiting, dysuria, or hematuria.  She reports that she does not follow with a cardiologist, but per chart review sees Dr. Post and has an appointment with Dr. Mcconnell scheduled for .  Additionally, she follows with pain management for the past 15 years due to chronic back pain.    In the ED, patient was HDS on RA.  Labs were grossly unremarkable.  Troponins negative x 2.  CXR negative for acute cardiopulmonary process.  EKG showing NSR with rate 80, WA interval 138, Qtc 438, no changes when compared to prior EKG from yesterday. No interventions were performed in the ED and the patient was admitted to the general medicine service.    On exam, the patient is AxO3, heart RRR, lungs CTAB, abdomen soft/nontender/nondistended, there is reproducible exquisite chest wall pain to  palpation, no peripheral edema, strength and sensation intact in the upper and lower extremities bilaterally.  Highly suspect that this is costochondritis rather than ACS.  Patient has had multiple workups that are all negative, but despite this wants to be admitted.  The patient had a lipid panel and A1c on 7/5, lipid panel demonstrated  and cholesterol 211, A1c 5.3%. Will start atorvastatin, but will not load with aspirin due to anaphylactic allergy to ibuprofen.  Given that patient has a Cardiology appointment scheduled for 7/19 do not see immediate need for inpatient cardiac consultation.  However, the patient has had multiple emergency department visits and may benefit from cardiology consultation to avoid future ED visits for similar symptoms.  Will make NPO for possible cardiac intervention, but do not suspect that this will be necessary and diet can likely be resumed.  Will resume home pain medication regiment pending formal medication reconciliation.  Anticipate discharge in the next 24 to 48 hours.    Case to be staffed with attending physician,  Ector Vegas, DO PGY-2

## 2024-07-14 NOTE — DISCHARGE SUMMARY
Discharge Diagnosis  Chest pain    Issues Requiring Follow-Up  Chest pain  Hyperlipidemia    Discharge Meds     Your medication list        START taking these medications        Instructions Last Dose Given Next Dose Due   atorvastatin 40 mg tablet  Commonly known as: Lipitor      Take 1 tablet (40 mg) by mouth once daily at bedtime.              CONTINUE taking these medications        Instructions Last Dose Given Next Dose Due   albuterol 90 mcg/actuation inhaler  Commonly known as: Ventolin HFA      Inhale 2 puffs every 4 hours if needed for wheezing or shortness of breath.       allopurinol 100 mg tablet  Commonly known as: Zyloprim      Take 1 tablet (100 mg) by mouth once daily.       ascorbic acid 500 mg tablet  Commonly known as: Vitamin C           colchicine 0.6 mg tablet      TAKE ONE TABLET (0.6 MG) BY MOUTH DAILY       cyclobenzaprine 5 mg tablet  Commonly known as: Flexeril           gabapentin 800 mg tablet  Commonly known as: Neurontin           HYDROcodone-acetaminophen  mg tablet  Commonly known as: Norco           lidocaine 4 % patch           lisinopril 10 mg tablet      Take 1 tablet (10 mg) by mouth once daily.       medical cannabis           multivitamin tablet           naloxone 4 mg/0.1 mL nasal spray  Commonly known as: Narcan           ondansetron 8 mg tablet  Commonly known as: Zofran      TAKE ONE TABLET (8 MG) BY MOUTH EVERY EIGHT HOURS IF NEEDED FOR NAUSEA OR VOMITING.              STOP taking these medications      cefdinir 300 mg capsule  Commonly known as: Omnicef                  Where to Get Your Medications        These medications were sent to [facility] - [place], [place] - [address] [address]  [id number], [facility] [address]      Phone: [phone]   atorvastatin 40 mg tablet         Test Results Pending At Discharge  Pending Labs       No current pending labs.            Hospital Course   Malika Casper is a 63 y.o. female with PMHx of HTN, HLD, ADD, Anxiety,  Vitamin D Deficiency, OA of Left Hip, and Gout who presents to [facility] with c/o chest pain. Patient reports left sided chest pain onset 2 weeks ago, which is described as constant, sharp, and aching. Pain does not radiate. It is worse with exertion and with palpation of the chest. It also became sharper in quality when she smoked medical marijuana today, so she stopped smoking. Reports associated SOB and dizziness. Patient visited the ED yesterday for this pain and states that it feels the same as it did yesterday. She has never has this kind of chest pain in her life before the last 2 weeks. Also notes that she has been tired for the last 2 days. Notes urinary frequency with no burning. Denies fever, chills, lightheadedness, palpitations, jaw pain, arm tightness, abdominal pain, N/V, diarrhea, constipation, or leg swelling. Patient denies any recent upper respiratory infection. Denies any personal or family history of heart disease. She smokes half a pack a day for 30 years, she does not drink alcohol, and she smokes medical marijuana (last time was today).      Patient visited [facility] ED yesterday (7/12/2024) with similar symptoms. She had a negative troponin and D-dimer. Also unremarkable EKG and CXR. Patient heart score of 3 at that time with low suspicion for ACS. Patient was given Percocet and Zofran, then discharged with pain medications. Patient was recommended follow up with her PCP and she was referred to cardiology.      In the ED, temp of 97.2, HR 89, RR 20, /87, O2 96% on RA. Labs significant for K 3.4. Troponin negative. EKG showed NSR with possible left atrial enlargement, with HR 80, NM interval 138, QT of 380, no ischemic changes. CXR with no acute cardiopulmonary process. No interventions were done in the ED.     Hospital course: Patient remained in the hospital overnight.  Vital signs stable.  Given recurrent ED presentations for similar pain and smoking history cardiology was consulted  inpatient.  Patient was seen by cardiology who agreed this is likely noncardiac chest pain.  Recommends outpatient stress testing.  Patient to keep her appointment with cardiology on 7/19.    New medications on discharge:  Atorvastatin 40 mg    Pertinent Physical Exam At Time of Discharge  Physical Exam    General: Awake, alert/oriented x4, well developed, no acute distress  Head: Atraumatic/Normocephalic  Eyes: Normal external exam, EOMI, PERRLA  ENT: Oropharynx normal. Uvula midline, no tonsillar edema, moist mucous membranes  Cardiovascular: RRR, S1/S2, no murmurs, rubs, or gallops, radial pulses +2, no edema of extremities.  Left lateral chest wall tenderness  Pulmonary: CTAB, no respiratory distress. No wheezes, rales, or ronchi  Abdomen: +BS, soft, non-tender, nondistended, no guarding or rebound, no masses noted  MSK: No joint swelling, normal movements of all extremities. Range of motion- normal.  Extremities: FROM, no edema, wounds, or contusions  Skin- No lesions, contusions, or erythema.  Neuro: Alert/oriented x4, no focal motor or sensory deficits  Psychiatric: Judgment intact. Appropriate mood and behavior    Outpatient Follow-Up  Future Appointments   Date Time Provider Department Center   7/19/2024  2:30 PM Renae Mcconnell MD [id number] [place]   8/2/2024  7:45 AM Asaf Cochran MD [id number] [place]         Merissa Luna, DO  Internal medicine, PGY 3

## 2024-07-14 NOTE — ED PROVIDER NOTES
HPI   Chief Complaint   Patient presents with    Chest Pain       Patient is a 63-year-old female with history HTN, HLD presenting [facility] ED for persistent chest pain.  Patient reports that chest pain has been going on for a month.  Patient was just seen in this ED and discharged after negative cardiac workup.  Patient reports that she is still having chest pain and shortness of breath.  Patient denies any other new symptoms at this time.  Patient denies any fever, chills, dizziness, lightheadedness, headache, abdominal pain, or weakness.                          Christin Coma Scale Score: 15                     Patient History   Past Medical History:   Diagnosis Date    Abnormal findings on diagnostic imaging of other specified body structures     Abnormal finding on imaging    Encounter for screening for malignant neoplasm of colon 04/27/2022    Colon cancer screening    Liver disease, unspecified 12/16/2022    Chronic liver disease    Personal history of other medical treatment     History of transvaginal ultrasound    Personal history of other medical treatment     History of ultrasound, pelvic     Past Surgical History:   Procedure Laterality Date    ANKLE SURGERY  09/18/2017    Ankle Surgery    CT ABDOMEN PELVIS ANGIOGRAM W AND/OR WO IV CONTRAST  5/3/2020    CT ABDOMEN PELVIS ANGIOGRAM W AND/OR WO IV CONTRAST 5/3/2020 [facility]    KNEE SURGERY  09/18/2017    Knee Surgery    OTHER SURGICAL HISTORY  09/10/2015    Adrenal Gland Excision    OTHER SURGICAL HISTORY  12/02/2021    Gallbladder surgery     Family History   Problem Relation Name Age of Onset    Colon cancer Father      Prostate cancer Father      Breast cancer Mother's Sister  31     Social History     Tobacco Use    Smoking status: Every Day     Current packs/day: 0.50     Average packs/day: 0.5 packs/day for 25.0 years (12.5 ttl pk-yrs)     Types: Cigarettes     Passive exposure: Current    Smokeless tobacco: Never   Vaping Use     Vaping status: Never Used   Substance Use Topics    Alcohol use: Not Currently    Drug use: Not Currently       Physical Exam   ED Triage Vitals   Temperature Heart Rate Respirations BP   07/13/24 2028 07/13/24 2028 07/13/24 2028 07/13/24 2030   36.2 °C (97.2 °F) 89 20 177/87      Pulse Ox Temp Source Heart Rate Source Patient Position   07/13/24 2028 07/13/24 2028 07/13/24 2227 07/13/24 2227   96 % Temporal Monitor Lying      BP Location FiO2 (%)     07/13/24 2227 --     Right arm        Physical Exam  Constitutional:       Appearance: Normal appearance. She is normal weight.   HENT:      Head: Normocephalic and atraumatic.      Nose: Nose normal.      Mouth/Throat:      Mouth: Mucous membranes are moist.      Pharynx: Oropharynx is clear.   Eyes:      Extraocular Movements: Extraocular movements intact.      Conjunctiva/sclera: Conjunctivae normal.      Pupils: Pupils are equal, round, and reactive to light.   Cardiovascular:      Rate and Rhythm: Normal rate and regular rhythm.      Pulses: Normal pulses.      Heart sounds: Normal heart sounds.   Pulmonary:      Effort: Pulmonary effort is normal.      Breath sounds: Normal breath sounds.   Chest:      Chest wall: Tenderness present.   Abdominal:      General: Abdomen is flat. Bowel sounds are normal.      Palpations: Abdomen is soft.   Musculoskeletal:         General: Normal range of motion.      Cervical back: Normal range of motion and neck supple.   Skin:     General: Skin is warm and dry.      Capillary Refill: Capillary refill takes less than 2 seconds.   Neurological:      General: No focal deficit present.      Mental Status: She is alert and oriented to person, place, and time. Mental status is at baseline.   Psychiatric:         Mood and Affect: Mood normal.         Behavior: Behavior normal.         ED Course & MDM   Diagnoses as of 07/13/24 2350   Chest pain, unspecified type       Medical Decision Making  Patient is a 63 y.o. female who presents to  [facility] ED for Chest Pain. On initial ED evaluation, patient found to be in no acute distress. Per HPI, concern to evaluate and treat for possible ACS.  Obtaining cardiac lab workup and diagnostics.  Lab work reviewed, grossly unremarkable.  Patient had no acute ST changes on EKG.  Patient had negative delta troponin series.  Patient still endorsing chest pain, therefore would possibly benefit from inpatient cardiac evaluation.  Patient admitted to general medicine service for further evaluation and management.          Procedure  Procedures     Telma Rodriguez MD  Resident  07/14/24 0012
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
## 2024-07-19 ENCOUNTER — APPOINTMENT (OUTPATIENT)
Dept: CARDIOLOGY | Facility: CLINIC | Age: 64
End: 2024-07-19
Payer: COMMERCIAL

## 2024-07-19 VITALS
BODY MASS INDEX: 21.9 KG/M2 | HEART RATE: 90 BPM | WEIGHT: 119 LBS | DIASTOLIC BLOOD PRESSURE: 90 MMHG | HEIGHT: 62 IN | SYSTOLIC BLOOD PRESSURE: 134 MMHG | OXYGEN SATURATION: 96 %

## 2024-07-19 DIAGNOSIS — R07.89 OTHER CHEST PAIN: ICD-10-CM

## 2024-07-19 DIAGNOSIS — R09.89 DIMINISHED PULSES IN LOWER EXTREMITY: Primary | ICD-10-CM

## 2024-07-19 DIAGNOSIS — E78.2 MIXED HYPERLIPIDEMIA: ICD-10-CM

## 2024-07-19 DIAGNOSIS — R93.1 AGATSTON CAC SCORE, <100: ICD-10-CM

## 2024-07-19 DIAGNOSIS — I10 PRIMARY HYPERTENSION: ICD-10-CM

## 2024-07-19 DIAGNOSIS — I73.9 CLAUDICATION, INTERMITTENT (CMS-HCC): ICD-10-CM

## 2024-07-19 PROCEDURE — 3080F DIAST BP >= 90 MM HG: CPT | Performed by: INTERNAL MEDICINE

## 2024-07-19 PROCEDURE — 3008F BODY MASS INDEX DOCD: CPT | Performed by: INTERNAL MEDICINE

## 2024-07-19 PROCEDURE — 3075F SYST BP GE 130 - 139MM HG: CPT | Performed by: INTERNAL MEDICINE

## 2024-07-19 PROCEDURE — 99214 OFFICE O/P EST MOD 30 MIN: CPT | Performed by: INTERNAL MEDICINE

## 2024-07-19 RX ORDER — REGADENOSON 0.08 MG/ML
0.4 INJECTION, SOLUTION INTRAVENOUS
OUTPATIENT
Start: 2024-07-19

## 2024-07-19 RX ORDER — AMINOPHYLLINE 25 MG/ML
125 INJECTION, SOLUTION INTRAVENOUS ONCE AS NEEDED
OUTPATIENT
Start: 2024-07-19

## 2024-07-19 ASSESSMENT — ENCOUNTER SYMPTOMS
ABDOMINAL DISTENTION: 0
PALPITATIONS: 0
ARTHRALGIAS: 1
CONSTIPATION: 0
NUMBNESS: 1
BRUISES/BLEEDS EASILY: 1
EYES NEGATIVE: 1
NAUSEA: 1
HEMATURIA: 1
SHORTNESS OF BREATH: 1
NERVOUS/ANXIOUS: 1
DYSURIA: 1
COUGH: 1
VOMITING: 1
BACK PAIN: 1

## 2024-07-19 NOTE — PROGRESS NOTES
CARDIOLOGY NEW PATIENT OFFICE VISIT    Patient:  Malika Casper  YOB: 1960  MRN: [id number]       Chief Complaint/Active Symptoms:       Malika Casper is a 63 y.o. female who is being seen today at the request of *** for evaluation of ***.    Chief Complaint   Patient presents with   • Establish Care   • ER Follow-up       History of Present Illness:   HPI      Allergies:     Allergies   Allergen Reactions   • Amlodipine Shortness of breath   • Ibuprofen Diarrhea and GI Upset   • Sulfamethoxazole-Trimethoprim Unknown        Outpatient Medications:     Current Outpatient Medications   Medication Instructions   • albuterol (Ventolin HFA) 90 mcg/actuation inhaler 2 puffs, inhalation, Every 4 hours PRN   • allopurinol (ZYLOPRIM) 100 mg, oral, Daily   • ascorbic acid (VITAMIN C) 500 mg, oral, Daily   • atorvastatin (LIPITOR) 40 mg, oral, Nightly   • colchicine 0.6 mg tablet TAKE ONE TABLET (0.6 MG) BY MOUTH DAILY   • cyclobenzaprine (FLEXERIL) 5 mg, oral, 3 times daily   • gabapentin (Neurontin) 800 mg tablet Take 1 tablet (800 mg) by mouth 3 times a day.   • HYDROcodone-acetaminophen (Norco)  mg tablet 1 tablet, oral, Every 4 hours, Max 5 tablets daily   • lidocaine 4 % patch 1 patch, transdermal, Daily   • lisinopril 10 mg, oral, Daily   • medical cannabis 1 each, oral, As needed, Oil Or Sol Vap - 3.47 - 170 - Indica Mireya Dueñas, Edb Oral Admin 50-10 Gummy Pineapple Punch Nakia, Edb Oral Admin - 22 - 10 - Gummy - D-Berry   • multivitamin tablet 1 tablet, oral, Daily   • naloxone (Narcan) 4 mg/0.1 mL nasal spray Administer 1 spray (4 mg) into affected nostril(s) if needed for opioid reversal.   • ondansetron (Zofran) 8 mg tablet TAKE ONE TABLET (8 MG) BY MOUTH EVERY EIGHT HOURS IF NEEDED FOR NAUSEA OR VOMITING.        Past Medical History:     Past Medical History:   Diagnosis Date   • Abnormal findings on diagnostic imaging of other specified body structures     Abnormal finding  on imaging   • Encounter for screening for malignant neoplasm of colon 04/27/2022    Colon cancer screening   • Liver disease, unspecified 12/16/2022    Chronic liver disease   • Personal history of other medical treatment     History of transvaginal ultrasound   • Personal history of other medical treatment     History of ultrasound, pelvic       Social History:     Social History     Socioeconomic History   • Marital status: Single   Tobacco Use   • Smoking status: Every Day     Current packs/day: 0.50     Average packs/day: 0.5 packs/day for 25.0 years (12.5 ttl pk-yrs)     Types: Cigarettes     Passive exposure: Current   • Smokeless tobacco: Never   Vaping Use   • Vaping status: Never Used   Substance and Sexual Activity   • Alcohol use: Not Currently   • Drug use: Not Currently   • Sexual activity: Defer     Social Determinants of Health     Financial Resource Strain: Medium Risk (7/14/2024)    Overall Financial Resource Strain (CARDIA)    • Difficulty of Paying Living Expenses: Somewhat hard   Food Insecurity: Not at Risk (4/3/2023)    Received from [facility]    Food Insecurity    • Food: 1   Transportation Needs: No Transportation Needs (7/14/2024)    PRAPARE - Transportation    • Lack of Transportation (Medical): No    • Lack of Transportation (Non-Medical): No   Physical Activity: Not on File (3/28/2023)    Received from [facility]    Physical Activity    • Physical Activity: 0   Stress: Not at Risk (4/3/2023)    Received from [facility]    Stress    • Stress: 1   Social Connections: Not at Risk (4/3/2023)    Received from [facility]    Social Connections    • Social Connections and Isolation: 1   Housing Stability: Low Risk  (7/14/2024)    Housing Stability Vital Sign    • Unable to Pay for Housing in the Last Year: No    • Number of Times Moved in the Last Year: 1    • Homeless in the Last Year: No       Family History:        Family History   Problem Relation Name Age of Onset   • Colon cancer  Father     • Prostate cancer Father     • Breast cancer Mother's Sister  31       Review of Systems:     Review of Systems    Objective:     There were no vitals filed for this visit.    There were no vitals filed for this visit.    Physical Examination:   GENERAL:  Well developed, well nourished, in no acute distress.  HEENT: NC AT EOMI with anicteric sclera  NECK:  Supple, no JVD, no bruit.  CHEST:  Symmetric and nontender.  LUNGS:  Normal respiratory effort. Bilateral breath sounds clear to auscultation.   HEART:  PMI nondisplaced. Rate and rhythm regular with no evident murmur, no gallop appreciated. There are no rubs, clicks or heaves.  PERIPHERAL VASCULAR:  Pulses present and equally palpable; 2+ throughout.  ABDOMEN: Soft, NT, ND without HSM or palpable organomegaly, no bruits, normoactive bowel sounds  MUSCULOSKELETAL: No significant joint or limb deformity  EXTREMITIES:  Warm with good color, no clubbing or cyanosis.  There is no edema noted.  NEURO/PSYCH:  Alert and oriented times three with approppriate behavior and responses.  LYMPH: no significant palpable lymphadenopathy  SKIN: No rashes on exposed skin, no reported skin lesions.     Lab:     CBC:   Lab Results   Component Value Date    WBC 9.0 07/14/2024    RBC 4.16 07/14/2024    HGB 13.2 07/14/2024    HCT 40.4 07/14/2024     07/14/2024      Lab Results   Component Value Date    WBC 9.0 07/14/2024    WBC 9.9 07/13/2024    WBC 8.6 07/12/2024    RBC 4.16 07/14/2024    RBC 4.21 07/13/2024    RBC 4.67 07/12/2024    HGB 13.2 07/14/2024    HGB 13.6 07/13/2024    HGB 15.0 07/12/2024    HCT 40.4 07/14/2024    HCT 41.4 07/13/2024    HCT 45.0 07/12/2024    MCV 97 07/14/2024    MCV 98 07/13/2024    MCV 96 07/12/2024    MCH 31.7 07/14/2024    MCH 32.3 07/13/2024    MCH 32.1 07/12/2024    MCHC 32.7 07/14/2024    MCHC 32.9 07/13/2024    MCHC 33.3 07/12/2024    RDW 13.8 07/14/2024    RDW 14.0 07/13/2024    RDW 13.8 07/12/2024     07/14/2024      "07/13/2024     07/12/2024    MPV 10.6 10/24/2023    MPV 9.8 10/22/2023       CMP:    Lab Results   Component Value Date     07/14/2024    K 3.5 07/14/2024     07/14/2024    CO2 28 07/14/2024    BUN 16 07/14/2024    CREATININE 0.57 07/14/2024    GLUCOSE 92 07/14/2024    CALCIUM 9.4 07/14/2024     Lab Results   Component Value Date     07/14/2024     07/13/2024     07/12/2024    K 3.5 07/14/2024    K 3.4 (L) 07/13/2024    K 5.2 07/12/2024     07/14/2024     07/13/2024     07/12/2024    CO2 28 07/14/2024    CO2 27 07/13/2024    CO2 26 07/12/2024    BUN 16 07/14/2024    BUN 21 07/13/2024    BUN 17 07/12/2024    CREATININE 0.57 07/14/2024    CREATININE 0.66 07/13/2024    CREATININE 0.71 07/12/2024     Lab Results   Component Value Date    ALKPHOS 61 07/13/2024    ALT 16 07/13/2024    AST 15 07/13/2024    PROT 6.6 07/13/2024    BILITOT 0.2 07/13/2024    BILIDIR 0.0 02/09/2022       Magnesium:    Lab Results   Component Value Date    MG 1.85 07/14/2024       Lipid Profile:    Lab Results   Component Value Date    TRIG 109 07/05/2024    HDL 66.9 07/05/2024    LDLCALC 122 (H) 07/05/2024       TSH:    Lab Results   Component Value Date    TSH 2.03 07/05/2024       BNP:   Lab Results   Component Value Date    BNP 60 07/12/2024        PT/INR:    Lab Results   Component Value Date    PROTIME 11.1 07/13/2024    INR 1.0 07/13/2024       HgBA1c:    Lab Results   Component Value Date    HGBA1C 5.3 07/05/2024       Cardiac Enzymes:    Lab Results   Component Value Date    TROPHS 5 07/14/2024    TROPHS 4 07/13/2024    TROPHS 5 07/12/2024         Diagnostic Studies:     No echocardiogram results found for the past 12 months    No nuclear medicine results found for the past 12 months    No valid procedures specified.    Radiology:     No valid procedures specified.    Assessment:     {Assess/Plan SmartLinks:95995::\"***\"}    Plan:       "

## 2024-07-19 NOTE — PROGRESS NOTES
Patient:  Malika Casper  YOB: 1960  MRN: [id number]       Chief Complaint/Active Symptoms:       Malika Casper is a 63 y.o. female who returns today for cardiac follow-up.    Patient here for follow-up of chest discomfort. Continues to have intermittent pain without any particular pattern. Best she can describe is a stabbing pain that lasts for 10 minutes without any symptoms. Comes and goes at will. Has no SOB, nausea, vomiting, diaphoresis when she has the pain but feels like she needs to cry.     Gets the pain 9-10 times per day. Has not changed.     No new problems since hospital discharge.     Review of Systems   HENT:  Positive for congestion.    Eyes: Negative.    Respiratory:  Positive for cough (BLOOD AND MUCOUS) and shortness of breath (with walking, no orthopnea or pnd).    Cardiovascular:  Positive for chest pain. Negative for palpitations and leg swelling.   Gastrointestinal:  Positive for nausea (every morning) and vomiting. Negative for abdominal distention and constipation.   Endocrine: Positive for cold intolerance.   Genitourinary:  Positive for dysuria (burning discomfort with urination) and hematuria (none recentl. but seen previously).   Musculoskeletal:  Positive for arthralgias (hips and knees) and back pain.   Neurological:  Positive for numbness (toes).   Hematological:  Bruises/bleeds easily.   Psychiatric/Behavioral:  The patient is nervous/anxious.    All other systems reviewed and are negative.      Objective:     Vitals:    07/19/24 1510   BP: 134/90   Pulse: 90   SpO2: 96%   ;s    Allergies:     Allergies   Allergen Reactions    Amlodipine Shortness of breath    Ibuprofen Diarrhea and GI Upset    Sulfamethoxazole-Trimethoprim Unknown          Medications:     Current Outpatient Medications   Medication Instructions    albuterol (Ventolin HFA) 90 mcg/actuation inhaler 2 puffs, inhalation, Every 4 hours PRN    allopurinol (ZYLOPRIM) 100 mg, oral, Daily     ascorbic acid (VITAMIN C) 500 mg, oral, Daily    atorvastatin (LIPITOR) 40 mg, oral, Nightly    colchicine 0.6 mg tablet TAKE ONE TABLET (0.6 MG) BY MOUTH DAILY    cyclobenzaprine (FLEXERIL) 5 mg, oral, 3 times daily    gabapentin (Neurontin) 800 mg tablet Take 1 tablet (800 mg) by mouth 3 times a day.    HYDROcodone-acetaminophen (Norco)  mg tablet 1 tablet, oral, Every 4 hours, Max 5 tablets daily    lidocaine 4 % patch 1 patch, transdermal, Daily    lisinopril 10 mg, oral, Daily    medical cannabis 1 each, oral, As needed, Oil Or Sol Vap - 3.47 - 170 - Indica Cart - La [employer]g Cake, Edb Oral Admin 50-10 Gummy Pineapple Punch Ubgood, Edb Oral Admin - 22 - 10 - Gummy - D-Berry    multivitamin tablet 1 tablet, oral, Daily    naloxone (Narcan) 4 mg/0.1 mL nasal spray Administer 1 spray (4 mg) into affected nostril(s) if needed for opioid reversal.    ondansetron (Zofran) 8 mg tablet TAKE ONE TABLET (8 MG) BY MOUTH EVERY EIGHT HOURS IF NEEDED FOR NAUSEA OR VOMITING.       Physical Examination:   GENERAL:  Well developed, well nourished, in no acute distress.  HEENT: NC AT, EOMI with anicteric sclera  NECK:  reduced ROM, no JVD, no bruit.  CHEST:  Symmetric and nontender.  LUNGS:  Clear to auscultation bilaterally, normal respiratory effort.  HEART:  PMI nonpalpable, RRR with normal S1 and S2, no S3. No appreciable murmur. Pedal pulses diminished with delayed cap refill. Dusky.   ABDOMEN: Soft, NT, ND without palpable organomegaly or bruits  EXTREMITIES:  Warm with good color, no clubbing or cyanosis.  There is no edema noted.  PERIPHERAL VASCULAR:  diminished, delayed cap refill 6-7 seconds.   MUSCULOSKELETAL: mild kyphosis, OA changes  NEURO/PSYCH:  Alert and oriented times three with approppriate behavior and responses. Nonfocal motor examination with ambulates with cane  Lymph: No significant palpable lymphadenopathy  Skin: no rash or lesions on exposed skin or reported.    Lab:     CBC:   Lab Results    Component Value Date    WBC 9.0 07/14/2024    RBC 4.16 07/14/2024    HGB 13.2 07/14/2024    HCT 40.4 07/14/2024     07/14/2024        CMP:    Lab Results   Component Value Date     07/14/2024    K 3.5 07/14/2024     07/14/2024    CO2 28 07/14/2024    BUN 16 07/14/2024    CREATININE 0.57 07/14/2024    GLUCOSE 92 07/14/2024    CALCIUM 9.4 07/14/2024       Magnesium:    Lab Results   Component Value Date    MG 1.85 07/14/2024       Lipid Profile:    Lab Results   Component Value Date    TRIG 109 07/05/2024    HDL 66.9 07/05/2024    LDLCALC 122 (H) 07/05/2024       TSH:    Lab Results   Component Value Date    TSH 2.03 07/05/2024       BNP:   Lab Results   Component Value Date    BNP 60 07/12/2024        PT/INR:    Lab Results   Component Value Date    PROTIME 11.1 07/13/2024    INR 1.0 07/13/2024       HgBA1c:    Lab Results   Component Value Date    HGBA1C 5.3 07/05/2024       BMP:  Lab Results   Component Value Date     07/14/2024     07/13/2024     07/12/2024    K 3.5 07/14/2024    K 3.4 (L) 07/13/2024    K 5.2 07/12/2024     07/14/2024     07/13/2024     07/12/2024    CO2 28 07/14/2024    CO2 27 07/13/2024    CO2 26 07/12/2024    BUN 16 07/14/2024    BUN 21 07/13/2024    BUN 17 07/12/2024    CREATININE 0.57 07/14/2024    CREATININE 0.66 07/13/2024    CREATININE 0.71 07/12/2024       Cardiac Enzymes:    Lab Results   Component Value Date    TROPHS 5 07/14/2024    TROPHS 4 07/13/2024    TROPHS 5 07/12/2024       Hepatic Function Panel:    Lab Results   Component Value Date    ALKPHOS 61 07/13/2024    ALT 16 07/13/2024    AST 15 07/13/2024    PROT 6.6 07/13/2024    BILITOT 0.2 07/13/2024    BILIDIR 0.0 02/09/2022         Diagnostic Studies:     No echocardiogram results found for the past 12 months    No nuclear medicine results found for the past 12 months  Nuclear stress 2/2023 was normal / low risk     No valid procedures specified.    EKG:   No results found  "for: \"EKG\"    Radiology:     Nuclear Stress Test    (Results Pending)   Vascular US lower extremity arterial duplex bilateral with DONNA    (Results Pending)   CT cardiac score \"3\" 12/2022  Recent CT scans without notification or any coronary calcifications.       ASSESSMENT     Problem List Items Addressed This Visit       Claudication, intermittent (CMS-HCC)    Relevant Orders    Vascular US lower extremity arterial duplex bilateral with DONNA    HTN (hypertension)    Hyperlipidemia    Chest pain    Relevant Orders    Nuclear Stress Test    Diminished pulses in lower extremity - Primary    Relevant Orders    Vascular US lower extremity arterial duplex bilateral with DONNA    Agatston CAC score, <100       PLAN     1.  Chest discomfort.  Patient's symptoms are atypical and not highly suggestive of angina.  She was recently hospitalized with negative markers for myocardial injury.  No new or ischemic EKG changes.  Given a low CT cardiac score less than 2 years ago would recommend repeating her stress test this 1 more time and then after that if the stress test is normal or low risk would really focus attention looking for noncardiac causes of chest discomfort.  Her symptoms or not suggestive of angina and not suggestive of pericarditis.  They are sharp which is more likely musculoskeletal in nature however there is no clear pattern to them.  Will defer any additional workup to her primary care physician.  Would recommend repeating her CT cardiac score in 1421-8021 given her low reading in December 2022.  2.  Bilateral foot pain questionable claudication with diminished pulses.  I have looked and she has had venous duplex studies but she has not had any arterial studies given her delayed capillary refill.  I have recommended arterial duplex to bilateral legs with DONNA.  If abnormal would refer to vascular medicine.  3.  Hypertension.  Diastolic blood pressures are borderline with her initial reading repeat shows better " blood pressure control.  4.  Coronary artery calcium score less than 100.  Actually it was 3 in December 2022 no indication for statin therapy.  5.  Elevated LDL cholesterol.  At this point in time recommend diet and exercise.    Will see the patient in follow-up after her stress test if it is normal we will reevaluate her symptoms at that time and defer any additional testing to her PCP.

## 2024-07-20 LAB
ATRIAL RATE: 80 BPM
P AXIS: 55 DEGREES
P OFFSET: 208 MS
P ONSET: 156 MS
PR INTERVAL: 138 MS
Q ONSET: 225 MS
QRS COUNT: 13 BEATS
QRS DURATION: 84 MS
QT INTERVAL: 380 MS
QTC CALCULATION(BAZETT): 438 MS
QTC FREDERICIA: 418 MS
R AXIS: -27 DEGREES
T AXIS: 33 DEGREES
T OFFSET: 415 MS
VENTRICULAR RATE: 80 BPM

## 2024-07-22 ENCOUNTER — HOSPITAL ENCOUNTER (EMERGENCY)
Facility: HOSPITAL | Age: 64
Discharge: HOME | End: 2024-07-22
Attending: EMERGENCY MEDICINE
Payer: COMMERCIAL

## 2024-07-22 VITALS
DIASTOLIC BLOOD PRESSURE: 80 MMHG | BODY MASS INDEX: 21.9 KG/M2 | HEIGHT: 62 IN | RESPIRATION RATE: 18 BRPM | OXYGEN SATURATION: 98 % | SYSTOLIC BLOOD PRESSURE: 124 MMHG | TEMPERATURE: 96.8 F | HEART RATE: 87 BPM | WEIGHT: 119 LBS

## 2024-07-22 DIAGNOSIS — B02.9 HERPES ZOSTER WITHOUT COMPLICATION: Primary | ICD-10-CM

## 2024-07-22 DIAGNOSIS — R11.0 MILD NAUSEA: ICD-10-CM

## 2024-07-22 LAB
ALBUMIN SERPL BCP-MCNC: 4.3 G/DL (ref 3.4–5)
ALP SERPL-CCNC: 67 U/L (ref 33–136)
ALT SERPL W P-5'-P-CCNC: 19 U/L (ref 7–45)
ANION GAP SERPL CALC-SCNC: 11 MMOL/L (ref 10–20)
AST SERPL W P-5'-P-CCNC: 42 U/L (ref 9–39)
BASOPHILS # BLD AUTO: 0.05 X10*3/UL (ref 0–0.1)
BASOPHILS NFR BLD AUTO: 0.6 %
BILIRUB SERPL-MCNC: 0.2 MG/DL (ref 0–1.2)
BUN SERPL-MCNC: 18 MG/DL (ref 6–23)
CALCIUM SERPL-MCNC: 9.8 MG/DL (ref 8.6–10.3)
CHLORIDE SERPL-SCNC: 103 MMOL/L (ref 98–107)
CO2 SERPL-SCNC: 29 MMOL/L (ref 21–32)
CREAT SERPL-MCNC: 0.61 MG/DL (ref 0.5–1.05)
EGFRCR SERPLBLD CKD-EPI 2021: >90 ML/MIN/1.73M*2
EOSINOPHIL # BLD AUTO: 0.25 X10*3/UL (ref 0–0.7)
EOSINOPHIL NFR BLD AUTO: 3 %
ERYTHROCYTE [DISTWIDTH] IN BLOOD BY AUTOMATED COUNT: 13.3 % (ref 11.5–14.5)
GLUCOSE SERPL-MCNC: 81 MG/DL (ref 74–99)
HCT VFR BLD AUTO: 43.2 % (ref 36–46)
HGB BLD-MCNC: 14.2 G/DL (ref 12–16)
IMM GRANULOCYTES # BLD AUTO: 0.02 X10*3/UL (ref 0–0.7)
IMM GRANULOCYTES NFR BLD AUTO: 0.2 % (ref 0–0.9)
LYMPHOCYTES # BLD AUTO: 2.43 X10*3/UL (ref 1.2–4.8)
LYMPHOCYTES NFR BLD AUTO: 29 %
MCH RBC QN AUTO: 32.1 PG (ref 26–34)
MCHC RBC AUTO-ENTMCNC: 32.9 G/DL (ref 32–36)
MCV RBC AUTO: 98 FL (ref 80–100)
MONOCYTES # BLD AUTO: 0.67 X10*3/UL (ref 0.1–1)
MONOCYTES NFR BLD AUTO: 8 %
NEUTROPHILS # BLD AUTO: 4.97 X10*3/UL (ref 1.2–7.7)
NEUTROPHILS NFR BLD AUTO: 59.2 %
NRBC BLD-RTO: 0 /100 WBCS (ref 0–0)
PLATELET # BLD AUTO: 245 X10*3/UL (ref 150–450)
POTASSIUM SERPL-SCNC: 5 MMOL/L (ref 3.5–5.3)
PROT SERPL-MCNC: 7.4 G/DL (ref 6.4–8.2)
RBC # BLD AUTO: 4.42 X10*6/UL (ref 4–5.2)
RBC MORPH BLD: NORMAL
SODIUM SERPL-SCNC: 138 MMOL/L (ref 136–145)
WBC # BLD AUTO: 8.4 X10*3/UL (ref 4.4–11.3)

## 2024-07-22 PROCEDURE — 99284 EMERGENCY DEPT VISIT MOD MDM: CPT | Performed by: EMERGENCY MEDICINE

## 2024-07-22 PROCEDURE — 80053 COMPREHEN METABOLIC PANEL: CPT

## 2024-07-22 PROCEDURE — 36415 COLL VENOUS BLD VENIPUNCTURE: CPT

## 2024-07-22 PROCEDURE — 85025 COMPLETE CBC W/AUTO DIFF WBC: CPT

## 2024-07-22 PROCEDURE — 99283 EMERGENCY DEPT VISIT LOW MDM: CPT

## 2024-07-22 RX ORDER — ACYCLOVIR 800 MG/1
800 TABLET ORAL
Qty: 35 TABLET | Refills: 0 | Status: SHIPPED | OUTPATIENT
Start: 2024-07-22 | End: 2024-07-29

## 2024-07-22 ASSESSMENT — LIFESTYLE VARIABLES
HAVE YOU EVER FELT YOU SHOULD CUT DOWN ON YOUR DRINKING: NO
TOTAL SCORE: 0
EVER HAD A DRINK FIRST THING IN THE MORNING TO STEADY YOUR NERVES TO GET RID OF A HANGOVER: NO
EVER FELT BAD OR GUILTY ABOUT YOUR DRINKING: NO
HAVE PEOPLE ANNOYED YOU BY CRITICIZING YOUR DRINKING: NO

## 2024-07-22 ASSESSMENT — PAIN SCALES - GENERAL
PAINLEVEL_OUTOF10: 0 - NO PAIN

## 2024-07-22 ASSESSMENT — PAIN - FUNCTIONAL ASSESSMENT
PAIN_FUNCTIONAL_ASSESSMENT: 0-10
PAIN_FUNCTIONAL_ASSESSMENT: 0-10

## 2024-07-22 NOTE — DISCHARGE INSTRUCTIONS
Take acyclovir as directed for your shingles/herpes zoster.  Follow-up with your PCP.  Discuss with your PCP regarding any dermatology or neurology follow-up/referral for shingles.  Return to the ER for any persistent or worsening rash, pain, eye pain, hearing change or other symptoms

## 2024-07-22 NOTE — ED PROVIDER NOTES
HPI   Chief Complaint   Patient presents with    Rash     Patient states itchy  rash started 2 days ago       HPI    63-year-old female patient arriving with a rash since Saturday that is itchy and somewhat painful.  It is primarily on her right chest and slightly on her neck and right scapula.  She also describes nausea.  She denies any chest pain, abdominal pain dyspnea, sick contacts.  Denies any detergent use.  Denies any anticoagulation.  She is not vaccinated for zoster.    Patient History   Past Medical History:   Diagnosis Date    Abnormal findings on diagnostic imaging of other specified body structures     Abnormal finding on imaging    Encounter for screening for malignant neoplasm of colon 04/27/2022    Colon cancer screening    Liver disease, unspecified 12/16/2022    Chronic liver disease    Personal history of other medical treatment     History of transvaginal ultrasound    Personal history of other medical treatment     History of ultrasound, pelvic     Past Surgical History:   Procedure Laterality Date    ANKLE SURGERY  09/18/2017    Ankle Surgery    CT ABDOMEN PELVIS ANGIOGRAM W AND/OR WO IV CONTRAST  5/3/2020    CT ABDOMEN PELVIS ANGIOGRAM W AND/OR WO IV CONTRAST 5/3/2020 [facility]    KNEE SURGERY  09/18/2017    Knee Surgery    OTHER SURGICAL HISTORY  09/10/2015    Adrenal Gland Excision    OTHER SURGICAL HISTORY  12/02/2021    Gallbladder surgery     Family History   Problem Relation Name Age of Onset    Colon cancer Father      Prostate cancer Father      Breast cancer Mother's Sister  31     Social History     Tobacco Use    Smoking status: Every Day     Current packs/day: 0.50     Average packs/day: 0.5 packs/day for 25.0 years (12.5 ttl pk-yrs)     Types: Cigarettes     Passive exposure: Current    Smokeless tobacco: Never   Vaping Use    Vaping status: Never Used   Substance Use Topics    Alcohol use: Not Currently    Drug use: Not Currently       Physical Exam   ED Triage Vitals  [07/22/24 1416]   Temperature Heart Rate Respirations BP   36 °C (96.8 °F) 82 18 (!) 146/99      Pulse Ox Temp Source Heart Rate Source Patient Position   97 % Temporal Monitor Sitting      BP Location FiO2 (%)     Right arm --       Physical Exam  General: Alert, no acute distress, well developed, Well hydrated  Head: NCAT  Eyes: Clear conjunctiva, EOMI  ENT: Moist mucosa, no lymphadenopathy  Respiratory: Normal respiratory effort. CTAB. Symmetric aeration. No wheezes, rales, or Rhonchi.  CV: Normal rhythm, Normal Rate, pulses present bilaterally  GI: Soft, NT, no RBT, no guarding, No distention  : no flank tenderness, no cva tenderness  MSK: Atraumatic. Full ROM in extremities. Bilateral symmetric intact strength. No pedal edema.  Skin: Warm, c/d/i, erythematous tender coalesced diffuse rash localized along the right chest with a similar smaller patch on the right neck and the right scapula.  Unimpressive petechiae on the abdomen and right hand dorsally.  Neuro: AOx3, facial symmetry,   sensorimotor intact in extremities,   CN intact, Nonfocal neurological exam      ED Course & MDM   Diagnoses as of 07/22/24 1634   Herpes zoster without complication                       Christin Coma Scale Score: 15                        Medical Decision Making      63-year-old female patient arriving with a rash since Saturday that is itchy and somewhat painful.  It is primarily on her right chest and slightly on her neck and right scapula.  She also describes nausea.  She denies any chest pain, abdominal pain dyspnea, sick contacts.  Denies any detergent use.  Denies any anticoagulation.  Warm, c/d/i, erythematous tender coalesced diffuse rash localized along the right chest with a similar smaller patch on the right neck and the right scapula.  Unimpressive petechiae on the abdomen and right hand dorsally.  CBC is unremarkable for thrombocytopenia despite noted petechia.  CBC and CMP unremarkable. She was discharged on  acyclovir 800 p.o. 5 times daily for 7 days for  for shingles.  Discharged in stable condition with return precautions and anticipatory guidance.  Patient is in agreement with the plan.  PCP follow-up advised.      External Records Reviewed: I reviewed recent and relevant outside records including: Prior  Independent Interpretation of Studies: I independently interpreted: As above  Social Determinants Affecting Care: Diagnostic testing considered: As above    I reviewed the case with the attending ER physician. Patient and/or patient´s representative was counseled regarding labs, imaging, likely diagnosis, and plan.   The patient was seen by the resident/fellow.  I have personally performed a substantive portion of the encounter.  I have seen and examined the patient; agree with the workup, evaluation, MDM, management and diagnosis.  The care plan has been discussed with the resident; I have reviewed the resident’s note and agree with the documented findings.                                Jemal Null MD MS  Emergency Medicine    The above documentation was completed with the use of speech recognition software. It may contain dictation errors secondary to limitations of the software.        Jemal Null MD  Resident  07/22/24 1700       Manjit Rodriguez DO  09/06/24 1341

## 2024-07-23 RX ORDER — ONDANSETRON HYDROCHLORIDE 8 MG/1
TABLET, FILM COATED ORAL
Qty: 15 TABLET | Refills: 0 | Status: SHIPPED | OUTPATIENT
Start: 2024-07-23 | End: 2024-07-24 | Stop reason: SDUPTHER

## 2024-07-24 ENCOUNTER — OFFICE VISIT (OUTPATIENT)
Dept: PRIMARY CARE | Facility: CLINIC | Age: 64
End: 2024-07-24
Payer: COMMERCIAL

## 2024-07-24 VITALS
SYSTOLIC BLOOD PRESSURE: 148 MMHG | DIASTOLIC BLOOD PRESSURE: 90 MMHG | WEIGHT: 121 LBS | HEIGHT: 62 IN | TEMPERATURE: 97.7 F | OXYGEN SATURATION: 98 % | BODY MASS INDEX: 22.26 KG/M2 | RESPIRATION RATE: 16 BRPM | HEART RATE: 88 BPM

## 2024-07-24 DIAGNOSIS — R11.0 MILD NAUSEA: ICD-10-CM

## 2024-07-24 DIAGNOSIS — B02.9 HERPES ZOSTER WITHOUT COMPLICATION: Primary | ICD-10-CM

## 2024-07-24 PROCEDURE — 99213 OFFICE O/P EST LOW 20 MIN: CPT | Performed by: NURSE PRACTITIONER

## 2024-07-24 RX ORDER — BISMUTH SUBSALICYLATE 262 MG
1 TABLET,CHEWABLE ORAL DAILY
Qty: 90 TABLET | Refills: 0 | Status: SHIPPED | OUTPATIENT
Start: 2024-07-24

## 2024-07-24 RX ORDER — ONDANSETRON HYDROCHLORIDE 8 MG/1
TABLET, FILM COATED ORAL
Qty: 15 TABLET | Refills: 0 | Status: SHIPPED | OUTPATIENT
Start: 2024-07-24

## 2024-07-24 RX ORDER — CALAMINE/ZINC OXIDE
1 LOTION (ML) TOPICAL 4 TIMES DAILY PRN
Qty: 1 BOTTLE | Refills: 1 | Status: SHIPPED | OUTPATIENT
Start: 2024-07-24

## 2024-07-24 ASSESSMENT — ENCOUNTER SYMPTOMS
NAUSEA: 1
DIZZINESS: 0
CHILLS: 1
WHEEZING: 0
NUMBNESS: 0
COUGH: 0
MYALGIAS: 0
DIARRHEA: 0
FATIGUE: 0
SHORTNESS OF BREATH: 0
ABDOMINAL PAIN: 0
CONSTIPATION: 0
VOMITING: 0
FEVER: 0

## 2024-07-24 NOTE — PATIENT INSTRUCTIONS
Today you were seen for shingles. Continue with acyclovir as directed. Over-the-counter tylenol as needed for pain per package instructions,  Keep lesions covered until appearance of crust. May use calamine lotion 4-5 times every day over rash or zinc oxide over rash. Avoid contact with immunocompromised people, pregnant women, and people with no history of chickenpox infections.  Follow-up with primary care provider in 1 week with any persisting symptoms, or sooner with any additional concerns

## 2024-07-24 NOTE — PROGRESS NOTES
"Subjective   Patient ID: Malika Casper is a 63 y.o. female who presents for Herpes Zoster.    Herpes Zoster  Episode onset: 4 days. Associated symptoms include chills, nausea and a rash. Pertinent negatives include no abdominal pain, coughing, fatigue, fever, myalgias, numbness or vomiting. Associated symptoms comments: itchy.    Patient states she went to the ER and got diagnosed with shingles. She was given Zovirax 800 mg. She is following up from the hospital.    63-year-old female presents today complaining of shingles.  Patient states that she started with a rash on her right chest 4 days ago.  She went to the emergency room 2 days ago where she was diagnosed with shingles.  She was started on acyclovir.  She states that the rash is itching and painful.  She has noticed a few other lesions on her lower neck as well.  She is also requesting a refill of her Zofran that she takes as needed for anxiety and her multivitamin.    Review of Systems   Constitutional:  Positive for chills. Negative for fatigue and fever.   Respiratory:  Negative for cough, shortness of breath and wheezing.    Gastrointestinal:  Positive for nausea. Negative for abdominal pain, constipation, diarrhea and vomiting.   Musculoskeletal:  Negative for myalgias.   Skin:  Positive for rash.   Neurological:  Negative for dizziness and numbness.       Objective   /90 (BP Location: Right arm, Patient Position: Sitting, BP Cuff Size: Adult)   Pulse 88   Temp 36.5 °C (97.7 °F) (Temporal)   Resp 16   Ht 1.575 m (5' 2\")   Wt 54.9 kg (121 lb)   SpO2 98%   BMI 22.13 kg/m²     Physical Exam  General: Alert, no acute distress, well developed, Well hydrated  Respiratory: Normal respiratory effort. CTAB. Symmetric aeration. No wheezes, rales, or Rhonchi.  CV: Normal rhythm, Normal Rate, pulses present bilaterally  Skin: Warm, c/d/i, erythematous tender coalesced diffuse rash localized along the right chest with a similar smaller patch on " the right neck and the right scapula.    Neuro: AOx3, facial symmetry,   Psych: Appropriate mood and affect    Assessment/Plan   Problem List Items Addressed This Visit    None  Visit Diagnoses         Codes    Herpes zoster without complication    -  Primary B02.9    Relevant Medications    multivitamin tablet    calamine-zinc oxide lotion    Mild nausea     R11.0    Relevant Medications    ondansetron (Zofran) 8 mg tablet    BMI 22.0-22.9, adult     Z68.22        Herpes zoster: Continue with acyclovir as prescribed by ER.  Encouraged to use calamine or zinc over area as needed.  Educated on herpes zoster virus.  Follow-up with PCP in 1 week with any persisting symptoms, or sooner with any additional concerns.

## 2024-08-01 ENCOUNTER — PATIENT OUTREACH (OUTPATIENT)
Dept: PRIMARY CARE | Facility: CLINIC | Age: 64
End: 2024-08-01
Payer: COMMERCIAL

## 2024-08-01 DIAGNOSIS — I10 PRIMARY HYPERTENSION: ICD-10-CM

## 2024-08-01 DIAGNOSIS — I10 HYPERTENSION, UNSPECIFIED TYPE: ICD-10-CM

## 2024-08-01 NOTE — PROGRESS NOTES
Recovering from shingles  She plans to do shingrix at the appropriate time    Started atorvastatin  Tolerating well  Continues to be seen at [facility] appointment this Monday    Confirmed appointment VV with Dr. Cochran

## 2024-08-02 ENCOUNTER — APPOINTMENT (OUTPATIENT)
Dept: PRIMARY CARE | Facility: CLINIC | Age: 64
End: 2024-08-02
Payer: COMMERCIAL

## 2024-08-02 DIAGNOSIS — M16.12 PRIMARY OSTEOARTHRITIS OF LEFT HIP: ICD-10-CM

## 2024-08-02 DIAGNOSIS — I10 PRIMARY HYPERTENSION: ICD-10-CM

## 2024-08-02 DIAGNOSIS — F98.8 ATTENTION DEFICIT DISORDER, UNSPECIFIED HYPERACTIVITY PRESENCE: ICD-10-CM

## 2024-08-02 DIAGNOSIS — F43.21 ADJUSTMENT DISORDER WITH DEPRESSED MOOD: ICD-10-CM

## 2024-08-02 DIAGNOSIS — R73.9 HYPERGLYCEMIA: ICD-10-CM

## 2024-08-02 DIAGNOSIS — R53.83 FATIGUE, UNSPECIFIED TYPE: ICD-10-CM

## 2024-08-02 DIAGNOSIS — M10.9 GOUT, ARTHRITIS: ICD-10-CM

## 2024-08-02 DIAGNOSIS — Z79.899 MEDICATION MANAGEMENT: ICD-10-CM

## 2024-08-02 DIAGNOSIS — E55.9 VITAMIN D DEFICIENCY: ICD-10-CM

## 2024-08-02 DIAGNOSIS — M10.9 GOUT OF LEFT ELBOW, UNSPECIFIED CAUSE, UNSPECIFIED CHRONICITY: ICD-10-CM

## 2024-08-02 DIAGNOSIS — F43.9 STRESS: ICD-10-CM

## 2024-08-02 DIAGNOSIS — E78.2 MIXED HYPERLIPIDEMIA: ICD-10-CM

## 2024-08-02 DIAGNOSIS — F41.9 ANXIETY: ICD-10-CM

## 2024-08-02 PROCEDURE — 99442 PR PHYS/QHP TELEPHONE EVALUATION 11-20 MIN: CPT | Performed by: FAMILY MEDICINE

## 2024-08-02 RX ORDER — COLCHICINE 0.6 MG/1
0.6 TABLET ORAL DAILY
Qty: 30 TABLET | Refills: 0 | Status: CANCELLED | OUTPATIENT
Start: 2024-08-02

## 2024-08-02 NOTE — PROGRESS NOTES
Subjective   Patient ID: Malika Casper is a 63 y.o. female who presents for Pain.    HPI   Patient is following up on chronic pain and gout.    Patient had an episode yesterday of chest pain with no SOB or nausea. The pain was intermittent but she did have left side pain at night . She slept on cough and only could lay on right side. She refused to go to ER.    Patient has high stress and believed the stress/anxiety was causing the pain.    She feels better today and symptoms have resolved.  She was told to see ER if symptoms return.     Patient is asking for gout medication, Colchicine and allopurinol to be refilled.        Review of Systems  12 Systems have been reviewed as follows.  Constitutional: Fever, weight gain, weight loss, appetite change, night sweats, fatigue, chills.  Eyes : blurry, double vision, vision, loss, tearing, redness, pain, sensitivity to light, glaucoma.  Ears, nose, mouth, and throat: Hearing loss, ringing in the ears, ear pain, nasal congestion, nasal drainage, nosebleeds, mouth, throat, irritation tooth problem.  Cardiovascular :chest pain, pressure, heart racing, palpitations, sweating, leg swelling, high or low blood pressure  Pulmonary: Cough, yellow or green sputum, blood and sputum, shortness of breath, wheezing  Gastrointestinal: Nausea, vomiting, diarrhea, constipation, pain, blood in stool, or vomitus, heartburn, difficulty swallowing  Genitourinary: incontinence, abnormal bleeding, abnormal discharge, urinary frequency, urinary hesitancy, pain, impotence sexual problem, infection, urinary retention  Musculoskeletal: Pain, stiffness, joint, redness or warmth, arthritis, back pain, weakness, muscle wasting, sprain or fracture  Neuro: Weight weakness, dizziness, change in voice, change in taste change in vision, change in hearing, loss, or change of sensation, trouble walking, balance problems coordination problems, shaking, speech problem  Endocrine , cold or heat  intolerance, blood sugar problem, weight gain or loss missed periods hot flashes, sweats, change in body hair, change in libido, increased thirst, increased urination  Heme/lymph: Swelling, bleeding, problem anemia, bruising, enlarged lymph nodes  Allergic/immunologic: H. plus nasal drip, watery itchy eyes, nasal drainage, immunosuppressed  The above were reviewed and noted negative except as noted in HPI and Problem List.      Objective   There were no vitals taken for this visit.    Physical Exam  Constitutional: Well developed, well nourished, alert and in no acute distress     Assessment/Plan   Problem List Items Addressed This Visit             ICD-10-CM    ADD (attention deficit disorder) F98.8    Relevant Orders    Follow Up In [facility] - PCP - Established    Anxiety F41.9    Relevant Orders    Follow Up In [facility] - PCP - Established    Fatigue R53.83    Relevant Orders    Follow Up In [facility] - PCP - Established    HTN (hypertension) I10    Relevant Orders    Follow Up In [facility] - PCP - Established    Hyperlipidemia E78.5    Relevant Orders    Follow Up [facility] - PCP - Established    Stress F43.9    Relevant Orders    Follow Up In [facility] - PCP - Established    Vitamin D deficiency E55.9    Relevant Orders    Follow Up In [facility] - PCP - Established    Primary osteoarthritis of left hip M16.12    Relevant Orders    Follow Up In [facility] - PCP - Established    Adjustment disorder with depressed mood F43.21    Relevant Orders    Follow Up In [facility] - PCP - Established    Gout of elbow M10.9    Relevant Medications    allopurinol (Zyloprim) 100 mg tablet    Other Relevant Orders    Follow Up In [facility] - PCP - Established    Gout, arthritis M10.9    Relevant Orders    Follow Up In [facility] - PCP - Established     Other Visit Diagnoses         Codes     Medication management     Z79.899    Relevant Orders    Follow Up In [facility] - PCP - Established    Hyperglycemia     R73.9    Relevant Orders    Follow Up In [facility] - PCP - Established        Virtual Visit - Audio and Visual Communication Real Time     Sees  Dr. Lam      -see GYN      -Continue colchicine for gout PRN     -Go to PT      Stop marijuana     Declines psych meds      Risks explained        Continue current medications and therapy for chronic medical conditions.     Patient was advised importance of proper diet/nutrition in addition adequate hydration. Patient was encouraged moderate exercise program to include 30 minutes daily for 5 days of the week or 150 minutes weekly. Patient will follow-up with us as scheduled.     I personally reviewed the OARRS report for this patient. I have considered the risks of abuse, dependence, addiction, and diversion.

## 2024-08-04 RX ORDER — ALLOPURINOL 100 MG/1
100 TABLET ORAL DAILY
Qty: 30 TABLET | Refills: 0 | Status: SHIPPED | OUTPATIENT
Start: 2024-08-04

## 2024-08-18 ENCOUNTER — HOSPITAL ENCOUNTER (EMERGENCY)
Facility: HOSPITAL | Age: 64
Discharge: HOME | End: 2024-08-19
Attending: EMERGENCY MEDICINE
Payer: COMMERCIAL

## 2024-08-18 ENCOUNTER — APPOINTMENT (OUTPATIENT)
Dept: RADIOLOGY | Facility: HOSPITAL | Age: 64
End: 2024-08-18
Payer: COMMERCIAL

## 2024-08-18 DIAGNOSIS — K85.90 ACUTE PANCREATITIS WITHOUT INFECTION OR NECROSIS, UNSPECIFIED PANCREATITIS TYPE (HHS-HCC): ICD-10-CM

## 2024-08-18 DIAGNOSIS — R10.31 RLQ ABDOMINAL PAIN: Primary | ICD-10-CM

## 2024-08-18 LAB
ALBUMIN SERPL BCP-MCNC: 4.2 G/DL (ref 3.4–5)
ALP SERPL-CCNC: 66 U/L (ref 33–136)
ALT SERPL W P-5'-P-CCNC: 17 U/L (ref 7–45)
ANION GAP SERPL CALC-SCNC: 10 MMOL/L (ref 10–20)
APPEARANCE UR: CLEAR
AST SERPL W P-5'-P-CCNC: 16 U/L (ref 9–39)
BASOPHILS # BLD AUTO: 0.04 X10*3/UL (ref 0–0.1)
BASOPHILS NFR BLD AUTO: 0.4 %
BILIRUB SERPL-MCNC: 0.3 MG/DL (ref 0–1.2)
BILIRUB UR STRIP.AUTO-MCNC: NEGATIVE MG/DL
BUN SERPL-MCNC: 11 MG/DL (ref 6–23)
CALCIUM SERPL-MCNC: 9.6 MG/DL (ref 8.6–10.3)
CHLORIDE SERPL-SCNC: 104 MMOL/L (ref 98–107)
CO2 SERPL-SCNC: 29 MMOL/L (ref 21–32)
COLOR UR: ABNORMAL
CREAT SERPL-MCNC: 0.62 MG/DL (ref 0.5–1.05)
EGFRCR SERPLBLD CKD-EPI 2021: >90 ML/MIN/1.73M*2
EOSINOPHIL # BLD AUTO: 0.22 X10*3/UL (ref 0–0.7)
EOSINOPHIL NFR BLD AUTO: 2.4 %
ERYTHROCYTE [DISTWIDTH] IN BLOOD BY AUTOMATED COUNT: 13.6 % (ref 11.5–14.5)
GLUCOSE SERPL-MCNC: 89 MG/DL (ref 74–99)
GLUCOSE UR STRIP.AUTO-MCNC: NORMAL MG/DL
HCT VFR BLD AUTO: 40.7 % (ref 36–46)
HGB BLD-MCNC: 13.3 G/DL (ref 12–16)
IMM GRANULOCYTES # BLD AUTO: 0.03 X10*3/UL (ref 0–0.7)
IMM GRANULOCYTES NFR BLD AUTO: 0.3 % (ref 0–0.9)
KETONES UR STRIP.AUTO-MCNC: NEGATIVE MG/DL
LEUKOCYTE ESTERASE UR QL STRIP.AUTO: ABNORMAL
LIPASE SERPL-CCNC: 425 U/L (ref 9–82)
LYMPHOCYTES # BLD AUTO: 3.03 X10*3/UL (ref 1.2–4.8)
LYMPHOCYTES NFR BLD AUTO: 33.2 %
MCH RBC QN AUTO: 32.4 PG (ref 26–34)
MCHC RBC AUTO-ENTMCNC: 32.7 G/DL (ref 32–36)
MCV RBC AUTO: 99 FL (ref 80–100)
MONOCYTES # BLD AUTO: 0.63 X10*3/UL (ref 0.1–1)
MONOCYTES NFR BLD AUTO: 6.9 %
MUCOUS THREADS #/AREA URNS AUTO: ABNORMAL /LPF
NEUTROPHILS # BLD AUTO: 5.19 X10*3/UL (ref 1.2–7.7)
NEUTROPHILS NFR BLD AUTO: 56.8 %
NITRITE UR QL STRIP.AUTO: NEGATIVE
NRBC BLD-RTO: 0 /100 WBCS (ref 0–0)
PH UR STRIP.AUTO: 5.5 [PH]
PLATELET # BLD AUTO: 245 X10*3/UL (ref 150–450)
POTASSIUM SERPL-SCNC: 3.7 MMOL/L (ref 3.5–5.3)
PROT SERPL-MCNC: 6.9 G/DL (ref 6.4–8.2)
PROT UR STRIP.AUTO-MCNC: NEGATIVE MG/DL
RBC # BLD AUTO: 4.1 X10*6/UL (ref 4–5.2)
RBC # UR STRIP.AUTO: ABNORMAL /UL
RBC #/AREA URNS AUTO: ABNORMAL /HPF
SODIUM SERPL-SCNC: 139 MMOL/L (ref 136–145)
SP GR UR STRIP.AUTO: 1.01
SQUAMOUS #/AREA URNS AUTO: ABNORMAL /HPF
UROBILINOGEN UR STRIP.AUTO-MCNC: NORMAL MG/DL
WBC # BLD AUTO: 9.1 X10*3/UL (ref 4.4–11.3)
WBC #/AREA URNS AUTO: ABNORMAL /HPF

## 2024-08-18 PROCEDURE — 76856 US EXAM PELVIC COMPLETE: CPT

## 2024-08-18 PROCEDURE — 74177 CT ABD & PELVIS W/CONTRAST: CPT | Performed by: RADIOLOGY

## 2024-08-18 PROCEDURE — 2550000001 HC RX 255 CONTRASTS: Performed by: STUDENT IN AN ORGANIZED HEALTH CARE EDUCATION/TRAINING PROGRAM

## 2024-08-18 PROCEDURE — 76830 TRANSVAGINAL US NON-OB: CPT | Performed by: RADIOLOGY

## 2024-08-18 PROCEDURE — 93975 VASCULAR STUDY: CPT

## 2024-08-18 PROCEDURE — 99285 EMERGENCY DEPT VISIT HI MDM: CPT | Mod: 25

## 2024-08-18 PROCEDURE — 83690 ASSAY OF LIPASE: CPT

## 2024-08-18 PROCEDURE — 2500000004 HC RX 250 GENERAL PHARMACY W/ HCPCS (ALT 636 FOR OP/ED)

## 2024-08-18 PROCEDURE — 76856 US EXAM PELVIC COMPLETE: CPT | Performed by: RADIOLOGY

## 2024-08-18 PROCEDURE — 85025 COMPLETE CBC W/AUTO DIFF WBC: CPT

## 2024-08-18 PROCEDURE — 96374 THER/PROPH/DIAG INJ IV PUSH: CPT

## 2024-08-18 PROCEDURE — 80053 COMPREHEN METABOLIC PANEL: CPT

## 2024-08-18 PROCEDURE — 81001 URINALYSIS AUTO W/SCOPE: CPT | Performed by: EMERGENCY MEDICINE

## 2024-08-18 PROCEDURE — 74177 CT ABD & PELVIS W/CONTRAST: CPT

## 2024-08-18 PROCEDURE — 36415 COLL VENOUS BLD VENIPUNCTURE: CPT

## 2024-08-18 RX ORDER — MORPHINE SULFATE 4 MG/ML
4 INJECTION, SOLUTION INTRAMUSCULAR; INTRAVENOUS ONCE
Status: COMPLETED | OUTPATIENT
Start: 2024-08-18 | End: 2024-08-18

## 2024-08-18 ASSESSMENT — PAIN SCALES - GENERAL
PAINLEVEL_OUTOF10: 10 - WORST POSSIBLE PAIN
PAINLEVEL_OUTOF10: 10 - WORST POSSIBLE PAIN

## 2024-08-18 ASSESSMENT — LIFESTYLE VARIABLES
EVER FELT BAD OR GUILTY ABOUT YOUR DRINKING: NO
HAVE PEOPLE ANNOYED YOU BY CRITICIZING YOUR DRINKING: NO
EVER HAD A DRINK FIRST THING IN THE MORNING TO STEADY YOUR NERVES TO GET RID OF A HANGOVER: NO
HAVE YOU EVER FELT YOU SHOULD CUT DOWN ON YOUR DRINKING: NO
TOTAL SCORE: 0

## 2024-08-18 ASSESSMENT — PAIN - FUNCTIONAL ASSESSMENT: PAIN_FUNCTIONAL_ASSESSMENT: 0-10

## 2024-08-18 ASSESSMENT — PAIN DESCRIPTION - PROGRESSION: CLINICAL_PROGRESSION: NOT CHANGED

## 2024-08-18 ASSESSMENT — PAIN DESCRIPTION - ONSET: ONSET: ONGOING

## 2024-08-18 ASSESSMENT — PAIN DESCRIPTION - ORIENTATION: ORIENTATION: LOWER

## 2024-08-18 ASSESSMENT — PAIN DESCRIPTION - DESCRIPTORS
DESCRIPTORS: SHARP;SHOOTING
DESCRIPTORS: SHARP;SHOOTING

## 2024-08-18 ASSESSMENT — PAIN DESCRIPTION - FREQUENCY: FREQUENCY: CONSTANT/CONTINUOUS

## 2024-08-18 ASSESSMENT — PAIN DESCRIPTION - PAIN TYPE: TYPE: ACUTE PAIN

## 2024-08-18 ASSESSMENT — PAIN DESCRIPTION - LOCATION: LOCATION: ABDOMEN

## 2024-08-18 NOTE — ED TRIAGE NOTES
Pt states she is pain in her lower abdominal area she equates to labor pain, 10/10 sharp and shooting that began this morning. Pt also having nausea.

## 2024-08-18 NOTE — ED PROVIDER NOTES
"EMERGENCY DEPARTMENT ENCOUNTER      Pt Name: Malika Casper  MRN: [id number]  Birthdate 1960  Date of evaluation: 8/18/2024  Provider: Adelita Wahl DO    CHIEF COMPLAINT       Chief Complaint   Patient presents with    Abdominal Pain         HISTORY OF PRESENT ILLNESS    This is a 63-year-old female presenting for acute right lower quadrant abdominal pain.  She states that pain started around 6/7 AM this morning and comes in waves for 20 minutes and then will go away and come back for an additional 20 minutes.  This has been happening all throughout the day.  She is seeing pain management for chronic low back pain, and states that her usual pain medicine cocktail still not relieving her pain at all.  She does endorse associated nausea, for which she takes her Zofran at home for which has helped.  She describes it as \"having a baby pain\", and states that his 10 times more intense than a cramp.  Walking around, and transitioning from sitting to standing makes it worse, sitting still makes it better.  She denies any recent illnesses, fever, chills, chest pain, shortness of breath, urinary symptoms such as hematuria/dysuria/frequency, changes in bowel movements (last 1 earlier today), recent travel, headache, lightheadedness, emesis, diarrhea, constipation, vaginal discharge.  She does state that she has an adrenalectomy and cholecystectomy several years ago, but has not had any other abdominal problems.      History provided by:  Patient      Nursing Notes were reviewed.    PAST MEDICAL HISTORY     Past Medical History:   Diagnosis Date    Abnormal findings on diagnostic imaging of other specified body structures     Abnormal finding on imaging    Encounter for screening for malignant neoplasm of colon 04/27/2022    Colon cancer screening    Liver disease, unspecified 12/16/2022    Chronic liver disease    Personal history of other medical treatment     History of transvaginal ultrasound    Personal " history of other medical treatment     History of ultrasound, pelvic         SURGICAL HISTORY       Past Surgical History:   Procedure Laterality Date    ANKLE SURGERY  09/18/2017    Ankle Surgery    CT ABDOMEN PELVIS ANGIOGRAM W AND/OR WO IV CONTRAST  5/3/2020    CT ABDOMEN PELVIS ANGIOGRAM W AND/OR WO IV CONTRAST 5/3/2020 [facility]    KNEE SURGERY  09/18/2017    Knee Surgery    OTHER SURGICAL HISTORY  09/10/2015    Adrenal Gland Excision    OTHER SURGICAL HISTORY  12/02/2021    Gallbladder surgery         CURRENT MEDICATIONS       Discharge Medication List as of 8/19/2024  2:57 AM        CONTINUE these medications which have NOT CHANGED    Details   albuterol (Ventolin HFA) 90 mcg/actuation inhaler Inhale 2 puffs every 4 hours if needed for wheezing or shortness of breath., Starting Wed 5/8/2024, Until Thu 5/8/2025 at 2359, Normal      allopurinol (Zyloprim) 100 mg tablet Take 1 tablet (100 mg) by mouth once daily., Starting Sun 8/4/2024, Normal      ascorbic acid (Vitamin C) 500 mg tablet Take 1 tablet (500 mg) by mouth once daily., Historical Med      atorvastatin (Lipitor) 40 mg tablet Take 1 tablet (40 mg) by mouth once daily at bedtime., Starting Sun 7/14/2024, Until Tue 8/13/2024, Normal      calamine-zinc oxide lotion Apply 1 Application topically 4 times a day as needed (itching)., Starting Wed 7/24/2024, Normal      colchicine 0.6 mg tablet TAKE ONE TABLET (0.6 MG) BY MOUTH DAILY, Normal      cyclobenzaprine (Flexeril) 5 mg tablet Take 1 tablet (5 mg) by mouth 3 times a day., Historical Med      gabapentin (Neurontin) 800 mg tablet Take 1 tablet (800 mg) by mouth 3 times a day., Historical Med      HYDROcodone-acetaminophen (Norco)  mg tablet Take 1 tablet by mouth every 4 hours. Max 5 tablets daily, Historical Med      lidocaine 4 % patch Place 1 patch on the skin once daily., Historical Med      lisinopril 10 mg tablet Take 1 tablet (10 mg) by mouth once daily., Starting Wed 5/1/2024,  Normal      medical cannabis Take 1 each by mouth if needed. Oil Or Sol Vap - 3.47 - 170 - Indica Cart - La Yg Cake, Edb Oral Admin 50-10 Gummy Pineapple Punch Ubgood, Edb Oral Admin - 22 - 10 - Gummy - D-Berry, Historical Med      multivitamin tablet Take 1 tablet by mouth once daily., Starting Wed 7/24/2024, Normal      naloxone (Narcan) 4 mg/0.1 mL nasal spray Administer 1 spray (4 mg) into affected nostril(s) if needed for opioid reversal., Historical Med             ALLERGIES     Amlodipine, Ibuprofen, and Sulfamethoxazole-trimethoprim    FAMILY HISTORY       Family History   Problem Relation Name Age of Onset    Colon cancer Father      Prostate cancer Father      Breast cancer Mother's Sister  31          SOCIAL HISTORY       Social History     Socioeconomic History    Marital status: Single   Tobacco Use    Smoking status: Every Day     Current packs/day: 0.50     Average packs/day: 0.5 packs/day for 25.0 years (12.5 ttl pk-yrs)     Types: Cigarettes     Passive exposure: Current    Smokeless tobacco: Never   Vaping Use    Vaping status: Never Used   Substance and Sexual Activity    Alcohol use: Not Currently    Drug use: Not Currently    Sexual activity: Defer     Social Determinants of Health     Financial Resource Strain: Medium Risk (7/14/2024)    Overall Financial Resource Strain (CARDIA)     Difficulty of Paying Living Expenses: Somewhat hard   Food Insecurity: Not on File (4/3/2023)    Received from [employer]    Food Insecurity     Food: 0   Transportation Needs: No Transportation Needs (7/14/2024)    PRAPARE - Transportation     Lack of Transportation (Medical): No     Lack of Transportation (Non-Medical): No   Physical Activity: Not on File (3/28/2023)    Received from [facility]    Physical Activity     Physical Activity: 0   Stress: Not on File (4/3/2023)    Received from [employer]    Stress     Stress: 0   Social Connections: Not at Risk (4/3/2023)    Received from [facility]    Social Connections      Social Connections and Isolation: 1   Housing Stability: Low Risk  (7/14/2024)    Housing Stability Vital Sign     Unable to Pay for Housing in the Last Year: No     Number of Times Moved in the Last Year: 1     Homeless in the Last Year: No       SCREENINGS                        PHYSICAL EXAM    (up to 7 for level 4, 8 or more for level 5)     ED Triage Vitals [08/18/24 1634]   Temperature Heart Rate Respirations BP   36.6 °C (97.9 °F) 84 18 178/83      Pulse Ox Temp src Heart Rate Source Patient Position   98 % -- Monitor Sitting      BP Location FiO2 (%)     Right arm --       Physical Exam  Vitals reviewed.   Constitutional:       General: She is not in acute distress.     Appearance: Normal appearance. She is not ill-appearing.   HENT:      Head: Normocephalic and atraumatic.      Right Ear: External ear normal.      Left Ear: External ear normal.      Nose: Nose normal. No congestion.      Mouth/Throat:      Mouth: Mucous membranes are moist.   Eyes:      Conjunctiva/sclera: Conjunctivae normal.   Cardiovascular:      Rate and Rhythm: Normal rate and regular rhythm.      Heart sounds: Normal heart sounds.   Pulmonary:      Effort: Pulmonary effort is normal. No respiratory distress.      Breath sounds: Normal breath sounds. No wheezing or rales.   Abdominal:      General: Abdomen is flat. Bowel sounds are normal. There is no distension. There are no signs of injury.      Palpations: Abdomen is soft. There is no shifting dullness.      Tenderness: There is abdominal tenderness in the right lower quadrant. There is no right CVA tenderness, left CVA tenderness or rebound.   Musculoskeletal:      Cervical back: Normal range of motion.      Right lower leg: No edema.      Left lower leg: No edema.   Skin:     General: Skin is warm and dry.      Capillary Refill: Capillary refill takes less than 2 seconds.   Neurological:      General: No focal deficit present.      Mental Status: She is alert and oriented to  person, place, and time.      Gait: Gait normal.   Psychiatric:         Mood and Affect: Mood normal.         Behavior: Behavior normal.          DIAGNOSTIC RESULTS     LABS:  Labs Reviewed   LIPASE - Abnormal       Result Value    Lipase 425 (*)     Narrative:     Venipuncture immediately after or during the administration of Metamizole may lead to falsely low results. Testing should be performed immediately prior to Metamizole dosing.   URINALYSIS WITH REFLEX MICROSCOPIC - Abnormal    Color, Urine Light-Yellow      Appearance, Urine Clear      Specific Gravity, Urine 1.010      pH, Urine 5.5      Protein, Urine NEGATIVE      Glucose, Urine Normal      Blood, Urine 0.03 (TRACE) (*)     Ketones, Urine NEGATIVE      Bilirubin, Urine NEGATIVE      Urobilinogen, Urine Normal      Nitrite, Urine NEGATIVE      Leukocyte Esterase, Urine 250 Carmen/µL (*)    MICROSCOPIC ONLY, URINE - Abnormal    WBC, Urine 11-20 (*)     RBC, Urine 1-2      Squamous Epithelial Cells, Urine 1-9 (SPARSE)      Mucus, Urine FEW     COMPREHENSIVE METABOLIC PANEL - Normal    Glucose 89      Sodium 139      Potassium 3.7      Chloride 104      Bicarbonate 29      Anion Gap 10      Urea Nitrogen 11      Creatinine 0.62      eGFR >90      Calcium 9.6      Albumin 4.2      Alkaline Phosphatase 66      Total Protein 6.9      AST 16      Bilirubin, Total 0.3      ALT 17     CBC WITH AUTO DIFFERENTIAL    WBC 9.1      nRBC 0.0      RBC 4.10      Hemoglobin 13.3      Hematocrit 40.7      MCV 99      MCH 32.4      MCHC 32.7      RDW 13.6      Platelets 245      Neutrophils % 56.8      Immature Granulocytes %, Automated 0.3      Lymphocytes % 33.2      Monocytes % 6.9      Eosinophils % 2.4      Basophils % 0.4      Neutrophils Absolute 5.19      Immature Granulocytes Absolute, Automated 0.03      Lymphocytes Absolute 3.03      Monocytes Absolute 0.63      Eosinophils Absolute 0.22      Basophils Absolute 0.04     URINE GRAY TUBE    Extra Tube Hold for  add-ons.         All other labs were within normal range or not returned as of this dictation.    Imaging  CT abdomen pelvis w IV contrast   Final Result   New focal subcutaneous fluid collection left groin/inguinal region.   Correlate with physical exam. May reflect small abscess.        Otherwise no acute process seen in the abdomen or pelvis.                       MACRO:   None.        Signed by: Eliot Dumont 8/18/2024 11:44 PM   Dictation workstation:   [id number]      US PELVIS TRANSABDOMINAL WITH TRANSVAGINAL   Final Result   1. Fibroid uterus redemonstrated.   2. Nonvisualization left ovary.   3. Otherwise unremarkable exam.        MACRO:   None        Signed by: Eliot Dumont 8/18/2024 9:26 PM   Dictation workstation:   [id number]           Procedures  Procedures     EMERGENCY DEPARTMENT COURSE/MDM:     ED Course as of 08/19/24 1034   Mon Aug 19, 2024   0145 No hyperlipidemia on labs last month [[facility]]      ED Course User Index  [] Kilo Asif MD         Diagnoses as of 08/19/24 1034   RLQ abdominal pain   Acute pancreatitis without infection or necrosis, unspecified pancreatitis type ([facility])        Medical Decision Making  This is a 63-year-old female presenting for acute right lower abdominal pain.  Pain started abruptly this morning, interservice refractory to her home pain medications not associated with nausea/emesis, no change in bowel/bladder habits, no urinary symptoms.  On physical exam she is profusely tender in her right lower quadrant.  Acuity of symptoms and location of pain, concerning for ovarian torsion will obtain pelvic ultrasound with Dopplers to rule out.  Will obtain general CMP, CBC, and lipase to rule out metabolic causes of her abdominal pain.  CBC, CMP unremarkable, lipase is elevated at 425, but her pain is a area.  Will obtain CT abdomen pelvis to rule out intra-abdominal pathology. CT showed fluid collection in LLQ and an I&D was performed by another provider. She was given  Keflex to cover for any underlying cellulitis. There was purulent drainage and it was left open to continue to drain. She was given ED return precautions and voiced understanding of the results.       Patient and or family in agreement and understanding of treatment plan.  All questions answered.        ED Medications administered this visit:    Medications   morphine injection 4 mg (4 mg intravenous Given 8/18/24 1815)   iohexol (OMNIPaque) 350 mg iodine/mL solution 75 mL (75 mL intravenous Given 8/18/24 2218)   cephalexin (Keflex) capsule 500 mg (500 mg oral Given 8/19/24 0135)   lactated Ringer's bolus 1,000 mL (0 mL intravenous Stopped 8/19/24 0235)   lidocaine PF (Xylocaine) 10 mg/mL (1 %) injection 100 mg (100 mg infiltration Given 8/19/24 0135)   acetaminophen (Tylenol) tablet 975 mg (975 mg oral Given 8/19/24 0135)   ondansetron ODT (Zofran-ODT) disintegrating tablet 4 mg (4 mg oral Given 8/19/24 0158)       New Prescriptions from this visit:    Discharge Medication List as of 8/19/2024  2:57 AM        START taking these medications    Details   promethazine (Phenergan) 25 mg tablet Take 0.5 tablets (12.5 mg) by mouth every 6 hours if needed for nausea or vomiting for up to 10 days., Starting Mon 8/19/2024, Until Thu 8/29/2024 at 2359, Normal             Follow-up:  Asaf Cochran MD  [address] [facility] [address] [phone]    Schedule an appointment as soon as possible for a visit       [facility]  [address] [facility] [address] [phone]  Go to   As needed, If symptoms worsen        Final Impression:   1. RLQ abdominal pain    2. Acute pancreatitis without infection or necrosis, unspecified pancreatitis type ([facility])            I reviewed the case with the attending ED physician. The attending ED physician agrees with the plan. Patient and/or patient´s representative was counseled regarding labs, imaging, likely diagnosis, and plan. All  questions were answered. This assessment and plan has been discussed with the attending physician, Dr. Rdoriguez.   This resident signed out this patient to Dr. Eze Mccoy and he was responsible for the completion of her work up and disposition.      Adelita Wahl DO  Family Medicine, PGY-2    This note may have been transcribed using Dragon voice recognition system and there is a possibility of unintentional typing misprints.  Any information found to be copied from previous providers is done in the best interest of the patient to provide accurate, quality, and continuity of care.       Adelita Wahl DO  Resident  08/19/24 1036

## 2024-08-19 VITALS
TEMPERATURE: 97.9 F | HEIGHT: 62 IN | BODY MASS INDEX: 22.6 KG/M2 | RESPIRATION RATE: 18 BRPM | OXYGEN SATURATION: 95 % | HEART RATE: 68 BPM | WEIGHT: 122.8 LBS | DIASTOLIC BLOOD PRESSURE: 87 MMHG | SYSTOLIC BLOOD PRESSURE: 121 MMHG

## 2024-08-19 DIAGNOSIS — E78.2 MIXED HYPERLIPIDEMIA: ICD-10-CM

## 2024-08-19 LAB — HOLD SPECIMEN: NORMAL

## 2024-08-19 PROCEDURE — 2500000005 HC RX 250 GENERAL PHARMACY W/O HCPCS: Performed by: STUDENT IN AN ORGANIZED HEALTH CARE EDUCATION/TRAINING PROGRAM

## 2024-08-19 PROCEDURE — 2500000005 HC RX 250 GENERAL PHARMACY W/O HCPCS

## 2024-08-19 PROCEDURE — 2500000004 HC RX 250 GENERAL PHARMACY W/ HCPCS (ALT 636 FOR OP/ED)

## 2024-08-19 PROCEDURE — 96361 HYDRATE IV INFUSION ADD-ON: CPT

## 2024-08-19 PROCEDURE — 10060 I&D ABSCESS SIMPLE/SINGLE: CPT

## 2024-08-19 PROCEDURE — 2500000001 HC RX 250 WO HCPCS SELF ADMINISTERED DRUGS (ALT 637 FOR MEDICARE OP)

## 2024-08-19 RX ORDER — ACETAMINOPHEN 325 MG/1
975 TABLET ORAL ONCE
Status: COMPLETED | OUTPATIENT
Start: 2024-08-19 | End: 2024-08-19

## 2024-08-19 RX ORDER — ATORVASTATIN CALCIUM 40 MG/1
40 TABLET, FILM COATED ORAL NIGHTLY
Qty: 30 TABLET | Refills: 0 | Status: SHIPPED | OUTPATIENT
Start: 2024-08-19 | End: 2024-09-18

## 2024-08-19 RX ORDER — ONDANSETRON 4 MG/1
4 TABLET, ORALLY DISINTEGRATING ORAL ONCE
Status: COMPLETED | OUTPATIENT
Start: 2024-08-19 | End: 2024-08-19

## 2024-08-19 RX ORDER — LIDOCAINE HYDROCHLORIDE 10 MG/ML
10 INJECTION, SOLUTION EPIDURAL; INFILTRATION; INTRACAUDAL; PERINEURAL ONCE
Status: COMPLETED | OUTPATIENT
Start: 2024-08-19 | End: 2024-08-19

## 2024-08-19 RX ORDER — ONDANSETRON HYDROCHLORIDE 2 MG/ML
4 INJECTION, SOLUTION INTRAVENOUS ONCE
Status: DISCONTINUED | OUTPATIENT
Start: 2024-08-19 | End: 2024-08-19

## 2024-08-19 RX ORDER — PROMETHAZINE HYDROCHLORIDE 25 MG/1
12.5 TABLET ORAL EVERY 6 HOURS PRN
Qty: 20 TABLET | Refills: 0 | Status: SHIPPED | OUTPATIENT
Start: 2024-08-19 | End: 2024-08-29

## 2024-08-19 RX ORDER — ONDANSETRON 4 MG/1
4 TABLET, FILM COATED ORAL EVERY 8 HOURS PRN
Qty: 12 TABLET | Refills: 0 | Status: SHIPPED | OUTPATIENT
Start: 2024-08-19 | End: 2024-08-23

## 2024-08-19 RX ORDER — CEPHALEXIN 500 MG/1
500 CAPSULE ORAL ONCE
Status: COMPLETED | OUTPATIENT
Start: 2024-08-19 | End: 2024-08-19

## 2024-08-19 ASSESSMENT — PAIN SCALES - GENERAL
PAINLEVEL_OUTOF10: 8
PAINLEVEL_OUTOF10: 8

## 2024-08-19 NOTE — TELEPHONE ENCOUNTER
Dr. Cochran Pt    Refill for  atorvastatin (Lipitor) 40 mg tablet        Pt just got out of hospital last night- is completely out of medication.  Pt would like a call from CB. Please advise, thank you.      [facility] - [place], [place] - [address]       Malika  [phone]

## 2024-08-19 NOTE — ED PROCEDURE NOTE
Procedure  Incision and Drainage    Performed by: Eze Mccoy MD  Authorized by: Kilo Asif MD    Consent:     Consent obtained:  Verbal    Consent given by:  Patient    Risks, benefits, and alternatives were discussed: yes      Risks discussed:  Bleeding and pain    Alternatives discussed:  No treatment  Universal protocol:     Patient identity confirmed:  Verbally with patient, arm band, hospital-assigned identification number and provided demographic data  Location:     Type:  Abscess    Size:  11 mm in diameter    Location:  Trunk    Trunk location:  Abdomen  Sedation:     Sedation type:  None  Anesthesia:     Anesthesia method:  Local infiltration    Local anesthetic:  Lidocaine 1% w/o epi  Procedure type:     Complexity:  Simple  Procedure details:     Ultrasound guidance: no      Needle aspiration: no      Incision types:  Stab incision and single straight    Drainage:  Purulent and bloody    Drainage amount:  Moderate    Wound treatment:  Wound left open    Packing materials:  None  Post-procedure details:     Procedure completion:  Tolerated               Eze Mccoy MD  Resident  08/19/24 0315

## 2024-08-19 NOTE — DISCHARGE INSTRUCTIONS
We are not sure why your lipase was up today, but it was likely from uncomplicated pancreatitis.  In general, if you are able to tolerate oral fluids, and clear liquid diet, you are safe to return home with antiemetics and your home pain meds.  Please follow-up with your primary care doctor or a GI doctor to discuss neck steps.

## 2024-08-20 ENCOUNTER — APPOINTMENT (OUTPATIENT)
Dept: CARDIOLOGY | Facility: HOSPITAL | Age: 64
End: 2024-08-20
Payer: COMMERCIAL

## 2024-08-20 ENCOUNTER — APPOINTMENT (OUTPATIENT)
Dept: RADIOLOGY | Facility: HOSPITAL | Age: 64
End: 2024-08-20
Payer: COMMERCIAL

## 2024-08-20 ENCOUNTER — HOSPITAL ENCOUNTER (EMERGENCY)
Facility: HOSPITAL | Age: 64
Discharge: HOME | End: 2024-08-21
Attending: EMERGENCY MEDICINE
Payer: COMMERCIAL

## 2024-08-20 DIAGNOSIS — N30.90 CYSTITIS: ICD-10-CM

## 2024-08-20 DIAGNOSIS — R10.9 ABDOMINAL PAIN, UNSPECIFIED ABDOMINAL LOCATION: Primary | ICD-10-CM

## 2024-08-20 LAB
ALBUMIN SERPL BCP-MCNC: 4.3 G/DL (ref 3.4–5)
ALP SERPL-CCNC: 69 U/L (ref 33–136)
ALT SERPL W P-5'-P-CCNC: 19 U/L (ref 7–45)
ANION GAP SERPL CALC-SCNC: 11 MMOL/L (ref 10–20)
APPEARANCE UR: ABNORMAL
AST SERPL W P-5'-P-CCNC: 44 U/L (ref 9–39)
BACTERIA #/AREA URNS AUTO: ABNORMAL /HPF
BASOPHILS # BLD AUTO: 0.04 X10*3/UL (ref 0–0.1)
BASOPHILS NFR BLD AUTO: 0.3 %
BILIRUB SERPL-MCNC: 0.3 MG/DL (ref 0–1.2)
BILIRUB UR STRIP.AUTO-MCNC: NEGATIVE MG/DL
BNP SERPL-MCNC: 98 PG/ML (ref 0–99)
BUN SERPL-MCNC: 14 MG/DL (ref 6–23)
CALCIUM SERPL-MCNC: 9.5 MG/DL (ref 8.6–10.3)
CARDIAC TROPONIN I PNL SERPL HS: 3 NG/L (ref 0–13)
CARDIAC TROPONIN I PNL SERPL HS: 3 NG/L (ref 0–13)
CHLORIDE SERPL-SCNC: 99 MMOL/L (ref 98–107)
CO2 SERPL-SCNC: 31 MMOL/L (ref 21–32)
COLOR UR: YELLOW
CREAT SERPL-MCNC: 0.72 MG/DL (ref 0.5–1.05)
EGFRCR SERPLBLD CKD-EPI 2021: >90 ML/MIN/1.73M*2
EOSINOPHIL # BLD AUTO: 0.08 X10*3/UL (ref 0–0.7)
EOSINOPHIL NFR BLD AUTO: 0.6 %
ERYTHROCYTE [DISTWIDTH] IN BLOOD BY AUTOMATED COUNT: 13.6 % (ref 11.5–14.5)
GLUCOSE SERPL-MCNC: 160 MG/DL (ref 74–99)
GLUCOSE UR STRIP.AUTO-MCNC: NORMAL MG/DL
HCT VFR BLD AUTO: 36.8 % (ref 36–46)
HGB BLD-MCNC: 12.3 G/DL (ref 12–16)
HYALINE CASTS #/AREA URNS AUTO: ABNORMAL /LPF
IMM GRANULOCYTES # BLD AUTO: 0.04 X10*3/UL (ref 0–0.7)
IMM GRANULOCYTES NFR BLD AUTO: 0.3 % (ref 0–0.9)
KETONES UR STRIP.AUTO-MCNC: ABNORMAL MG/DL
LACTATE SERPL-SCNC: 1.9 MMOL/L (ref 0.4–2)
LEUKOCYTE ESTERASE UR QL STRIP.AUTO: ABNORMAL
LIPASE SERPL-CCNC: 60 U/L (ref 9–82)
LYMPHOCYTES # BLD AUTO: 1.41 X10*3/UL (ref 1.2–4.8)
LYMPHOCYTES NFR BLD AUTO: 10.9 %
MCH RBC QN AUTO: 32.5 PG (ref 26–34)
MCHC RBC AUTO-ENTMCNC: 33.4 G/DL (ref 32–36)
MCV RBC AUTO: 97 FL (ref 80–100)
MONOCYTES # BLD AUTO: 0.62 X10*3/UL (ref 0.1–1)
MONOCYTES NFR BLD AUTO: 4.8 %
MUCOUS THREADS #/AREA URNS AUTO: ABNORMAL /LPF
NEUTROPHILS # BLD AUTO: 10.71 X10*3/UL (ref 1.2–7.7)
NEUTROPHILS NFR BLD AUTO: 83.1 %
NITRITE UR QL STRIP.AUTO: NEGATIVE
NRBC BLD-RTO: 0 /100 WBCS (ref 0–0)
PH UR STRIP.AUTO: 5.5 [PH]
PLATELET # BLD AUTO: 225 X10*3/UL (ref 150–450)
POTASSIUM SERPL-SCNC: 4.8 MMOL/L (ref 3.5–5.3)
PROT SERPL-MCNC: 7.2 G/DL (ref 6.4–8.2)
PROT UR STRIP.AUTO-MCNC: ABNORMAL MG/DL
RBC # BLD AUTO: 3.78 X10*6/UL (ref 4–5.2)
RBC # UR STRIP.AUTO: NEGATIVE /UL
RBC #/AREA URNS AUTO: ABNORMAL /HPF
SARS-COV-2 RNA RESP QL NAA+PROBE: NOT DETECTED
SODIUM SERPL-SCNC: 136 MMOL/L (ref 136–145)
SP GR UR STRIP.AUTO: 1.03
SQUAMOUS #/AREA URNS AUTO: ABNORMAL /HPF
UROBILINOGEN UR STRIP.AUTO-MCNC: ABNORMAL MG/DL
WBC # BLD AUTO: 12.9 X10*3/UL (ref 4.4–11.3)
WBC #/AREA URNS AUTO: ABNORMAL /HPF

## 2024-08-20 PROCEDURE — 87635 SARS-COV-2 COVID-19 AMP PRB: CPT | Performed by: EMERGENCY MEDICINE

## 2024-08-20 PROCEDURE — 81001 URINALYSIS AUTO W/SCOPE: CPT | Performed by: EMERGENCY MEDICINE

## 2024-08-20 PROCEDURE — 87635 SARS-COV-2 COVID-19 AMP PRB: CPT | Performed by: STUDENT IN AN ORGANIZED HEALTH CARE EDUCATION/TRAINING PROGRAM

## 2024-08-20 PROCEDURE — 99283 EMERGENCY DEPT VISIT LOW MDM: CPT | Mod: 25

## 2024-08-20 PROCEDURE — 36415 COLL VENOUS BLD VENIPUNCTURE: CPT | Performed by: STUDENT IN AN ORGANIZED HEALTH CARE EDUCATION/TRAINING PROGRAM

## 2024-08-20 PROCEDURE — 83605 ASSAY OF LACTIC ACID: CPT | Performed by: EMERGENCY MEDICINE

## 2024-08-20 PROCEDURE — 84484 ASSAY OF TROPONIN QUANT: CPT | Performed by: STUDENT IN AN ORGANIZED HEALTH CARE EDUCATION/TRAINING PROGRAM

## 2024-08-20 PROCEDURE — 83880 ASSAY OF NATRIURETIC PEPTIDE: CPT | Performed by: EMERGENCY MEDICINE

## 2024-08-20 PROCEDURE — 83690 ASSAY OF LIPASE: CPT

## 2024-08-20 PROCEDURE — 83605 ASSAY OF LACTIC ACID: CPT | Performed by: STUDENT IN AN ORGANIZED HEALTH CARE EDUCATION/TRAINING PROGRAM

## 2024-08-20 PROCEDURE — 96360 HYDRATION IV INFUSION INIT: CPT

## 2024-08-20 PROCEDURE — 71045 X-RAY EXAM CHEST 1 VIEW: CPT

## 2024-08-20 PROCEDURE — 85025 COMPLETE CBC W/AUTO DIFF WBC: CPT | Performed by: EMERGENCY MEDICINE

## 2024-08-20 PROCEDURE — 71045 X-RAY EXAM CHEST 1 VIEW: CPT | Performed by: RADIOLOGY

## 2024-08-20 PROCEDURE — 2500000004 HC RX 250 GENERAL PHARMACY W/ HCPCS (ALT 636 FOR OP/ED)

## 2024-08-20 PROCEDURE — 93005 ELECTROCARDIOGRAM TRACING: CPT

## 2024-08-20 PROCEDURE — 84075 ASSAY ALKALINE PHOSPHATASE: CPT | Performed by: EMERGENCY MEDICINE

## 2024-08-20 PROCEDURE — 84484 ASSAY OF TROPONIN QUANT: CPT | Performed by: EMERGENCY MEDICINE

## 2024-08-20 RX ORDER — CEFTRIAXONE 1 G/50ML
1 INJECTION, SOLUTION INTRAVENOUS ONCE
Status: DISCONTINUED | OUTPATIENT
Start: 2024-08-20 | End: 2024-08-21

## 2024-08-20 ASSESSMENT — PAIN SCALES - GENERAL
PAINLEVEL_OUTOF10: 8
PAINLEVEL_OUTOF10: 10 - WORST POSSIBLE PAIN

## 2024-08-20 ASSESSMENT — PAIN DESCRIPTION - PAIN TYPE: TYPE: ACUTE PAIN

## 2024-08-20 ASSESSMENT — PAIN DESCRIPTION - LOCATION: LOCATION: ABDOMEN

## 2024-08-20 ASSESSMENT — LIFESTYLE VARIABLES
TOTAL SCORE: 0
EVER FELT BAD OR GUILTY ABOUT YOUR DRINKING: NO
HAVE PEOPLE ANNOYED YOU BY CRITICIZING YOUR DRINKING: NO
EVER HAD A DRINK FIRST THING IN THE MORNING TO STEADY YOUR NERVES TO GET RID OF A HANGOVER: NO
HAVE YOU EVER FELT YOU SHOULD CUT DOWN ON YOUR DRINKING: NO

## 2024-08-20 ASSESSMENT — PAIN - FUNCTIONAL ASSESSMENT: PAIN_FUNCTIONAL_ASSESSMENT: 0-10

## 2024-08-21 ENCOUNTER — APPOINTMENT (OUTPATIENT)
Dept: RADIOLOGY | Facility: CLINIC | Age: 64
End: 2024-08-21
Payer: COMMERCIAL

## 2024-08-21 ENCOUNTER — APPOINTMENT (OUTPATIENT)
Dept: CARDIOLOGY | Facility: CLINIC | Age: 64
End: 2024-08-21
Payer: COMMERCIAL

## 2024-08-21 VITALS
WEIGHT: 120 LBS | HEIGHT: 62 IN | RESPIRATION RATE: 16 BRPM | TEMPERATURE: 97.3 F | SYSTOLIC BLOOD PRESSURE: 119 MMHG | OXYGEN SATURATION: 99 % | HEART RATE: 69 BPM | BODY MASS INDEX: 22.08 KG/M2 | DIASTOLIC BLOOD PRESSURE: 79 MMHG

## 2024-08-21 LAB
ATRIAL RATE: 78 BPM
P AXIS: 65 DEGREES
P OFFSET: 205 MS
P ONSET: 149 MS
PR INTERVAL: 146 MS
Q ONSET: 222 MS
QRS COUNT: 13 BEATS
QRS DURATION: 88 MS
QT INTERVAL: 398 MS
QTC CALCULATION(BAZETT): 453 MS
QTC FREDERICIA: 434 MS
R AXIS: 34 DEGREES
T AXIS: 48 DEGREES
T OFFSET: 421 MS
VENTRICULAR RATE: 78 BPM

## 2024-08-21 RX ORDER — DOXYCYCLINE HYCLATE 100 MG
100 TABLET ORAL EVERY 12 HOURS
Qty: 20 TABLET | Refills: 0 | Status: SHIPPED | OUTPATIENT
Start: 2024-08-21

## 2024-08-21 RX ORDER — CEPHALEXIN 500 MG/1
500 CAPSULE ORAL 2 TIMES DAILY
Qty: 20 CAPSULE | Refills: 0 | Status: SHIPPED | OUTPATIENT
Start: 2024-08-21 | End: 2024-08-31

## 2024-08-21 ASSESSMENT — PAIN - FUNCTIONAL ASSESSMENT: PAIN_FUNCTIONAL_ASSESSMENT: 0-10

## 2024-08-21 ASSESSMENT — PAIN SCALES - GENERAL: PAINLEVEL_OUTOF10: 1

## 2024-08-21 NOTE — ED PROVIDER NOTES
EMERGENCY DEPARTMENT ENCOUNTER      Pt Name: Malika Casper  MRN: [id number]  Birthdate 1960  Date of evaluation: 8/20/2024  Provider: Aries Zarate DO    CHIEF COMPLAINT       Chief Complaint   Patient presents with    Weakness, Gen     Pt reports repeat visit, not feeling well. Was admitted recently.     Abdominal Pain         HISTORY OF PRESENT ILLNESS    Malika Casper is a 63 y.o. female pmhx HTN, HLD, chronic back pain, multiple abdominal surgeries who presents to the ED for abdominal pain. Pt was seen here on 8/18/24 for RLQ pain and told she had acute pancreatitis. She did not want to be admitted as she was scheduled for a stress test today and did not want to miss it. Her stress test was cancelled and her pain has not gone away. She reports the pain is a 10/10 and mostly in the right lower quadrant. She also admits to some nausea and pain with urination. She denies recent fevers, chills, cp, sob, vomiting, diarrhea, blood in the stool. She is seeing pain management for low back pain and says her abdominal pain is not being controlled with those medications. She denies alcohol use. She does smoke cigarettes but has been trying to cut down. She smokes marijuana and last smoked last weekend. She was recently started on Lipitor for HLD.           History provided by:  Patient   used: No        Nursing Notes were reviewed.    PAST MEDICAL HISTORY     Past Medical History:   Diagnosis Date    Abnormal findings on diagnostic imaging of other specified body structures     Abnormal finding on imaging    Encounter for screening for malignant neoplasm of colon 04/27/2022    Colon cancer screening    Liver disease, unspecified 12/16/2022    Chronic liver disease    Personal history of other medical treatment     History of transvaginal ultrasound    Personal history of other medical treatment     History of ultrasound, pelvic         SURGICAL HISTORY       Past Surgical History:    Procedure Laterality Date    ANKLE SURGERY  09/18/2017    Ankle Surgery    CT ABDOMEN PELVIS ANGIOGRAM W AND/OR WO IV CONTRAST  5/3/2020    CT ABDOMEN PELVIS ANGIOGRAM W AND/OR WO IV CONTRAST 5/3/2020 [facility]    KNEE SURGERY  09/18/2017    Knee Surgery    OTHER SURGICAL HISTORY  09/10/2015    Adrenal Gland Excision    OTHER SURGICAL HISTORY  12/02/2021    Gallbladder surgery         CURRENT MEDICATIONS       Discharge Medication List as of 8/21/2024 12:38 AM        CONTINUE these medications which have NOT CHANGED    Details   albuterol (Ventolin HFA) 90 mcg/actuation inhaler Inhale 2 puffs every 4 hours if needed for wheezing or shortness of breath., Starting Wed 5/8/2024, Until Thu 5/8/2025 at 2359, Normal      allopurinol (Zyloprim) 100 mg tablet Take 1 tablet (100 mg) by mouth once daily., Starting Sun 8/4/2024, Normal      ascorbic acid (Vitamin C) 500 mg tablet Take 1 tablet (500 mg) by mouth once daily., Historical Med      atorvastatin (Lipitor) 40 mg tablet Take 1 tablet (40 mg) by mouth once daily at bedtime., Starting Mon 8/19/2024, Until Wed 9/18/2024, Normal      calamine-zinc oxide lotion Apply 1 Application topically 4 times a day as needed (itching)., Starting Wed 7/24/2024, Normal      colchicine 0.6 mg tablet TAKE ONE TABLET (0.6 MG) BY MOUTH DAILY, Normal      cyclobenzaprine (Flexeril) 5 mg tablet Take 1 tablet (5 mg) by mouth 3 times a day., Historical Med      gabapentin (Neurontin) 800 mg tablet Take 1 tablet (800 mg) by mouth 3 times a day., Historical Med      HYDROcodone-acetaminophen (Norco)  mg tablet Take 1 tablet by mouth every 4 hours if needed for moderate pain (4 - 6) or severe pain (7 - 10). Max 5 tablets daily, Historical Med      lidocaine 4 % patch Place 1 patch on the skin once daily as needed for mild pain (1 - 3)., Historical Med      lisinopril 10 mg tablet Take 1 tablet (10 mg) by mouth once daily., Starting Wed 5/1/2024, Normal      medical cannabis  Take 1 each by mouth if needed. Oil Or Sol Vap - 3.47 - 170 - Indica Cart - La Yg Cake, Edb Oral Admin 50-10 Gummy Pineapple Punch Ubgood, Edb Oral Admin - 22 - 10 - Gummy - D-Berry, Historical Med      multivitamin tablet Take 1 tablet by mouth once daily., Starting Wed 7/24/2024, Normal      naloxone (Narcan) 4 mg/0.1 mL nasal spray Administer 1 spray (4 mg) into affected nostril(s) if needed for opioid reversal., Historical Med      ondansetron (Zofran) 4 mg tablet Take 1 tablet (4 mg) by mouth every 8 hours if needed for nausea or vomiting for up to 4 days., Starting Mon 8/19/2024, Until Fri 8/23/2024 at 2359, Normal      promethazine (Phenergan) 25 mg tablet Take 0.5 tablets (12.5 mg) by mouth every 6 hours if needed for nausea or vomiting for up to 10 days., Starting Mon 8/19/2024, Until Thu 8/29/2024 at 2359, Normal             ALLERGIES     Amlodipine, Ibuprofen, and Sulfamethoxazole-trimethoprim    FAMILY HISTORY       Family History   Problem Relation Name Age of Onset    Colon cancer Father      Prostate cancer Father      Breast cancer Mother's Sister  31          SOCIAL HISTORY       Social History     Socioeconomic History    Marital status: Single   Tobacco Use    Smoking status: Every Day     Current packs/day: 0.50     Average packs/day: 0.5 packs/day for 25.0 years (12.5 ttl pk-yrs)     Types: Cigarettes     Passive exposure: Current    Smokeless tobacco: Never   Vaping Use    Vaping status: Never Used   Substance and Sexual Activity    Alcohol use: Not Currently    Drug use: Not Currently    Sexual activity: Defer     Social Determinants of Health     Financial Resource Strain: Medium Risk (7/14/2024)    Overall Financial Resource Strain (CARDIA)     Difficulty of Paying Living Expenses: Somewhat hard   Food Insecurity: Not on File (4/3/2023)    Received from [facility]    Food Insecurity     Food: 0   Transportation Needs: No Transportation Needs (7/14/2024)    PRAPARE - Transportation     Lack of  Transportation (Medical): No     Lack of Transportation (Non-Medical): No   Physical Activity: Not on File (3/28/2023)    Received from [facility]    Physical Activity     Physical Activity: 0   Stress: Not on File (4/3/2023)    Received from [facility]    Stress     Stress: 0   Social Connections: Not at Risk (4/3/2023)    Received from [facility]    Social Connections     Social Connections and Isolation: 1   Housing Stability: Low Risk  (7/14/2024)    Housing Stability Vital Sign     Unable to Pay for Housing in the Last Year: No     Number of Times Moved in the Last Year: 1     Homeless in the Last Year: No       SCREENINGS                        PHYSICAL EXAM    (up to 7 for level 4, 8 or more for level 5)     ED Triage Vitals [08/20/24 1910]   Temperature Heart Rate Respirations BP   36.2 °C (97.2 °F) 75 18 (!) 87/49      Pulse Ox Temp Source Heart Rate Source Patient Position   98 % Temporal Monitor Sitting      BP Location FiO2 (%)     Right arm --       Physical Exam  HENT:      Head: Normocephalic and atraumatic.   Eyes:      Extraocular Movements: Extraocular movements intact.      Conjunctiva/sclera: Conjunctivae normal.   Cardiovascular:      Rate and Rhythm: Normal rate and regular rhythm.      Pulses: Normal pulses.      Heart sounds: Normal heart sounds.   Pulmonary:      Effort: Pulmonary effort is normal.      Breath sounds: Normal breath sounds.   Abdominal:      General: Bowel sounds are normal. There is no distension.      Palpations: Abdomen is soft.      Tenderness: Negative signs include Hines's sign, Rovsing's sign, McBurney's sign and psoas sign.      Comments: Generalized tenderness of abdomen, mostly in RLQ.   Neurological:      Mental Status: She is alert.          DIAGNOSTIC RESULTS     LABS:  Labs Reviewed   CBC WITH AUTO DIFFERENTIAL - Abnormal       Result Value    WBC 12.9 (*)     nRBC 0.0      RBC 3.78 (*)     Hemoglobin 12.3      Hematocrit 36.8      MCV 97      MCH 32.5      MCHC  33.4      RDW 13.6      Platelets 225      Neutrophils % 83.1      Immature Granulocytes %, Automated 0.3      Lymphocytes % 10.9      Monocytes % 4.8      Eosinophils % 0.6      Basophils % 0.3      Neutrophils Absolute 10.71 (*)     Immature Granulocytes Absolute, Automated 0.04      Lymphocytes Absolute 1.41      Monocytes Absolute 0.62      Eosinophils Absolute 0.08      Basophils Absolute 0.04     COMPREHENSIVE METABOLIC PANEL - Abnormal    Glucose 160 (*)     Sodium 136      Potassium 4.8      Chloride 99      Bicarbonate 31      Anion Gap 11      Urea Nitrogen 14      Creatinine 0.72      eGFR >90      Calcium 9.5      Albumin 4.3      Alkaline Phosphatase 69      Total Protein 7.2      AST 44 (*)     Bilirubin, Total 0.3      ALT 19     URINALYSIS WITH REFLEX MICROSCOPIC - Abnormal    Color, Urine Yellow      Appearance, Urine Turbid (*)     Specific Gravity, Urine 1.033      pH, Urine 5.5      Protein, Urine 30 (1+) (*)     Glucose, Urine Normal      Blood, Urine NEGATIVE      Ketones, Urine TRACE (*)     Bilirubin, Urine NEGATIVE      Urobilinogen, Urine 2 (1+) (*)     Nitrite, Urine NEGATIVE      Leukocyte Esterase, Urine 75 Carmen/µL (*)    MICROSCOPIC ONLY, URINE - Abnormal    WBC, Urine 1-5      RBC, Urine 3-5      Squamous Epithelial Cells, Urine 1-9 (SPARSE)      Bacteria, Urine 1+ (*)     Mucus, Urine 2+      Hyaline Casts, Urine 3+ (*)    B-TYPE NATRIURETIC PEPTIDE - Normal    BNP 98      Narrative:        <100 pg/mL - Heart failure unlikely  100-299 pg/mL - Intermediate probability of acute heart                  failure exacerbation. Correlate with clinical                  context and patient history.    >=300 pg/mL - Heart Failure likely. Correlate with clinical                  context and patient history.    BNP testing is performed using different testing methodology at [facility] than at other [facility]. Direct result comparisons should only be made within the same method.       LACTATE - Normal    Lactate 1.9      Narrative:     Venipuncture immediately after or during the administration of Metamizole may lead to falsely low results. Testing should be performed immediately  prior to Metamizole dosing.   SERIAL TROPONIN-INITIAL - Normal    Troponin I, High Sensitivity 3      Narrative:     Less than 99th percentile of normal range cutoff-  Female and children under 18 years old <14 ng/L; Male <21 ng/L: Negative  Repeat testing should be performed if clinically indicated.     Female and children under 18 years old 14-50 ng/L; Male 21-50 ng/L:  Consistent with possible cardiac damage and possible increased clinical   risk. Serial measurements may help to assess extent of myocardial damage.     >50 ng/L: Consistent with cardiac damage, increased clinical risk and  myocardial infarction. Serial measurements may help assess extent of   myocardial damage.      NOTE: Children less than 1 year old may have higher baseline troponin   levels and results should be interpreted in conjunction with the overall   clinical context.     NOTE: Troponin I testing is performed using a different   testing methodology at [facility] than at [facility]. Direct result comparisons should only   be made within the same method.   SARS-COV-2 PCR - Normal    Coronavirus 2019, PCR Not Detected      Narrative:     This assay has received FDA Emergency Use Authorization (EUA) and is only authorized for the duration of time that circumstances exist to justify the authorization of the emergency use of in vitro diagnostic tests for the detection of SARS-CoV-2 virus and/or diagnosis of COVID-19 infection under section 564(b)(1) of the Act, 21 U.S.C. 360bbb-3(b)(1). This assay is an in vitro diagnostic nucleic acid amplification test for the qualitative detection of SARS-CoV-2 from nasopharyngeal specimens and has been validated for use at [facility]. Negative results do  not preclude COVID-19 infections and should not be used as the sole basis for diagnosis, treatment, or other management decisions.     LIPASE - Normal    Lipase 60      Narrative:     Venipuncture immediately after or during the administration of Metamizole may lead to falsely low results. Testing should be performed immediately prior to Metamizole dosing.   SERIAL TROPONIN, 1 HOUR - Normal    Troponin I, High Sensitivity 3      Narrative:     Less than 99th percentile of normal range cutoff-  Female and children under 18 years old <14 ng/L; Male <21 ng/L: Negative  Repeat testing should be performed if clinically indicated.     Female and children under 18 years old 14-50 ng/L; Male 21-50 ng/L:  Consistent with possible cardiac damage and possible increased clinical   risk. Serial measurements may help to assess extent of myocardial damage.     >50 ng/L: Consistent with cardiac damage, increased clinical risk and  myocardial infarction. Serial measurements may help assess extent of   myocardial damage.      NOTE: Children less than 1 year old may have higher baseline troponin   levels and results should be interpreted in conjunction with the overall   clinical context.     NOTE: Troponin I testing is performed using a different   testing methodology at [facility] than at [facility]. Direct result comparisons should only   be made within the same method.   TROPONIN SERIES- (INITIAL, 1 HR)    Narrative:     The following orders were created for panel order Troponin I Series, High Sensitivity (0, 1 HR).  Procedure                               Abnormality         Status                     ---------                               -----------         ------                     Troponin I, High Sensiti...[[id number]]  Normal              Final result               Troponin, High Sensitivi...[[id number]]  Normal              Final result                 Please view results for these tests on the  individual orders.   PROTIME-INR       All other labs were within normal range or not returned as of this dictation.    Imaging  XR chest 1 view   Final Result   No acute abnormalities        Signed by: Mary Grace Serrano 8/20/2024 9:08 PM   Dictation workstation:   [id number]           Procedures  Procedures     EMERGENCY DEPARTMENT COURSE/MDM:     ED Course as of 08/21/24 0150   Wed Aug 21, 2024   0148 Workup is notable for evidence of UTI.  He was offered IV antibiotics, states that she would like to be discharged home and will take oral antibiotics at home.  Patient is given a prescription for Keflex as well as for doxycycline to cover for the potential skin infection that she had drained 2 days prior.  The rest of her workup is overall unremarkable.  Troponin normal x 2.  Very mild leukocytosis, this is likely related to her UTI.  CMP is otherwise unremarkable.  Lipase is now normal.  No need for further evaluation or treatment in the ER.  Patient was in agreement with plan for discharge.  Return precaution were discussed.  Patient was discharged stable condition.  She was able to tolerate ambulation prior to discharge. [CL]      ED Course User Index  [CL] Aries Zarate,          Diagnoses as of 08/21/24 0150   Abdominal pain, unspecified abdominal location   Cystitis        Medical Decision Making  63-year-old female present today with chief plaint of abdominal pain, states she was evaluated here 2 days ago, per review the chart she was evaluated here, was found to have an elevated lipase, concern for acute pancreatitis.  She states that her pain is similar to what she was experiencing 2 days ago.  She states that she had to leave because she was having a stress test performed outpatient.Her symptoms have not gotten any better.  Denies any fevers chills or chest pain at home.  No dysuria or hematuria.  No melena or hematochezia.  Differential includes but is not limited to gastritis, GERD, appendicitis,  diverticulitis, nephrolithiasis, cystitis. Will proceed with usual abdominal pain work-up including CBC, CMP, lipase, urinalysis, lactate, CT of the abdomen pelvis with IV contrast.        Patient and or family in agreement and understanding of treatment plan.  All questions answered.      I reviewed the case with the attending ED physician. The attending ED physician agrees with the plan. Patient and/or patient´s representative was counseled regarding labs, imaging, likely diagnosis, and plan. All questions were answered.    ED Medications administered this visit:    Medications   lactated Ringer's bolus 1,000 mL (0 mL intravenous Stopped 8/20/24 1187)       New Prescriptions from this visit:    Discharge Medication List as of 8/21/2024 12:38 AM        START taking these medications    Details   cephalexin (Keflex) 500 mg capsule Take 1 capsule (500 mg) by mouth 2 times a day for 10 days., Starting Wed 8/21/2024, Until Sat 8/31/2024, Normal      doxycycline (Vibra-Tabs) 100 mg tablet Take 1 tablet (100 mg) by mouth every 12 hours. Take with a full glass of water and do not lie down for at least 30 minutes after., Starting Wed 8/21/2024, Normal             Follow-up:  Asaf Cochran MD  [address] [facility] [address] [phone]    Schedule an appointment as soon as possible for a visit           Final Impression:   1. Abdominal pain, unspecified abdominal location    2. Cystitis          (Please note that portions of this note were completed with a voice recognition program.  Efforts were made to edit the dictations but occasionally words are mis-transcribed.)     Aries Zarate DO  Resident  08/21/24 0149       Aries Zarate DO  Resident  08/21/24 0150

## 2024-08-21 NOTE — DISCHARGE INSTRUCTIONS
Your workup today showed evidence of a urinary tract infection.  We have given you a prescription for Keflex and doxycycline to treat your urinary tract infection as well as to cover any potential infection related to the abscess that you had drained a few days ago.  Please take these as prescribed to completion.    Your work-up today has not revealed any acute emergent causes of your abdominal pain that would require you to stay in the hospital or need emergent intervention.  Please follow-up with your primary care provider in the next 1 to 2 days for ER follow-up after your visit with us today.    If you have a return or worsening of symptoms including severe abdominal pain, blood in your stools or blood in your urine, chest pain, shortness of breath, fevers, chills, lightheadedness, dizziness or any new or concerning symptoms please seek immediate medical attention or come back to the ER.

## 2024-08-22 ENCOUNTER — OFFICE VISIT (OUTPATIENT)
Dept: PRIMARY CARE | Facility: CLINIC | Age: 64
End: 2024-08-22
Payer: COMMERCIAL

## 2024-08-22 ENCOUNTER — APPOINTMENT (OUTPATIENT)
Dept: PRIMARY CARE | Facility: CLINIC | Age: 64
End: 2024-08-22
Payer: COMMERCIAL

## 2024-08-22 DIAGNOSIS — R10.31 RIGHT LOWER QUADRANT ABDOMINAL PAIN: ICD-10-CM

## 2024-08-22 DIAGNOSIS — R11.2 NAUSEA AND VOMITING, UNSPECIFIED VOMITING TYPE: ICD-10-CM

## 2024-08-22 DIAGNOSIS — R19.7 DIARRHEA, UNSPECIFIED TYPE: ICD-10-CM

## 2024-08-22 DIAGNOSIS — R10.11 RIGHT UPPER QUADRANT ABDOMINAL PAIN: Primary | ICD-10-CM

## 2024-08-22 PROCEDURE — 99212 OFFICE O/P EST SF 10 MIN: CPT | Performed by: NURSE PRACTITIONER

## 2024-08-22 ASSESSMENT — PAIN SCALES - GENERAL: PAINLEVEL: 4

## 2024-08-22 NOTE — PROGRESS NOTES
Subjective   Patient ID: Malika Casper is a 63 y.o. female who is with complaint of abdominal pain.    HPI  Patient is a 63 y.o. female who CONSULTED AT [facility] today. Patient is with complaints of abdominal pain, nausea, vomiting, and diarrhea. Patient states condition started about 5 days ago. she described the abdominal pain as crampy / sharp, non radiating, located on the RUQ and RLQ areas of the abdomen, 4-6 /10, intermittent, no apparent relation to food intake, type of food, nor relieved by food. she states there are no urinary complaints. she also had episodes of vomiting of previously ingested food, once a day for 5 days, no blood, no bile, no coffee ground material, with accompanying nausea. she had diarrhea described as watery, only once today, brown, no blood, no black tarry material, non foul smelling. she denies any intake of water from pond, stream, nor intake of uncooked food. she denies fever, chills, yellow discoloration of skin / sclerae. she had no recent travel. She was seen at ED 4 days ago where blood works and CT scans were done. She states she signed out of the ED because she had a scheduled stress test the next day. Her stress test was eventually cancelled. Two days later, she went back to ED. She was diagnosed to have cystitis and was given antibiotics to take at home. She was offered IV antibiotics but declined hospital admission.     Review of Systems  General: no weight loss, generally healthy, no fatigue  Head:  no headaches / sinus pain, no vertigo, no injury  Eyes: no diplopia, no tearing, no pain,   Ears: no change in hearing, no tinnitus, no bleeding, no vertigo  Mouth:  no dental difficulties, no gingival bleeding, no sore throat, no loss of sense of taste  Nose: no congestion, no  discharge, no bleeding, no obstruction, no loss of sense of smell  Neck: no stiffness, no pain, no tenderness, no masses, no bruit  Pulmonary: no dyspnea, no wheezing,  no hemoptysis, no cough  Cardiovascular: no chest pain, no palpitations, no syncope, no orthopnea  Gastrointestinal: no change in appetite, no dysphagia, (+) abdominal pain, (+) diarrhea, (+) nausea, (+) emesis, no melena  Genito Urinary: no dysuria, no urinary urgency, no nocturia, no incontinence, no change in nature of urine  Musculoskeletal: no muscle ache, no joint pain, no limitation of range of motion, no paresthesia, no numbness  Constitutional: no fever, no chills, no night sweats    Objective   Physical Exam  General: ambulatory, in no acute distress  Head: normocephalic, no lesions  Eyes: pink palpebral conjunctiva, anicteric sclerae, PERRLA, EOM's full  Abdomen: flat, NABS, soft, (+) direct tenderness on RUQ and RLQ, (+) equivocal rebound tenderness, no mass palpated, SIGNS: no [place], no Rovsings, no Psoas, no Obturator; No CVA tenderness,    Assessment/Plan   Problem List Items Addressed This Visit    None  Visit Diagnoses         Codes    Right upper quadrant abdominal pain    -  Primary R10.11    Right lower quadrant abdominal pain     R10.31    Nausea and vomiting, unspecified vomiting type     R11.2    Diarrhea, unspecified type     R19.7        Patient concern discussed with Dr Cochran. Recommendations given: she is to be seen first thing in the morning tomorrow In Office Visit. Patient assisted by  staff with scheduling visit.    DISCHARGE SUMMARY:   Probable diagnosis, differential diagnosis, treatment, treatment options, and probable complications were discussed and explained to patient. Patient was educated and advised on low fiber, BRAT diet. Advised to avoid high fiber foods. Advised to increase fluid intake (water and electrolyte drinks) as tolerated, if with no nausea, nor vomiting. Patient educated on indications for stool examination. Patient verbalized understanding.    Patient to follow up with Dr Cochran tomorrow.         MIRYAM Colon-CNP 08/22/24 4:21 PM

## 2024-08-22 NOTE — PROGRESS NOTES
"Subjective   Patient ID: Malika Casper is a 63 y.o. female who presents for Abdominal Pain.      Symptoms: pt was discharged from [facility] on the 18 and is following up on her pancreatitis and UTI infection  Length of symptoms: 5+ days ago  OTC: none  Related information:    HPI     Review of Systems    Objective   /84 Comment: auto  Pulse 86   Temp 36.6 °C (97.9 °F)   Resp 16   Ht 1.575 m (5' 2\")   Wt 56.2 kg (123 lb 12.8 oz)   SpO2 97%   BMI 22.64 kg/m²     Physical Exam    Assessment/Plan          "

## 2024-08-23 ENCOUNTER — APPOINTMENT (OUTPATIENT)
Dept: PRIMARY CARE | Facility: CLINIC | Age: 64
End: 2024-08-23
Payer: COMMERCIAL

## 2024-08-23 ENCOUNTER — OFFICE VISIT (OUTPATIENT)
Dept: PRIMARY CARE | Facility: CLINIC | Age: 64
End: 2024-08-23
Payer: COMMERCIAL

## 2024-08-23 VITALS
BODY MASS INDEX: 22.26 KG/M2 | TEMPERATURE: 97.1 F | HEART RATE: 77 BPM | OXYGEN SATURATION: 98 % | HEIGHT: 62 IN | SYSTOLIC BLOOD PRESSURE: 128 MMHG | DIASTOLIC BLOOD PRESSURE: 70 MMHG | WEIGHT: 121 LBS

## 2024-08-23 VITALS
DIASTOLIC BLOOD PRESSURE: 84 MMHG | HEART RATE: 86 BPM | BODY MASS INDEX: 22.78 KG/M2 | SYSTOLIC BLOOD PRESSURE: 147 MMHG | OXYGEN SATURATION: 97 % | WEIGHT: 123.8 LBS | HEIGHT: 62 IN | RESPIRATION RATE: 16 BRPM | TEMPERATURE: 97.9 F

## 2024-08-23 DIAGNOSIS — I10 PRIMARY HYPERTENSION: ICD-10-CM

## 2024-08-23 DIAGNOSIS — E78.2 MIXED HYPERLIPIDEMIA: ICD-10-CM

## 2024-08-23 DIAGNOSIS — K85.90 ACUTE PANCREATITIS WITHOUT INFECTION OR NECROSIS, UNSPECIFIED PANCREATITIS TYPE (HHS-HCC): ICD-10-CM

## 2024-08-23 DIAGNOSIS — R10.84 GENERALIZED ABDOMINAL PAIN: ICD-10-CM

## 2024-08-23 DIAGNOSIS — K21.9 GASTROESOPHAGEAL REFLUX DISEASE WITHOUT ESOPHAGITIS: Primary | ICD-10-CM

## 2024-08-23 DIAGNOSIS — F41.9 ANXIETY: ICD-10-CM

## 2024-08-23 PROCEDURE — 99214 OFFICE O/P EST MOD 30 MIN: CPT | Performed by: FAMILY MEDICINE

## 2024-08-23 ASSESSMENT — ENCOUNTER SYMPTOMS
OCCASIONAL FEELINGS OF UNSTEADINESS: 0
DEPRESSION: 0
ABDOMINAL PAIN: 1
DIARRHEA: 1
NAUSEA: 1
LOSS OF SENSATION IN FEET: 0

## 2024-08-23 ASSESSMENT — PATIENT HEALTH QUESTIONNAIRE - PHQ9
1. LITTLE INTEREST OR PLEASURE IN DOING THINGS: NOT AT ALL
2. FEELING DOWN, DEPRESSED OR HOPELESS: NOT AT ALL
SUM OF ALL RESPONSES TO PHQ9 QUESTIONS 1 AND 2: 0

## 2024-08-23 NOTE — PATIENT INSTRUCTIONS
DISCHARGE SUMMARY:   Probable diagnosis, differential diagnosis, treatment, treatment options, and probable complications were discussed and explained to patient. Patient was educated and advised on low fiber, BRAT diet. Advised to avoid high fiber foods. Advised to increase fluid intake (water and electrolyte drinks) as tolerated, if with no nausea, nor vomiting. Patient educated on indications for stool examination. Patient verbalized understanding.    Patient to follow up with Dr Cochran tomorrow.   Pt states per [facility] pharmacy in order for her to get her prescriptions for zolpidem  Our office will need to contact them at [phone] otherwise she is unable to get her script

## 2024-08-23 NOTE — PROGRESS NOTES
"Subjective   Patient ID: Malika Casper is a 63 y.o. female who presents for Abdominal Pain.    Abdominal Pain  This is a recurrent problem. The current episode started in the past 7 days. The onset quality is sudden. The problem occurs constantly. The problem has been gradually worsening. The pain is located in the generalized abdominal region and RLQ. The pain is at a severity of 9/10. The pain is severe. The quality of the pain is sharp. The abdominal pain does not radiate. Associated symptoms include diarrhea and nausea. The pain is aggravated by certain positions. The pain is relieved by Nothing. She has tried antibiotics for the symptoms. The treatment provided mild relief.   Pt was seen in the ER on 08/18/2024 for abdominal pain at [facility].  Pt had Xrays, Ct of abdomen,US of pelvis and blood work.      Review of Systems   Gastrointestinal:  Positive for abdominal pain, diarrhea and nausea.     Constitutional: Well developed, well nourished, alert and in no acute distress   Eyes: Normal external exam. Pupils equally round and reactive to light with normal accommodation and extraocular movements intact.  Neck: Supple, no lymphadenopathy or masses.   Cardiovascular: Regular rate and rhythm, normal S1 and S2, no murmurs, gallops, or rubs. Radial pulses normal. No peripheral edema.  Pulmonary: No respiratory distress, lungs clear to auscultation bilaterally. No wheezes, rhonchi, rales.  Abdomen: soft,non tender, non distended, without masses or HSM  Skin: Warm, well perfused, normal skin turgor and color.   Neurologic: Cranial nerves II-XII grossly intact.   Psychiatric: Mood calm and affect normal  Musculoskeletal: Moving all extremities without restriction    Objective   /70 (BP Location: Left arm, Patient Position: Sitting, BP Cuff Size: Adult)   Pulse 77   Temp 36.2 °C (97.1 °F)   Ht 1.575 m (5' 2\")   Wt 54.9 kg (121 lb)   SpO2 98%   BMI 22.13 kg/m²     Physical Exam  Constitutional: Well " developed, well nourished, alert and in no acute distress   Eyes: Normal external exam. Pupils equally round and reactive to light with normal accommodation and extraocular movements intact.  Neck: Supple, no lymphadenopathy or masses.   Cardiovascular: Regular rate and rhythm, normal S1 and S2, no murmurs, gallops, or rubs. Radial pulses normal. No peripheral edema.  Pulmonary: No respiratory distress, lungs clear to auscultation bilaterally. No wheezes, rhonchi, rales.  Abdomen: soft, tender, non distended, without masses or HSM  Skin: Warm, well perfused, normal skin turgor and color.   Neurologic: Cranial nerves II-XII grossly intact.   Psychiatric: Mood calm and affect normal  Musculoskeletal: Moving all extremities without restriction    Assessment/Plan   Problem List Items Addressed This Visit             ICD-10-CM    GERD (gastroesophageal reflux disease) - Primary K21.9    Relevant Orders    Follow Up In [facility] - PCP - Established    HTN (hypertension) I10    Relevant Orders    Follow Up In [facility] - PCP - Established    Hyperlipidemia E78.5    Relevant Orders    Follow Up In [facility] - PCP - Established     Other Visit Diagnoses         Codes    Generalized abdominal pain     R10.84    Relevant Orders    Follow Up In [facility] - PCP - Established    Acute pancreatitis without infection or necrosis, unspecified pancreatitis type ([facility]-HCC)     K85.90    Relevant Orders    CBC and Auto Differential    Comprehensive Metabolic Panel    Lipase    Amylase    Follow Up In [facility] - PCP - Established          Sees  Dr. Lam      -see GYN      -Continue colchicine for gout PRN     -Go to PT      Stop marijuana    Continue current medications and therapy for chronic medical conditions

## 2024-08-25 ENCOUNTER — HOSPITAL ENCOUNTER (EMERGENCY)
Facility: HOSPITAL | Age: 64
Discharge: LEFT BEFORE DISCHARGE | End: 2024-08-25
Attending: EMERGENCY MEDICINE
Payer: COMMERCIAL

## 2024-08-25 VITALS
SYSTOLIC BLOOD PRESSURE: 134 MMHG | OXYGEN SATURATION: 99 % | HEIGHT: 60 IN | BODY MASS INDEX: 23.56 KG/M2 | RESPIRATION RATE: 18 BRPM | DIASTOLIC BLOOD PRESSURE: 81 MMHG | HEART RATE: 101 BPM | TEMPERATURE: 97.9 F | WEIGHT: 120 LBS

## 2024-08-25 DIAGNOSIS — M79.18 BUTTOCK PAIN: Primary | ICD-10-CM

## 2024-08-25 PROCEDURE — 99284 EMERGENCY DEPT VISIT MOD MDM: CPT | Performed by: EMERGENCY MEDICINE

## 2024-08-25 PROCEDURE — 36410 VNPNXR 3YR/> PHY/QHP DX/THER: CPT

## 2024-08-25 PROCEDURE — 99283 EMERGENCY DEPT VISIT LOW MDM: CPT

## 2024-08-25 RX ORDER — LIDOCAINE 560 MG/1
1 PATCH PERCUTANEOUS; TOPICAL; TRANSDERMAL DAILY
Status: DISCONTINUED | OUTPATIENT
Start: 2024-08-26 | End: 2024-08-26 | Stop reason: HOSPADM

## 2024-08-25 ASSESSMENT — PAIN DESCRIPTION - LOCATION: LOCATION: RECTUM

## 2024-08-25 ASSESSMENT — PAIN DESCRIPTION - DESCRIPTORS: DESCRIPTORS: ACHING

## 2024-08-25 ASSESSMENT — PAIN DESCRIPTION - PROGRESSION: CLINICAL_PROGRESSION: GRADUALLY WORSENING

## 2024-08-25 ASSESSMENT — PAIN - FUNCTIONAL ASSESSMENT: PAIN_FUNCTIONAL_ASSESSMENT: 0-10

## 2024-08-25 ASSESSMENT — PAIN DESCRIPTION - FREQUENCY: FREQUENCY: CONSTANT/CONTINUOUS

## 2024-08-25 ASSESSMENT — PAIN DESCRIPTION - ONSET: ONSET: ONGOING

## 2024-08-25 ASSESSMENT — PAIN SCALES - GENERAL: PAINLEVEL_OUTOF10: 10 - WORST POSSIBLE PAIN

## 2024-08-25 ASSESSMENT — PAIN DESCRIPTION - PAIN TYPE: TYPE: ACUTE PAIN

## 2024-08-26 NOTE — ED PROVIDER NOTES
Emergency Department Provider Note        History of Present Illness     History provided by: Patient  Limitations to History: None  External Records Reviewed with Brief Summary: None    HPI:  Malika Casper is a 63 y.o. female with a history of HTN, HLD, chronic back pain, and GERD presenting to the ED with complaint of buttock pain.  Patient reports that she was going to take a bath today when she sat down in the tub and began having severe buttock pain.  No pain to the rectum.  Also endorsing continued lower abdominal pain and dysuria despite taking prescribed Keflex for the past 5 days.  Endorses chills and mild nausea.  States she is concerned for worsening urinary infection.  Reports pain to her buttock is a 6/10 and lower abdomen at today 4/10.  Denies vomiting, diarrhea, blood in urine or stool, chest pain, shortness of breath, or fevers.    Physical Exam   Triage vitals:  T 36.6 °C (97.9 °F)  HR (!) 101  /81  RR 18  O2 99 % None (Room air)    Physical Exam  Vitals and nursing note reviewed.   Constitutional:       General: She is awake. She is not in acute distress.     Appearance: Normal appearance. She is well-developed.   HENT:      Head: Normocephalic and atraumatic.      Right Ear: External ear normal.      Left Ear: External ear normal.      Nose: Nose normal. No congestion or rhinorrhea.      Mouth/Throat:      Mouth: Mucous membranes are moist.      Pharynx: Oropharynx is clear. No posterior oropharyngeal erythema.   Eyes:      General: No scleral icterus.        Right eye: No discharge.         Left eye: No discharge.      Extraocular Movements: Extraocular movements intact.      Conjunctiva/sclera: Conjunctivae normal.   Cardiovascular:      Rate and Rhythm: Normal rate and regular rhythm.      Pulses: Normal pulses.      Heart sounds: Normal heart sounds. No murmur (grade 3 or above) heard.     No friction rub.   Pulmonary:      Effort: Pulmonary effort is normal. No respiratory  distress.      Breath sounds: Normal breath sounds. No stridor. No wheezing or rales.   Abdominal:      General: There is no distension.      Palpations: Abdomen is soft.      Tenderness: There is abdominal tenderness (Lower abdominal). There is no right CVA tenderness, left CVA tenderness, guarding or rebound.   Musculoskeletal:         General: Tenderness (gluteal muscles bilaterally) present. No swelling.      Cervical back: Normal range of motion and neck supple.      Right lower leg: No edema.      Left lower leg: No edema.   Skin:     General: Skin is warm and dry.      Capillary Refill: Capillary refill takes less than 2 seconds.      Coloration: Skin is not jaundiced or pale.      Findings: No lesion or rash.   Neurological:      General: No focal deficit present.      Mental Status: She is alert and oriented to person, place, and time. Mental status is at baseline.      Cranial Nerves: No cranial nerve deficit.      Sensory: No sensory deficit.      Motor: No weakness.   Psychiatric:         Mood and Affect: Mood normal.         Behavior: Behavior normal. Behavior is cooperative.         Thought Content: Thought content normal.         Judgment: Judgment normal.          Medical Decision Making & ED Course   Medical Decision Makin y.o. female presenting to ED with complaint of buttock pain.  Patient is afebrile, sinus, hypertensive, saturating well on room air, and in no acute distress.  On exam patient has tenderness to her lower abdomen and buttock region.  Patient informed she is difficult for obtaining IV access.  Nurse reports prior attempts were 3 successful IVs were blown.  Patient reports distress with blown IV at CT scanner when contrast extravasated in her arm.  2 ultrasound IV attempts were made with success and blood collection however no longer was flushing.  Patient refused further attempts and wished to leave.  Informed of concern of failure for outpatient treatment of your infection  and development of sepsis as well as death.  Patient elicits understanding and is aware of the risks.  She consistently communicates choice and appreciates the consequences of her actions.  Patient left prior to discharge paperwork being provided to her.  ----  Differential diagnoses considered include but are not limited to: Appendicitis, Cholecystitis, Pancreatitis, PUD, Gastritis, Gastroenteritis, Intestinal obstruction, Diverticulitis, IBD, Mesenteric ischemia, AAA, UTI/Pyelo, Calculi, Hepatitis, Splenic infarct, Hernia, Constipation, Adhesions, Malignancy, Bowel perforation, Volvulus, Trauma, Abscess, Retroperitoneal hemorrhage, Functional abdominal pain     Social Determinants of Health which Significantly Impact Care: Substance use disorder The following actions were taken to address these social determinants: Patient advised on smoking cessation and offered resources in which she is agreeable    EKG Independent Interpretation: EKG not obtained    Independent Result Review and Interpretation: None obtained    Chronic conditions affecting the patient's care: As documented above in [facility]    The patient was discussed with the following consultants/services: None    Care Considerations: As documented above in [facility]    ED Course:  Diagnoses as of 08/26/24 0824   Buttock pain     Disposition   The patient elected to leave the Emergency Department against medical advice. In my opinion, the patient has capacity to leave [place]. she is clinically sober, free from distracting injury, appears to have intact insight, judgment, and reason, and in my opinion has capacity to make decisions. I explained to her that these symptoms may represent a serious underlying medical condition and she verbalized understanding of my concerns and understands the consequences of leaving without complete evaluation.    I had a discussion with the patient about her workup and results, and informed her what the next step in diagnosis and treatment  would be, and she verbalized understanding. I explained the risks of leaving without further workup or treatment, which included reasonably foreseeable complications such as death, serious injury, and permanent disability. I also offered alternatives to departing AMA.    I have asked her to return as soon as possible to complete her evaluation, and also explained that she is welcome to return to the ED for further evaluation whenever she chooses. I have asked her to follow up with her primary doctor as soon as possible. All of her questions were answered and she was given oral discharge instructions.    Procedures   Procedures    Patient seen and discussed with ED attending physician.    Aaron Ferris MD  Emergency Medicine    =================Attending note===============    The patient was seen by the resident/fellow.  I have personally performed a substantive portion of the encounter.  I have seen and examined the patient; agree with the workup, evaluation, MDM,   management and diagnosis.  The care plan has been discussed with the resident; I have reviewed the resident’s note and agree with the documented findings.      This is a 63 y.o. female who presents to ER with her buttocks and lumbosacral region.  Is not actually in her back.  States she sat in the bathtub and she had this pain.  She was recently seen here for some similar pain.  She was recently diagnosed with a urinary tract infection and she is on Keflex.  No fevers.  She does some chills.  No diarrhea or vomiting.  She is having some burning with urination.  She does have a history of hyperlipidemia and hypertension.  She was just seen here a few days ago for similar issues.  She did have a CT scan and blood work at that time.    Heart is regular.  Lungs are clear.  Abdomen is soft.  There is no significant tenderness.  There is mild generalized lower abdominal tenderness.  No guarding or rebound.  No peritoneal signs.  She does some tenderness in her  gluteal region.    Nursing was unable to get an IV and resident did attempt to get ultrasound-guided IV.  Labs were drawn.  Patient then decided she did not want any further evaluation refused further attempt at IV.    Resident did discuss her leaving AMA.  He came and spoke with me and when they both went to go in the room to have her sign out AMA she had already left the hospital without treatment complete at 2215.      ==========================================       Aaron Ferris MD  Resident  08/26/24 0881       Kenji Nova DO  08/26/24 [id number]

## 2024-08-28 ENCOUNTER — PATIENT OUTREACH (OUTPATIENT)
Dept: PRIMARY CARE | Facility: CLINIC | Age: 64
End: 2024-08-28
Payer: COMMERCIAL

## 2024-08-28 ENCOUNTER — HOSPITAL ENCOUNTER (OUTPATIENT)
Dept: CARDIOLOGY | Facility: CLINIC | Age: 64
Discharge: HOME | End: 2024-08-28
Payer: COMMERCIAL

## 2024-08-28 DIAGNOSIS — I10 PRIMARY HYPERTENSION: ICD-10-CM

## 2024-08-28 DIAGNOSIS — R09.89 DIMINISHED PULSES IN LOWER EXTREMITY: ICD-10-CM

## 2024-08-28 DIAGNOSIS — I73.9 CLAUDICATION, INTERMITTENT (CMS-HCC): ICD-10-CM

## 2024-08-28 DIAGNOSIS — K85.90 ACUTE PANCREATITIS WITHOUT INFECTION OR NECROSIS, UNSPECIFIED PANCREATITIS TYPE (HHS-HCC): ICD-10-CM

## 2024-08-28 DIAGNOSIS — K21.9 GASTROESOPHAGEAL REFLUX DISEASE WITHOUT ESOPHAGITIS: ICD-10-CM

## 2024-08-28 PROCEDURE — 93922 UPR/L XTREMITY ART 2 LEVELS: CPT

## 2024-08-28 PROCEDURE — 93922 UPR/L XTREMITY ART 2 LEVELS: CPT | Performed by: INTERNAL MEDICINE

## 2024-08-28 PROCEDURE — 93925 LOWER EXTREMITY STUDY: CPT | Performed by: INTERNAL MEDICINE

## 2024-08-28 NOTE — PROGRESS NOTES
Returning Danae's phone call  She has appointments scheduled this week  She is unsure where they are or how to get there  Called and spoke to [facility]  Confirmed ultrasound [address]    Copied from Dr. Mcconnell's Office Note 7/19/24  1.  Chest discomfort.  Patient's symptoms are atypical and not highly suggestive of angina.  She was recently hospitalized with negative markers for myocardial injury.  No new or ischemic EKG changes.  Given a low CT cardiac score less than 2 years ago would recommend repeating her stress test this 1 more time and then after that if the stress test is normal or low risk would really focus attention looking for noncardiac causes of chest discomfort.  Her symptoms or not suggestive of angina and not suggestive of pericarditis.  They are sharp which is more likely musculoskeletal in nature however there is no clear pattern to them.  Will defer any additional workup to her primary care physician.  Would recommend repeating her CT cardiac score in [id number] given her low reading in December 2022.  2.  Bilateral foot pain questionable claudication with diminished pulses.  I have looked and she has had venous duplex studies but she has not had any arterial studies given her delayed capillary refill.  I have recommended arterial duplex to bilateral legs with DONNA.  If abnormal would refer to vascular medicine.  3.  Hypertension.  Diastolic blood pressures are borderline with her initial reading repeat shows better blood pressure control.  4.  Coronary artery calcium score less than 100.  Actually it was 3 in December 2022 no indication for statin therapy.  5.  Elevated LDL cholesterol.  At this point in time recommend diet and exercise.  Will see the patient in follow-up after her stress test if it is normal we will reevaluate her symptoms at that time and defer any additional testing to her PCP.

## 2024-08-29 ENCOUNTER — TELEPHONE (OUTPATIENT)
Dept: GASTROENTEROLOGY | Facility: CLINIC | Age: 64
End: 2024-08-29

## 2024-08-29 ENCOUNTER — APPOINTMENT (OUTPATIENT)
Dept: GASTROENTEROLOGY | Facility: CLINIC | Age: 64
End: 2024-08-29
Payer: COMMERCIAL

## 2024-08-29 ENCOUNTER — LAB (OUTPATIENT)
Dept: LAB | Facility: LAB | Age: 64
End: 2024-08-29
Payer: COMMERCIAL

## 2024-08-29 DIAGNOSIS — K85.90 ACUTE PANCREATITIS WITHOUT INFECTION OR NECROSIS, UNSPECIFIED PANCREATITIS TYPE (HHS-HCC): ICD-10-CM

## 2024-08-29 LAB
ALBUMIN SERPL BCP-MCNC: 4.5 G/DL (ref 3.4–5)
ALP SERPL-CCNC: 86 U/L (ref 33–136)
ALT SERPL W P-5'-P-CCNC: 20 U/L (ref 7–45)
AMYLASE SERPL-CCNC: 51 U/L (ref 29–103)
ANION GAP SERPL CALC-SCNC: 14 MMOL/L (ref 10–20)
AST SERPL W P-5'-P-CCNC: 20 U/L (ref 9–39)
ATRIAL RATE: 76 BPM
ATRIAL RATE: 89 BPM
BASOPHILS # BLD AUTO: 0.05 X10*3/UL (ref 0–0.1)
BASOPHILS NFR BLD AUTO: 0.6 %
BILIRUB SERPL-MCNC: 0.5 MG/DL (ref 0–1.2)
BUN SERPL-MCNC: 20 MG/DL (ref 6–23)
CALCIUM SERPL-MCNC: 10.1 MG/DL (ref 8.6–10.3)
CHLORIDE SERPL-SCNC: 104 MMOL/L (ref 98–107)
CO2 SERPL-SCNC: 26 MMOL/L (ref 21–32)
CREAT SERPL-MCNC: 0.68 MG/DL (ref 0.5–1.05)
EGFRCR SERPLBLD CKD-EPI 2021: >90 ML/MIN/1.73M*2
EOSINOPHIL # BLD AUTO: 0.17 X10*3/UL (ref 0–0.7)
EOSINOPHIL NFR BLD AUTO: 1.9 %
ERYTHROCYTE [DISTWIDTH] IN BLOOD BY AUTOMATED COUNT: 13.7 % (ref 11.5–14.5)
GLUCOSE SERPL-MCNC: 100 MG/DL (ref 74–99)
HCT VFR BLD AUTO: 42.4 % (ref 36–46)
HGB BLD-MCNC: 14.3 G/DL (ref 12–16)
IMM GRANULOCYTES # BLD AUTO: 0.02 X10*3/UL (ref 0–0.7)
IMM GRANULOCYTES NFR BLD AUTO: 0.2 % (ref 0–0.9)
LIPASE SERPL-CCNC: 98 U/L (ref 9–82)
LYMPHOCYTES # BLD AUTO: 2.21 X10*3/UL (ref 1.2–4.8)
LYMPHOCYTES NFR BLD AUTO: 24.7 %
MCH RBC QN AUTO: 32.6 PG (ref 26–34)
MCHC RBC AUTO-ENTMCNC: 33.7 G/DL (ref 32–36)
MCV RBC AUTO: 97 FL (ref 80–100)
MONOCYTES # BLD AUTO: 0.46 X10*3/UL (ref 0.1–1)
MONOCYTES NFR BLD AUTO: 5.1 %
NEUTROPHILS # BLD AUTO: 6.04 X10*3/UL (ref 1.2–7.7)
NEUTROPHILS NFR BLD AUTO: 67.5 %
NRBC BLD-RTO: 0 /100 WBCS (ref 0–0)
P AXIS: 73 DEGREES
P AXIS: 79 DEGREES
P OFFSET: 209 MS
P OFFSET: 213 MS
P ONSET: 150 MS
P ONSET: 159 MS
PLATELET # BLD AUTO: 287 X10*3/UL (ref 150–450)
POTASSIUM SERPL-SCNC: 4.3 MMOL/L (ref 3.5–5.3)
PR INTERVAL: 132 MS
PR INTERVAL: 146 MS
PROT SERPL-MCNC: 6.9 G/DL (ref 6.4–8.2)
Q ONSET: 223 MS
Q ONSET: 225 MS
QRS COUNT: 12 BEATS
QRS COUNT: 15 BEATS
QRS DURATION: 84 MS
QRS DURATION: 90 MS
QT INTERVAL: 374 MS
QT INTERVAL: 406 MS
QTC CALCULATION(BAZETT): 455 MS
QTC CALCULATION(BAZETT): 456 MS
QTC FREDERICIA: 426 MS
QTC FREDERICIA: 439 MS
R AXIS: -25 DEGREES
R AXIS: -42 DEGREES
RBC # BLD AUTO: 4.39 X10*6/UL (ref 4–5.2)
SODIUM SERPL-SCNC: 140 MMOL/L (ref 136–145)
T AXIS: 61 DEGREES
T AXIS: 69 DEGREES
T OFFSET: 412 MS
T OFFSET: 426 MS
VENTRICULAR RATE: 76 BPM
VENTRICULAR RATE: 89 BPM
WBC # BLD AUTO: 9 X10*3/UL (ref 4.4–11.3)

## 2024-08-29 PROCEDURE — 80053 COMPREHEN METABOLIC PANEL: CPT

## 2024-08-29 PROCEDURE — 36415 COLL VENOUS BLD VENIPUNCTURE: CPT

## 2024-08-29 PROCEDURE — 85025 COMPLETE CBC W/AUTO DIFF WBC: CPT

## 2024-08-29 PROCEDURE — 82150 ASSAY OF AMYLASE: CPT

## 2024-08-29 PROCEDURE — 83690 ASSAY OF LIPASE: CPT

## 2024-08-29 NOTE — PROGRESS NOTES
Subjective   Patient ID: Malika Casper is a 63 y.o. female who presents for Abdominal Pain (Seen at ED 8/18/2024 and 8/20/2024 at [place], acute pancreatitis per patient.) and Nausea.  HPI  ->CT A/Pwith contrast 8/18/24: Performed for right lower quadrant pain while in the ED  New focal subcutaneous fluid collection left groin/inguinal region.  Correlate with physical exam. May reflect small abscess.  No pancreatitis noted on imaging  -> Lipase 425 on 8/18/2024  -> LFTs unremarkable  -> CBC unremarkable, mild leukocytosis  -> Lipase repeated on 8/20/2024, within normal limits  -> Colonoscopy 2022: Normal exam  -> EGD 6/7/2022: Normal exam other than chronic gastritis biopsies negative for H. pylori  Current Outpatient Medications on File Prior to Visit   Medication Sig Dispense Refill    ascorbic acid (Vitamin C) 500 mg tablet Take 1 tablet (500 mg) by mouth once daily.      calamine-zinc oxide lotion Apply 1 Application topically 4 times a day as needed (itching). 1 bottle 1    cariprazine (Vraylar) 1.5 mg capsule Take 1 capsule (1.5 mg) by mouth once daily. 28 capsule 0    cephalexin (Keflex) 500 mg capsule Take 1 capsule (500 mg) by mouth 2 times a day for 10 days. 20 capsule 0    colchicine 0.6 mg tablet TAKE ONE TABLET (0.6 MG) BY MOUTH DAILY 30 tablet 0    cyclobenzaprine (Flexeril) 5 mg tablet Take 1 tablet (5 mg) by mouth 3 times a day.      doxycycline (Vibra-Tabs) 100 mg tablet Take 1 tablet (100 mg) by mouth every 12 hours. Take with a full glass of water and do not lie down for at least 30 minutes after. 20 tablet 0    gabapentin (Neurontin) 800 mg tablet Take 1 tablet (800 mg) by mouth 3 times a day.      HYDROcodone-acetaminophen (Norco)  mg tablet Take 1 tablet by mouth every 4 hours if needed for moderate pain (4 - 6) or severe pain (7 - 10). Max 5 tablets daily      lidocaine 4 % patch Place 1 patch on the skin once daily as needed for mild pain (1 - 3).      lisinopril 10 mg tablet Take  "1 tablet (10 mg) by mouth once daily. 90 tablet 0    medical cannabis Take 1 each by mouth if needed. Oil Or Sol Vap - 3.47 - 170 - Indica Cart - La Yg Cake, Edb Oral Admin 50-10 Gummy Pineapple Punch Ubgood, Edb Oral Admin - 22 - 10 - Gummy - D-Berry      multivitamin tablet Take 1 tablet by mouth once daily. 90 tablet 0    naloxone (Narcan) 4 mg/0.1 mL nasal spray Administer 1 spray (4 mg) into affected nostril(s) if needed for opioid reversal.      promethazine (Phenergan) 25 mg tablet Take 0.5 tablets (12.5 mg) by mouth every 6 hours if needed for nausea or vomiting for up to 10 days. 20 tablet 0    albuterol (Ventolin HFA) 90 mcg/actuation inhaler Inhale 2 puffs every 4 hours if needed for wheezing or shortness of breath. (Patient not taking: Reported on 2024) 8 g 2    allopurinol (Zyloprim) 100 mg tablet Take 1 tablet (100 mg) by mouth once daily. (Patient not taking: Reported on 2024) 30 tablet 0    atorvastatin (Lipitor) 40 mg tablet Take 1 tablet (40 mg) by mouth once daily at bedtime. (Patient not taking: Reported on 2024) 30 tablet 0    [] ondansetron (Zofran) 4 mg tablet Take 1 tablet (4 mg) by mouth every 8 hours if needed for nausea or vomiting for up to 4 days. 12 tablet 0     No current facility-administered medications on file prior to visit.        Review of Systems    Objective   Physical Exam  There were no vitals taken for this visit.     Lab Results   Component Value Date    WBC 12.9 (H) 2024    HGB 12.3 2024    HCT 36.8 2024    MCV 97 2024     2024           No lab exists for component: \"LABALBU\"    No results found for: \"AFP\"  Lab Results   Component Value Date    TSH 2.03 2024         Assessment/Plan   There are no diagnoses linked to this encounter.         "

## 2024-08-29 NOTE — TELEPHONE ENCOUNTER
Minda attempted several times to connect virtually with the patient to do her visit today.     Patient states that she could not get her phone to connect, so her appt. was rescheduled to 10/14/2024, next available.    Patient states that she is having pretty bad abdominal pain. She was advised to go to the ED. Patient states that she is going to go to [place] [facility].

## 2024-08-30 ENCOUNTER — TELEPHONE (OUTPATIENT)
Dept: CARDIOLOGY | Facility: CLINIC | Age: 64
End: 2024-08-30

## 2024-08-30 ENCOUNTER — APPOINTMENT (OUTPATIENT)
Dept: PRIMARY CARE | Facility: CLINIC | Age: 64
End: 2024-08-30
Payer: COMMERCIAL

## 2024-08-30 VITALS
BODY MASS INDEX: 23.56 KG/M2 | TEMPERATURE: 98.2 F | DIASTOLIC BLOOD PRESSURE: 80 MMHG | OXYGEN SATURATION: 97 % | HEART RATE: 100 BPM | SYSTOLIC BLOOD PRESSURE: 120 MMHG | RESPIRATION RATE: 16 BRPM | WEIGHT: 120 LBS | HEIGHT: 60 IN

## 2024-08-30 DIAGNOSIS — R31.9 HEMATURIA, UNSPECIFIED TYPE: Primary | ICD-10-CM

## 2024-08-30 DIAGNOSIS — E78.2 MIXED HYPERLIPIDEMIA: ICD-10-CM

## 2024-08-30 DIAGNOSIS — N39.0 URINARY TRACT INFECTION WITHOUT HEMATURIA, SITE UNSPECIFIED: ICD-10-CM

## 2024-08-30 DIAGNOSIS — M10.9 GOUT, ARTHRITIS: ICD-10-CM

## 2024-08-30 DIAGNOSIS — B02.9 HERPES ZOSTER WITHOUT COMPLICATION: ICD-10-CM

## 2024-08-30 DIAGNOSIS — I10 PRIMARY HYPERTENSION: ICD-10-CM

## 2024-08-30 DIAGNOSIS — F41.9 ANXIETY: ICD-10-CM

## 2024-08-30 DIAGNOSIS — K85.90 ACUTE PANCREATITIS WITHOUT INFECTION OR NECROSIS, UNSPECIFIED PANCREATITIS TYPE (HHS-HCC): ICD-10-CM

## 2024-08-30 DIAGNOSIS — R10.84 GENERALIZED ABDOMINAL PAIN: ICD-10-CM

## 2024-08-30 DIAGNOSIS — I10 HYPERTENSION, UNSPECIFIED TYPE: ICD-10-CM

## 2024-08-30 DIAGNOSIS — K21.9 GASTROESOPHAGEAL REFLUX DISEASE WITHOUT ESOPHAGITIS: ICD-10-CM

## 2024-08-30 DIAGNOSIS — N30.90 CYSTITIS: ICD-10-CM

## 2024-08-30 LAB
POC APPEARANCE, URINE: ABNORMAL
POC BILIRUBIN, URINE: NEGATIVE
POC BLOOD, URINE: ABNORMAL
POC COLOR, URINE: YELLOW
POC GLUCOSE, URINE: NEGATIVE MG/DL
POC KETONES, URINE: NEGATIVE MG/DL
POC LEUKOCYTES, URINE: ABNORMAL
POC NITRITE,URINE: NEGATIVE
POC PH, URINE: 5.5 PH
POC PROTEIN, URINE: NEGATIVE MG/DL
POC SPECIFIC GRAVITY, URINE: >=1.03
POC UROBILINOGEN, URINE: 0.2 EU/DL

## 2024-08-30 PROCEDURE — 99214 OFFICE O/P EST MOD 30 MIN: CPT | Performed by: FAMILY MEDICINE

## 2024-08-30 PROCEDURE — 87086 URINE CULTURE/COLONY COUNT: CPT

## 2024-08-30 PROCEDURE — 81002 URINALYSIS NONAUTO W/O SCOPE: CPT | Performed by: FAMILY MEDICINE

## 2024-08-30 RX ORDER — LISINOPRIL 10 MG/1
10 TABLET ORAL DAILY
Qty: 90 TABLET | Refills: 0 | Status: SHIPPED | OUTPATIENT
Start: 2024-08-30

## 2024-08-30 RX ORDER — DOXYCYCLINE HYCLATE 100 MG
100 TABLET ORAL EVERY 12 HOURS
Qty: 20 TABLET | Refills: 0 | Status: SHIPPED | OUTPATIENT
Start: 2024-08-30

## 2024-08-30 RX ORDER — ATORVASTATIN CALCIUM 40 MG/1
40 TABLET, FILM COATED ORAL NIGHTLY
Qty: 30 TABLET | Refills: 0 | Status: SHIPPED | OUTPATIENT
Start: 2024-08-30 | End: 2024-09-29

## 2024-08-30 RX ORDER — ONDANSETRON 4 MG/1
4 TABLET, FILM COATED ORAL EVERY 8 HOURS PRN
Qty: 12 TABLET | Refills: 0 | Status: SHIPPED | OUTPATIENT
Start: 2024-08-30 | End: 2024-09-03

## 2024-08-30 RX ORDER — COLCHICINE 0.6 MG/1
0.6 TABLET ORAL DAILY
Qty: 30 TABLET | Refills: 0 | Status: SHIPPED | OUTPATIENT
Start: 2024-08-30

## 2024-08-30 NOTE — PROGRESS NOTES
Subjective   Patient ID: Malika Casper is a 63 y.o. female who presents for Follow-up, Pain, and UTI.    HPI   The patient is in office for a follow up visit from her visit to the ED. She states she went in due to severe buttock pain and and UTI. She states since her visit she is still experiencing pain and UTI symptoms.     The patient would like to go over blood work results.       Review of Systems  12 Systems have been reviewed as follows.  Constitutional: Fever, weight gain, weight loss, appetite change, night sweats, fatigue, chills.  Eyes : blurry, double vision, vision, loss, tearing, redness, pain, sensitivity to light, glaucoma.  Ears, nose, mouth, and throat: Hearing loss, ringing in the ears, ear pain, nasal congestion, nasal drainage, nosebleeds, mouth, throat, irritation tooth problem.  Cardiovascular :chest pain, pressure, heart racing, palpitations, sweating, leg swelling, high or low blood pressure  Pulmonary: Cough, yellow or green sputum, blood and sputum, shortness of breath, wheezing  Gastrointestinal: Nausea, vomiting, diarrhea, constipation, pain, blood in stool, or vomitus, heartburn, difficulty swallowing  Genitourinary: incontinence, abnormal bleeding, abnormal discharge, urinary frequency, urinary hesitancy, pain, impotence sexual problem, infection, urinary retention  Musculoskeletal: Pain, stiffness, joint, redness or warmth, arthritis, back pain, weakness, muscle wasting, sprain or fracture  Neuro: Weight weakness, dizziness, change in voice, change in taste change in vision, change in hearing, loss, or change of sensation, trouble walking, balance problems coordination problems, shaking, speech problem  Endocrine , cold or heat intolerance, blood sugar problem, weight gain or loss missed periods hot flashes, sweats, change in body hair, change in libido, increased thirst, increased urination  Heme/lymph: Swelling, bleeding, problem anemia, bruising, enlarged lymph  nodes  Allergic/immunologic: H. plus nasal drip, watery itchy eyes, nasal drainage, immunosuppressed  The above were reviewed and noted negative except as noted in HPI and Problem List.      Objective   /80 (BP Location: Left arm, Patient Position: Sitting)   Pulse 100   Temp 36.8 °C (98.2 °F) (Temporal)   Resp 16   Ht 1.524 m (5')   Wt 54.4 kg (120 lb)   SpO2 97%   BMI 23.44 kg/m²     Physical Exam  Constitutional: Well developed, well nourished, alert and in no acute distress   Eyes: Normal external exam. Pupils equally round and reactive to light with normal accommodation and extraocular movements intact.  Neck: Supple, no lymphadenopathy or masses.   Cardiovascular: Regular rate and rhythm, normal S1 and S2, no murmurs, gallops, or rubs. Radial pulses normal. No peripheral edema.  Pulmonary: No respiratory distress, lungs clear to auscultation bilaterally. No wheezes, rhonchi, rales.  Abdomen: soft,non tender, non distended, without masses or HSM  Skin: Warm, well perfused, normal skin turgor and color.   Neurologic: Cranial nerves II-XII grossly intact.   Psychiatric: Mood calm and affect normal  Musculoskeletal: Moving all extremities without restriction    Assessment/Plan   Problem List Items Addressed This Visit             ICD-10-CM    Anxiety F41.9    Relevant Medications    cariprazine (Vraylar) 1.5 mg capsule    GERD (gastroesophageal reflux disease) K21.9    Relevant Orders    Follow Up In [facility] - PCP - Established    HTN (hypertension) I10    Relevant Medications    lisinopril 10 mg tablet    Other Relevant Orders    Follow Up In [facility] - PCP - Established    Hyperlipidemia E78.5    Relevant Medications    atorvastatin (Lipitor) 40 mg tablet    Other Relevant Orders    Follow Up In [facility] - PCP - Established    Gout, arthritis M10.9    Relevant Medications    colchicine 0.6 mg tablet     Other Visit Diagnoses         Codes    Hematuria,  unspecified type    -  Primary R31.9    Relevant Orders    Urine Culture    Follow Up In [facility] - PCP - Established    Generalized abdominal pain     R10.84    Relevant Orders    Follow Up In [facility] - PCP - Established    Acute pancreatitis without infection or necrosis, unspecified pancreatitis type ([facility])     K85.90    Relevant Medications    ondansetron (Zofran) 4 mg tablet    Other Relevant Orders    Follow Up In [facility] - PCP - Established    Comprehensive Metabolic Panel    CBC and Auto Differential    Lipase    Amylase    Urinary tract infection without hematuria, site unspecified     N39.0    Relevant Orders    POCT UA (nonautomated) manually resulted (Completed)    Follow Up In [facility] - PCP - Established    Cystitis     N30.90    Relevant Medications    doxycycline (Vibra-Tabs) 100 mg tablet          See BW     Repeat prior    Continue current medications and therapy for chronic medical conditions.    Patient was advised importance of proper diet/nutrition in addition adequate hydration. Patient was encouraged moderate exercise program to include 30 minutes daily for 5 days of the week or 150 minutes weekly. Patient will follow-up with us as scheduled.      Sample of vraylar today 1.5 mg    Refill doxycycline 100 mg    Sees  Dr. Lam      -see GYN      -Continue colchicine for gout PRN     -Go to PT      Stop marijuana    Scribe Attestation  By signing my name below, I, Asaf Cochran MD , Scribe   attest that this documentation has been prepared under the direction and in the presence of Asaf Cochran MD.      Provider Attestation - Scribe documentation    All medical record entries made by the Scribe were at my direction and personally dictated by me. I have reviewed the chart and agree that the record accurately reflects my personal performance of the history, physical exam, discussion and plan.

## 2024-08-30 NOTE — TELEPHONE ENCOUNTER
----- Message from Renae Mcconnell sent at 8/29/2024 10:13 PM EDT -----  Leg testing shows diffuse plaque in the vessels of her leg but no focal severe narrowing that would be responsive to stenting of bypass. Conservative medical therapy.  ----- Message -----  From: Interface, Onbase - Scan In  Sent: 8/28/2024   3:04 PM EDT  To: Renae Mcconnell MD

## 2024-08-31 DIAGNOSIS — B02.9 HERPES ZOSTER WITHOUT COMPLICATION: ICD-10-CM

## 2024-08-31 DIAGNOSIS — R11.0 MILD NAUSEA: ICD-10-CM

## 2024-08-31 LAB — BACTERIA UR CULT: NO GROWTH

## 2024-08-31 RX ORDER — MULTIVITAMIN WITH FOLIC ACID 400 MCG
1 TABLET ORAL DAILY
Qty: 90 TABLET | Refills: 0 | Status: SHIPPED | OUTPATIENT
Start: 2024-08-31

## 2024-09-03 RX ORDER — MULTIVITAMIN WITH FOLIC ACID 400 MCG
1 TABLET ORAL DAILY
Qty: 90 TABLET | Refills: 0 | OUTPATIENT
Start: 2024-09-03

## 2024-09-03 RX ORDER — ONDANSETRON HYDROCHLORIDE 8 MG/1
TABLET, FILM COATED ORAL
Qty: 30 TABLET | Refills: 0 | OUTPATIENT
Start: 2024-09-03

## 2024-09-04 ENCOUNTER — PATIENT OUTREACH (OUTPATIENT)
Dept: PRIMARY CARE | Facility: CLINIC | Age: 64
End: 2024-09-04
Payer: COMMERCIAL

## 2024-09-04 DIAGNOSIS — I10 PRIMARY HYPERTENSION: ICD-10-CM

## 2024-09-04 DIAGNOSIS — K85.90 ACUTE PANCREATITIS WITHOUT INFECTION OR NECROSIS, UNSPECIFIED PANCREATITIS TYPE (HHS-HCC): ICD-10-CM

## 2024-09-04 NOTE — PROGRESS NOTES
Having severe abdominal pain  Very nauseas  Requesting refill of ondansetron  Describes pain mainly on R side of abdomen  Constant sharp and tender    Had 2 bowel movement this morning  First one was solid, second one was very loose  Last night ate salami sandwich 8:30pm  Had sips of milk today to swallow medicine    Hands are very dry and cracked  Bright red and burning  Tried to put lotion on them and burned so bad she was in tears  Was cleaning over the weekend with lysol wipes and alcohol  Possible side effect to doxycycline?    I advised patient to be evaluated in ER

## 2024-09-05 ENCOUNTER — HOSPITAL ENCOUNTER (EMERGENCY)
Facility: HOSPITAL | Age: 64
Discharge: HOME | End: 2024-09-05
Attending: STUDENT IN AN ORGANIZED HEALTH CARE EDUCATION/TRAINING PROGRAM
Payer: COMMERCIAL

## 2024-09-05 ENCOUNTER — APPOINTMENT (OUTPATIENT)
Dept: RADIOLOGY | Facility: HOSPITAL | Age: 64
End: 2024-09-05
Payer: COMMERCIAL

## 2024-09-05 VITALS
HEIGHT: 62 IN | OXYGEN SATURATION: 96 % | SYSTOLIC BLOOD PRESSURE: 130 MMHG | HEART RATE: 82 BPM | RESPIRATION RATE: 18 BRPM | TEMPERATURE: 97.5 F | DIASTOLIC BLOOD PRESSURE: 70 MMHG | BODY MASS INDEX: 22.64 KG/M2 | WEIGHT: 123.02 LBS

## 2024-09-05 DIAGNOSIS — R10.84 GENERALIZED ABDOMINAL PAIN: Primary | ICD-10-CM

## 2024-09-05 LAB
ALBUMIN SERPL BCP-MCNC: 4.2 G/DL (ref 3.4–5)
ALP SERPL-CCNC: 66 U/L (ref 33–136)
ALT SERPL W P-5'-P-CCNC: 23 U/L (ref 7–45)
ANION GAP SERPL CALC-SCNC: 10 MMOL/L (ref 10–20)
APPEARANCE UR: CLEAR
AST SERPL W P-5'-P-CCNC: 23 U/L (ref 9–39)
BASOPHILS # BLD AUTO: 0.04 X10*3/UL (ref 0–0.1)
BASOPHILS NFR BLD AUTO: 0.6 %
BILIRUB SERPL-MCNC: 0.3 MG/DL (ref 0–1.2)
BILIRUB UR STRIP.AUTO-MCNC: NEGATIVE MG/DL
BUN SERPL-MCNC: 9 MG/DL (ref 6–23)
CALCIUM SERPL-MCNC: 9.7 MG/DL (ref 8.6–10.3)
CHLORIDE SERPL-SCNC: 103 MMOL/L (ref 98–107)
CO2 SERPL-SCNC: 28 MMOL/L (ref 21–32)
COLOR UR: NORMAL
CREAT SERPL-MCNC: 0.65 MG/DL (ref 0.5–1.05)
CREAT SERPL-MCNC: 0.67 MG/DL (ref 0.5–1.05)
EGFRCR SERPLBLD CKD-EPI 2021: >90 ML/MIN/1.73M*2
EGFRCR SERPLBLD CKD-EPI 2021: >90 ML/MIN/1.73M*2
EOSINOPHIL # BLD AUTO: 0.2 X10*3/UL (ref 0–0.7)
EOSINOPHIL NFR BLD AUTO: 2.8 %
ERYTHROCYTE [DISTWIDTH] IN BLOOD BY AUTOMATED COUNT: 14.2 % (ref 11.5–14.5)
GLUCOSE SERPL-MCNC: 126 MG/DL (ref 74–99)
GLUCOSE UR STRIP.AUTO-MCNC: NORMAL MG/DL
HCT VFR BLD AUTO: 40 % (ref 36–46)
HGB BLD-MCNC: 13.2 G/DL (ref 12–16)
IMM GRANULOCYTES # BLD AUTO: 0.02 X10*3/UL (ref 0–0.7)
IMM GRANULOCYTES NFR BLD AUTO: 0.3 % (ref 0–0.9)
KETONES UR STRIP.AUTO-MCNC: NEGATIVE MG/DL
LEUKOCYTE ESTERASE UR QL STRIP.AUTO: NEGATIVE
LIPASE SERPL-CCNC: 19 U/L (ref 9–82)
LYMPHOCYTES # BLD AUTO: 1.81 X10*3/UL (ref 1.2–4.8)
LYMPHOCYTES NFR BLD AUTO: 25.7 %
MCH RBC QN AUTO: 32.5 PG (ref 26–34)
MCHC RBC AUTO-ENTMCNC: 33 G/DL (ref 32–36)
MCV RBC AUTO: 99 FL (ref 80–100)
MONOCYTES # BLD AUTO: 0.42 X10*3/UL (ref 0.1–1)
MONOCYTES NFR BLD AUTO: 6 %
NEUTROPHILS # BLD AUTO: 4.55 X10*3/UL (ref 1.2–7.7)
NEUTROPHILS NFR BLD AUTO: 64.6 %
NITRITE UR QL STRIP.AUTO: NEGATIVE
NRBC BLD-RTO: 0 /100 WBCS (ref 0–0)
PH UR STRIP.AUTO: 6 [PH]
PLATELET # BLD AUTO: 237 X10*3/UL (ref 150–450)
POTASSIUM SERPL-SCNC: 4.1 MMOL/L (ref 3.5–5.3)
PROT SERPL-MCNC: 6.7 G/DL (ref 6.4–8.2)
PROT UR STRIP.AUTO-MCNC: NEGATIVE MG/DL
RBC # BLD AUTO: 4.06 X10*6/UL (ref 4–5.2)
RBC # UR STRIP.AUTO: NEGATIVE /UL
SODIUM SERPL-SCNC: 137 MMOL/L (ref 136–145)
SP GR UR STRIP.AUTO: 1.01
UROBILINOGEN UR STRIP.AUTO-MCNC: NORMAL MG/DL
WBC # BLD AUTO: 7 X10*3/UL (ref 4.4–11.3)

## 2024-09-05 PROCEDURE — 99284 EMERGENCY DEPT VISIT MOD MDM: CPT | Mod: 25

## 2024-09-05 PROCEDURE — 99285 EMERGENCY DEPT VISIT HI MDM: CPT | Performed by: EMERGENCY MEDICINE

## 2024-09-05 PROCEDURE — 2500000004 HC RX 250 GENERAL PHARMACY W/ HCPCS (ALT 636 FOR OP/ED)

## 2024-09-05 PROCEDURE — 96374 THER/PROPH/DIAG INJ IV PUSH: CPT | Mod: 59

## 2024-09-05 PROCEDURE — 2550000001 HC RX 255 CONTRASTS: Performed by: STUDENT IN AN ORGANIZED HEALTH CARE EDUCATION/TRAINING PROGRAM

## 2024-09-05 PROCEDURE — 36415 COLL VENOUS BLD VENIPUNCTURE: CPT | Performed by: STUDENT IN AN ORGANIZED HEALTH CARE EDUCATION/TRAINING PROGRAM

## 2024-09-05 PROCEDURE — 71045 X-RAY EXAM CHEST 1 VIEW: CPT

## 2024-09-05 PROCEDURE — 96375 TX/PRO/DX INJ NEW DRUG ADDON: CPT

## 2024-09-05 PROCEDURE — 83690 ASSAY OF LIPASE: CPT | Performed by: STUDENT IN AN ORGANIZED HEALTH CARE EDUCATION/TRAINING PROGRAM

## 2024-09-05 PROCEDURE — 81003 URINALYSIS AUTO W/O SCOPE: CPT | Performed by: STUDENT IN AN ORGANIZED HEALTH CARE EDUCATION/TRAINING PROGRAM

## 2024-09-05 PROCEDURE — 74177 CT ABD & PELVIS W/CONTRAST: CPT | Performed by: RADIOLOGY

## 2024-09-05 PROCEDURE — 80053 COMPREHEN METABOLIC PANEL: CPT | Performed by: STUDENT IN AN ORGANIZED HEALTH CARE EDUCATION/TRAINING PROGRAM

## 2024-09-05 PROCEDURE — 82565 ASSAY OF CREATININE: CPT

## 2024-09-05 PROCEDURE — 71045 X-RAY EXAM CHEST 1 VIEW: CPT | Performed by: RADIOLOGY

## 2024-09-05 PROCEDURE — 85025 COMPLETE CBC W/AUTO DIFF WBC: CPT | Performed by: STUDENT IN AN ORGANIZED HEALTH CARE EDUCATION/TRAINING PROGRAM

## 2024-09-05 PROCEDURE — 74177 CT ABD & PELVIS W/CONTRAST: CPT

## 2024-09-05 PROCEDURE — 36415 COLL VENOUS BLD VENIPUNCTURE: CPT

## 2024-09-05 RX ORDER — ONDANSETRON HYDROCHLORIDE 2 MG/ML
4 INJECTION, SOLUTION INTRAVENOUS ONCE
Status: COMPLETED | OUTPATIENT
Start: 2024-09-05 | End: 2024-09-05

## 2024-09-05 ASSESSMENT — PAIN DESCRIPTION - LOCATION: LOCATION: ABDOMEN

## 2024-09-05 ASSESSMENT — PAIN - FUNCTIONAL ASSESSMENT
PAIN_FUNCTIONAL_ASSESSMENT: 0-10
PAIN_FUNCTIONAL_ASSESSMENT: 0-10

## 2024-09-05 ASSESSMENT — LIFESTYLE VARIABLES
HAVE YOU EVER FELT YOU SHOULD CUT DOWN ON YOUR DRINKING: NO
EVER HAD A DRINK FIRST THING IN THE MORNING TO STEADY YOUR NERVES TO GET RID OF A HANGOVER: NO
HAVE PEOPLE ANNOYED YOU BY CRITICIZING YOUR DRINKING: NO
TOTAL SCORE: 0
EVER FELT BAD OR GUILTY ABOUT YOUR DRINKING: NO

## 2024-09-05 ASSESSMENT — PAIN DESCRIPTION - PAIN TYPE: TYPE: ACUTE PAIN

## 2024-09-05 ASSESSMENT — PAIN SCALES - GENERAL
PAINLEVEL_OUTOF10: 3
PAINLEVEL_OUTOF10: 10 - WORST POSSIBLE PAIN
PAINLEVEL_OUTOF10: 10 - WORST POSSIBLE PAIN

## 2024-09-05 NOTE — DISCHARGE INSTRUCTIONS
Please return to the ED if your pain worsens, you have a fever, or if you have any concerns. Follow up with your primary care physician and with pain management.

## 2024-09-05 NOTE — ED PROVIDER NOTES
EMERGENCY DEPARTMENT ENCOUNTER      Pt Name: Malika Casper  MRN: [id number]  Birthdate 1960  Date of evaluation: 9/5/2024  Provider: Isabella Landrum MD    CHIEF COMPLAINT       Chief Complaint   Patient presents with    Abdominal Pain     Pt diagnosed with pancreatitis 8/18/24. Pt completed outpatient treatment, but is not getting any better. PMD told pt to come here for further management.      HISTORY OF PRESENT ILLNESS    Malika Casper is a 63 y.o. year old female who presents to the ER for abdominal pain. She says that her pain is similar to when she had pancreatitis. The patient also reports dry skin on her hands and was concerned that it was a rash. The patient has a past medical history of chronic pain, pancreatitis, hepatitis C infection, anxiety, IBS, and gout.      PAST MEDICAL HISTORY     Past Medical History:   Diagnosis Date    Abnormal findings on diagnostic imaging of other specified body structures     Abnormal finding on imaging    Encounter for screening for malignant neoplasm of colon 04/27/2022    Colon cancer screening    Liver disease, unspecified 12/16/2022    Chronic liver disease    Personal history of other medical treatment     History of transvaginal ultrasound    Personal history of other medical treatment     History of ultrasound, pelvic     CURRENT MEDICATIONS       Previous Medications    ALBUTEROL (VENTOLIN HFA) 90 MCG/ACTUATION INHALER    Inhale 2 puffs every 4 hours if needed for wheezing or shortness of breath.    ALLOPURINOL (ZYLOPRIM) 100 MG TABLET    Take 1 tablet (100 mg) by mouth once daily.    ASCORBIC ACID (VITAMIN C) 500 MG TABLET    Take 1 tablet (500 mg) by mouth once daily.    ATORVASTATIN (LIPITOR) 40 MG TABLET    Take 1 tablet (40 mg) by mouth once daily at bedtime.    CALAMINE-ZINC OXIDE LOTION    Apply 1 Application topically 4 times a day as needed (itching).    CARIPRAZINE (VRAYLAR) 1.5 MG CAPSULE    Take 1 capsule (1.5 mg) by mouth once daily.     COLCHICINE 0.6 MG TABLET    Take 1 tablet (0.6 mg) by mouth once daily.    CYCLOBENZAPRINE (FLEXERIL) 5 MG TABLET    Take 1 tablet (5 mg) by mouth 3 times a day.    DAILY-JOEY, WITH FOLIC ACID, 400 MCG TABLET    TAKE ONE TABLET BY MOUTH ONCE DAILY.    DOXYCYCLINE (VIBRA-TABS) 100 MG TABLET    Take 1 tablet (100 mg) by mouth every 12 hours. Take with a full glass of water and do not lie down for at least 30 minutes after.    GABAPENTIN (NEURONTIN) 800 MG TABLET    Take 1 tablet (800 mg) by mouth 3 times a day.    HYDROCODONE-ACETAMINOPHEN (NORCO)  MG TABLET    Take 1 tablet by mouth every 4 hours if needed for moderate pain (4 - 6) or severe pain (7 - 10). Max 5 tablets daily    LIDOCAINE 4 % PATCH    Place 1 patch on the skin once daily as needed for mild pain (1 - 3).    LISINOPRIL 10 MG TABLET    Take 1 tablet (10 mg) by mouth once daily.    MEDICAL CANNABIS    Take 1 each by mouth if needed. Oil Or Sol Vap - 3.47 - 170 - Indica Cart - [place], Edb Oral Admin 50-10 Gummy Pineapple Punch [employer], Edb Oral Admin - 22 - 10 - Gummy - D-Berry    NALOXONE (NARCAN) 4 MG/0.1 ML NASAL SPRAY    Administer 1 spray (4 mg) into affected nostril(s) if needed for opioid reversal.     SURGICAL HISTORY       Past Surgical History:   Procedure Laterality Date    ANKLE SURGERY  09/18/2017    Ankle Surgery    CT ABDOMEN PELVIS ANGIOGRAM W AND/OR WO IV CONTRAST  5/3/2020    CT ABDOMEN PELVIS ANGIOGRAM W AND/OR WO IV CONTRAST 5/3/2020 [facility]    KNEE SURGERY  09/18/2017    Knee Surgery    OTHER SURGICAL HISTORY  09/10/2015    Adrenal Gland Excision    OTHER SURGICAL HISTORY  12/02/2021    Gallbladder surgery     ALLERGIES     Amlodipine, Ibuprofen, and Sulfamethoxazole-trimethoprim  FAMILY HISTORY       Family History   Problem Relation Name Age of Onset    Colon cancer Father      Prostate cancer Father      Breast cancer Mother's Sister  31     SOCIAL HISTORY       Social History     Tobacco Use    Smoking  status: Every Day     Current packs/day: 0.50     Average packs/day: 0.5 packs/day for 25.0 years (12.5 ttl pk-yrs)     Types: Cigarettes     Passive exposure: Current    Smokeless tobacco: Never   Vaping Use    Vaping status: Never Used   Substance Use Topics    Alcohol use: Not Currently    Drug use: Not Currently     PHYSICAL EXAM  (up to 7 for level 4, 8 or more for level 5)     ED Triage Vitals [09/05/24 1304]   Temperature Heart Rate Respirations BP   36.4 °C (97.5 °F) 96 16 138/84      Pulse Ox Temp Source Heart Rate Source Patient Position   97 % Temporal Monitor Sitting      BP Location FiO2 (%)     Right arm --       Physical Exam  HENT:      Head: Normocephalic and atraumatic.   Cardiovascular:      Rate and Rhythm: Normal rate and regular rhythm.   Pulmonary:      Effort: Pulmonary effort is normal.      Breath sounds: Normal breath sounds.   Abdominal:      General: Abdomen is flat.      Palpations: Abdomen is soft.      Tenderness: There is abdominal tenderness in the right upper quadrant. Negative signs include Hines's sign, Rovsing's sign and McBurney's sign.   Musculoskeletal:      Right hand: Normal.      Left hand: Normal.      Comments: Dry skin but not abnormal.    Neurological:      Mental Status: She is alert.        DIAGNOSTIC RESULTS   LABS:  Labs Reviewed   COMPREHENSIVE METABOLIC PANEL - Abnormal       Result Value    Glucose 126 (*)     Sodium 137      Potassium 4.1      Chloride 103      Bicarbonate 28      Anion Gap 10      Urea Nitrogen 9      Creatinine 0.65      eGFR >90      Calcium 9.7      Albumin 4.2      Alkaline Phosphatase 66      Total Protein 6.7      AST 23      Bilirubin, Total 0.3      ALT 23     LIPASE - Normal    Lipase 19      Narrative:     Venipuncture immediately after or during the administration of Metamizole may lead to falsely low results. Testing should be performed immediately prior to Metamizole dosing.   URINALYSIS WITH REFLEX CULTURE AND MICROSCOPIC -  Normal    Color, Urine Light-Yellow      Appearance, Urine Clear      Specific Gravity, Urine 1.014      pH, Urine 6.0      Protein, Urine NEGATIVE      Glucose, Urine Normal      Blood, Urine NEGATIVE      Ketones, Urine NEGATIVE      Bilirubin, Urine NEGATIVE      Urobilinogen, Urine Normal      Nitrite, Urine NEGATIVE      Leukocyte Esterase, Urine NEGATIVE     CREATININE - Normal    Creatinine 0.67      eGFR >90     CBC WITH AUTO DIFFERENTIAL    WBC 7.0      nRBC 0.0      RBC 4.06      Hemoglobin 13.2      Hematocrit 40.0      MCV 99      MCH 32.5      MCHC 33.0      RDW 14.2      Platelets 237      Neutrophils % 64.6      Immature Granulocytes %, Automated 0.3      Lymphocytes % 25.7      Monocytes % 6.0      Eosinophils % 2.8      Basophils % 0.6      Neutrophils Absolute 4.55      Immature Granulocytes Absolute, Automated 0.02      Lymphocytes Absolute 1.81      Monocytes Absolute 0.42      Eosinophils Absolute 0.20      Basophils Absolute 0.04     URINALYSIS WITH REFLEX CULTURE AND MICROSCOPIC    Narrative:     The following orders were created for panel order Urinalysis with Reflex Culture and Microscopic.  Procedure                               Abnormality         Status                     ---------                               -----------         ------                     Urinalysis with Reflex C...[[id number]]  Normal              Final result               Extra Urine Gray Tube[[id number]]                            In process                   Please view results for these tests on the individual orders.   EXTRA URINE GRAY TUBE     All other labs were within normal range or not returned as of this dictation.  Imaging  CT abdomen pelvis w IV contrast   Final Result   1.  No acute intra-abdominal abnormality, specifically no evidence of   overt peripancreatic inflammatory changes suggest CT evidence of   pancreatitis.   2. Similar appearing intrahepatic and extrahepatic biliary dilatation   which is  nonspecific in a post cholecystectomy patient. Correlate   with liver function tests.   3. Prominent bilateral adnexal varices which are nonspecific, however   may be seen with sequela of pelvic congestion syndrome.   4. Remaining findings appear stable since comparison CT imaging from   08/18/2024.             Signed by: Virgilio Wiggins 9/5/2024 6:58 PM   Dictation workstation:   [id number]      XR chest 1 view   Final Result   1.  No evidence of acute cardiopulmonary process.             Signed by: Virgilio Wiggins 9/5/2024 6:31 PM   Dictation workstation:   [id number]         Procedure  Procedures  EMERGENCY DEPARTMENT COURSE/MDM:   Medical Decision Making  The patient is a 63 year old female who comes to the ED for abdominal pain. She says that her pain is similar to when she had pancreatitis. The patient also reports dry skin on her hands and was concerned that it was a rash. The patient has a past medical history of chronic pain, pancreatitis, hepatitis C infection, anxiety, IBS, and gout.     On physical exam there is tenderness to the RUQ but no guarding, rebound tenderness, or radiating pain. The hands are within normal limits, there is dry skin on the finger tips and between the digits. Due to the findings on abdominal exam, CT of the abdomen and pelvis was done along with Chest XR. Both were interpreted by the radiologist and showed no acute changes. A bedside ultrasound was done and patent bile ducts and normal liver was visualized. Her labs and urinalysis were within normal limits.     The patient was given dilaudid for pain and Zofran for nausea. Her pain was well controlled with therapy. With the normal CT and ability to ambulate and eat, the patient was discharged. She agreed to be discharged home. She was discharged with return precautions and instructions to follow up with her PCP and pain management team.        Vitals:    Vitals:    09/05/24 1304 09/05/24 1824   BP: 138/84 130/72   BP Location:  "Right arm Left arm   Patient Position: Sitting Sitting   Pulse: 96 80   Resp: 16 18   Temp: 36.4 °C (97.5 °F)    TempSrc: Temporal    SpO2: 97% 95%   Weight: 55.8 kg (123 lb 0.3 oz)    Height: 1.575 m (5' 2\")      Diagnoses as of 09/05/24 1925   Generalized abdominal pain         External Records Reviewed: I reviewed recent and relevant outside records including inpatient notes, outpatient records  Prescription Drug Consideration: Patient is getting medication via pain management.     Shared decision making for disposition  Patient and/or patient´s representative was counseled regarding labs, imaging, likely diagnosis. All questions were answered. Recommendation was made discharge home with return precautions and instructions to follow up with PCP and pain management.     ED Medications administered this visit:    Medications   ondansetron (Zofran) injection 4 mg (4 mg intravenous Given 9/5/24 1824)   HYDROmorphone (Dilaudid) injection 0.5 mg (0.5 mg intravenous Given 9/5/24 1824)   iohexol (OMNIPaque) 350 mg iodine/mL solution 75 mL (75 mL intravenous Given 9/5/24 1834)       New Prescriptions from this visit:    New Prescriptions    No medications on file       Follow-up:  Asaf Cochran MD  [address] [id number] [phone]    In 1 week          Final Impression:   1. Generalized abdominal pain          Please excuse any misspellings or unintended errors related to the Dragon speech recognition software used to dictate this note.    I reviewed the case with the attending ED physician. The attending ED physician agrees with the plan.      Isabella Landrum MD  Resident  09/05/24 1937    "

## 2024-09-06 ENCOUNTER — TELEPHONE (OUTPATIENT)
Dept: PRIMARY CARE | Facility: CLINIC | Age: 64
End: 2024-09-06
Payer: COMMERCIAL

## 2024-09-06 DIAGNOSIS — R11.0 MILD NAUSEA: ICD-10-CM

## 2024-09-06 LAB — HOLD SPECIMEN: NORMAL

## 2024-09-06 RX ORDER — ONDANSETRON HYDROCHLORIDE 8 MG/1
TABLET, FILM COATED ORAL
Qty: 30 TABLET | Refills: 0 | Status: SHIPPED | OUTPATIENT
Start: 2024-09-06

## 2024-09-06 NOTE — TELEPHONE ENCOUNTER
Pt is calling and asking for her pended medication please.  ondansetron (Zofran) 8 mg tablet   Also asking for cephalexin (Keflex) 500 mg capsule   Pt was in ER yesterday for nausea and abdominal pain.    Pt states the pharmacy never got medication refills that were sent for these medications last time, and she has not picked them up.  Please send to   [facility] [place] - [address] [address]  [id number] [place] [address]

## 2024-09-11 ENCOUNTER — APPOINTMENT (OUTPATIENT)
Dept: PRIMARY CARE | Facility: CLINIC | Age: 64
End: 2024-09-11
Payer: COMMERCIAL

## 2024-09-12 ENCOUNTER — HOSPITAL ENCOUNTER (OUTPATIENT)
Dept: RADIOLOGY | Facility: CLINIC | Age: 64
End: 2024-09-12
Payer: COMMERCIAL

## 2024-09-12 ENCOUNTER — HOSPITAL ENCOUNTER (OUTPATIENT)
Dept: CARDIOLOGY | Facility: CLINIC | Age: 64
End: 2024-09-12
Payer: COMMERCIAL

## 2024-09-12 ENCOUNTER — HOSPITAL ENCOUNTER (OUTPATIENT)
Dept: RADIOLOGY | Facility: CLINIC | Age: 64
Discharge: HOME | End: 2024-09-12
Payer: COMMERCIAL

## 2024-09-12 DIAGNOSIS — R07.89 OTHER CHEST PAIN: ICD-10-CM

## 2024-09-13 ENCOUNTER — OFFICE VISIT (OUTPATIENT)
Dept: PRIMARY CARE | Facility: CLINIC | Age: 64
End: 2024-09-13
Payer: COMMERCIAL

## 2024-09-13 VITALS
OXYGEN SATURATION: 98 % | HEIGHT: 62 IN | SYSTOLIC BLOOD PRESSURE: 124 MMHG | TEMPERATURE: 98 F | BODY MASS INDEX: 22.82 KG/M2 | RESPIRATION RATE: 16 BRPM | DIASTOLIC BLOOD PRESSURE: 68 MMHG | WEIGHT: 124 LBS | HEART RATE: 90 BPM

## 2024-09-13 DIAGNOSIS — E78.2 MIXED HYPERLIPIDEMIA: ICD-10-CM

## 2024-09-13 DIAGNOSIS — R11.0 MILD NAUSEA: ICD-10-CM

## 2024-09-13 DIAGNOSIS — M54.50 ACUTE RIGHT-SIDED LOW BACK PAIN WITHOUT SCIATICA: ICD-10-CM

## 2024-09-13 DIAGNOSIS — D17.1 LIPOMA OF TORSO: Primary | ICD-10-CM

## 2024-09-13 DIAGNOSIS — I10 HYPERTENSION, UNSPECIFIED TYPE: ICD-10-CM

## 2024-09-13 LAB
POC APPEARANCE, URINE: CLEAR
POC BILIRUBIN, URINE: NEGATIVE
POC BLOOD, URINE: NEGATIVE
POC COLOR, URINE: YELLOW
POC GLUCOSE, URINE: NEGATIVE MG/DL
POC KETONES, URINE: NEGATIVE MG/DL
POC LEUKOCYTES, URINE: NEGATIVE
POC NITRITE,URINE: NEGATIVE
POC PH, URINE: 5 PH
POC PROTEIN, URINE: NEGATIVE MG/DL
POC SPECIFIC GRAVITY, URINE: >=1.03
POC UROBILINOGEN, URINE: 0.2 EU/DL

## 2024-09-13 PROCEDURE — 3008F BODY MASS INDEX DOCD: CPT | Performed by: FAMILY MEDICINE

## 2024-09-13 PROCEDURE — 81003 URINALYSIS AUTO W/O SCOPE: CPT | Performed by: FAMILY MEDICINE

## 2024-09-13 PROCEDURE — 3078F DIAST BP <80 MM HG: CPT | Performed by: FAMILY MEDICINE

## 2024-09-13 PROCEDURE — 3074F SYST BP LT 130 MM HG: CPT | Performed by: FAMILY MEDICINE

## 2024-09-13 PROCEDURE — 99214 OFFICE O/P EST MOD 30 MIN: CPT | Performed by: FAMILY MEDICINE

## 2024-09-13 RX ORDER — ONDANSETRON HYDROCHLORIDE 8 MG/1
TABLET, FILM COATED ORAL
Qty: 30 TABLET | Refills: 2 | Status: SHIPPED | OUTPATIENT
Start: 2024-09-13

## 2024-09-13 RX ORDER — LISINOPRIL 10 MG/1
10 TABLET ORAL DAILY
Qty: 90 TABLET | Refills: 0 | Status: SHIPPED | OUTPATIENT
Start: 2024-09-13

## 2024-09-13 RX ORDER — ATORVASTATIN CALCIUM 40 MG/1
40 TABLET, FILM COATED ORAL NIGHTLY
Qty: 30 TABLET | Refills: 5 | Status: SHIPPED | OUTPATIENT
Start: 2024-09-13 | End: 2025-03-12

## 2024-09-13 ASSESSMENT — ENCOUNTER SYMPTOMS
COUGH: 0
NUMBNESS: 0
CONSTITUTIONAL NEGATIVE: 1
SORE THROAT: 0
VOICE CHANGE: 0
RHINORRHEA: 0
POLYDIPSIA: 0
DYSPHORIC MOOD: 0
CARDIOVASCULAR NEGATIVE: 1
BLOOD IN STOOL: 0
CHILLS: 0
APPETITE CHANGE: 0
PALPITATIONS: 0
TREMORS: 0
ABDOMINAL DISTENTION: 0
HALLUCINATIONS: 0
TROUBLE SWALLOWING: 0
DIFFICULTY URINATING: 0
HEADACHES: 0
CHOKING: 0
COLOR CHANGE: 0
DIAPHORESIS: 0
ARTHRALGIAS: 0
STRIDOR: 0
BACK PAIN: 0
APNEA: 0
EYE ITCHING: 0
GASTROINTESTINAL NEGATIVE: 1
WOUND: 0
FREQUENCY: 0
UNEXPECTED WEIGHT CHANGE: 0
CHEST TIGHTNESS: 0
WHEEZING: 0
ACTIVITY CHANGE: 0
LIGHT-HEADEDNESS: 0
ADENOPATHY: 0
DIZZINESS: 0
CONFUSION: 0
HEMATURIA: 0
ANAL BLEEDING: 0
ABDOMINAL PAIN: 0
CONSTIPATION: 0
FATIGUE: 0
SINUS PAIN: 0
FACIAL SWELLING: 0
NAUSEA: 0
DECREASED CONCENTRATION: 0
SEIZURES: 0
EYE PAIN: 0
AGITATION: 0
POLYPHAGIA: 0
FLANK PAIN: 0
FEVER: 0
JOINT SWELLING: 0
BRUISES/BLEEDS EASILY: 0
NERVOUS/ANXIOUS: 0
SLEEP DISTURBANCE: 0
FACIAL ASYMMETRY: 0
VOMITING: 0
EYE DISCHARGE: 0
SPEECH DIFFICULTY: 0
PHOTOPHOBIA: 0
SHORTNESS OF BREATH: 0
HYPERACTIVE: 0
EYE REDNESS: 0
SINUS PRESSURE: 0
WEAKNESS: 0
RECTAL PAIN: 0
DYSURIA: 0
NECK STIFFNESS: 0
DIARRHEA: 0
MYALGIAS: 0
NECK PAIN: 0

## 2024-09-13 NOTE — PROGRESS NOTES
Subjective   Patient ID: Malika Casper is a 63 y.o. female who presents for Pain.    HPI   The patient would like to discuss possibly having PCP in charge of her pain med so that she does not have to go to another doctor.     The patient states she has a lump on the right side of her back. She states it is painful. She states she first noticed it six months ago.   Review of Systems   Constitutional: Negative.  Negative for activity change, appetite change, chills, diaphoresis, fatigue, fever and unexpected weight change.   HENT: Negative.  Negative for congestion, dental problem, ear discharge, facial swelling, hearing loss, mouth sores, nosebleeds, postnasal drip, rhinorrhea, sinus pressure, sinus pain, sneezing, sore throat, tinnitus, trouble swallowing and voice change.    Eyes:  Negative for photophobia, pain, discharge, redness, itching and visual disturbance.   Respiratory:  Negative for apnea, cough, choking, chest tightness, shortness of breath, wheezing and stridor.    Cardiovascular: Negative.  Negative for chest pain, palpitations and leg swelling.   Gastrointestinal: Negative.  Negative for abdominal distention, abdominal pain, anal bleeding, blood in stool, constipation, diarrhea, nausea, rectal pain and vomiting.   Endocrine: Negative for cold intolerance, heat intolerance, polydipsia, polyphagia and polyuria.   Genitourinary:  Negative for decreased urine volume, difficulty urinating, dysuria, enuresis, flank pain, frequency, hematuria and urgency.   Musculoskeletal:  Negative for arthralgias, back pain, gait problem, joint swelling, myalgias, neck pain and neck stiffness.   Skin:  Negative for color change, pallor, rash and wound.   Allergic/Immunologic: Negative for environmental allergies, food allergies and immunocompromised state.   Neurological:  Negative for dizziness, tremors, seizures, syncope, facial asymmetry, speech difficulty, weakness, light-headedness, numbness and headaches.  "  Hematological:  Negative for adenopathy. Does not bruise/bleed easily.   Psychiatric/Behavioral:  Negative for agitation, behavioral problems, confusion, decreased concentration, dysphoric mood, hallucinations, self-injury, sleep disturbance and suicidal ideas. The patient is not nervous/anxious and is not hyperactive.    All other systems reviewed and are negative.      Objective   /68 (BP Location: Right arm, Patient Position: Sitting, BP Cuff Size: Adult)   Pulse 90   Temp 36.7 °C (98 °F) (Temporal)   Resp 16   Ht 1.575 m (5' 2\")   Wt 56.2 kg (124 lb)   SpO2 98%   BMI 22.68 kg/m²     Physical Exam  Vitals reviewed.   Constitutional:       General: She is not in acute distress.     Appearance: Normal appearance. She is normal weight. She is not ill-appearing or diaphoretic.   HENT:      Head: Normocephalic.      Right Ear: Tympanic membrane and external ear normal.      Left Ear: Tympanic membrane and external ear normal.      Nose: Nose normal. No congestion.      Mouth/Throat:      Pharynx: No posterior oropharyngeal erythema.   Eyes:      General:         Right eye: No discharge.         Left eye: No discharge.      Extraocular Movements: Extraocular movements intact.      Conjunctiva/sclera: Conjunctivae normal.      Pupils: Pupils are equal, round, and reactive to light.   Cardiovascular:      Rate and Rhythm: Normal rate and regular rhythm.      Pulses: Normal pulses.      Heart sounds: Normal heart sounds. No murmur heard.  Pulmonary:      Effort: Pulmonary effort is normal. No respiratory distress.      Breath sounds: Normal breath sounds. No wheezing or rales.   Chest:      Chest wall: No tenderness.   Abdominal:      General: Abdomen is flat. Bowel sounds are normal. There is no distension.      Palpations: There is no mass.      Tenderness: There is no abdominal tenderness. There is no guarding.   Musculoskeletal:         General: No tenderness. Normal range of motion.      Cervical back: " Normal range of motion and neck supple. No tenderness.      Right lower leg: No edema.      Left lower leg: No edema.   Skin:     General: Skin is dry.      Coloration: Skin is not jaundiced.      Findings: No bruising, erythema or rash.   Neurological:      General: No focal deficit present.      Mental Status: She is alert and oriented to person, place, and time. Mental status is at baseline.      Cranial Nerves: No cranial nerve deficit.      Sensory: No sensory deficit.      Coordination: Coordination normal.      Gait: Gait normal.   Psychiatric:         Mood and Affect: Mood normal.         Thought Content: Thought content normal.         Judgment: Judgment normal.         Assessment/Plan   Problem List Items Addressed This Visit             ICD-10-CM    HTN (hypertension) I10    Relevant Medications    lisinopril 10 mg tablet    Other Relevant Orders    Follow Up In [facility] - PCP - Established    Hyperlipidemia E78.5    Relevant Medications    atorvastatin (Lipitor) 40 mg tablet    Other Relevant Orders    Follow Up [facility] - PCP - Established    Low back pain, unspecified M54.50    Relevant Orders    POCT UA Automated manually resulted (Completed)    Follow Up [facility] - PCP - Established    Lipoma of torso - Primary D17.1    Relevant Orders    Follow Up [facility] - PCP - Established     Other Visit Diagnoses         Codes    Mild nausea     R11.0    Relevant Medications    ondansetron (Zofran) 8 mg tablet    Other Relevant Orders    Follow Up [facility] - PCP - Established          Provider Attestation - Scribe documentation    All medical record entries made by the Scribe were at my direction and personally dictated by me. I have reviewed the chart and agree that the record accurately reflects my personal performance of the history, physical exam, discussion and plan.    Continue current medications and therapy for chronic medical  conditions     Sees  Dr. Lam      -see GYN      -Continue colchicine for gout PRN     -Go to PT      Stop marijuana    See GI

## 2024-09-26 DIAGNOSIS — E78.2 MIXED HYPERLIPIDEMIA: ICD-10-CM

## 2024-09-26 DIAGNOSIS — I10 HYPERTENSION, UNSPECIFIED TYPE: ICD-10-CM

## 2024-09-26 DIAGNOSIS — R11.0 MILD NAUSEA: ICD-10-CM

## 2024-09-26 DIAGNOSIS — B02.9 HERPES ZOSTER WITHOUT COMPLICATION: ICD-10-CM

## 2024-09-26 DIAGNOSIS — M10.9 GOUT, ARTHRITIS: ICD-10-CM

## 2024-09-26 RX ORDER — MULTIVITAMIN WITH FOLIC ACID 400 MCG
1 TABLET ORAL DAILY
Qty: 90 TABLET | Refills: 1 | Status: SHIPPED | OUTPATIENT
Start: 2024-09-26

## 2024-09-26 RX ORDER — ONDANSETRON HYDROCHLORIDE 8 MG/1
TABLET, FILM COATED ORAL
Qty: 30 TABLET | Refills: 2 | Status: SHIPPED | OUTPATIENT
Start: 2024-09-26

## 2024-09-26 RX ORDER — ATORVASTATIN CALCIUM 40 MG/1
40 TABLET, FILM COATED ORAL NIGHTLY
Qty: 90 TABLET | Refills: 1 | Status: SHIPPED | OUTPATIENT
Start: 2024-09-26 | End: 2025-03-25

## 2024-09-26 RX ORDER — COLCHICINE 0.6 MG/1
0.6 TABLET ORAL DAILY
Qty: 90 TABLET | Refills: 1 | Status: SHIPPED | OUTPATIENT
Start: 2024-09-26 | End: 2025-03-25

## 2024-09-26 RX ORDER — LISINOPRIL 10 MG/1
10 TABLET ORAL DAILY
Qty: 90 TABLET | Refills: 1 | Status: SHIPPED | OUTPATIENT
Start: 2024-09-26

## 2024-09-26 NOTE — TELEPHONE ENCOUNTER
Dr. Cochran Pt    Refill for  atorvastatin (Lipitor) 40 mg tablet  Daily-Kia, with folic acid, 400 mcg tablet  lisinopril 10 mg tablet  ondansetron (Zofran) 8 mg tablet  colchicine 0.6 mg tablet      [facility], [place] - [address]

## 2024-10-02 DIAGNOSIS — R11.0 MILD NAUSEA: ICD-10-CM

## 2024-10-02 NOTE — TELEPHONE ENCOUNTER
Pt is asking about paperwork from electric company to keep electricity on, wondering if CB has this and wants to have this faxed back asap.  Also asking for refill on zofran and to add refills to the bottle.  [facility] - [place], [place] - [address]  [id number] [place] [address]

## 2024-10-03 RX ORDER — ONDANSETRON HYDROCHLORIDE 8 MG/1
TABLET, FILM COATED ORAL
Qty: 30 TABLET | Refills: 2 | Status: SHIPPED | OUTPATIENT
Start: 2024-10-03

## 2024-10-04 ENCOUNTER — PATIENT OUTREACH (OUTPATIENT)
Dept: PRIMARY CARE | Facility: CLINIC | Age: 64
End: 2024-10-04
Payer: COMMERCIAL

## 2024-10-04 DIAGNOSIS — I10 HYPERTENSION, UNSPECIFIED TYPE: ICD-10-CM

## 2024-10-04 DIAGNOSIS — K21.9 GASTROESOPHAGEAL REFLUX DISEASE WITHOUT ESOPHAGITIS: ICD-10-CM

## 2024-10-04 DIAGNOSIS — F41.9 ANXIETY: ICD-10-CM

## 2024-10-04 NOTE — PROGRESS NOTES
CCM outreach with pt identified by name and .     LOV: 24  NOV: 10/8/24     Health Maintenance Due: up to date at this point    Form faxed from [place] to our office   [employer]  Requesting these forms get signed and faxed back    Stomach has been better  Does have poor appetite  Does not note any weight loss    Dry skin on hands has resolved  No issues now

## 2024-10-08 ENCOUNTER — APPOINTMENT (OUTPATIENT)
Dept: PRIMARY CARE | Facility: CLINIC | Age: 64
End: 2024-10-08
Payer: COMMERCIAL

## 2024-10-08 VITALS
WEIGHT: 119.6 LBS | SYSTOLIC BLOOD PRESSURE: 110 MMHG | DIASTOLIC BLOOD PRESSURE: 74 MMHG | BODY MASS INDEX: 22.01 KG/M2 | HEIGHT: 62 IN | OXYGEN SATURATION: 97 % | RESPIRATION RATE: 16 BRPM | HEART RATE: 91 BPM

## 2024-10-08 DIAGNOSIS — E55.9 VITAMIN D DEFICIENCY: ICD-10-CM

## 2024-10-08 DIAGNOSIS — E78.2 MIXED HYPERLIPIDEMIA: ICD-10-CM

## 2024-10-08 DIAGNOSIS — F90.9 ATTENTION DEFICIT HYPERACTIVITY DISORDER (ADHD), UNSPECIFIED ADHD TYPE: ICD-10-CM

## 2024-10-08 DIAGNOSIS — M48.062 SPINAL STENOSIS OF LUMBAR REGION WITH NEUROGENIC CLAUDICATION: ICD-10-CM

## 2024-10-08 DIAGNOSIS — K21.9 GASTROESOPHAGEAL REFLUX DISEASE WITHOUT ESOPHAGITIS: ICD-10-CM

## 2024-10-08 DIAGNOSIS — F41.9 ANXIETY: ICD-10-CM

## 2024-10-08 DIAGNOSIS — K58.1 IRRITABLE BOWEL SYNDROME WITH CONSTIPATION: ICD-10-CM

## 2024-10-08 DIAGNOSIS — I10 PRIMARY HYPERTENSION: Primary | ICD-10-CM

## 2024-10-08 DIAGNOSIS — R05.1 ACUTE COUGH: ICD-10-CM

## 2024-10-08 DIAGNOSIS — L30.9 ECZEMA, UNSPECIFIED TYPE: ICD-10-CM

## 2024-10-08 PROCEDURE — 99214 OFFICE O/P EST MOD 30 MIN: CPT | Performed by: FAMILY MEDICINE

## 2024-10-08 RX ORDER — ALBUTEROL SULFATE 90 UG/1
2 INHALANT RESPIRATORY (INHALATION) EVERY 4 HOURS PRN
Qty: 16 G | Refills: 2 | Status: SHIPPED | OUTPATIENT
Start: 2024-10-08 | End: 2025-10-08

## 2024-10-08 RX ORDER — MULTIVITAMIN
1 TABLET ORAL
COMMUNITY
Start: 2024-09-27

## 2024-10-08 NOTE — PROGRESS NOTES
Subjective   Patient ID: Malika Casper is a 63 y.o. female who presents for Thrush.    HPI   Patient is following up on Thrush and is still having problems in the mouth . She is asking for refill of the medication.     Patient is getting the shingles and RSV vaccine.     She did have shingles in the past with some scaring.    She would like to discuss gas company paperwork to allow her to continue with service during the winter when she is unable to pay the full amount.     Review of Systems  12 Systems have been reviewed as follows.  Constitutional: Fever, weight gain, weight loss, appetite change, night sweats, fatigue, chills.  Eyes : blurry, double vision, vision, loss, tearing, redness, pain, sensitivity to light, glaucoma.  Ears, nose, mouth, and throat: Hearing loss, ringing in the ears, ear pain, nasal congestion, nasal drainage, nosebleeds, mouth, throat, irritation tooth problem.  Cardiovascular :chest pain, pressure, heart racing, palpitations, sweating, leg swelling, high or low blood pressure  Pulmonary: Cough, yellow or green sputum, blood and sputum, shortness of breath, wheezing  Gastrointestinal: Nausea, vomiting, diarrhea, constipation, pain, blood in stool, or vomitus, heartburn, difficulty swallowing  Genitourinary: incontinence, abnormal bleeding, abnormal discharge, urinary frequency, urinary hesitancy, pain, impotence sexual problem, infection, urinary retention  Musculoskeletal: Pain, stiffness, joint, redness or warmth, arthritis, back pain, weakness, muscle wasting, sprain or fracture  Neuro: Weight weakness, dizziness, change in voice, change in taste change in vision, change in hearing, loss, or change of sensation, trouble walking, balance problems coordination problems, shaking, speech problem  Endocrine , cold or heat intolerance, blood sugar problem, weight gain or loss missed periods hot flashes, sweats, change in body hair, change in libido, increased thirst, increased  "urination  Heme/lymph: Swelling, bleeding, problem anemia, bruising, enlarged lymph nodes  Allergic/immunologic: H. plus nasal drip, watery itchy eyes, nasal drainage, immunosuppressed  The above were reviewed and noted negative except as noted in HPI and Problem List.      Objective   /74 (BP Location: Right arm, Patient Position: Sitting, BP Cuff Size: Adult)   Pulse 91   Resp 16   Ht 1.575 m (5' 2\")   Wt 54.3 kg (119 lb 9.6 oz)   SpO2 97%   BMI 21.88 kg/m²     Physical Exam  Constitutional: Well developed, well nourished, alert and in no acute distress   Eyes: Normal external exam. Pupils equally round and reactive to light with normal accommodation and extraocular movements intact.  Neck: Supple, no lymphadenopathy or masses.   Cardiovascular: Regular rate and rhythm, normal S1 and S2, no murmurs, gallops, or rubs. Radial pulses normal. No peripheral edema.  Pulmonary: No respiratory distress, lungs clear to auscultation bilaterally. No wheezes, rhonchi, rales.  Abdomen: soft,non tender, non distended, without masses or HSM  Skin: Warm, well perfused, normal skin turgor and color.   Neurologic: Cranial nerves II-XII grossly intact.   Psychiatric: Mood calm and affect normal  Musculoskeletal: Moving all extremities without restriction    Assessment/Plan   Problem List Items Addressed This Visit             ICD-10-CM    ADD (attention deficit disorder) F98.8    Anxiety F41.9    GERD (gastroesophageal reflux disease) K21.9    HTN (hypertension) - Primary I10    Hyperlipidemia E78.5    Lumbar spinal stenosis M48.061    Vitamin D deficiency E55.9    Eczema L30.9    Irritable bowel syndrome with constipation K58.1     Other Visit Diagnoses         Codes    Acute cough     R05.1          Continue current medications and therapy for chronic medical conditions     Sees  Dr. Lam      -see GYN      -Continue colchicine for gout PRN     -Go to PT      Stop marijuana     See GI       "

## 2024-10-14 ENCOUNTER — APPOINTMENT (OUTPATIENT)
Dept: GASTROENTEROLOGY | Facility: CLINIC | Age: 64
End: 2024-10-14
Payer: COMMERCIAL

## 2024-10-16 DIAGNOSIS — M10.9 GOUT, ARTHRITIS: ICD-10-CM

## 2024-10-16 RX ORDER — COLCHICINE 0.6 MG/1
0.6 TABLET ORAL DAILY
Qty: 30 TABLET | Refills: 0 | Status: SHIPPED | OUTPATIENT
Start: 2024-10-16 | End: 2024-11-15

## 2024-10-16 NOTE — TELEPHONE ENCOUNTER
Dr Cochran Pt    Last Visit: 10/08/2024    Patient is requesting refill for     colchicine 0.6 mg tablet        Sig: Take 1 tablet (0.6 mg) by mouth once daily.            Pharmacy:    [facility] [address] Phone: [phone]   Fax: [phone]          Please assist

## 2024-10-31 ENCOUNTER — TELEPHONE (OUTPATIENT)
Dept: PRIMARY CARE | Facility: CLINIC | Age: 64
End: 2024-10-31
Payer: COMMERCIAL

## 2024-10-31 DIAGNOSIS — M48.062 SPINAL STENOSIS OF LUMBAR REGION WITH NEUROGENIC CLAUDICATION: ICD-10-CM

## 2024-11-14 ENCOUNTER — TELEMEDICINE (OUTPATIENT)
Dept: PRIMARY CARE | Facility: CLINIC | Age: 64
End: 2024-11-14
Payer: COMMERCIAL

## 2024-11-14 DIAGNOSIS — F90.9 ATTENTION DEFICIT HYPERACTIVITY DISORDER (ADHD), UNSPECIFIED ADHD TYPE: ICD-10-CM

## 2024-11-14 DIAGNOSIS — K58.1 IRRITABLE BOWEL SYNDROME WITH CONSTIPATION: ICD-10-CM

## 2024-11-14 DIAGNOSIS — G44.221 CHRONIC TENSION-TYPE HEADACHE, INTRACTABLE: ICD-10-CM

## 2024-11-14 DIAGNOSIS — E55.9 VITAMIN D DEFICIENCY: ICD-10-CM

## 2024-11-14 DIAGNOSIS — I73.9 CLAUDICATION, INTERMITTENT (CMS-HCC): Primary | ICD-10-CM

## 2024-11-14 DIAGNOSIS — K21.9 GASTROESOPHAGEAL REFLUX DISEASE WITHOUT ESOPHAGITIS: ICD-10-CM

## 2024-11-14 DIAGNOSIS — E27.9 ADRENAL NODULE: ICD-10-CM

## 2024-11-14 DIAGNOSIS — E78.2 MIXED HYPERLIPIDEMIA: ICD-10-CM

## 2024-11-14 DIAGNOSIS — F41.9 ANXIETY: ICD-10-CM

## 2024-11-14 DIAGNOSIS — K83.8 COMMON BILE DUCT DILATION: ICD-10-CM

## 2024-11-14 PROCEDURE — 99442 PR PHYS/QHP TELEPHONE EVALUATION 11-20 MIN: CPT | Performed by: FAMILY MEDICINE

## 2024-11-14 NOTE — PROGRESS NOTES
Subjective   Patient ID: Malika Casper is a 63 y.o. female who presents for Arm Pain and Chest Pain.    HPI Pt presents today  for left arm pain,Pt states that it is constant.Pain scale is 7/10. Pt states the pain is from the top of her shoulder to her hand.  Pt states her hand and fingers are cold.    Pt states she is havin chest pain on the left side of her chest that is sharp.Pt states pain is 6/10.    Advised PT to go to ER or call 911 if pain gets worse.Pt states she will talk with CB when he calls.        Review of Systems   12 Systems have been reviewed as follows.  Constitutional: Fever, weight gain, weight loss, appetite change, night sweats, fatigue, chills.  Eyes : blurry, double vision, vision, loss, tearing, redness, pain, sensitivity to light, glaucoma.  Ears, nose, mouth, and throat: Hearing loss, ringing in the ears, ear pain, nasal congestion, nasal drainage, nosebleeds, mouth, throat, irritation tooth problem.  Cardiovascular :chest pain, pressure, heart racing, palpitations, sweating, leg swelling, high or low blood pressure  Pulmonary: Cough, yellow or green sputum, blood and sputum, shortness of breath, wheezing  Gastrointestinal: Nausea, vomiting, diarrhea, constipation, pain, blood in stool, or vomitus, heartburn, difficulty swallowing  Genitourinary: incontinence, abnormal bleeding, abnormal discharge, urinary frequency, urinary hesitancy, pain, impotence sexual problem, infection, urinary retention  Musculoskeletal: Pain, stiffness, joint, redness or warmth, arthritis, back pain, weakness, muscle wasting, sprain or fracture  Neuro: Weight weakness, dizziness, change in voice, change in taste change in vision, change in hearing, loss, or change of sensation, trouble walking, balance problems coordination problems, shaking, speech problem  Endocrine , cold or heat intolerance, blood sugar problem, weight gain or loss missed periods hot flashes, sweats, change in body hair, change in  libido, increased thirst, increased urination  Heme/lymph: Swelling, bleeding, problem anemia, bruising, enlarged lymph nodes  Allergic/immunologic: H. plus nasal drip, watery itchy eyes, nasal drainage, immunosuppressed  The above were reviewed and noted negative except as noted in HPI and Problem List.    Objective   There were no vitals taken for this visit.    Physical Exam  Constitutional: Well developed, well nourished, alert and in no acute distress     Assessment/Plan   Problem List Items Addressed This Visit             ICD-10-CM    ADD (attention deficit disorder) F98.8    Relevant Orders    Follow Up In [facility] - PCP - Established    Adrenal nodule E27.9    Relevant Orders    Follow Up [facility] - PCP - Established    Anxiety F41.9    Relevant Orders    Follow Up [facility] - PCP - Established    Chronic tension-type headache, intractable G44.221    Relevant Orders    Follow Up [facility] - PCP - Established    RESOLVED: Common bile duct dilation K83.8    Relevant Orders    Follow Up [facility] - PCP - Established    Claudication, intermittent (CMS-HCC) - Primary I73.9    Relevant Orders    Follow Up [facility] - PCP - Established    GERD (gastroesophageal reflux disease) K21.9    Relevant Orders    Follow Up [facility] - PCP - Established    Hyperlipidemia E78.5    Relevant Orders    Follow Up [facility] - PCP - Established    Vitamin D deficiency E55.9    Relevant Orders    Follow Up [facility] - PCP - Established    Irritable bowel syndrome with constipation K58.1    Relevant Orders    Follow Up [facility] - PCP - Established       Continue current medications and therapy for chronic medical conditions     Sees  Dr. Lam      -see GYN      -Continue colchicine for gout PRN     -Go to PT      Stop marijuana     See GI    Continue current medications and  therapy for chronic medical conditions

## 2024-11-19 ENCOUNTER — PATIENT OUTREACH (OUTPATIENT)
Dept: PRIMARY CARE | Facility: CLINIC | Age: 64
End: 2024-11-19
Payer: COMMERCIAL

## 2024-11-19 DIAGNOSIS — M19.132 POST-TRAUMATIC OSTEOARTHRITIS OF LEFT WRIST: ICD-10-CM

## 2024-11-19 DIAGNOSIS — S62.022K CLOSED DISPLACED FRACTURE OF MIDDLE THIRD OF SCAPHOID OF LEFT WRIST WITH NONUNION, SUBSEQUENT ENCOUNTER: ICD-10-CM

## 2024-11-19 DIAGNOSIS — I10 PRIMARY HYPERTENSION: ICD-10-CM

## 2024-11-20 NOTE — PROGRESS NOTES
Care Management monthly outreach with patient identified by name and .     Last Office Visit: 10/8/24  Next Office Visit: needs appointment. She prefers to call  to schedule     Health Maintenance Due: up to date    Feels the pain in her left hand/wrist is getting worse  Radiates up to her elbow  Had seen Ortho associates   Did not want to proceed with surgery to have bones removed  Does go to pain management and this helps  They prescribe flexeril, gabapentin, and Norco for pain   She is requesting order to go to physical therapy  Suggested she schedule at [facility] due to proximity  She is agreeable to getting 2nd opinion on her hand  Suggested she see Dr. Spring   She is agreeable and will call to schedule

## 2024-11-22 DIAGNOSIS — B02.9 HERPES ZOSTER WITHOUT COMPLICATION: ICD-10-CM

## 2024-11-22 DIAGNOSIS — R11.0 MILD NAUSEA: ICD-10-CM

## 2024-11-22 RX ORDER — ONDANSETRON HYDROCHLORIDE 8 MG/1
TABLET, FILM COATED ORAL
Qty: 30 TABLET | Refills: 2 | Status: SHIPPED | OUTPATIENT
Start: 2024-11-22

## 2024-11-22 RX ORDER — MULTIVITAMIN WITH FOLIC ACID 400 MCG
1 TABLET ORAL DAILY
Qty: 90 TABLET | Refills: 1 | Status: SHIPPED | OUTPATIENT
Start: 2024-11-22

## 2024-11-22 NOTE — TELEPHONE ENCOUNTER
Patient of Dr. Cochran    Refill request     Disp Refills Start End    multivitamin (Daily-Kia, with folic acid,) tablet 90 tablet 1 9/26/2024 --    Sig - Route: Take 1 tablet by mouth once daily. - oral        Sig: TAKE ONE TABLET (8 MG) BY MOUTH EVERY EIGHT HOURS IF NEEDED FOR NAUSEA OR VOMITING.    Sent to pharmacy as: ondansetron HCL 8 mg tablet (Zofran)    Asking for 2 refill     [facility] - [place], [place] - [address] Phone: [phone]   Fax: [phone]

## 2024-12-02 DIAGNOSIS — J40 BRONCHITIS: ICD-10-CM

## 2024-12-02 RX ORDER — CEFDINIR 300 MG/1
300 CAPSULE ORAL 2 TIMES DAILY
Qty: 20 CAPSULE | Refills: 0 | Status: SHIPPED | OUTPATIENT
Start: 2024-12-02 | End: 2024-12-12

## 2024-12-02 NOTE — TELEPHONE ENCOUNTER
Dr Cochran pt    Pt phoned office and is requesting a antibiotic. She was seen in ED on 11/27/24 for pneumonia and finished her meds and is not feeling better.

## 2024-12-02 NOTE — TELEPHONE ENCOUNTER
RENNY to confirm pharmacy of the antibiotic to be sent to. Dr. Cochran will send Omnicef to [facility] unless patient calls back with another pharmacy to use.

## 2024-12-03 ENCOUNTER — OFFICE VISIT (OUTPATIENT)
Dept: ORTHOPEDIC SURGERY | Facility: CLINIC | Age: 64
End: 2024-12-03
Payer: COMMERCIAL

## 2024-12-03 ENCOUNTER — HOSPITAL ENCOUNTER (OUTPATIENT)
Dept: RADIOLOGY | Facility: CLINIC | Age: 64
Discharge: HOME | End: 2024-12-03
Payer: COMMERCIAL

## 2024-12-03 DIAGNOSIS — S62.002S SNAC (SCAPHOID NON-UNION ADVANCED COLLAPSE) OF WRIST, LEFT: Primary | ICD-10-CM

## 2024-12-03 DIAGNOSIS — S62.022K CLOSED DISPLACED FRACTURE OF MIDDLE THIRD OF SCAPHOID OF LEFT WRIST WITH NONUNION, SUBSEQUENT ENCOUNTER: ICD-10-CM

## 2024-12-03 DIAGNOSIS — M19.132 SNAC (SCAPHOID NON-UNION ADVANCED COLLAPSE) OF WRIST, LEFT: Primary | ICD-10-CM

## 2024-12-03 DIAGNOSIS — E55.9 VITAMIN D INSUFFICIENCY: ICD-10-CM

## 2024-12-03 DIAGNOSIS — M19.132 POST-TRAUMATIC OSTEOARTHRITIS OF LEFT WRIST: ICD-10-CM

## 2024-12-03 PROCEDURE — 2500000004 HC RX 250 GENERAL PHARMACY W/ HCPCS (ALT 636 FOR OP/ED): Performed by: ORTHOPAEDIC SURGERY

## 2024-12-03 PROCEDURE — 99204 OFFICE O/P NEW MOD 45 MIN: CPT | Performed by: ORTHOPAEDIC SURGERY

## 2024-12-03 PROCEDURE — 73110 X-RAY EXAM OF WRIST: CPT | Mod: LEFT SIDE | Performed by: ORTHOPAEDIC SURGERY

## 2024-12-03 PROCEDURE — 99214 OFFICE O/P EST MOD 30 MIN: CPT | Mod: 25 | Performed by: ORTHOPAEDIC SURGERY

## 2024-12-03 PROCEDURE — 73110 X-RAY EXAM OF WRIST: CPT | Mod: LT

## 2024-12-03 PROCEDURE — L3908 WHO COCK-UP NONMOLDE PRE OTS: HCPCS | Performed by: ORTHOPAEDIC SURGERY

## 2024-12-03 PROCEDURE — 20605 DRAIN/INJ JOINT/BURSA W/O US: CPT | Mod: LT | Performed by: ORTHOPAEDIC SURGERY

## 2024-12-03 RX ORDER — LIDOCAINE HYDROCHLORIDE 10 MG/ML
1 INJECTION, SOLUTION INFILTRATION; PERINEURAL
Status: COMPLETED | OUTPATIENT
Start: 2024-12-03 | End: 2024-12-03

## 2024-12-03 RX ORDER — COLCHICINE 0.6 MG/1
0.6 TABLET ORAL DAILY
COMMUNITY
Start: 2024-11-18

## 2024-12-03 NOTE — PROGRESS NOTES
History present illness: Patient presents today for evaluation of chronic left wrist pain and swelling.  She does not recollect any previous discrete injury.      Past medical history: The patient's past medical history, family history, social history, and review of systems were documented on the patient medical intake.  The updated data was reviewed in the electronic medical record.  History is negative except otherwise stated in history of present illness.        Physical examination:  General: Alert and oriented to person, place, and time.  No acute distress and breathing comfortably: Pleasant and cooperative with examination.  HEENT: Head is normocephalic and atraumatic.  Neck: Supple, no visible swelling.  Cardiovascular: No palpable tachycardia  Lungs: No audible wheezing or labored breathing  Abdomen: Nondistended.  Extremities: Evaluation of the left upper extremity finds the patient had palpable radial artery at the wrist with brisk capillary refill to all digits.  Patient has intact sensation to axillary radial median and ulnar nerves.  There are no open wounds.  There are no signs of infection.  There is no evidence of lymphedema or lymphatic streaking.  The patient has supple compartments to left arm forearm and hand.  Tenderness and swelling over dorsal radial aspect of the left wrist at radiocarpal joint.      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  We talked about operative and nonoperative strategies.  We talked about intraoperative techniques and postoperative protocols.  Patient elects proceed forth with intra-articular steroid injection to left wrist radiocarpal joint and for intermittent bracing.  6-week follow-up.  No x-rays upon return.      Procedure:  M Inj/Asp: L radiocarpal on 12/3/2024 3:40 PM  Indications: pain  Details: 25 G  needle, dorsal approach  Medications: 1 mL lidocaine 10 mg/mL (1 %); 10 mg triamcinolone acetonide 10 mg/mL  Outcome: tolerated well, no immediate complications    Left Radiocarpal Joint Injection: It was explained to the patient that the risks of a steroid injection include but are not limited to infection, local skin irritation, skin atrophy, calcification, continued pain and discomfort, elevation of blood sugar, burning, failure to relieve pain, and possible late infection. The patient verbalized good insight and verbalized consent for the injection. It was further explained that post injection discomfort can be alleviated with additional medications, ice, elevation, and rest over the first 24 hours, and these modalities are recommended.     Using aseptic technique, a solution containing 10 mg of Kenalog and 1 mL of 1% lidocaine without epinephrine was injected into the patient´s left radiocarpal joint. This was done by digitally palpating Killian´s tubercle. We then moved 1 cm distal to this bony landmark and palpated the soft spot in the location of the typical 3-4 arthroscopic portal. We then prepped the skin and advanced a 25-gauge needle into the joint. The solution was then administered and the patient tolerated this well. A band-aid was placed. It should be noted that ethyl chloride spray was used to make the injection delivery more comfortable for the patient.  Procedure, treatment alternatives, risks and benefits explained, specific risks discussed. Consent was given by the patient. Immediately prior to procedure a time out was called to verify the correct patient, procedure, equipment, support staff and site/side marked as required. Patient was prepped and draped in the usual sterile fashion.

## 2024-12-04 ENCOUNTER — APPOINTMENT (OUTPATIENT)
Dept: CARDIOLOGY | Facility: HOSPITAL | Age: 64
End: 2024-12-04
Payer: COMMERCIAL

## 2024-12-04 ENCOUNTER — TELEPHONE (OUTPATIENT)
Dept: ORTHOPEDIC SURGERY | Facility: CLINIC | Age: 64
End: 2024-12-04

## 2024-12-04 ENCOUNTER — APPOINTMENT (OUTPATIENT)
Dept: RADIOLOGY | Facility: HOSPITAL | Age: 64
End: 2024-12-04
Payer: COMMERCIAL

## 2024-12-04 ENCOUNTER — HOSPITAL ENCOUNTER (EMERGENCY)
Facility: HOSPITAL | Age: 64
Discharge: HOME | End: 2024-12-04
Attending: EMERGENCY MEDICINE
Payer: COMMERCIAL

## 2024-12-04 VITALS
BODY MASS INDEX: 22.03 KG/M2 | OXYGEN SATURATION: 96 % | RESPIRATION RATE: 19 BRPM | WEIGHT: 119.71 LBS | SYSTOLIC BLOOD PRESSURE: 199 MMHG | TEMPERATURE: 98.2 F | HEIGHT: 62 IN | HEART RATE: 84 BPM | DIASTOLIC BLOOD PRESSURE: 104 MMHG

## 2024-12-04 DIAGNOSIS — J06.9 VIRAL URI WITH COUGH: ICD-10-CM

## 2024-12-04 DIAGNOSIS — J40 BRONCHITIS: Primary | ICD-10-CM

## 2024-12-04 LAB
ALBUMIN SERPL BCP-MCNC: 4.7 G/DL (ref 3.4–5)
ALP SERPL-CCNC: 66 U/L (ref 33–136)
ALT SERPL W P-5'-P-CCNC: 20 U/L (ref 7–45)
ANION GAP SERPL CALC-SCNC: 11 MMOL/L (ref 10–20)
AST SERPL W P-5'-P-CCNC: 16 U/L (ref 9–39)
BASOPHILS # BLD AUTO: 0.03 X10*3/UL (ref 0–0.1)
BASOPHILS NFR BLD AUTO: 0.2 %
BILIRUB SERPL-MCNC: 0.2 MG/DL (ref 0–1.2)
BNP SERPL-MCNC: 90 PG/ML (ref 0–99)
BUN SERPL-MCNC: 20 MG/DL (ref 6–23)
CALCIUM SERPL-MCNC: 9.9 MG/DL (ref 8.6–10.3)
CARDIAC TROPONIN I PNL SERPL HS: 4 NG/L (ref 0–13)
CARDIAC TROPONIN I PNL SERPL HS: 5 NG/L (ref 0–13)
CHLORIDE SERPL-SCNC: 106 MMOL/L (ref 98–107)
CO2 SERPL-SCNC: 27 MMOL/L (ref 21–32)
CREAT SERPL-MCNC: 0.58 MG/DL (ref 0.5–1.05)
EGFRCR SERPLBLD CKD-EPI 2021: >90 ML/MIN/1.73M*2
EOSINOPHIL # BLD AUTO: 0.03 X10*3/UL (ref 0–0.7)
EOSINOPHIL NFR BLD AUTO: 0.2 %
ERYTHROCYTE [DISTWIDTH] IN BLOOD BY AUTOMATED COUNT: 13.3 % (ref 11.5–14.5)
FLUAV RNA RESP QL NAA+PROBE: NOT DETECTED
FLUBV RNA RESP QL NAA+PROBE: NOT DETECTED
GLUCOSE SERPL-MCNC: 112 MG/DL (ref 74–99)
HCT VFR BLD AUTO: 43.3 % (ref 36–46)
HGB BLD-MCNC: 14.3 G/DL (ref 12–16)
IMM GRANULOCYTES # BLD AUTO: 0.05 X10*3/UL (ref 0–0.7)
IMM GRANULOCYTES NFR BLD AUTO: 0.4 % (ref 0–0.9)
INR PPP: 1 (ref 0.9–1.1)
LYMPHOCYTES # BLD AUTO: 1.6 X10*3/UL (ref 1.2–4.8)
LYMPHOCYTES NFR BLD AUTO: 12.1 %
MCH RBC QN AUTO: 31.7 PG (ref 26–34)
MCHC RBC AUTO-ENTMCNC: 33 G/DL (ref 32–36)
MCV RBC AUTO: 96 FL (ref 80–100)
MONOCYTES # BLD AUTO: 0.82 X10*3/UL (ref 0.1–1)
MONOCYTES NFR BLD AUTO: 6.2 %
NEUTROPHILS # BLD AUTO: 10.7 X10*3/UL (ref 1.2–7.7)
NEUTROPHILS NFR BLD AUTO: 80.9 %
NRBC BLD-RTO: 0 /100 WBCS (ref 0–0)
PLATELET # BLD AUTO: 278 X10*3/UL (ref 150–450)
POTASSIUM SERPL-SCNC: 4.1 MMOL/L (ref 3.5–5.3)
PROT SERPL-MCNC: 7.4 G/DL (ref 6.4–8.2)
PROTHROMBIN TIME: 11.1 SECONDS (ref 9.8–12.8)
RBC # BLD AUTO: 4.51 X10*6/UL (ref 4–5.2)
RSV RNA RESP QL NAA+PROBE: NOT DETECTED
SARS-COV-2 RNA RESP QL NAA+PROBE: NOT DETECTED
SODIUM SERPL-SCNC: 140 MMOL/L (ref 136–145)
WBC # BLD AUTO: 13.2 X10*3/UL (ref 4.4–11.3)

## 2024-12-04 PROCEDURE — 71045 X-RAY EXAM CHEST 1 VIEW: CPT | Performed by: RADIOLOGY

## 2024-12-04 PROCEDURE — 80053 COMPREHEN METABOLIC PANEL: CPT | Performed by: EMERGENCY MEDICINE

## 2024-12-04 PROCEDURE — 84484 ASSAY OF TROPONIN QUANT: CPT | Performed by: EMERGENCY MEDICINE

## 2024-12-04 PROCEDURE — 36415 COLL VENOUS BLD VENIPUNCTURE: CPT | Performed by: EMERGENCY MEDICINE

## 2024-12-04 PROCEDURE — 99285 EMERGENCY DEPT VISIT HI MDM: CPT | Performed by: EMERGENCY MEDICINE

## 2024-12-04 PROCEDURE — 85610 PROTHROMBIN TIME: CPT | Performed by: EMERGENCY MEDICINE

## 2024-12-04 PROCEDURE — 71045 X-RAY EXAM CHEST 1 VIEW: CPT

## 2024-12-04 PROCEDURE — 87637 SARSCOV2&INF A&B&RSV AMP PRB: CPT

## 2024-12-04 PROCEDURE — 85025 COMPLETE CBC W/AUTO DIFF WBC: CPT | Performed by: EMERGENCY MEDICINE

## 2024-12-04 PROCEDURE — 96372 THER/PROPH/DIAG INJ SC/IM: CPT

## 2024-12-04 PROCEDURE — 2500000001 HC RX 250 WO HCPCS SELF ADMINISTERED DRUGS (ALT 637 FOR MEDICARE OP)

## 2024-12-04 PROCEDURE — 83880 ASSAY OF NATRIURETIC PEPTIDE: CPT | Performed by: EMERGENCY MEDICINE

## 2024-12-04 PROCEDURE — 93005 ELECTROCARDIOGRAM TRACING: CPT

## 2024-12-04 PROCEDURE — 2500000004 HC RX 250 GENERAL PHARMACY W/ HCPCS (ALT 636 FOR OP/ED)

## 2024-12-04 RX ORDER — BENZONATATE 100 MG/1
100 CAPSULE ORAL 3 TIMES DAILY PRN
Qty: 21 CAPSULE | Refills: 0 | Status: SHIPPED | OUTPATIENT
Start: 2024-12-04 | End: 2024-12-11

## 2024-12-04 RX ORDER — LISINOPRIL 10 MG/1
10 TABLET ORAL ONCE
Status: COMPLETED | OUTPATIENT
Start: 2024-12-04 | End: 2024-12-04

## 2024-12-04 RX ORDER — METHYLPREDNISOLONE 4 MG/1
TABLET ORAL
Qty: 21 TABLET | Refills: 0 | Status: SHIPPED | OUTPATIENT
Start: 2024-12-04 | End: 2024-12-10

## 2024-12-04 ASSESSMENT — PAIN DESCRIPTION - PROGRESSION
CLINICAL_PROGRESSION: NOT CHANGED
CLINICAL_PROGRESSION: NOT CHANGED

## 2024-12-04 ASSESSMENT — LIFESTYLE VARIABLES
HAVE PEOPLE ANNOYED YOU BY CRITICIZING YOUR DRINKING: NO
HAVE YOU EVER FELT YOU SHOULD CUT DOWN ON YOUR DRINKING: NO
EVER FELT BAD OR GUILTY ABOUT YOUR DRINKING: NO
TOTAL SCORE: 0
EVER HAD A DRINK FIRST THING IN THE MORNING TO STEADY YOUR NERVES TO GET RID OF A HANGOVER: NO

## 2024-12-04 ASSESSMENT — PAIN DESCRIPTION - LOCATION: LOCATION: CHEST

## 2024-12-04 ASSESSMENT — PAIN SCALES - GENERAL
PAINLEVEL_OUTOF10: 10 - WORST POSSIBLE PAIN
PAINLEVEL_OUTOF10: 10 - WORST POSSIBLE PAIN
PAINLEVEL_OUTOF10: 0 - NO PAIN

## 2024-12-04 ASSESSMENT — PAIN - FUNCTIONAL ASSESSMENT
PAIN_FUNCTIONAL_ASSESSMENT: 0-10

## 2024-12-04 ASSESSMENT — PAIN DESCRIPTION - DESCRIPTORS: DESCRIPTORS: SHARP;STABBING

## 2024-12-04 ASSESSMENT — PAIN DESCRIPTION - PAIN TYPE: TYPE: ACUTE PAIN

## 2024-12-04 NOTE — TELEPHONE ENCOUNTER
I spoke to this pt today. She called to ask what was in the shot she was given at her appt on 12/3. I told her it was a cortisone injection. She also states she has pneumonia and her chest is hurting. She is going to go back to the ED where she had been seen for the pneumonia.

## 2024-12-04 NOTE — ED PROVIDER NOTES
"HPI   Chief Complaint   Patient presents with    Chest Pain    Shortness of Breath     Dx with PNA 11/27/24       64 female with past medical history of ADHD, chronic liver disease, GERD, HTN, HLD, chronic neck pain in pain management presents today for chest pain.  Notes 2 weeks ago started developing URI-like symptoms.  States that it started out as a sore throat and ear pain.  He then developed to left-sided chest pain with intermittent shortness of breath and a productive cough with green mucus.  Went to [facility] ED on 11/27 a week ago and diagnosed with possible atypical pneumonia though chest x-ray was negative at that time.  She was discharged home with azithromycin and Augmentin in addition to as needed albuterol inhaler.  States that she finished antibiotics 2 days ago though shortness of breath and chest pain persist.  States that she saw her doctor yesterday and was prescribed a \"antibiotic she has not heard about before\".  Notes that she did not take the medication.  Per chart review, went to see orthopedic surgery yesterday was not prescribed any medication.    States that she woke up this morning drenched in sweat.  Notes that all of her clothes were soaked in sweat and had bodyaches as if she has a fever though on arrival she is afebrile.  On presentation, she continuously is complaining that her \"heart hurts\".  Persistently pointing to left chest wall and complaining of pain.              Patient History   Past Medical History:   Diagnosis Date    Abnormal findings on diagnostic imaging of other specified body structures     Abnormal finding on imaging    Encounter for screening for malignant neoplasm of colon 04/27/2022    Colon cancer screening    Liver disease, unspecified 12/16/2022    Chronic liver disease    Personal history of other medical treatment     History of transvaginal ultrasound    Personal history of other medical treatment     History of ultrasound, pelvic     Past Surgical History: "   Procedure Laterality Date    ANKLE SURGERY  09/18/2017    Ankle Surgery    CT ABDOMEN PELVIS ANGIOGRAM W AND/OR WO IV CONTRAST  5/3/2020    CT ABDOMEN PELVIS ANGIOGRAM W AND/OR WO IV CONTRAST 5/3/2020 [facility]    KNEE SURGERY  09/18/2017    Knee Surgery    OTHER SURGICAL HISTORY  09/10/2015    Adrenal Gland Excision    OTHER SURGICAL HISTORY  12/02/2021    Gallbladder surgery     Family History   Problem Relation Name Age of Onset    Colon cancer Father      Prostate cancer Father      Breast cancer Mother's Sister  31     Social History     Tobacco Use    Smoking status: Every Day     Current packs/day: 0.50     Average packs/day: 0.5 packs/day for 25.0 years (12.5 ttl pk-yrs)     Types: Cigarettes     Passive exposure: Current    Smokeless tobacco: Never   Vaping Use    Vaping status: Never Used   Substance Use Topics    Alcohol use: Not Currently    Drug use: Not Currently       Physical Exam   ED Triage Vitals [12/04/24 1231]   Temperature Heart Rate Respirations BP   36.8 °C (98.2 °F) 87 20 (!) 184/94      Pulse Ox Temp src Heart Rate Source Patient Position   97 % -- Monitor Sitting      BP Location FiO2 (%)     Right arm --       Physical Exam  Vitals and nursing note reviewed.   Constitutional:       General: She is not in acute distress.     Appearance: Normal appearance. She is not ill-appearing.   HENT:      Head: Normocephalic and atraumatic.      Right Ear: Hearing, tympanic membrane, ear canal and external ear normal. Tympanic membrane is not erythematous or bulging.      Left Ear: Hearing, tympanic membrane, ear canal and external ear normal. Tympanic membrane is not erythematous or bulging.      Nose: Nose normal. No congestion or rhinorrhea.      Right Turbinates: Not swollen or pale.      Left Turbinates: Not swollen or pale.      Right Sinus: No maxillary sinus tenderness or frontal sinus tenderness.      Left Sinus: No maxillary sinus tenderness or frontal sinus tenderness.       Mouth/Throat:      Mouth: Mucous membranes are moist.      Pharynx: Oropharynx is clear. Uvula midline. No pharyngeal swelling, oropharyngeal exudate, posterior oropharyngeal erythema or uvula swelling.      Tonsils: No tonsillar exudate or tonsillar abscesses. 0 on the right. 0 on the left.   Eyes:      Extraocular Movements: Extraocular movements intact.      Conjunctiva/sclera: Conjunctivae normal.      Pupils: Pupils are equal, round, and reactive to light.   Cardiovascular:      Rate and Rhythm: Normal rate and regular rhythm.      Heart sounds: Normal heart sounds.   Pulmonary:      Effort: No accessory muscle usage or respiratory distress.      Breath sounds: Normal breath sounds. No wheezing, rhonchi or rales.   Abdominal:      General: Abdomen is flat. Bowel sounds are normal. There is no distension.      Palpations: Abdomen is soft.      Tenderness: There is no abdominal tenderness. There is no right CVA tenderness or left CVA tenderness.   Musculoskeletal:         General: No swelling or deformity. Normal range of motion.      Cervical back: Normal range of motion and neck supple.      Right lower leg: No edema.      Left lower leg: No edema.   Skin:     General: Skin is warm and dry.      Capillary Refill: Capillary refill takes less than 2 seconds.   Neurological:      General: No focal deficit present.      Mental Status: She is alert and oriented to person, place, and time.      GCS: GCS eye subscore is 4. GCS verbal subscore is 5. GCS motor subscore is 6.      Cranial Nerves: Cranial nerves 2-12 are intact.      Sensory: No sensory deficit.      Motor: Motor function is intact. No weakness.   Psychiatric:         Mood and Affect: Mood and affect normal.         Speech: Speech normal.         Behavior: Behavior normal. Behavior is cooperative.           ED Course & MDM   ED Course as of 12/04/24 1443   Wed Dec 04, 2024   1326 ECG 12 lead  NSR.  90 bpm.  QTc 455.  .  No ST elevation or new T wave  inversions as compared to previous EKG [ML]   1337 XR chest 1 view  No focal infiltrate or pneumothorax is identified.   [ML]   1427 This is a 64 years old female patient presented to the emergency department with a chief complaint of cough and a concern of pneumonia.  Stated that she went to an outside facility and was diagnosed with atypical pneumonia and was given oral antibiotic and she finished a course of antibiotic but she is still lacking improvement.  Reported cough with expectoration of brown sputum, she had a normal D-dimer and a normal cardiac workup at the outside facility.  She is a smoker, does not use oxygen at home, denies fever, headache, lightheadedness, dizziness, abdominal pain, diarrhea, constipation, weakness, numbness or urinary symptoms.    Review of system: As stated above in the HPI section.    Physical exam revealed a 64 years old female patient who does not appear to be in respiratory distress  Cardiopulmonary exam with normal S1, S2, no gallop, or murmur, no wheezes, rales, rhonchi or any signs of respiratory distress  Abdominal exam: Abdomen soft, nondistended, nontender, no guarding, rigidity, rebound  Skin exam: Warm and dry  Neurologic exam: Alert and oriented x 4, no acute neurologic deficit.    We obtained chest x-ray and was unremarkable, cardiac workup is unremarkable, rest of the labs are unremarkable.  Patient's pain and symptoms are likely secondary to bronchitis.  No signs of pneumonia at this point.  Will be able to discharge the patient home on Medrol Dosepak to follow-up with the primary care provider with instruction to return to the ED if alarming symptoms arise as per discharge instruction [ME]      ED Course User Index  [ME] Jamil Harrell DO  [ML] Aleshia Beaver PA-C         Diagnoses as of 12/04/24 1443   Bronchitis   Viral URI with cough                 No data recorded     Christin Coma Scale Score: 15 (12/04/24 1233 : Merissa Trinidad RN)                            Medical Decision Making  64-year-old female here for left-sided chest pain.  On my exam, lungs are clear to auscultation without any adventitious lung sounds.  No history of COPD.  EKG is nonischemic.  Reports 2 weeks ago starting to develop a viral illness that then progressed to chest pain 1 week ago.  Went to [facility] ED.  Per chart review, she had negative D-dimer and troponin.  Chest x-ray was also negative for pneumonia but ED provider suspected atypical pneumonia and sent her home with 5-day course of azithromycin and Augmentin.  She finished that course 2 days ago.  She has had persistent left-sided chest pain.  On exam of the chest wall, left-sided chest wall is tender to palpation.  Viral swabs were obtained which are negative for RSV, COVID and flu.  HEENT exam is benign.  2 troponins were negative, no concern for ACS at this time.  Chest x-ray is negative for pneumonia.  All of the labs are benign.  I do suspect symptoms consistent with bronchitis.  Her persistent cough could also have caused her to develop costochondritis considering her tender chest wall.  Patient was given a dose of IM methylprednisolone and will send her home with Medrol Dosepak and Tessalon Perles.  Discussed return precautions.  Discussed follow-up with primary care if symptoms persist.        Procedure  Procedures     Aleshia Beaver PA-C  12/04/24 3781

## 2024-12-04 NOTE — DISCHARGE INSTRUCTIONS
Your heart looks okay, no signs of pneumonia in your lungs.  COVID and flu swabs are negative.  I do believe your symptoms are from bronchitis which is a chronic cough after getting a viral illness.  This can make your lungs hurt and your chest wall hurt from excessive coughing.  You can use your albuterol inhaler as needed for shortness of breath.  Sending you in a steroid pill to take.  Also sending in cough medicine that you can take as needed to help with the cough.  If your symptoms persist beyond 1 week, so on 12/11 you are still feeling symptoms, please follow-up with your primary care doctor.

## 2024-12-04 NOTE — ED NOTES
64yr old f with c/o chest pain and sob. Pt states she thinks might have pneumonia. Pt placed on cardiac monitor, EKG and labs obtained. Pt medicated for htn per orders. All needs met at this time, call bell within reach.      Letha Alba, RN  12/04/24 1006

## 2024-12-05 LAB
ATRIAL RATE: 77 BPM
ATRIAL RATE: 90 BPM
P AXIS: 59 DEGREES
P AXIS: 62 DEGREES
P OFFSET: 208 MS
P OFFSET: 210 MS
P ONSET: 154 MS
P ONSET: 159 MS
PR INTERVAL: 130 MS
PR INTERVAL: 138 MS
Q ONSET: 223 MS
Q ONSET: 224 MS
QRS COUNT: 13 BEATS
QRS COUNT: 15 BEATS
QRS DURATION: 80 MS
QRS DURATION: 94 MS
QT INTERVAL: 372 MS
QT INTERVAL: 402 MS
QTC CALCULATION(BAZETT): 454 MS
QTC CALCULATION(BAZETT): 455 MS
QTC FREDERICIA: 425 MS
QTC FREDERICIA: 436 MS
R AXIS: 11 DEGREES
R AXIS: 17 DEGREES
T AXIS: 50 DEGREES
T AXIS: 52 DEGREES
T OFFSET: 410 MS
T OFFSET: 424 MS
VENTRICULAR RATE: 77 BPM
VENTRICULAR RATE: 90 BPM

## 2024-12-09 ENCOUNTER — PATIENT OUTREACH (OUTPATIENT)
Dept: PRIMARY CARE | Facility: CLINIC | Age: 64
End: 2024-12-09
Payer: COMMERCIAL

## 2024-12-09 DIAGNOSIS — I10 PRIMARY HYPERTENSION: ICD-10-CM

## 2024-12-09 DIAGNOSIS — K21.9 GASTROESOPHAGEAL REFLUX DISEASE WITHOUT ESOPHAGITIS: ICD-10-CM

## 2024-12-09 DIAGNOSIS — S62.022K CLOSED DISPLACED FRACTURE OF MIDDLE THIRD OF SCAPHOID OF LEFT WRIST WITH NONUNION, SUBSEQUENT ENCOUNTER: ICD-10-CM

## 2024-12-09 NOTE — PROGRESS NOTES
Care Management Monthly Outreach  Last Office Visit: 11/14/24  Next Office Visit: not scheduled      Health Maintenance Due: up to date    Saw Dr Brooks regarding L arm  Scheduled 1/14    No longer going to counseling at [facility]  She has had too many no shows  Discussed benefits of establishing somewhere new  She declines at this time

## 2024-12-11 ENCOUNTER — APPOINTMENT (OUTPATIENT)
Dept: PHYSICAL THERAPY | Facility: CLINIC | Age: 64
End: 2024-12-11
Payer: COMMERCIAL

## 2024-12-16 ENCOUNTER — EVALUATION (OUTPATIENT)
Dept: OCCUPATIONAL THERAPY | Facility: CLINIC | Age: 64
End: 2024-12-16
Payer: COMMERCIAL

## 2024-12-16 DIAGNOSIS — M19.132 POST-TRAUMATIC OSTEOARTHRITIS OF LEFT WRIST: Primary | ICD-10-CM

## 2024-12-16 DIAGNOSIS — M19.132 SNAC (SCAPHOID NON-UNION ADVANCED COLLAPSE) OF WRIST, LEFT: ICD-10-CM

## 2024-12-16 DIAGNOSIS — S62.002S SNAC (SCAPHOID NON-UNION ADVANCED COLLAPSE) OF WRIST, LEFT: ICD-10-CM

## 2024-12-16 DIAGNOSIS — S62.022K CLOSED DISPLACED FRACTURE OF MIDDLE THIRD OF SCAPHOID OF LEFT WRIST WITH NONUNION, SUBSEQUENT ENCOUNTER: ICD-10-CM

## 2024-12-16 DIAGNOSIS — M25.532 LEFT WRIST PAIN: ICD-10-CM

## 2024-12-16 PROCEDURE — 97112 NEUROMUSCULAR REEDUCATION: CPT | Mod: GO

## 2024-12-16 PROCEDURE — 97165 OT EVAL LOW COMPLEX 30 MIN: CPT | Mod: GO

## 2024-12-16 ASSESSMENT — ENCOUNTER SYMPTOMS
LOSS OF SENSATION IN FEET: 0
DEPRESSION: 0
OCCASIONAL FEELINGS OF UNSTEADINESS: 0

## 2024-12-16 NOTE — PROGRESS NOTES
Occupational Therapy                Patient Name: Malika Casper  MRN: [id number]  Today's Date: 12/16/2024     Goals:    Problem #1:  Decreased home exercise program knowledge  Goal #1:  The patient to demonstrate with 100% accuracy exercises to increase strength and prevent joint deformities throughout the left wrist and hand.  Treatment Intervention:  Home exercise program education along with range-of-motion activities education.    Problem #2:  Increased Quick DASH score  Goal #2:  The patient to demonstrate an increase in left upper extremity function as indicated by decreased Quick DASH score ranging between 11-22 at the conclusion of all treatment sessions. Current Quick DASH score is 40.     Problem #4: Increased pain level.  Goal #4:  Patient to verbalize a decreased pain level in the left wrist and hand upper extremity by two levels.  Current pain level 5/10.   Treatment intervention: Neuromuscular reeducation, pain decreasing modalities, patient education and coping skill education.    Assessment:   This patient presents with a longstanding condition and has displayed appropriate coping abilities in dealing with the effects of this injury.  Standardized testing and measures administered today, including Quick Dash, reveal that the patient has minimal impairments in body structures and functions, activity limitations, and participation restrictions.  The Quick Dash was scored at 40.  The patient has a longstanding history of the present problem as is without any personal factors and/or comorbidities that impact the plan of care.  The client was independent prior to the onset of this is impairment.  Two forms of identification were provided and she verbalized no complaints during the intake assessment of abuse or neglect.    Areas affected by this include limited muscle strength, decreased home task tolerance.  The patient's clinical presentation includes stable & uncomplicated characteristics as noted  during today's evaluation, including pain with decreased strength.   These deficits are effecting her home abilities at various times during the day and may be considered as presenting with a condition that is stable & uncomplicated.   Treatment options vary for this client with various treatment protocols available. Time spent evaluating this client and providing treatment, evaluation and education 50 minutes.  The combination of these findings indicate that this patient has 1 performance deficits and is of low complexity, and skilled OT services were warranted in order to realize measurable change in the above outcome measures and achieve improvements in the patient's functional status and individual goals.  The patient verbalized understanding and is in agreement with all goals and plan of care.    Frequency of care was developed with input and agreement by the patient.    Plan of Care    Frequency and duration: time(s) a week . 1-2 times a week for 5 weeks or for 5 visits.     Plan of care was developed with input and agreement by the patient.     Client's primary Goal: Return to previous level of function and independence, use left wrist with less pain.     Reason For Visit    Initial Evaluation, Evaluation and Treatment.        Client Input    The patient's primary form of communication is English. There are no language barriers. Social interaction is appropriate with good interactive skills noted.    Difficulty With Movement, Self Care, Home Management,  Activity, ADL'S    Past Med/Surgical History: Reviewed  Current Medications  Please see patient medication and intake questionnaire for current medications.    Adult Risk Screening  There are no spiritual/cultural practices/values/needs that are important to know.  No apparent abuse or neglect is noted, no suicidal ideation or self-harm plans referenced.    Initial Fall Risk Screening:     The client has not fallen in the last 6 months.  The client does not  have a fear of falling. The client does not need assistance with sitting, standing or walking.  The client does not use an assistance walking.  The client does not need assistance in an unfamiliar setting. The client is not using an assistive device.    Fall Risk: none     Insurance  Insurance reviewed   Visit number:  1     Treatment    Time in clinic started at: 1050  Time in clinic ended at:  1140  07177 - OT Eval Low Complexity 30 min.  Timed: 20211 Neuromuscular reeducation, 20    Total time in clinic is minutes: 50         Subjective    Patient reports: She has had wrist pain for over a year, she was treated for gout but at present is diagnoses with a wrist advance collapse.     Objective  Neuro: Intact    Strength:    Strength:   Level II lbs. on the right:  40  Level II lbs. on the left: 10  Bermudez pinch lbs. right: 12  Key pinch lbs. left: 08     composite strength:  Right: WFL  Left: Weak and painful    left Upper Extremity: Wrist flexion 18 degrees, extension 10 degrees.  Painful arch of motion. Crepitus with left wrist motion and pain with palpation.  Edema in left wrist at scaphoid-lunate region.  Pain complaints with all wrist activities and motions.  She was issued a wrist and thumb support to address pain and stability for the left wrist. Comfort cool medium.     Functional Task Tolerance:     Not Limited Progressing Painful Limited   Carrying   x x   Gripping   x x   Lifting   x x   Manipulation   x x   Pinching   x x   Pulling   x x   Pushing   x x   Reaching   x x   Weightbearing   x x     Treatment Performed Today:  Information communicated to patient.   Education provided included home program   Home program: PROM/AROM , modalities available and possible usage, orthosis as needed, HEP to include digital mobility and strengthening.     Antonio ROBERT/L, CHT, Date: 12/16/2024    1. Post-traumatic osteoarthritis of left wrist  Referral to Physical Therapy      2. Closed displaced fracture of  middle third of scaphoid of left wrist with nonunion, subsequent encounter  Referral to Physical Therapy      3. Left wrist pain        4. 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

## 2024-12-18 DIAGNOSIS — R11.0 MILD NAUSEA: ICD-10-CM

## 2024-12-18 RX ORDER — ONDANSETRON HYDROCHLORIDE 8 MG/1
TABLET, FILM COATED ORAL
Qty: 30 TABLET | Refills: 2 | Status: SHIPPED | OUTPATIENT
Start: 2024-12-18

## 2024-12-18 NOTE — TELEPHONE ENCOUNTER
[facility] - [place] - [address]   Pt needs refill on  ondansetron (Zofran) 8 mg tablet   Please add additional refills.  Pt states she never received prescription that was sent to [facility] in November?

## 2024-12-27 ENCOUNTER — PHARMACY VISIT (OUTPATIENT)
Dept: PHARMACY | Facility: CLINIC | Age: 64
End: 2024-12-27
Payer: MEDICAID

## 2024-12-27 ENCOUNTER — HOSPITAL ENCOUNTER (EMERGENCY)
Facility: HOSPITAL | Age: 64
Discharge: HOME | End: 2024-12-27
Attending: EMERGENCY MEDICINE
Payer: COMMERCIAL

## 2024-12-27 ENCOUNTER — APPOINTMENT (OUTPATIENT)
Dept: RADIOLOGY | Facility: HOSPITAL | Age: 64
End: 2024-12-27
Payer: COMMERCIAL

## 2024-12-27 ENCOUNTER — APPOINTMENT (OUTPATIENT)
Dept: CARDIOLOGY | Facility: HOSPITAL | Age: 64
End: 2024-12-27
Payer: COMMERCIAL

## 2024-12-27 VITALS
OXYGEN SATURATION: 95 % | BODY MASS INDEX: 23 KG/M2 | HEART RATE: 82 BPM | WEIGHT: 125 LBS | DIASTOLIC BLOOD PRESSURE: 104 MMHG | HEIGHT: 62 IN | SYSTOLIC BLOOD PRESSURE: 169 MMHG | TEMPERATURE: 97.9 F | RESPIRATION RATE: 18 BRPM

## 2024-12-27 DIAGNOSIS — I16.0 HYPERTENSIVE URGENCY: Primary | ICD-10-CM

## 2024-12-27 DIAGNOSIS — R07.9 CHEST PAIN, UNSPECIFIED TYPE: ICD-10-CM

## 2024-12-27 LAB
ALBUMIN SERPL BCP-MCNC: 4.3 G/DL (ref 3.4–5)
ALP SERPL-CCNC: 67 U/L (ref 33–136)
ALT SERPL W P-5'-P-CCNC: 26 U/L (ref 7–45)
ANION GAP SERPL CALC-SCNC: 11 MMOL/L (ref 10–20)
AST SERPL W P-5'-P-CCNC: 28 U/L (ref 9–39)
ATRIAL RATE: 88 BPM
BASOPHILS # BLD AUTO: 0.03 X10*3/UL (ref 0–0.1)
BASOPHILS NFR BLD AUTO: 0.4 %
BILIRUB SERPL-MCNC: 0.4 MG/DL (ref 0–1.2)
BUN SERPL-MCNC: 10 MG/DL (ref 6–23)
CALCIUM SERPL-MCNC: 9.6 MG/DL (ref 8.6–10.3)
CARDIAC TROPONIN I PNL SERPL HS: 4 NG/L (ref 0–13)
CARDIAC TROPONIN I PNL SERPL HS: 4 NG/L (ref 0–13)
CHLORIDE SERPL-SCNC: 104 MMOL/L (ref 98–107)
CO2 SERPL-SCNC: 30 MMOL/L (ref 21–32)
CREAT SERPL-MCNC: 0.53 MG/DL (ref 0.5–1.05)
EGFRCR SERPLBLD CKD-EPI 2021: >90 ML/MIN/1.73M*2
EOSINOPHIL # BLD AUTO: 0.2 X10*3/UL (ref 0–0.7)
EOSINOPHIL NFR BLD AUTO: 2.6 %
ERYTHROCYTE [DISTWIDTH] IN BLOOD BY AUTOMATED COUNT: 13.8 % (ref 11.5–14.5)
FLUAV RNA RESP QL NAA+PROBE: NOT DETECTED
FLUBV RNA RESP QL NAA+PROBE: NOT DETECTED
GLUCOSE SERPL-MCNC: 107 MG/DL (ref 74–99)
HCT VFR BLD AUTO: 41.9 % (ref 36–46)
HEMOCCULT SP1 STL QL: NEGATIVE
HGB BLD-MCNC: 13.8 G/DL (ref 12–16)
HOLD SPECIMEN: NORMAL
IMM GRANULOCYTES # BLD AUTO: 0.03 X10*3/UL (ref 0–0.7)
IMM GRANULOCYTES NFR BLD AUTO: 0.4 % (ref 0–0.9)
INR PPP: 1 (ref 0.9–1.1)
LYMPHOCYTES # BLD AUTO: 1.47 X10*3/UL (ref 1.2–4.8)
LYMPHOCYTES NFR BLD AUTO: 19.3 %
MAGNESIUM SERPL-MCNC: 1.83 MG/DL (ref 1.6–2.4)
MCH RBC QN AUTO: 31.7 PG (ref 26–34)
MCHC RBC AUTO-ENTMCNC: 32.9 G/DL (ref 32–36)
MCV RBC AUTO: 96 FL (ref 80–100)
MONOCYTES # BLD AUTO: 0.42 X10*3/UL (ref 0.1–1)
MONOCYTES NFR BLD AUTO: 5.5 %
NEUTROPHILS # BLD AUTO: 5.47 X10*3/UL (ref 1.2–7.7)
NEUTROPHILS NFR BLD AUTO: 71.8 %
NRBC BLD-RTO: 0 /100 WBCS (ref 0–0)
P AXIS: 72 DEGREES
P OFFSET: 210 MS
P ONSET: 156 MS
PLATELET # BLD AUTO: 346 X10*3/UL (ref 150–450)
POTASSIUM SERPL-SCNC: 3.5 MMOL/L (ref 3.5–5.3)
PR INTERVAL: 136 MS
PROT SERPL-MCNC: 7.2 G/DL (ref 6.4–8.2)
PROTHROMBIN TIME: 11.4 SECONDS (ref 9.8–12.8)
Q ONSET: 224 MS
QRS COUNT: 15 BEATS
QRS DURATION: 86 MS
QT INTERVAL: 402 MS
QTC CALCULATION(BAZETT): 486 MS
QTC FREDERICIA: 456 MS
R AXIS: 89 DEGREES
RBC # BLD AUTO: 4.35 X10*6/UL (ref 4–5.2)
RSV RNA RESP QL NAA+PROBE: NOT DETECTED
SARS-COV-2 RNA RESP QL NAA+PROBE: NOT DETECTED
SODIUM SERPL-SCNC: 141 MMOL/L (ref 136–145)
T AXIS: 4 DEGREES
T OFFSET: 425 MS
VENTRICULAR RATE: 88 BPM
WBC # BLD AUTO: 7.6 X10*3/UL (ref 4.4–11.3)

## 2024-12-27 PROCEDURE — 99284 EMERGENCY DEPT VISIT MOD MDM: CPT | Performed by: EMERGENCY MEDICINE

## 2024-12-27 PROCEDURE — 2500000001 HC RX 250 WO HCPCS SELF ADMINISTERED DRUGS (ALT 637 FOR MEDICARE OP): Performed by: EMERGENCY MEDICINE

## 2024-12-27 PROCEDURE — 85610 PROTHROMBIN TIME: CPT | Performed by: EMERGENCY MEDICINE

## 2024-12-27 PROCEDURE — 36415 COLL VENOUS BLD VENIPUNCTURE: CPT | Performed by: EMERGENCY MEDICINE

## 2024-12-27 PROCEDURE — 2500000005 HC RX 250 GENERAL PHARMACY W/O HCPCS: Performed by: EMERGENCY MEDICINE

## 2024-12-27 PROCEDURE — 84484 ASSAY OF TROPONIN QUANT: CPT | Performed by: EMERGENCY MEDICINE

## 2024-12-27 PROCEDURE — 99285 EMERGENCY DEPT VISIT HI MDM: CPT | Performed by: EMERGENCY MEDICINE

## 2024-12-27 PROCEDURE — 99285 EMERGENCY DEPT VISIT HI MDM: CPT | Mod: 25

## 2024-12-27 PROCEDURE — RXMED WILLOW AMBULATORY MEDICATION CHARGE

## 2024-12-27 PROCEDURE — 85025 COMPLETE CBC W/AUTO DIFF WBC: CPT | Performed by: EMERGENCY MEDICINE

## 2024-12-27 PROCEDURE — 71045 X-RAY EXAM CHEST 1 VIEW: CPT | Performed by: RADIOLOGY

## 2024-12-27 PROCEDURE — 93005 ELECTROCARDIOGRAM TRACING: CPT

## 2024-12-27 PROCEDURE — 82270 OCCULT BLOOD FECES: CPT | Performed by: EMERGENCY MEDICINE

## 2024-12-27 PROCEDURE — 87637 SARSCOV2&INF A&B&RSV AMP PRB: CPT | Performed by: EMERGENCY MEDICINE

## 2024-12-27 PROCEDURE — 83735 ASSAY OF MAGNESIUM: CPT | Performed by: EMERGENCY MEDICINE

## 2024-12-27 PROCEDURE — 80053 COMPREHEN METABOLIC PANEL: CPT | Performed by: EMERGENCY MEDICINE

## 2024-12-27 PROCEDURE — 71045 X-RAY EXAM CHEST 1 VIEW: CPT

## 2024-12-27 RX ORDER — LIDOCAINE 560 MG/1
1 PATCH PERCUTANEOUS; TOPICAL; TRANSDERMAL ONCE
Status: DISCONTINUED | OUTPATIENT
Start: 2024-12-27 | End: 2024-12-27 | Stop reason: HOSPADM

## 2024-12-27 RX ORDER — LISINOPRIL 10 MG/1
10 TABLET ORAL ONCE
Status: COMPLETED | OUTPATIENT
Start: 2024-12-27 | End: 2024-12-27

## 2024-12-27 RX ORDER — NITROGLYCERIN 0.4 MG/1
0.4 TABLET SUBLINGUAL ONCE
Status: COMPLETED | OUTPATIENT
Start: 2024-12-27 | End: 2024-12-27

## 2024-12-27 RX ORDER — BENZONATATE 100 MG/1
100 CAPSULE ORAL 3 TIMES DAILY PRN
COMMUNITY

## 2024-12-27 RX ORDER — METOPROLOL SUCCINATE 25 MG/1
25 TABLET, EXTENDED RELEASE ORAL DAILY
Qty: 30 TABLET | Refills: 0 | Status: SHIPPED | OUTPATIENT
Start: 2024-12-27 | End: 2025-01-26

## 2024-12-27 RX ORDER — AMOXICILLIN AND CLAVULANATE POTASSIUM 875; 125 MG/1; MG/1
875 TABLET, FILM COATED ORAL 2 TIMES DAILY
COMMUNITY
Start: 2024-12-26 | End: 2025-01-01

## 2024-12-27 RX ORDER — NAPROXEN SODIUM 220 MG/1
324 TABLET, FILM COATED ORAL ONCE
Status: COMPLETED | OUTPATIENT
Start: 2024-12-27 | End: 2024-12-27

## 2024-12-27 RX ORDER — NITROGLYCERIN 0.4 MG/1
0.4 TABLET SUBLINGUAL EVERY 5 MIN PRN
Status: DISCONTINUED | OUTPATIENT
Start: 2024-12-27 | End: 2024-12-27 | Stop reason: HOSPADM

## 2024-12-27 RX ADMIN — ASPIRIN 81 MG CHEWABLE TABLET 324 MG: 81 TABLET CHEWABLE at 13:53

## 2024-12-27 RX ADMIN — NITROGLYCERIN 0.4 MG: 0.4 TABLET SUBLINGUAL at 12:40

## 2024-12-27 RX ADMIN — NITROGLYCERIN 0.4 MG: 0.4 TABLET SUBLINGUAL at 13:53

## 2024-12-27 RX ADMIN — LIDOCAINE 4% 1 PATCH: 40 PATCH TOPICAL at 13:53

## 2024-12-27 RX ADMIN — LISINOPRIL 10 MG: 10 TABLET ORAL at 14:53

## 2024-12-27 ASSESSMENT — LIFESTYLE VARIABLES
EVER FELT BAD OR GUILTY ABOUT YOUR DRINKING: NO
EVER HAD A DRINK FIRST THING IN THE MORNING TO STEADY YOUR NERVES TO GET RID OF A HANGOVER: NO
HAVE YOU EVER FELT YOU SHOULD CUT DOWN ON YOUR DRINKING: NO
HAVE PEOPLE ANNOYED YOU BY CRITICIZING YOUR DRINKING: NO
TOTAL SCORE: 0

## 2024-12-27 ASSESSMENT — PAIN SCALES - GENERAL
PAINLEVEL_OUTOF10: 10 - WORST POSSIBLE PAIN
PAINLEVEL_OUTOF10: 0 - NO PAIN
PAINLEVEL_OUTOF10: 10 - WORST POSSIBLE PAIN

## 2024-12-27 ASSESSMENT — PAIN DESCRIPTION - LOCATION: LOCATION: CHEST

## 2024-12-27 ASSESSMENT — PAIN DESCRIPTION - ORIENTATION: ORIENTATION: LEFT

## 2024-12-27 ASSESSMENT — PAIN DESCRIPTION - ONSET: ONSET: ONGOING

## 2024-12-27 ASSESSMENT — PAIN - FUNCTIONAL ASSESSMENT: PAIN_FUNCTIONAL_ASSESSMENT: 0-10

## 2024-12-27 ASSESSMENT — PAIN DESCRIPTION - PROGRESSION: CLINICAL_PROGRESSION: NOT CHANGED

## 2024-12-27 ASSESSMENT — PAIN DESCRIPTION - DESCRIPTORS
DESCRIPTORS: SHARP
DESCRIPTORS: SORE
DESCRIPTORS: SHARP

## 2024-12-27 ASSESSMENT — PAIN DESCRIPTION - FREQUENCY: FREQUENCY: CONSTANT/CONTINUOUS

## 2024-12-27 ASSESSMENT — PAIN DESCRIPTION - PAIN TYPE: TYPE: ACUTE PAIN

## 2024-12-27 NOTE — DISCHARGE INSTRUCTIONS
Please return to the ER or seek immediate medical attention if you experience new or worsening chest pain, shortness of breath, fever of 38C (100.4) or higher, persistent vomiting, weakness, numbness, tingling, excessive sweating,  loss of motion in your arms or legs, fainting, vision changes, or any new or worsening symptoms.    You are welcome back any time. Thank you for entrusting your care to us, I hope we made your visit as pleasant as possible. Wishing you well!    Dr. Trevino

## 2024-12-27 NOTE — ED PROVIDER NOTES
EMERGENCY DEPARTMENT ENCOUNTER      Pt Name: Malika Casper  MRN: [id number]  Birthdate 1960  Date of evaluation: 12/27/2024  Provider: Jovi Trevino DO    CHIEF COMPLAINT       Chief Complaint   Patient presents with    Chest Pain    Shortness of Breath     Cp and sob x few days     HISTORY OF PRESENT ILLNESS    Malika Casper is a 64 y.o. year old female who presents to the ER for 2 days of ongoing chest pain which radiates to her left arm, with associated shortness of breath persistent nausea.  Pain is worse with exertion.  She also does endorse 2 episodes of black diarrhea today.  Denies urinary changes, flank pain, fevers, fainting, abdominal pain, vomiting.  States that she has had had Zofran for nausea for 6 years, she has been using her Zofran consistently which is new for her.  Did not take her medication today.  PMH back pain, hypertension HLD none PSH back and left leg pins and plates, cholecystectomy, adrenalectomy  Family history none  Allergies to ibuprofen, Bactrim  Endorses tobacco use, marijuana use, denies alcohol use     PAST MEDICAL HISTORY     Past Medical History:   Diagnosis Date    Abnormal findings on diagnostic imaging of other specified body structures     Abnormal finding on imaging    Encounter for screening for malignant neoplasm of colon 04/27/2022    Colon cancer screening    Liver disease, unspecified 12/16/2022    Chronic liver disease    Personal history of other medical treatment     History of transvaginal ultrasound    Personal history of other medical treatment     History of ultrasound, pelvic     CURRENT MEDICATIONS       Discharge Medication List as of 12/27/2024  2:47 PM        CONTINUE these medications which have NOT CHANGED    Details   albuterol (Ventolin HFA) 90 mcg/actuation inhaler Inhale 2 puffs every 4 hours if needed for wheezing or shortness of breath., Starting Tue 10/8/2024, Until Wed 10/8/2025 at 2359, Normal      amoxicillin-pot clavulanate  (Augmentin) 875-125 mg tablet Take 1 tablet (875 mg) by mouth 2 times a day., Starting Thu 12/26/2024, Until Wed 1/1/2025, Historical Med      atorvastatin (Lipitor) 40 mg tablet Take 1 tablet (40 mg) by mouth once daily at bedtime., Starting Thu 9/26/2024, Until Tue 3/25/2025, Normal      benzonatate (Tessalon) 100 mg capsule Take 1 capsule (100 mg) by mouth 3 times a day as needed for cough. Do not crush or chew., Historical Med      calamine-zinc oxide lotion Apply 1 Application topically 4 times a day as needed (itching)., Starting Wed 7/24/2024, Normal      colchicine 0.6 mg tablet Take 1 tablet (0.6 mg) by mouth once daily., Starting Mon 11/18/2024, Historical Med      cyclobenzaprine (Flexeril) 5 mg tablet Take 1 tablet (5 mg) by mouth 3 times a day., Historical Med      gabapentin (Neurontin) 800 mg tablet Take 1 tablet (800 mg) by mouth 3 times a day., Historical Med      HYDROcodone-acetaminophen (Norco)  mg tablet Take 1 tablet by mouth every 4 hours if needed for moderate pain (4 - 6) or severe pain (7 - 10). Max 5 tablets daily, Historical Med      lidocaine 4 % patch Place 1 patch on the skin once daily as needed for mild pain (1 - 3)., Historical Med      lisinopril 10 mg tablet Take 1 tablet (10 mg) by mouth once daily., Starting Thu 9/26/2024, Normal      medical cannabis Take 1 each by mouth if needed. Oil Or Sol Vap - 3.47 - 170 - Indica Cart - [place], Edb Oral Admin 50-10 Gummy Pineapple Punch Ubgood, Edb Oral Admin - 22 - 10 - Gummy - D-Berry, Historical Med      multivitamin (Daily-Kia, with folic acid,) tablet Take 1 tablet by mouth once daily., Starting Fri 11/22/2024, Normal      ondansetron (Zofran) 8 mg tablet TAKE ONE TABLET (8 MG) BY MOUTH EVERY EIGHT HOURS IF NEEDED FOR NAUSEA OR VOMITING., Normal           SURGICAL HISTORY       Past Surgical History:   Procedure Laterality Date    ANKLE SURGERY  09/18/2017    Ankle Surgery    CT ABDOMEN PELVIS ANGIOGRAM W AND/OR WO IV  CONTRAST  5/3/2020    CT ABDOMEN PELVIS ANGIOGRAM W AND/OR WO IV CONTRAST 5/3/2020 [facility]    KNEE SURGERY  09/18/2017    Knee Surgery    OTHER SURGICAL HISTORY  09/10/2015    Adrenal Gland Excision    OTHER SURGICAL HISTORY  12/02/2021    Gallbladder surgery     ALLERGIES     Amlodipine, Ibuprofen, Sulfamethoxazole-trimethoprim, and Vraylar [cariprazine]  FAMILY HISTORY       Family History   Problem Relation Name Age of Onset    Colon cancer Father      Prostate cancer Father      Breast cancer Mother's Sister  31     SOCIAL HISTORY       Social History     Tobacco Use    Smoking status: Every Day     Current packs/day: 0.50     Average packs/day: 0.5 packs/day for 25.0 years (12.5 ttl pk-yrs)     Types: Cigarettes     Passive exposure: Current    Smokeless tobacco: Never   Vaping Use    Vaping status: Never Used   Substance Use Topics    Alcohol use: Not Currently    Drug use: Not Currently     PHYSICAL EXAM  (up to 7 for level 4, 8 or more for level 5)     ED Triage Vitals [12/27/24 1103]   Temperature Heart Rate Respirations BP   36.6 °C (97.9 °F) 88 18 (!) 160/95      Pulse Ox Temp Source Heart Rate Source Patient Position   96 % Temporal Monitor Sitting      BP Location FiO2 (%)     Right arm --       Physical Exam  Vitals and nursing note reviewed.   Constitutional:       General: She is not in acute distress.     Appearance: Normal appearance. She is not ill-appearing.   HENT:      Head: Normocephalic and atraumatic. No raccoon eyes, Benitez's sign, contusion or laceration.      Jaw: No trismus or malocclusion.      Right Ear: External ear normal.      Left Ear: External ear normal.      Mouth/Throat:      Mouth: Mucous membranes are moist.      Pharynx: Oropharynx is clear.   Eyes:      Extraocular Movements: Extraocular movements intact.      Pupils: Pupils are equal, round, and reactive to light.   Neck:      Trachea: No tracheal deviation.   Cardiovascular:      Rate and Rhythm: Normal rate  and regular rhythm.      Pulses: Normal pulses.   Pulmonary:      Effort: No respiratory distress.      Breath sounds: No wheezing, rhonchi or rales.   Chest:      Chest wall: Tenderness (L) present.   Abdominal:      General: Abdomen is flat.      Palpations: Abdomen is soft. There is no mass.      Tenderness: There is no abdominal tenderness.   Musculoskeletal:         General: No tenderness or signs of injury.      Right lower leg: No edema.      Left lower leg: No edema.   Skin:     Coloration: Skin is not jaundiced or pale.      Findings: No petechiae, rash or wound.   Neurological:      Mental Status: She is alert.   Psychiatric:         Speech: Speech normal.         Thought Content: Thought content does not include homicidal or suicidal ideation.        DIAGNOSTIC RESULTS   LABS:  Labs Reviewed   COMPREHENSIVE METABOLIC PANEL - Abnormal       Result Value    Glucose 107 (*)     Sodium 141      Potassium 3.5      Chloride 104      Bicarbonate 30      Anion Gap 11      Urea Nitrogen 10      Creatinine 0.53      eGFR >90      Calcium 9.6      Albumin 4.3      Alkaline Phosphatase 67      Total Protein 7.2      AST 28      Bilirubin, Total 0.4      ALT 26     OCCULT BLOOD X1, STOOL - Normal    Occult Blood, Stool X1 Negative     MAGNESIUM - Normal    Magnesium 1.83     PROTIME-INR - Normal    Protime 11.4      INR 1.0     SERIAL TROPONIN-INITIAL - Normal    Troponin I, High Sensitivity 4      Narrative:     Less than 99th percentile of normal range cutoff-  Female and children under 18 years old <14 ng/L; Male <21 ng/L: Negative  Repeat testing should be performed if clinically indicated.     Female and children under 18 years old 14-50 ng/L; Male 21-50 ng/L:  Consistent with possible cardiac damage and possible increased clinical   risk. Serial measurements may help to assess extent of myocardial damage.     >50 ng/L: Consistent with cardiac damage, increased clinical risk and  myocardial infarction. Serial  measurements may help assess extent of   myocardial damage.      NOTE: Children less than 1 year old may have higher baseline troponin   levels and results should be interpreted in conjunction with the overall   clinical context.     NOTE: Troponin I testing is performed using a different   testing methodology at [facility] than at [facility]. Direct result comparisons should only   be made within the same method.   SARS-COV-2 AND INFLUENZA A/B PCR - Normal    Flu A Result Not Detected      Flu B Result Not Detected      Coronavirus 2019, PCR Not Detected      Narrative:     This assay has received FDA Emergency Use Authorization (EUA) and  is only authorized for the duration of time that circumstances exist to justify the authorization of the emergency use of in vitro diagnostic tests for the detection of SARS-CoV-2 virus and/or diagnosis of COVID-19 infection under section 564(b)(1) of the Act, 21 U.S.C. 360bbb-3(b)(1). Testing for SARS-CoV-2 is only recommended for patients who meet current clinical and/or epidemiological criteria as defined by federal, state, or local public health directives. This assay is an in vitro diagnostic nucleic acid amplification test for the qualitative detection of SARS-CoV-2, Influenza A, and Influenza B from nasopharyngeal specimens and has been validated for use at [facility]. Negative results do not preclude COVID-19 infections or Influenza A/B infections, and should not be used as the sole basis for diagnosis, treatment, or other management decisions. If Influenza A/B and RSV PCR results are negative, testing for Parainfluenza virus, Adenovirus and Metapneumovirus is routinely performed for [facility] pediatric oncology and intensive care inpatients, and is available on other patients by placing an add-on request.    RSV PCR - Normal    RSV PCR Not Detected      Narrative:     This assay is an FDA-cleared, in vitro diagnostic nucleic  acid amplification test for the detection of RSV from nasopharyngeal specimens, and has been validated for use at [facility]. Negative results do not preclude RSV infections, and should not be used as the sole basis for diagnosis, treatment, or other management decisions. If Influenza A/B and RSV PCR results are negative, testing for Parainfluenza virus, Adenovirus and Metapneumovirus is routinely performed for pediatric oncology and intensive care inpatients at [facility], and is available on other patients by placing an add-on request.       SERIAL TROPONIN, 1 HOUR - Normal    Troponin I, High Sensitivity 4      Narrative:     Less than 99th percentile of normal range cutoff-  Female and children under 18 years old <14 ng/L; Male <21 ng/L: Negative  Repeat testing should be performed if clinically indicated.     Female and children under 18 years old 14-50 ng/L; Male 21-50 ng/L:  Consistent with possible cardiac damage and possible increased clinical   risk. Serial measurements may help to assess extent of myocardial damage.     >50 ng/L: Consistent with cardiac damage, increased clinical risk and  myocardial infarction. Serial measurements may help assess extent of   myocardial damage.      NOTE: Children less than 1 year old may have higher baseline troponin   levels and results should be interpreted in conjunction with the overall   clinical context.     NOTE: Troponin I testing is performed using a different   testing methodology at [facility] than at [facility]. Direct result comparisons should only   be made within the same method.   TROPONIN SERIES- (INITIAL, 1 HR)    Narrative:     The following orders were created for panel order Troponin I Series, High Sensitivity (0, 1 HR).  Procedure                               Abnormality         Status                     ---------                               -----------         ------                     Troponin I,  High Sensiti...[[id number]]  Normal              Final result               Troponin, High Sensitivi...[[id number]]  Normal              Final result                 Please view results for these tests on the individual orders.   CBC WITH AUTO DIFFERENTIAL    WBC 7.6      nRBC 0.0      RBC 4.35      Hemoglobin 13.8      Hematocrit 41.9      MCV 96      MCH 31.7      MCHC 32.9      RDW 13.8      Platelets 346      Neutrophils % 71.8      Immature Granulocytes %, Automated 0.4      Lymphocytes % 19.3      Monocytes % 5.5      Eosinophils % 2.6      Basophils % 0.4      Neutrophils Absolute 5.47      Immature Granulocytes Absolute, Automated 0.03      Lymphocytes Absolute 1.47      Monocytes Absolute 0.42      Eosinophils Absolute 0.20      Basophils Absolute 0.03       All other labs were within normal range or not returned as of this dictation.  Imaging  XR chest 1 view   Final Result   No evidence of acute intrathoracic abnormality.        Signed by: Boris Way 12/27/2024 12:26 PM   Dictation workstation:   [id number]         Procedure  Procedures  EMERGENCY DEPARTMENT COURSE/MDM:   Medical Decision Making    Vitals:    Vitals:    12/27/24 1236 12/27/24 1302 12/27/24 1334 12/27/24 1451   BP: (!) 166/101 (!) 169/94 (!) 181/100 (!) 169/104   BP Location: Right arm Right arm Right arm Right arm   Patient Position: Sitting Sitting Sitting Sitting   Pulse: 71 78 79 82   Resp: 16 18 20 18   Temp:       TempSrc:       SpO2: 96% (!) 92% 95% 95%   Weight:       Height:         Malika Casper is a female 64 y.o. who presents to the ER for chest pain rating to her left arm worse with exertion as well as palpation. On arrival the patients vital signs were: Afebrile, Bradycardic, Normotensive, Regular RR, and Normoxic on room air. History obtained from: patient.  Given exertional chest pain, worse with palpation plan for cardiac workup.    ED Course as of 12/27/24 1828   Fri Dec 27, 2024   1234 XR chest 1 view  No evidence  of acute intrathoracic abnormality. [CB]   1309 CBC and Auto Differential  No acute leukocytosis, leukopenia, anemia, thrombocytosis, or thrombocytopenia [CB]   1326 OCCULT BLOOD, STOOL X1: Negative [CB]   1326 Comprehensive Metabolic Panel(!)  Normoglycemic, no acute electrolyte or hepatorenal abnormality [CB]   1326 Coronavirus 2019, PCR: Not Detected [CB]   1326 Flu B Result: Not Detected [CB]   1326 Flu A Result: Not Detected [CB]   1326 RSV PCR: Not Detected [CB]   1326 Protime-INR  No coagulopathy [CB]   1327 XR chest 1 view  No evidence of acute intrathoracic abnormality. [CB]   1336 Chest pain chest pain improved from 10/10 to 8/10 after first nitro, remains hypertensive, will provide additional nitro [CB]   1415 IM recommended discussion with cardiology prior to admission [CB]   1425 Patient persistently hypertensive, will give home lisinopril [CB]   1440 Discussed with cardiology, Dr. Mcconnell, who recommends discharge home with close cardiac follow-up, low-dose beta-blocker as tolerated [CB]      ED Course User Index  [CB] Jovi Trevino, DO         Diagnoses as of 12/27/24 1828   Hypertensive urgency   Chest pain, unspecified type       External Records Reviewed: I reviewed recent and relevant outside records including inpatient notes, outpatient records.  Notable for normal stress test January 2023, stress test ordered July 2024 by Dr. Mcconnell not yet done  Prescription Drug Consideration: Metoprolol succinate started at the direction of cardiology    Shared decision making for disposition  Patient and/or patient´s representative was counseled regarding labs, imaging, likely diagnosis. All questions were answered. Recommendation was made   for discharge after discussion with cardiology, with which patient is in agreement with outpaitent follow-up.    ED Medications administered this visit:    Medications   nitroglycerin (Nitrostat) SL tablet 0.4 mg (0.4 mg sublingual Given 12/27/24 1240)    aspirin chewable tablet 324 mg (324 mg oral Given 12/27/24 1353)   lisinopril tablet 10 mg (10 mg oral Given 12/27/24 1453)       New Prescriptions from this visit:    Discharge Medication List as of 12/27/2024  2:47 PM        START taking these medications    Details   metoprolol succinate XL (Toprol-XL) 25 mg 24 hr tablet Take 1 tablet (25 mg) by mouth once daily. Do not crush or chew., Starting Fri 12/27/2024, Until Sun 1/26/2025, Normal             Follow-up:  Asaf Cochran MD  [address] [facility] [address]  [phone]              Final Impression:   1. Hypertensive urgency    2. Chest pain, unspecified type          Please excuse any misspellings or unintended errors related to the Dragon speech recognition software used to dictate this note.    I reviewed the case with the attending ED physician. The attending ED physician agrees with the plan.      Jovi Trevino,   Resident  12/27/24 4490       Loy Bellamy MD  12/29/24 7629

## 2024-12-28 ENCOUNTER — APPOINTMENT (OUTPATIENT)
Dept: RADIOLOGY | Facility: HOSPITAL | Age: 64
End: 2024-12-28
Payer: COMMERCIAL

## 2024-12-28 ENCOUNTER — HOSPITAL ENCOUNTER (EMERGENCY)
Facility: HOSPITAL | Age: 64
Discharge: LEFT BEFORE DISCHARGE | End: 2024-12-28
Attending: EMERGENCY MEDICINE
Payer: COMMERCIAL

## 2024-12-28 ENCOUNTER — DOCUMENTATION (OUTPATIENT)
Dept: PRIMARY CARE | Facility: CLINIC | Age: 64
End: 2024-12-28
Payer: COMMERCIAL

## 2024-12-28 VITALS
BODY MASS INDEX: 23 KG/M2 | DIASTOLIC BLOOD PRESSURE: 60 MMHG | HEART RATE: 80 BPM | WEIGHT: 125 LBS | RESPIRATION RATE: 18 BRPM | OXYGEN SATURATION: 98 % | TEMPERATURE: 97.9 F | HEIGHT: 62 IN | SYSTOLIC BLOOD PRESSURE: 105 MMHG

## 2024-12-28 PROCEDURE — 99281 EMR DPT VST MAYX REQ PHY/QHP: CPT | Performed by: EMERGENCY MEDICINE

## 2024-12-28 PROCEDURE — 99284 EMERGENCY DEPT VISIT MOD MDM: CPT | Performed by: EMERGENCY MEDICINE

## 2024-12-28 RX ORDER — DEXTROSE 50 % IN WATER (D50W) INTRAVENOUS SYRINGE
25 ONCE
Status: DISCONTINUED | OUTPATIENT
Start: 2024-12-28 | End: 2024-12-28

## 2024-12-28 ASSESSMENT — LIFESTYLE VARIABLES
HAVE YOU EVER FELT YOU SHOULD CUT DOWN ON YOUR DRINKING: NO
EVER FELT BAD OR GUILTY ABOUT YOUR DRINKING: NO
EVER HAD A DRINK FIRST THING IN THE MORNING TO STEADY YOUR NERVES TO GET RID OF A HANGOVER: NO
TOTAL SCORE: 0
HAVE PEOPLE ANNOYED YOU BY CRITICIZING YOUR DRINKING: NO

## 2024-12-28 ASSESSMENT — PAIN - FUNCTIONAL ASSESSMENT: PAIN_FUNCTIONAL_ASSESSMENT: 0-10

## 2024-12-28 ASSESSMENT — PAIN SCALES - GENERAL: PAINLEVEL_OUTOF10: 9

## 2024-12-28 ASSESSMENT — PAIN DESCRIPTION - PAIN TYPE: TYPE: CHRONIC PAIN

## 2024-12-28 NOTE — SIGNIFICANT EVENT
"Capacity Assessment Tool    \"Capacity\" is the \"ability\" to make a decision.  The decision in question must be specific (one decision), relevant to a patient's current condition (appropriate), and timely (neither prospective nor retrospective).    Capacity varies based on knowledge base (explanation/understanding of clinical information), cognitive processing, acute psychiatric illness, and other clinical conditions.    In order to be deemed \"capacitated\" to make a single decision at one point in time, a patient must demonstrate all 4 of the following elements:    *Ability to consistently communicate a choice (consistent over time with adequate information)  *Ability to understand the relevant information (accurate knowledge of condition)  *Ability to appreciate the situation and its consequences (risks/benefits, pros/cons)  *Ability to reason about treatment options (without undue influence of a person or condition, eg. suicidality or acute psychosis)      Current Decision    Clinical issue:   Patient appears confused as to why she is in the emergency department, states that she called her primary care provider and was informed to come to the emergency department.  The patient subsequently eloped.  I was able to speak with the physician covering for Dr. Cochran, who states they will schedule the appointment for Monday, Dec 30th.  The covering physician additionally did not understand why the patient was told to \"follow-up\" with the emergency department.  He was unaware that the patient was in this facility.  Patient is alert and oriented x 4, knows the date, it was oriented to self, oriented to time oriented to place, denies auditory visual hallucinations, states she feels safe at home.  She was offered to sign AGAINST MEDICAL ADVICE but eloped nonetheless.    Did the appropriate team address relevant information with the patient:  Yes    Date: 12/28/2024    If \"NO\" is selected for appropriate team, then please " "discuss with the appropriate team.  The appropriate team should be encouraged to address relevant information with the patient AND reevaluate capacity when appropriate.    Capacity Evaluation    Patient demonstrates ability to consistently communicate choice:  Yes 12/28/2024    Patient demonstrates ability to understand the relevant information:  Yes 12/28/2024    Patient demonstrates ability to appreciate the situation and its consequences:  Yes 12/28/2024    Patient demonstrates ability to reason about treatment options:  Yes 12/28/2024    If ANY of the above items are answered \"NO,\" the patient LACKS CAPACITY for that specific decision at hand, at that specific time.  Further capacity evaluations can be done as needed.         "

## 2024-12-28 NOTE — ED PROVIDER NOTES
"Emergency Department Provider Note        History of Present Illness     History provided by: Patient  Limitations to History: None  External Records Reviewed with Brief Summary:  Medical chart review, including recent ED visits over the last several days and 1 yesterday when she was instructed to follow-up with her primary care as well as with her cardiologist Dr. Mcconnell    HPI:  Malika Casper is a 64 y.o. female seen here yesterday for ongoing chest pain which radiates to the left arm along with associated shortness of breath and nausea.  History of back pain, hypertension, hyperlipidemia, surgical history includes back and left leg pins and plates, cholecystectomy, adrenalectomy.  She does endorse tobacco use, marijuana use, denies daily alcohol use.  Patient was seen here yesterday for full cardiac evaluation and instructed follow-up with her primary care provider and with her cardiologist.  When inquired the patient states that she feels better than yesterday but she still feels generalized weakness but no longer has chest pain, she denies nausea denies shortness of breath, was offered admission to a nursing facility as she lives alone however she states \"no I would like to go home and feel better\".  When asked why the patient is here the patient states that she had called her primary care's office and that the nurse who answered the phone and instructed her that she was supposed to follow-up in the emergency department.  Patient states that she believes that there was a mixup at the office however she states that she believes she was post to follow-up in the emergency department.  She states that she now understands that she was supposed to follow-up with her primary care provider.  Patient eloped before full evaluation could be performed.  There is a question of whether the patient is on pain medication regime as    Physical Exam   Triage vitals:  T 36.6 °C (97.9 °F)  HR 80  /60  RR 18  " O2 98 % None (Room air)    General: Awake, alert, in no acute distress, somnolent  Eyes: Gaze conjugate.  No scleral icterus or injection  HENT: Normo-cephalic, atraumatic. No stridor  CV: Regular rate, regular rhythm. Radial pulses 2+ bilaterally  Resp: Breathing non-labored, speaking in full sentences.  Clear to auscultation bilaterally  GI: Soft, non-distended, non-tender. No rebound or guarding.  : Deferred  MSK/Extremities: No gross bony deformities. Moving all extremities  Skin: Warm. Appropriate color  Neuro: Alert. Oriented. Face symmetric. Speech is fluent.  Gross strength and sensation intact in b/l UE and LEs  Psych: Appropriate mood and affect    Medical Decision Making & ED Course   Medical Decision Makin y.o. female who arrives to the emergency department with a chief complaint of generalized weakness having been seen here yesterday and instructed follow-up with her primary care provider and with her cardiologist.  Patient states that she called her primary care provider's office on  as Saturday and was told by nursing that she need to follow-up in the emergency department.  I did speak to the primary care provider's partner who states that he was unaware that the patient made a call however we will try to get the patient seen as soon as possible on Monday.  The patient eloped and left the department without treatment complete before any laboratory values could be obtained well orders were being placed and discussion was being had with her primary care team.    ----  Chronic conditions affecting the patient's care: As documented above in [facility]    The patient was discussed with the following consultants/services: The patient's PCP regarding her follow-up instructions, patient's PCP will have the patient seen on Monday, 2024    Care Considerations: As documented above in [facility]    ED Course:     Disposition   Eloped    Procedures   Procedures    Patient seen and discussed with ED attending  physician.    Deng Cueva DO  Emergency Medicine     Deng Cueva DO  Resident  12/29/24 0155        The patient was seen by the resident/fellow.  I have personally performed a substantive portion of the encounter.  I have seen and examined the patient; agree with the workup, evaluation, MDM, management and diagnosis.  The care plan has been discussed with the resident; I have reviewed the resident’s note and agree with the documented findings.                                                    Manjit Rodriguez,   12/29/24 [id number]

## 2024-12-28 NOTE — Clinical Note
The patient was offered AGAINST MEDICAL ADVICE or full workup, patient eloped while awaiting laboratory work.

## 2024-12-29 NOTE — ED NOTES
Patient refused to allow this RN to place IV and draw her blood stating that she was going home; patient was noted to be alert and oriented and did not appear to be in distress; patient got up from cart and walked out of the ER.; Dr Rodriguez and Hammad moon.     Hali Noguera RN  12/28/24 1948

## 2024-12-31 ENCOUNTER — OFFICE VISIT (OUTPATIENT)
Dept: CARDIOLOGY | Facility: CLINIC | Age: 64
End: 2024-12-31
Payer: COMMERCIAL

## 2024-12-31 VITALS
BODY MASS INDEX: 22.63 KG/M2 | SYSTOLIC BLOOD PRESSURE: 164 MMHG | HEIGHT: 62 IN | WEIGHT: 123 LBS | DIASTOLIC BLOOD PRESSURE: 96 MMHG | HEART RATE: 82 BPM

## 2024-12-31 DIAGNOSIS — R07.9 CHEST PAIN, UNSPECIFIED TYPE: Primary | ICD-10-CM

## 2024-12-31 DIAGNOSIS — R93.1 AGATSTON CAC SCORE, <100: ICD-10-CM

## 2024-12-31 DIAGNOSIS — I10 ELEVATED BLOOD PRESSURE READING IN OFFICE WITH DIAGNOSIS OF HYPERTENSION: ICD-10-CM

## 2024-12-31 DIAGNOSIS — I10 HYPERTENSION, UNSPECIFIED TYPE: ICD-10-CM

## 2024-12-31 DIAGNOSIS — F17.210 CIGARETTE NICOTINE DEPENDENCE WITHOUT COMPLICATION: ICD-10-CM

## 2024-12-31 DIAGNOSIS — K92.1 BLACK STOOLS: ICD-10-CM

## 2024-12-31 DIAGNOSIS — I10 PRIMARY HYPERTENSION: ICD-10-CM

## 2024-12-31 PROCEDURE — 3077F SYST BP >= 140 MM HG: CPT | Performed by: INTERNAL MEDICINE

## 2024-12-31 PROCEDURE — 3008F BODY MASS INDEX DOCD: CPT | Performed by: INTERNAL MEDICINE

## 2024-12-31 PROCEDURE — 93000 ELECTROCARDIOGRAM COMPLETE: CPT | Performed by: INTERNAL MEDICINE

## 2024-12-31 PROCEDURE — 3080F DIAST BP >= 90 MM HG: CPT | Performed by: INTERNAL MEDICINE

## 2024-12-31 PROCEDURE — 99214 OFFICE O/P EST MOD 30 MIN: CPT | Performed by: INTERNAL MEDICINE

## 2024-12-31 PROCEDURE — G2211 COMPLEX E/M VISIT ADD ON: HCPCS | Performed by: INTERNAL MEDICINE

## 2024-12-31 RX ORDER — LISINOPRIL 10 MG/1
10 TABLET ORAL 2 TIMES DAILY
Qty: 180 TABLET | Refills: 1 | Status: SHIPPED | OUTPATIENT
Start: 2024-12-31 | End: 2025-06-29

## 2024-12-31 ASSESSMENT — ENCOUNTER SYMPTOMS
ARTHRALGIAS: 1
WHEEZING: 0
SHORTNESS OF BREATH: 0
WEAKNESS: 1
ABDOMINAL PAIN: 1
ACTIVITY CHANGE: 1
NAUSEA: 1
APPETITE CHANGE: 1
STRIDOR: 0
PALPITATIONS: 0
FATIGUE: 1
NERVOUS/ANXIOUS: 1
BRUISES/BLEEDS EASILY: 1

## 2024-12-31 NOTE — PROGRESS NOTES
Patient:  Malika Casper  YOB: 1960  MRN: [id number]       Chief Complaint/Active Symptoms:       Malika Casper is a 64 y.o. female who returns today for cardiac follow-up.    Here for ER follow-up of chest pain. Has a stabbing pain in her left lower chest and left upper quadrant of her abdomen. Pain is present all day long. Pain is improved with vicoden and tramadol but can still feel the discomfort. Has to walk around holding her left breast as the pain is so severe. The pain is not pleuritic but improved with laying down. Has nausea with arising every day since the discomfort started. Has also noted black bowel movements over this same time period. This pain has been present continuously for 1 month.       Review of Systems   Constitutional:  Positive for activity change, appetite change and fatigue.   HENT:  Positive for congestion.    Respiratory:  Negative for shortness of breath, wheezing and stridor.    Cardiovascular:  Positive for chest pain. Negative for palpitations and leg swelling.   Gastrointestinal:  Positive for abdominal pain and nausea.   Genitourinary: Negative.    Musculoskeletal:  Positive for arthralgias.   Neurological:  Positive for weakness.   Hematological:  Bruises/bleeds easily.   Psychiatric/Behavioral:  The patient is nervous/anxious.    All other systems reviewed and are negative.      Objective:     Vitals:    12/31/24 0921   BP: (!) 164/96   Pulse: 82       Vitals:    12/31/24 0921   Weight: 55.8 kg (123 lb)       Allergies:     Allergies   Allergen Reactions    Amlodipine Shortness of breath    Ibuprofen Diarrhea and GI Upset    Sulfamethoxazole-Trimethoprim Unknown    Vraylar [Cariprazine] GI Upset          Medications:     Current Outpatient Medications   Medication Instructions    albuterol (Ventolin HFA) 90 mcg/actuation inhaler 2 puffs, inhalation, Every 4 hours PRN    amoxicillin-pot clavulanate (Augmentin) 875-125 mg tablet 875 mg, 2 times daily     atorvastatin (LIPITOR) 40 mg, oral, Nightly    benzonatate (TESSALON) 100 mg, 3 times daily PRN    calamine-zinc oxide lotion 1 Application, topical (top), 4 times daily PRN    colchicine 0.6 mg, Daily    cyclobenzaprine (FLEXERIL) 5 mg, 3 times daily    gabapentin (Neurontin) 800 mg tablet Take 1 tablet (800 mg) by mouth 3 times a day.    HYDROcodone-acetaminophen (Norco)  mg tablet 1 tablet, Every 4 hours PRN    lidocaine 4 % patch 1 patch, Daily PRN    lisinopril 10 mg, oral, Daily    medical cannabis 1 each, As needed    metoprolol succinate XL (TOPROL-XL) 25 mg, oral, Daily, Do not crush or chew.    multivitamin (Daily-Kia, with folic acid,) tablet 1 tablet, oral, Daily    ondansetron (Zofran) 8 mg tablet TAKE ONE TABLET (8 MG) BY MOUTH EVERY EIGHT HOURS IF NEEDED FOR NAUSEA OR VOMITING.       Physical Examination:   GENERAL:  Well developed, well nourished, in no acute distress.  HEENT: NC AT, EOMI with anicteric sclera  NECK:  Supple, no JVD, no bruit.  CHEST:  Symmetric and pain is reproduced and exacerbated with palpation of the anterior lateral chest wall.  No chest wall or sternal instability  LUNGS:  Clear to auscultation bilaterally, normal respiratory effort.  HEART:  PMI is nondisplaced. RRR with normal S1 and S2, no S3, no mumur or rub. No carotid or abdominal bruits  ABDOMEN: Soft, NT, ND without palpable organomegaly or bruits  EXTREMITIES:  Warm with good color, no clubbing or cyanosis.  There is no edema noted.  PERIPHERAL VASCULAR:  Pulses present and equally palpable; 2+ throughout.  MUSCULOSKELETAL: Osteoarthritic changes and diffuse muscular atrophy  NEURO/PSYCH:  Alert and oriented times three flat affect.  Anxious.  No focal motor deficits   lymph: No significant palpable lymphadenopathy  Skin: no rash or lesions on exposed skin or reported.    Lab:     CBC:   Lab Results   Component Value Date    WBC 7.6 12/27/2024    RBC 4.35 12/27/2024    HGB 13.8 12/27/2024    HCT 41.9 12/27/2024      12/27/2024        CMP:    Lab Results   Component Value Date     12/27/2024    K 3.5 12/27/2024     12/27/2024    CO2 30 12/27/2024    BUN 10 12/27/2024    CREATININE 0.53 12/27/2024    GLUCOSE 107 (H) 12/27/2024    CALCIUM 9.6 12/27/2024       Magnesium:    Lab Results   Component Value Date    MG 1.83 12/27/2024       Lipid Profile:    Lab Results   Component Value Date    TRIG 109 07/05/2024    HDL 66.9 07/05/2024    LDLCALC 122 (H) 07/05/2024       TSH:    Lab Results   Component Value Date    TSH 2.03 07/05/2024       BNP:   Lab Results   Component Value Date    BNP 90 12/04/2024        PT/INR:    Lab Results   Component Value Date    PROTIME 11.4 12/27/2024    INR 1.0 12/27/2024       HgBA1c:    Lab Results   Component Value Date    HGBA1C 5.3 07/05/2024       BMP:  Lab Results   Component Value Date     12/27/2024     12/04/2024     09/05/2024    K 3.5 12/27/2024    K 4.1 12/04/2024    K 4.1 09/05/2024     12/27/2024     12/04/2024     09/05/2024    CO2 30 12/27/2024    CO2 27 12/04/2024    CO2 28 09/05/2024    BUN 10 12/27/2024    BUN 20 12/04/2024    BUN 9 09/05/2024    CREATININE 0.53 12/27/2024    CREATININE 0.58 12/04/2024    CREATININE 0.67 09/05/2024       Cardiac Enzymes:    Lab Results   Component Value Date    TROPHS 4 12/27/2024    TROPHS 4 12/27/2024    TROPHS 5 12/04/2024       Hepatic Function Panel:    Lab Results   Component Value Date    ALKPHOS 67 12/27/2024    ALT 26 12/27/2024    AST 28 12/27/2024    PROT 7.2 12/27/2024    BILITOT 0.4 12/27/2024    BILIDIR 0.0 02/09/2022         Diagnostic Studies:   No echocardiogram results found for the past 12 months    No nuclear medicine results found for the past 12 months    No valid procedures specified.    EKG:   EKG today shows normal sinus rhythm, biatrial enlargement.  Since the EKG of 12/27/2024 the QT is shortened and minor nonspecific ST abnormality has resolved    Radiology:     No  orders to display   Chest x-ray from recent ER visit reviewed no pathology identified  ASSESSMENT     Problem List Items Addressed This Visit       HTN (hypertension)    Relevant Medications    lisinopril 10 mg tablet    Other Relevant Orders    ECG 12 lead (Clinic Performed) (Completed)    Nicotine dependence    Chest pain - Primary    Agatston CAC score, <100    Black stools    Relevant Orders    Occult Blood, Stool    Elevated blood pressure reading in office with diagnosis of hypertension       PLAN   1.  Chest pain.  The chest discomfort is described is not consistent with angina pectoris but is more musculoskeletal pain.  The patient continues over years to have a variety of different chest discomforts on a continual basis.  Earlier this summer she had yet a different discomfort in her left chest.  Despite her low risk we ordered a stress test which she never had done.  Although the symptoms are atypical she can consider an elective stress test she is in the order already on her chart from July of this year.    For this type of discomfort we would normally recommend a course of 10 days of nonsteroidal anti-inflammatory agents.  This was not prescribed because the patient has complaints of a.m. nausea upper abdominal pain and discussion of black stools.  We have encouraged her to discuss these additional problems with her primary care physician.    2.  Black stools.  We reviewed her CBC from the 27th there was no anemia.  I ordered stool guaiacs and told the patient to discuss her ongoing symptoms with her primary care physician.    3.  Hypertension.  Blood pressures are somewhat labile.  While her blood pressures at her doctor's office and here today are elevated when she was in the emergency room the other night and her blood pressures were low normal.  Rather than increase her dose in the morning I have suggested 10 mg of lisinopril twice daily and to follow-up monitoring her blood pressures with her  PCP.    4.  Coronary calcium score less than 100.  In December 2022 or 2 years ago her score was only 3.  Because of all of the CT of the chest that she gets because of her constant recurrent chest discomfort I have not ordered a repeat CT cardiac score.  Normally without low of her reading we repeat it in 5 years which would be in 2027.      5.  Tobacco use.  Patient has ongoing tobacco use because of risk for future cardiovascular as well as pulmonary disease we continue to recommend cessation.    Will see her back after her stress test if she has not performed we will reevaluate her symptoms and blood pressure.  We can repetitively encouraged her to make an appointment to see her primary care physician to discuss all of her multiple other issues.

## 2025-01-02 DIAGNOSIS — R11.0 MILD NAUSEA: ICD-10-CM

## 2025-01-02 NOTE — TELEPHONE ENCOUNTER
Pt needs refills     ondansetron (Zofran) 8 mg tablet     Pharmacy    [facility] - [place], [place] - [address]  [address] [place] [address]  Phone: [phone]  Fax: [phone]

## 2025-01-02 NOTE — TELEPHONE ENCOUNTER
Recent Visits  Date Type Provider Dept   10/08/24 Office Visit Asaf Cochran MD [facility]   09/13/24 Office Visit Asaf Cochran MD [facility]   08/30/24 Office Visit Asaf Cochran MD [facility]   08/23/24 Office Visit Asaf Cochran MD [facility]   07/02/24 Office Visit Asaf Cochran MD [facility]   05/01/24 Office Visit Asaf Cochran MD [facility]   02/01/24 Office Visit Asaf Cochran MD [facility]   Showing recent visits within past 365 days and meeting all other requirements  Future Appointments  No visits were found meeting these conditions.  Showing future appointments within next 90 days and meeting all other requirements

## 2025-01-04 RX ORDER — ONDANSETRON HYDROCHLORIDE 8 MG/1
TABLET, FILM COATED ORAL
Qty: 30 TABLET | Refills: 2 | Status: SHIPPED | OUTPATIENT
Start: 2025-01-04

## 2025-01-10 DIAGNOSIS — R11.0 MILD NAUSEA: ICD-10-CM

## 2025-01-10 RX ORDER — ONDANSETRON HYDROCHLORIDE 8 MG/1
TABLET, FILM COATED ORAL
Qty: 30 TABLET | Refills: 0 | OUTPATIENT
Start: 2025-01-10

## 2025-01-10 NOTE — TELEPHONE ENCOUNTER
Dr. Cochran patient  Refill for ondansetron (Zofran) 8 mg tablet  [facility] - [place], [place] - [address]

## 2025-01-10 NOTE — TELEPHONE ENCOUNTER
Recent Visits  Date Type Provider Dept   10/08/24 Office Visit Asaf Cochran MD [facility]   09/13/24 Office Visit Asaf Cochran MD [facility]   08/30/24 Office Visit Asaf Cochran MD [facility]   08/23/24 Office Visit Asaf Cochran MD [facility]   Showing recent visits within past 180 days and meeting all other requirements  Future Appointments  No visits were found meeting these conditions.  Showing future appointments within next 90 days and meeting all other requirements

## 2025-01-12 LAB
ATRIAL RATE: 88 BPM
P AXIS: 72 DEGREES
P OFFSET: 210 MS
P ONSET: 156 MS
PR INTERVAL: 136 MS
Q ONSET: 224 MS
QRS COUNT: 15 BEATS
QRS DURATION: 86 MS
QT INTERVAL: 402 MS
QTC CALCULATION(BAZETT): 486 MS
QTC FREDERICIA: 456 MS
R AXIS: 89 DEGREES
T AXIS: 4 DEGREES
T OFFSET: 425 MS
VENTRICULAR RATE: 88 BPM

## 2025-01-13 ENCOUNTER — APPOINTMENT (OUTPATIENT)
Dept: PRIMARY CARE | Facility: CLINIC | Age: 65
End: 2025-01-13
Payer: COMMERCIAL

## 2025-01-14 ENCOUNTER — APPOINTMENT (OUTPATIENT)
Dept: ORTHOPEDIC SURGERY | Facility: CLINIC | Age: 65
End: 2025-01-14
Payer: COMMERCIAL

## 2025-01-14 ENCOUNTER — APPOINTMENT (OUTPATIENT)
Dept: PRIMARY CARE | Facility: CLINIC | Age: 65
End: 2025-01-14
Payer: COMMERCIAL

## 2025-01-14 ENCOUNTER — OFFICE VISIT (OUTPATIENT)
Dept: PRIMARY CARE | Facility: CLINIC | Age: 65
End: 2025-01-14
Payer: COMMERCIAL

## 2025-01-14 VITALS
BODY MASS INDEX: 23.75 KG/M2 | HEIGHT: 60 IN | SYSTOLIC BLOOD PRESSURE: 136 MMHG | DIASTOLIC BLOOD PRESSURE: 88 MMHG | RESPIRATION RATE: 16 BRPM | OXYGEN SATURATION: 99 % | WEIGHT: 121 LBS | HEART RATE: 97 BPM | TEMPERATURE: 98 F

## 2025-01-14 DIAGNOSIS — B02.9 HERPES ZOSTER WITHOUT COMPLICATION: ICD-10-CM

## 2025-01-14 DIAGNOSIS — R11.0 MILD NAUSEA: ICD-10-CM

## 2025-01-14 DIAGNOSIS — N64.4 BREAST PAIN, LEFT: Primary | ICD-10-CM

## 2025-01-14 PROCEDURE — 99213 OFFICE O/P EST LOW 20 MIN: CPT | Performed by: NURSE PRACTITIONER

## 2025-01-14 RX ORDER — METOPROLOL SUCCINATE 25 MG/1
25 TABLET, EXTENDED RELEASE ORAL DAILY
Qty: 30 TABLET | Refills: 0 | Status: CANCELLED | OUTPATIENT
Start: 2025-01-14 | End: 2025-02-13

## 2025-01-14 RX ORDER — MULTIVITAMIN WITH FOLIC ACID 400 MCG
1 TABLET ORAL DAILY
Qty: 90 TABLET | Refills: 1 | Status: SHIPPED | OUTPATIENT
Start: 2025-01-14

## 2025-01-14 RX ORDER — ONDANSETRON HYDROCHLORIDE 8 MG/1
TABLET, FILM COATED ORAL
Qty: 30 TABLET | Refills: 2 | Status: SHIPPED | OUTPATIENT
Start: 2025-01-14

## 2025-01-14 ASSESSMENT — ENCOUNTER SYMPTOMS
DIZZINESS: 0
ABDOMINAL PAIN: 0
CONSTIPATION: 0
VOMITING: 0
WHEEZING: 0
SHORTNESS OF BREATH: 0
NUMBNESS: 0
COUGH: 0
CHILLS: 0
FEVER: 0
MYALGIAS: 0
DIARRHEA: 0
BREAST PAIN: 1
NAUSEA: 0
WEAKNESS: 0
PALPITATIONS: 0
FATIGUE: 0

## 2025-01-14 NOTE — PROGRESS NOTES
Subjective   Patient ID: Malika Casper is a 64 y.o. female who presents for Breast Pain.    Patient is following up from hospital visit with chest pain and shortness of breath, She states that they diagnosed her with pneumonia and was given antibiotics and started feeling a little better but is still having pain under her left arm around her breast area.     Breast Pain  Chronicity:  Chronic  Associated Symptoms: breast pain and tenderness    Associated Symptoms: no breast mass, no breast discharge, no breast redness, no skin change and no swelling    Affected side:  Left  Onset:  More than 1 month ago  Progression since onset:  Unchanged  Currently pregnant:  No  Practices self breast exam: yes  Diagnostic workup:  Ultrasound  Treatments tried:  None     She is following with cardiology and a stress test has been ordered. She had a breast ultrasound done on 5/2/24 which was normal. He last chest xray was done on 12/27/24 and was negative.     Review of Systems   Constitutional:  Negative for chills, fatigue and fever.   Respiratory:  Negative for cough, shortness of breath and wheezing.    Cardiovascular:  Negative for palpitations.   Gastrointestinal:  Negative for abdominal pain, constipation, diarrhea, nausea and vomiting.   Musculoskeletal:  Negative for myalgias.   Skin:  Negative for rash.   Neurological:  Negative for dizziness, weakness and numbness.       Objective   /88 (BP Location: Left arm, Patient Position: Sitting, BP Cuff Size: Adult)   Pulse 97   Temp 36.7 °C (98 °F) (Temporal)   Resp 16   Ht 1.524 m (5')   Wt 54.9 kg (121 lb)   SpO2 99%   BMI 23.63 kg/m²     Physical Exam  Vitals and nursing note reviewed.   Constitutional:       General: She is not in acute distress.     Appearance: Normal appearance.   Cardiovascular:      Rate and Rhythm: Normal rate and regular rhythm.      Heart sounds: Normal heart sounds.   Pulmonary:      Effort: Pulmonary effort is normal.      Breath  sounds: Normal breath sounds.   Chest:   Breasts:     Left: Tenderness present. No swelling, mass, nipple discharge or skin change.   Lymphadenopathy:      Upper Body:      Left upper body: No supraclavicular, axillary or pectoral adenopathy.   Neurological:      Mental Status: She is alert.         Assessment/Plan   Problem List Items Addressed This Visit    None  Visit Diagnoses         Codes    Breast pain, left    -  Primary N64.4    Herpes zoster without complication     B02.9    Relevant Medications    multivitamin (Daily-Kia, with folic acid,) tablet    Mild nausea     R11.0    Relevant Medications    ondansetron (Zofran) 8 mg tablet    Body mass index (BMI) 23.0-23.9, adult     Z68.23        Continue with current medications for chronic arvind conditions.   Normal breast US reviewed from 5/2024.  Encouraged patient to complete stress test as ordered by cardiology.   Follow up with PCP with any persisting symptoms.

## 2025-01-27 ENCOUNTER — TELEPHONE (OUTPATIENT)
Dept: PRIMARY CARE | Facility: CLINIC | Age: 65
End: 2025-01-27
Payer: COMMERCIAL

## 2025-01-27 ENCOUNTER — TELEPHONE (OUTPATIENT)
Dept: CARDIOLOGY | Facility: CLINIC | Age: 65
End: 2025-01-27
Payer: COMMERCIAL

## 2025-01-27 DIAGNOSIS — R07.9 CHEST PAIN, UNSPECIFIED TYPE: Primary | ICD-10-CM

## 2025-01-27 DIAGNOSIS — R06.02 SOB (SHORTNESS OF BREATH): ICD-10-CM

## 2025-01-27 RX ORDER — AMINOPHYLLINE 25 MG/ML
125 INJECTION, SOLUTION INTRAVENOUS ONCE AS NEEDED
OUTPATIENT
Start: 2025-01-27

## 2025-01-27 RX ORDER — REGADENOSON 0.08 MG/ML
0.4 INJECTION, SOLUTION INTRAVENOUS
OUTPATIENT
Start: 2025-01-27

## 2025-01-27 NOTE — TELEPHONE ENCOUNTER
Dr. Cochran Pt    Pt is calling to ask for new order for NM stress test. Was told that origional order would not work. Would like to get done at [facility] as soon as possible. Please advise, thank you.      Malika  [phone]

## 2025-01-27 NOTE — TELEPHONE ENCOUNTER
Patient asked for a new order for a nuclear stress test, the current order would not allow scheduling

## 2025-02-05 ENCOUNTER — HOSPITAL ENCOUNTER (OUTPATIENT)
Dept: RADIOLOGY | Facility: HOSPITAL | Age: 65
Discharge: HOME | End: 2025-02-05
Payer: COMMERCIAL

## 2025-02-05 ENCOUNTER — HOSPITAL ENCOUNTER (OUTPATIENT)
Dept: CARDIOLOGY | Facility: HOSPITAL | Age: 65
Discharge: HOME | End: 2025-02-05
Payer: COMMERCIAL

## 2025-02-05 DIAGNOSIS — R06.02 SOB (SHORTNESS OF BREATH): ICD-10-CM

## 2025-02-05 PROCEDURE — 93017 CV STRESS TEST TRACING ONLY: CPT

## 2025-02-05 PROCEDURE — 78452 HT MUSCLE IMAGE SPECT MULT: CPT

## 2025-02-05 PROCEDURE — A9502 TC99M TETROFOSMIN: HCPCS | Performed by: FAMILY MEDICINE

## 2025-02-05 PROCEDURE — 3430000001 HC RX 343 DIAGNOSTIC RADIOPHARMACEUTICALS: Performed by: FAMILY MEDICINE

## 2025-02-05 RX ADMIN — TETROFOSMIN 10.5 MILLICURIE: 0.23 INJECTION, POWDER, LYOPHILIZED, FOR SOLUTION INTRAVENOUS at 08:45

## 2025-02-05 RX ADMIN — TETROFOSMIN 35 MILLICURIE: 0.23 INJECTION, POWDER, LYOPHILIZED, FOR SOLUTION INTRAVENOUS at 10:40

## 2025-02-10 ENCOUNTER — TELEPHONE (OUTPATIENT)
Dept: PRIMARY CARE | Facility: CLINIC | Age: 65
End: 2025-02-10

## 2025-02-10 ENCOUNTER — PATIENT OUTREACH (OUTPATIENT)
Dept: PRIMARY CARE | Facility: CLINIC | Age: 65
End: 2025-02-10
Payer: COMMERCIAL

## 2025-02-10 DIAGNOSIS — I10 PRIMARY HYPERTENSION: ICD-10-CM

## 2025-02-10 DIAGNOSIS — K21.9 GASTROESOPHAGEAL REFLUX DISEASE WITHOUT ESOPHAGITIS: ICD-10-CM

## 2025-02-10 DIAGNOSIS — N64.4 BREAST PAIN, LEFT: ICD-10-CM

## 2025-02-10 NOTE — TELEPHONE ENCOUNTER
Dr. Cochran Pt    Pt is calling to have MA or CB to please call patient back to discuss NM Stress Test Results. Pt is concerned and would like to know what the next step is. Please advise, thank you.      Malika  [phone]

## 2025-02-10 NOTE — PROGRESS NOTES
Care Management Monthly Outreach  Last Office Visit: 1/14/25 (FABIENNE Polo CNP)  Next Office Visit: none scheduled     Health Maintenance Due: up to date  Chart Review Completed    Danae is doing okay  Had recent fall  1 week ago  Hurt her Right knee  Reaching for milk at the grocery store  It is doing a little better this week    Has been having ongoing pain on the L side of breast  her arm and under arm  Has been hurting for years  Recently lidocaine patches have been helpful  Had several questions regarding nuclear stress test results  She is worried about heart health because she is a smoker  Scheduled 2/25 with Dr Mcconnell    Copied 1/27   IMPRESSION:  1. Negative for stress-induced ischemia or prior infarction.  2. The left ventricle is normal in size.  3. Normal LV wall motion with an LV EF estimated at greater than 65%.      Venecia Bills RN  Chronic Care Management  [phone]

## 2025-02-13 ENCOUNTER — TELEPHONE (OUTPATIENT)
Dept: CARDIOLOGY | Facility: CLINIC | Age: 65
End: 2025-02-13
Payer: COMMERCIAL

## 2025-02-14 ENCOUNTER — TELEMEDICINE (OUTPATIENT)
Dept: PRIMARY CARE | Facility: CLINIC | Age: 65
End: 2025-02-14
Payer: COMMERCIAL

## 2025-02-14 DIAGNOSIS — E78.2 MIXED HYPERLIPIDEMIA: ICD-10-CM

## 2025-02-14 DIAGNOSIS — F41.9 ANXIETY: ICD-10-CM

## 2025-02-14 DIAGNOSIS — F31.77 BIPOLAR DISORDER, IN PARTIAL REMISSION, MOST RECENT EPISODE MIXED (MULTI): ICD-10-CM

## 2025-02-14 DIAGNOSIS — K21.9 GASTROESOPHAGEAL REFLUX DISEASE WITHOUT ESOPHAGITIS: ICD-10-CM

## 2025-02-14 DIAGNOSIS — Z86.19 HX OF HEPATITIS C: ICD-10-CM

## 2025-02-14 DIAGNOSIS — R11.0 MILD NAUSEA: ICD-10-CM

## 2025-02-14 DIAGNOSIS — I10 PRIMARY HYPERTENSION: Primary | ICD-10-CM

## 2025-02-14 DIAGNOSIS — F20.9 SCHIZOPHRENIA, UNSPECIFIED TYPE: ICD-10-CM

## 2025-02-14 PROCEDURE — 99214 OFFICE O/P EST MOD 30 MIN: CPT | Performed by: FAMILY MEDICINE

## 2025-02-14 ASSESSMENT — ENCOUNTER SYMPTOMS
DEPRESSION: 0
OCCASIONAL FEELINGS OF UNSTEADINESS: 0
LOSS OF SENSATION IN FEET: 0

## 2025-02-14 NOTE — PROGRESS NOTES
Subjective   Patient ID: Malika Casper is a 64 y.o. female who presents for No chief complaint on file..    HPI   Patient is follow up for chest pain and arm pain. She states she is still experiencing pain in her chest and left arm. Today does not hurt as much. Pain scale 3/10.   Go over results of nuclear stress test.  Review of Systems  12 Systems have been reviewed as follows.  Constitutional: Fever, weight gain, weight loss, appetite change, night sweats, fatigue, chills.  Eyes : blurry, double vision, vision, loss, tearing, redness, pain, sensitivity to light, glaucoma.  Ears, nose, mouth, and throat: Hearing loss, ringing in the ears, ear pain, nasal congestion, nasal drainage, nosebleeds, mouth, throat, irritation tooth problem.  Cardiovascular :chest pain, pressure, heart racing, palpitations, sweating, leg swelling, high or low blood pressure  Pulmonary: Cough, yellow or green sputum, blood and sputum, shortness of breath, wheezing  Gastrointestinal: Nausea, vomiting, diarrhea, constipation, pain, blood in stool, or vomitus, heartburn, difficulty swallowing  Genitourinary: incontinence, abnormal bleeding, abnormal discharge, urinary frequency, urinary hesitancy, pain, impotence sexual problem, infection, urinary retention  Musculoskeletal: Pain, stiffness, joint, redness or warmth, arthritis, back pain, weakness, muscle wasting, sprain or fracture  Neuro: Weight weakness, dizziness, change in voice, change in taste change in vision, change in hearing, loss, or change of sensation, trouble walking, balance problems coordination problems, shaking, speech problem  Endocrine , cold or heat intolerance, blood sugar problem, weight gain or loss missed periods hot flashes, sweats, change in body hair, change in libido, increased thirst, increased urination  Heme/lymph: Swelling, bleeding, problem anemia, bruising, enlarged lymph nodes  Allergic/immunologic: H. plus nasal drip, watery itchy eyes, nasal  drainage, immunosuppressed  The above were reviewed and noted negative except as noted in HPI and Problem List.    Objective   There were no vitals taken for this visit.    Physical Exam    Assessment/Plan   Problem List Items Addressed This Visit    None  Visit Diagnoses         Codes    Mild nausea     R11.0          Sees  Dr. Lam      -see GYN      -Continue colchicine for gout PRN     -Go to PT      Stop marijuana     See GI    Nuclear stress wnl     Continue current medications and therapy for chronic medical conditions

## 2025-02-16 RX ORDER — ONDANSETRON HYDROCHLORIDE 8 MG/1
TABLET, FILM COATED ORAL
Qty: 30 TABLET | Refills: 0 | Status: CANCELLED | OUTPATIENT
Start: 2025-02-16

## 2025-02-18 ENCOUNTER — APPOINTMENT (OUTPATIENT)
Dept: CARDIOLOGY | Facility: CLINIC | Age: 65
End: 2025-02-18
Payer: COMMERCIAL

## 2025-02-24 ENCOUNTER — APPOINTMENT (OUTPATIENT)
Dept: CARDIOLOGY | Facility: CLINIC | Age: 65
End: 2025-02-24
Payer: COMMERCIAL

## 2025-02-24 VITALS
BODY MASS INDEX: 23.95 KG/M2 | SYSTOLIC BLOOD PRESSURE: 132 MMHG | HEIGHT: 60 IN | DIASTOLIC BLOOD PRESSURE: 78 MMHG | HEART RATE: 86 BPM | WEIGHT: 122 LBS

## 2025-02-24 DIAGNOSIS — I10 HYPERTENSION, UNSPECIFIED TYPE: ICD-10-CM

## 2025-02-24 DIAGNOSIS — E78.2 MIXED HYPERLIPIDEMIA: ICD-10-CM

## 2025-02-24 DIAGNOSIS — R07.89 ATYPICAL CHEST PAIN: Primary | ICD-10-CM

## 2025-02-24 PROCEDURE — 3078F DIAST BP <80 MM HG: CPT | Performed by: INTERNAL MEDICINE

## 2025-02-24 PROCEDURE — 99213 OFFICE O/P EST LOW 20 MIN: CPT | Performed by: INTERNAL MEDICINE

## 2025-02-24 PROCEDURE — 4004F PT TOBACCO SCREEN RCVD TLK: CPT | Performed by: INTERNAL MEDICINE

## 2025-02-24 PROCEDURE — 3075F SYST BP GE 130 - 139MM HG: CPT | Performed by: INTERNAL MEDICINE

## 2025-02-24 PROCEDURE — 3008F BODY MASS INDEX DOCD: CPT | Performed by: INTERNAL MEDICINE

## 2025-02-24 RX ORDER — ATORVASTATIN CALCIUM 40 MG/1
40 TABLET, FILM COATED ORAL NIGHTLY
Qty: 90 TABLET | Refills: 1 | Status: SHIPPED | OUTPATIENT
Start: 2025-02-24 | End: 2025-08-23

## 2025-02-24 RX ORDER — LISINOPRIL 10 MG/1
10 TABLET ORAL 2 TIMES DAILY
Qty: 180 TABLET | Refills: 1 | Status: SHIPPED | OUTPATIENT
Start: 2025-02-24 | End: 2025-08-23

## 2025-02-24 ASSESSMENT — ENCOUNTER SYMPTOMS
WEAKNESS: 1
FATIGUE: 1
NERVOUS/ANXIOUS: 1
PALPITATIONS: 0

## 2025-02-24 NOTE — PROGRESS NOTES
Patient:  Malika Casper  YOB: 1960  MRN: [id number]       Chief Complaint/Active Symptoms:       Malika Casper is a 64 y.o. female who returns today for cardiac follow-up.    Here for follow-up after stress test. Still having left axillary pain. Happens when she is active and moving her arm. Has none at rest. Pain is both an aching and sharp pain. Lasts for more than an hour a day. Helped by taking her pain medications and drinking Gatorade. She feels that the vitamins are also helping. No other symptoms at the same.     Only other concerns are personal stressors and concerns.     Review of Systems   Constitutional:  Positive for fatigue.   Cardiovascular:  Negative for palpitations and leg swelling.   Neurological:  Positive for weakness.   Psychiatric/Behavioral:  The patient is nervous/anxious.    All other systems reviewed and are negative.      Objective:     Vitals:    02/24/25 1155   BP: 132/78   Pulse: 86       Vitals:    02/24/25 1155   Weight: 55.3 kg (122 lb)       Allergies:     Allergies   Allergen Reactions    Amlodipine Shortness of breath    Ibuprofen Diarrhea and GI Upset    Sulfamethoxazole-Trimethoprim Unknown    Vraylar [Cariprazine] GI Upset          Medications:     Current Outpatient Medications   Medication Instructions    albuterol (Ventolin HFA) 90 mcg/actuation inhaler 2 puffs, inhalation, Every 4 hours PRN    atorvastatin (LIPITOR) 40 mg, oral, Nightly    colchicine 0.6 mg, Daily    cyclobenzaprine (FLEXERIL) 5 mg, 3 times daily    gabapentin (Neurontin) 800 mg tablet Take 1 tablet (800 mg) by mouth 3 times a day.    HYDROcodone-acetaminophen (Norco)  mg tablet 1 tablet, Every 4 hours PRN    lidocaine 4 % patch 1 patch, Daily PRN    lisinopril 10 mg, oral, 2 times daily    medical cannabis 1 each, As needed    metoprolol succinate XL (TOPROL-XL) 25 mg, oral, Daily, Do not crush or chew.    multivitamin (Daily-Kia, with folic acid,) tablet 1 tablet, oral,  Daily    ondansetron (Zofran) 8 mg tablet TAKE ONE TABLET (8 MG) BY MOUTH EVERY EIGHT HOURS IF NEEDED FOR NAUSEA OR VOMITING.       Physical Examination:   GENERAL:  Well developed, well nourished, in no acute distress.  HEENT: NC AT,  anicteric sclera  NECK:  , no JVD, no bruit.  CHEST:  Symmetric and nontender.  LUNGS: Nonlabored respirations with prolonged expiratory phase and a few scattered rhonchi  HEART: PMI is nondisplaced.  There is a regular rhythm with a normal S1 and S2 no appreciable S3 or murmur  EXTREMITIES:  Warm with good color, no clubbing or cyanosis.  There is no edema noted.  PERIPHERAL VASCULAR:  Pulses present and equally palpable.  MUSCULOSKELETAL: Mild arthritic changes  NEURO/PSYCH:  Alert and oriented times three. Nonfocal motor examination with normal gait and ambulation      Lab:     CBC:   Lab Results   Component Value Date    WBC 7.6 12/27/2024    RBC 4.35 12/27/2024    HGB 13.8 12/27/2024    HCT 41.9 12/27/2024     12/27/2024        CMP:    Lab Results   Component Value Date     12/27/2024    K 3.5 12/27/2024     12/27/2024    CO2 30 12/27/2024    BUN 10 12/27/2024    CREATININE 0.53 12/27/2024    GLUCOSE 107 (H) 12/27/2024    CALCIUM 9.6 12/27/2024       Magnesium:    Lab Results   Component Value Date    MG 1.83 12/27/2024       Lipid Profile:    Lab Results   Component Value Date    TRIG 109 07/05/2024    HDL 66.9 07/05/2024    LDLCALC 122 (H) 07/05/2024       TSH:    Lab Results   Component Value Date    TSH 2.03 07/05/2024       BNP:   Lab Results   Component Value Date    BNP 90 12/04/2024        PT/INR:    Lab Results   Component Value Date    PROTIME 11.4 12/27/2024    INR 1.0 12/27/2024       HgBA1c:    Lab Results   Component Value Date    HGBA1C 5.3 07/05/2024       BMP:  Lab Results   Component Value Date     12/27/2024     12/04/2024     09/05/2024    K 3.5 12/27/2024    K 4.1 12/04/2024    K 4.1 09/05/2024     12/27/2024    CL  106 12/04/2024     09/05/2024    CO2 30 12/27/2024    CO2 27 12/04/2024    CO2 28 09/05/2024    BUN 10 12/27/2024    BUN 20 12/04/2024    BUN 9 09/05/2024    CREATININE 0.53 12/27/2024    CREATININE 0.58 12/04/2024    CREATININE 0.67 09/05/2024       Cardiac Enzymes:    Lab Results   Component Value Date    TROPHS 4 12/27/2024    TROPHS 4 12/27/2024    TROPHS 5 12/04/2024       Hepatic Function Panel:    Lab Results   Component Value Date    ALKPHOS 67 12/27/2024    ALT 26 12/27/2024    AST 28 12/27/2024    PROT 7.2 12/27/2024    BILITOT 0.4 12/27/2024    BILIDIR 0.0 02/09/2022     Labs reviewed    Diagnostic Studies:     No echocardiogram results found for the past 12 months    Nuclear stress 12/31/24:  IMPRESSION:  1. Negative for stress-induced ischemia or prior infarction.  2. The left ventricle is normal in size.  3. Normal LV wall motion with an LV EF estimated at greater than 65%.    Stress nuclear 1/9/23 also normal with EF 65%.    Nuclear stress test in comparison to the last 1 prior to this both reviewed  Radiology:     No orders to display     ASSESSMENT     Problem List Items Addressed This Visit       HTN (hypertension)    Relevant Medications    lisinopril 10 mg tablet    Hyperlipidemia    Relevant Medications    atorvastatin (Lipitor) 40 mg tablet    Atypical chest pain - Primary       PLAN     1.  Atypical chest pain.  Her symptoms of chest discomfort are actually axillary discomfort and as described are not suggestive of angina pectoris.  Given her normal stress test and multiple prior stress test that are normal would recommend evaluation and treatment for noncardiac causes.  2.  Hypertension and hyperlipidemia.  Blood pressures are adequately controlled on her current medical regimen we will continue risk factor modification.    Will see the patient in follow-up in 6 to 9 months if she stable at that time we can see her on an annual basis

## 2025-02-25 ENCOUNTER — APPOINTMENT (OUTPATIENT)
Dept: CARDIOLOGY | Facility: CLINIC | Age: 65
End: 2025-02-25
Payer: COMMERCIAL

## 2025-03-05 DIAGNOSIS — R11.0 MILD NAUSEA: ICD-10-CM

## 2025-03-05 NOTE — TELEPHONE ENCOUNTER
Refill request    ondansetron (Zofran) 8 mg tablet     Pharmacy    [facility] - [place], [place] - [address] [address]  [address] [place] [address]

## 2025-03-06 LAB
ALBUMIN SERPL-MCNC: 4.6 G/DL (ref 3.6–5.1)
ALP SERPL-CCNC: 79 U/L (ref 37–153)
ALT SERPL-CCNC: 20 U/L (ref 6–29)
AMYLASE SERPL-CCNC: 93 U/L (ref 21–101)
ANION GAP SERPL CALCULATED.4IONS-SCNC: 7 MMOL/L (CALC) (ref 7–17)
AST SERPL-CCNC: 22 U/L (ref 10–35)
BASOPHILS # BLD AUTO: 65 CELLS/UL (ref 0–200)
BASOPHILS NFR BLD AUTO: 0.6 %
BILIRUB SERPL-MCNC: 0.4 MG/DL (ref 0.2–1.2)
BUN SERPL-MCNC: 17 MG/DL (ref 7–25)
CALCIUM SERPL-MCNC: 9.8 MG/DL (ref 8.6–10.4)
CHLORIDE SERPL-SCNC: 104 MMOL/L (ref 98–110)
CO2 SERPL-SCNC: 29 MMOL/L (ref 20–32)
CREAT SERPL-MCNC: 0.68 MG/DL (ref 0.5–1.05)
EGFRCR SERPLBLD CKD-EPI 2021: 98 ML/MIN/1.73M2
EOSINOPHIL # BLD AUTO: 403 CELLS/UL (ref 15–500)
EOSINOPHIL NFR BLD AUTO: 3.7 %
ERYTHROCYTE [DISTWIDTH] IN BLOOD BY AUTOMATED COUNT: 12.5 % (ref 11–15)
GLUCOSE SERPL-MCNC: 92 MG/DL (ref 65–99)
HCT VFR BLD AUTO: 44.8 % (ref 35–45)
HGB BLD-MCNC: 14.9 G/DL (ref 11.7–15.5)
LIPASE SERPL-CCNC: 71 U/L (ref 7–60)
LYMPHOCYTES # BLD AUTO: 2583 CELLS/UL (ref 850–3900)
LYMPHOCYTES NFR BLD AUTO: 23.7 %
MCH RBC QN AUTO: 32.7 PG (ref 27–33)
MCHC RBC AUTO-ENTMCNC: 33.3 G/DL (ref 32–36)
MCV RBC AUTO: 98.5 FL (ref 80–100)
MONOCYTES # BLD AUTO: 839 CELLS/UL (ref 200–950)
MONOCYTES NFR BLD AUTO: 7.7 %
NEUTROPHILS # BLD AUTO: 7009 CELLS/UL (ref 1500–7800)
NEUTROPHILS NFR BLD AUTO: 64.3 %
PLATELET # BLD AUTO: 291 THOUSAND/UL (ref 140–400)
PMV BLD REES-ECKER: 10.8 FL (ref 7.5–12.5)
POTASSIUM SERPL-SCNC: 4.6 MMOL/L (ref 3.5–5.3)
PROT SERPL-MCNC: 7.1 G/DL (ref 6.1–8.1)
RBC # BLD AUTO: 4.55 MILLION/UL (ref 3.8–5.1)
SODIUM SERPL-SCNC: 140 MMOL/L (ref 135–146)
WBC # BLD AUTO: 10.9 THOUSAND/UL (ref 3.8–10.8)

## 2025-03-06 RX ORDER — ONDANSETRON HYDROCHLORIDE 8 MG/1
TABLET, FILM COATED ORAL
Qty: 30 TABLET | Refills: 2 | Status: SHIPPED | OUTPATIENT
Start: 2025-03-06

## 2025-03-11 ENCOUNTER — PATIENT OUTREACH (OUTPATIENT)
Dept: PRIMARY CARE | Facility: CLINIC | Age: 65
End: 2025-03-11
Payer: COMMERCIAL

## 2025-03-11 DIAGNOSIS — I10 PRIMARY HYPERTENSION: ICD-10-CM

## 2025-03-11 DIAGNOSIS — N64.4 BREAST PAIN, LEFT: ICD-10-CM

## 2025-03-11 DIAGNOSIS — R11.0 MILD NAUSEA: ICD-10-CM

## 2025-03-11 DIAGNOSIS — K21.9 GASTROESOPHAGEAL REFLUX DISEASE WITHOUT ESOPHAGITIS: ICD-10-CM

## 2025-03-11 NOTE — PROGRESS NOTES
Care Management Monthly Outreach  Confirmed patients name and date of birth  Confirmed insurance information    Last Office Visit: 2/14/25?  Next Office Visit: none scheduled at this time     Health Maintenance Due: up to date  Chart Review Completed    Continues to have pain under her Left breast  Also had recent blood work completed  Requested to review results  Recommended she schedule appointment to review results  She is agreeable and will call scheduling line    Venecia Bills RN  Chronic Care Management  [phone]

## 2025-03-31 ENCOUNTER — EVALUATION (OUTPATIENT)
Dept: OCCUPATIONAL THERAPY | Facility: CLINIC | Age: 65
End: 2025-03-31
Payer: COMMERCIAL

## 2025-03-31 DIAGNOSIS — S62.002S SNAC (SCAPHOID NON-UNION ADVANCED COLLAPSE) OF WRIST, LEFT: ICD-10-CM

## 2025-03-31 DIAGNOSIS — S62.022K CLOSED DISPLACED FRACTURE OF MIDDLE THIRD OF SCAPHOID OF LEFT WRIST WITH NONUNION, SUBSEQUENT ENCOUNTER: ICD-10-CM

## 2025-03-31 DIAGNOSIS — M19.132 POST-TRAUMATIC OSTEOARTHRITIS OF LEFT WRIST: ICD-10-CM

## 2025-03-31 DIAGNOSIS — M19.132 SNAC (SCAPHOID NON-UNION ADVANCED COLLAPSE) OF WRIST, LEFT: ICD-10-CM

## 2025-03-31 DIAGNOSIS — M25.532 LEFT WRIST PAIN: ICD-10-CM

## 2025-03-31 PROCEDURE — 97112 NEUROMUSCULAR REEDUCATION: CPT | Mod: GO

## 2025-03-31 PROCEDURE — 97035 APP MDLTY 1+ULTRASOUND EA 15: CPT | Mod: GO

## 2025-03-31 PROCEDURE — 97165 OT EVAL LOW COMPLEX 30 MIN: CPT | Mod: GO

## 2025-03-31 ASSESSMENT — ENCOUNTER SYMPTOMS
LOSS OF SENSATION IN FEET: 0
DEPRESSION: 0
OCCASIONAL FEELINGS OF UNSTEADINESS: 1

## 2025-03-31 NOTE — PROGRESS NOTES
Occupational Therapy               Patient Name: Malika Casper  MRN: [id number]  Today's Date: 3/31/2025     Goals:  Long Term  Problem #1:  Decreased home exercise program knowledge  Goal #1:  The patient to demonstrate with 100% accuracy exercises to increase strength and prevent joint deformities throughout the left wrist hand.  Treatment Intervention:  Home exercise program education along with range-of-motion activities education.    Problem #2:  Increased Quick DASH score  Goal #2:  The patient to demonstrate an increase in left upper extremity function as indicated by decreased Quick DASH score ranging between 11-22 at the conclusion of all treatment sessions. Current Quick DASH score is 31.     Short Term  Problem #3: Left wrist edema  Goal #1:  The patient to demonstrate with 100% accuracy: nerve glides, edema control, Coban wrapping techniques to decrease edema, prevent joint deformities and contractures throughout the left wrist and hand.    Treatment Intervention:  Nerve glides, edema control technique's along with range-of-motion activities education.    Problem #4: Increased pain level.  Goal #2:  Patient to verbalize a decreased pain level in the left upper extremity by two levels.  Current pain level 6/10.   Treatment intervention: Neuromuscular reeducation, pain decreasing modalities, patient education and coping skill education.    Assessment:   This patient presents with a longstanding condition and has displayed appropriate coping abilities in dealing with the effects of this injury.  Standardized testing and measures administered today, including Quick Dash, reveal that the patient has minimal impairments in body structures and functions, activity limitations, and participation restrictions.  The Quick Dash was scored at 31.  The patient has a 5 month  history of the present problem as is without any personal factors and/or comorbidities that impact the plan of care.  The client was  independent prior to the onset of this is impairment.  Two forms of identification were provided and she verbalized no complaints during the intake assessment of abuse or neglect.    Areas affected by this include limited muscle strength, decreased home task tolerance.  The patient's clinical presentation includes stable & uncomplicated characteristics as noted during today's evaluation, including pain with decreased strength.   These deficits are effecting her home abilities at various times during the day and may be considered as presenting with a condition that is stable & uncomplicated.   Treatment options vary for this client with various treatment protocols available. Time spent evaluating this client and providing treatment, evaluation and education 60 minutes.  The combination of these findings indicate that this patient has 1 performance deficits and is of low complexity, and skilled OT services were warranted in order to realize measurable change in the above outcome measures and achieve improvements in the patient's functional status and individual goals.  The patient verbalized understanding and is in agreement with all goals and plan of care.    Frequency of care was developed with input and agreement by the patient.    Plan of Care    Frequency and duration: time(s) a week . 1-2 times a week for 12 weeks or for 12 visits.     Plan of care was developed with input and agreement by the patient.     Client's primary Goal: Return to previous level of function and independence, use left wrist, arm and hand again.     Reason For Visit    Initial Evaluation, Evaluation and Treatment.        Client Input    The patient's primary form of communication is English. There are no language barriers. Social interaction is appropriate with good interactive skills noted.    Difficulty With Movement, Self Care, Home Management,  Activity, ADL'S    Past Med/Surgical History: Reviewed  Current Medications  Please see  patient medication and intake questionnaire for current medications.    Adult Risk Screening  There are no spiritual/cultural practices/values/needs that are important to know.  No apparent abuse or neglect is noted, no suicidal ideation or self-harm plans referenced.      Initial Fall Risk Screening:   The client has fallen in the last 6 months.    Fall Risk: Yes     Insurance  Insurance reviewed   Visit number:  1     Treatment    Time in clinic started at: 1300  Time in clinic ended at:  1350  85226 - OT Eval Low Complexity 30 min.  Timed: 62070 Neuromuscular reeducation, 15  32165 Ultrasound, 15  Total time in clinic is minutes: 60      Referring Physician: Asaf Cochran M.D.  Insurance: [employer]  Onset Date: 12/17/2024  CPT codes that may be used:  [facility] [address], [place] [address]  ICD10 Dx Code: M25.532  Visit Requested: 12  Functional Outcome Tool(s): Quick Dash  score:  31   Did the patient have a surgical procedure in the last three (3) months related to the condition for which services are being requested: No  Select all conditions that apply: Select All That Apply: Musculoskeletal disorders  Was treatment billed with eval?  Yes  Number of Short Term Goals established at eval: 2   Number of Short Term Goals met to date: 0  Number of Long Term Goals established at [facility]: 2    Number of Long Term Goals met to date: 0     Subjective    Patient reports: Pain in the left shoulder, wrist and hand a constant pain with usage and rest.    Objective  Neuro: Intact    Strength:      Strength:   Level II lbs. on the right:  20  Level II lbs. on the left: 15  Bermudez pinch lbs. right: 17  Key pinch lbs. left: 10     composite strength:  Right: Weak but WFL  Left: Active complete grasp weak and painful at the left wrist region.  Palpation pain at the radial dorsal side of wrist.     Left Upper Extremity: Painful arch of motion for the shoulder and elbow.  Wrist limitations in flexion and extension with pain during  extension less than flexion.  Area raised with flexion palpation pain of the dorsum of the wrist  Functional Task Tolerance:     Not Limited Progressing Painful Limited   Carrying   x x   Gripping   x x   Lifting   x x   Manipulation   x x   Pinching    x   Pulling    x   Pushing    x   Reaching   x x   Weightbearing   x x     Treatment Performed Today:  Information communicated to patient.  Issued a small left comfort cool brace.  Yellow theraband exercises.    Education provided included home program   Home program: PROM , tendon gliding , AAROM , AROM , modalities available and possible usage, orthosis as needed, strengthening.     Contacts for Physician Signature    First attempt date:  03/31/2025    Antonio Patel OTR/L, CHT, Date: 03/31/25  1. Left wrist pain  Follow Up In Occupational Therapy      2. 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 Post-traumatic osteoarthritis of left wrist  Follow Up In Occupational Therapy      4. Closed displaced fracture of middle third of scaphoid of left wrist with nonunion, subsequent encounter  Follow Up In Occupational Therapy

## 2025-04-07 ENCOUNTER — APPOINTMENT (OUTPATIENT)
Dept: OCCUPATIONAL THERAPY | Facility: CLINIC | Age: 65
End: 2025-04-07
Payer: COMMERCIAL

## 2025-04-07 DIAGNOSIS — M25.532 LEFT WRIST PAIN: ICD-10-CM

## 2025-04-07 DIAGNOSIS — S62.002S SNAC (SCAPHOID NON-UNION ADVANCED COLLAPSE) OF WRIST, LEFT: ICD-10-CM

## 2025-04-07 DIAGNOSIS — M19.132 SNAC (SCAPHOID NON-UNION ADVANCED COLLAPSE) OF WRIST, LEFT: ICD-10-CM

## 2025-04-08 ENCOUNTER — PATIENT OUTREACH (OUTPATIENT)
Dept: PRIMARY CARE | Facility: CLINIC | Age: 65
End: 2025-04-08
Payer: COMMERCIAL

## 2025-04-08 DIAGNOSIS — I10 PRIMARY HYPERTENSION: ICD-10-CM

## 2025-04-08 DIAGNOSIS — E78.2 MIXED HYPERLIPIDEMIA: ICD-10-CM

## 2025-04-08 NOTE — PROGRESS NOTES
Care Management Monthly Outreach  Confirmation of at least 2 patient identifiers  Change in insurance? No  Chart reviewed completed    Last Office Visit: 2/14/25  Next Office Visit: not scheduled  Next Specialist Appt:  APC: n/a    Has patient been to ER/Urgent Care since last outreach? No    Medications:   Are there medication changes since last visit? No  Refills needed? No    Care Gaps Addressed? Complete    Chronic Conditions and Outreach Summary:   Primary hypertension    Mixed hyperlipidemia    Takes lisinopril 2 tabs around 11am    Social Drivers of Health: Complete    Care Plan addressed: yes     Last filed Vital Signs Reviewed:  BP Readings from Last 3 Encounters:   02/24/25 132/78   01/14/25 136/88   12/31/24 (!) 164/96        Labs Reviewed:  Lab Results   Component Value Date    CREATININE 0.68 03/05/2025    GLUCOSE 92 03/05/2025    ALKPHOS 79 03/05/2025    K 4.6 03/05/2025    PROT 7.1 03/05/2025     03/05/2025    AST 22 03/05/2025    ALT 20 03/05/2025    BUN 17 03/05/2025    MG 1.83 12/27/2024    PHOS 3.4 07/14/2024    GFRF >90 09/19/2023    GFRMALE CANCELED 08/08/2023     Lab Results   Component Value Date    TRIG 109 07/05/2024    CHOL 211 (H) 07/05/2024    LDLCALC 122 (H) 07/05/2024    HDL 66.9 07/05/2024     Lab Results   Component Value Date    HGBA1C 5.3 07/05/2024    HGBA1C 5.3 11/20/2022    HGBA1C 5.3 12/10/2018     Lab Results   Component Value Date    WBC 10.9 (H) 03/05/2025    RBC 4.55 03/05/2025    HGB 14.9 03/05/2025     03/05/2025       No other concerns at this time.  Agreeable to continue monthly outreaches.  Encouraged to call if questions or concerns arise.

## 2025-04-10 ENCOUNTER — APPOINTMENT (OUTPATIENT)
Dept: ORTHOPEDIC SURGERY | Facility: CLINIC | Age: 65
End: 2025-04-10
Payer: COMMERCIAL

## 2025-04-21 ENCOUNTER — APPOINTMENT (OUTPATIENT)
Dept: PRIMARY CARE | Facility: CLINIC | Age: 65
End: 2025-04-21
Payer: COMMERCIAL

## 2025-04-21 VITALS
OXYGEN SATURATION: 95 % | DIASTOLIC BLOOD PRESSURE: 72 MMHG | HEART RATE: 96 BPM | SYSTOLIC BLOOD PRESSURE: 120 MMHG | WEIGHT: 121 LBS | BODY MASS INDEX: 23.63 KG/M2 | TEMPERATURE: 98 F

## 2025-04-21 DIAGNOSIS — F41.9 ANXIETY: ICD-10-CM

## 2025-04-21 DIAGNOSIS — E27.9 ADRENAL NODULE: ICD-10-CM

## 2025-04-21 DIAGNOSIS — M67.432 GANGLION CYST OF WRIST, LEFT: Primary | ICD-10-CM

## 2025-04-21 DIAGNOSIS — R11.0 MILD NAUSEA: ICD-10-CM

## 2025-04-21 DIAGNOSIS — M10.032 IDIOPATHIC GOUT OF LEFT WRIST, UNSPECIFIED CHRONICITY: ICD-10-CM

## 2025-04-21 DIAGNOSIS — B18.2 HEP C W/O COMA, CHRONIC (MULTI): ICD-10-CM

## 2025-04-21 DIAGNOSIS — R53.83 FATIGUE, UNSPECIFIED TYPE: ICD-10-CM

## 2025-04-21 DIAGNOSIS — E78.2 MIXED HYPERLIPIDEMIA: ICD-10-CM

## 2025-04-21 DIAGNOSIS — B02.9 HERPES ZOSTER WITHOUT COMPLICATION: ICD-10-CM

## 2025-04-21 DIAGNOSIS — E27.8 OTHER SPECIFIED DISORDERS OF ADRENAL GLAND: ICD-10-CM

## 2025-04-21 DIAGNOSIS — F43.9 STRESS: ICD-10-CM

## 2025-04-21 PROBLEM — N64.4 PAIN OF LEFT BREAST: Status: ACTIVE | Noted: 2022-12-21

## 2025-04-21 RX ORDER — NALOXONE HYDROCHLORIDE 4 MG/.1ML
SPRAY NASAL
COMMUNITY
Start: 2025-04-17

## 2025-04-21 RX ORDER — ONDANSETRON HYDROCHLORIDE 8 MG/1
TABLET, FILM COATED ORAL
Qty: 30 TABLET | Refills: 2 | Status: SHIPPED | OUTPATIENT
Start: 2025-04-21

## 2025-04-21 RX ORDER — PREDNISONE 10 MG/1
TABLET ORAL
Qty: 30 TABLET | Refills: 0 | Status: SHIPPED | OUTPATIENT
Start: 2025-04-21

## 2025-04-21 RX ORDER — MULTIVITAMIN WITH FOLIC ACID 400 MCG
1 TABLET ORAL DAILY
Qty: 90 TABLET | Refills: 1 | Status: SHIPPED | OUTPATIENT
Start: 2025-04-21

## 2025-04-21 NOTE — PROGRESS NOTES
Subjective   Patient ID: Malika Casper is a 64 y.o. female who presents for Wrist Pain (Follow up for left wrist pain. She has some swelling on wrist. Pain level 5/10. ), Gout (Patient states she was diagnosed with gout in left arm.), and Results (Discuss results of blood work.).  History of Present Illness  She is a 64-year-old female who presents for evaluation of pancreatic issues following abnormal blood work.    She is experiencing pain associated with pancreatic issues. She has a history of arthritis and recalls taking prednisone in the past for her condition. She mentions having a ganglion cyst, which she associates with her arthritis.    She is currently taking Pentacel, Zofran, and a multivitamin. She has been off her gout medication for a while but plans to resume it. Her gout has been stable, although she describes a sensation in her vein that she is learning to manage.    She mentions having only one adrenal gland and questions if this could be affecting her body's acid retention. Her cholesterol levels have been stable, and she manages stress and anxiety well.    She previously took thyroid medication but has not been on it for some time. She wants to have her thyroid checked during her next blood work.    Review of Systems  12 Systems have been reviewed as follows.  Constitutional: Fever, weight gain, weight loss, appetite change, night sweats, fatigue, chills.  Eyes : blurry, double vision, vision, loss, tearing, redness, pain, sensitivity to light, glaucoma.  Ears, nose, mouth, and throat: Hearing loss, ringing in the ears, ear pain, nasal congestion, nasal drainage, nosebleeds, mouth, throat, irritation tooth problem.  Cardiovascular :chest pain, pressure, heart racing, palpitations, sweating, leg swelling, high or low blood pressure  Pulmonary: Cough, yellow or green sputum, blood and sputum, shortness of breath, wheezing  Gastrointestinal: Nausea, vomiting, diarrhea, constipation, pain,  blood in stool, or vomitus, heartburn, difficulty swallowing  Genitourinary: incontinence, abnormal bleeding, abnormal discharge, urinary frequency, urinary hesitancy, pain, impotence sexual problem, infection, urinary retention  Musculoskeletal: Pain, stiffness, joint, redness or warmth, arthritis, back pain, weakness, muscle wasting, sprain or fracture  Neuro: Weight weakness, dizziness, change in voice, change in taste change in vision, change in hearing, loss, or change of sensation, trouble walking, balance problems coordination problems, shaking, speech problem  Endocrine , cold or heat intolerance, blood sugar problem, weight gain or loss missed periods hot flashes, sweats, change in body hair, change in libido, increased thirst, increased urination  Heme/lymph: Swelling, bleeding, problem anemia, bruising, enlarged lymph nodes  Allergic/immunologic: H. plus nasal drip, watery itchy eyes, nasal drainage, immunosuppressed  The above were reviewed and noted negative except as noted in HPI and Problem List.    Objective     /72   Pulse 96   Temp 36.7 °C (98 °F) (Temporal)   Wt 54.9 kg (121 lb)   SpO2 95%   BMI 23.63 kg/m²      Physical Exam    Constitutional: Well developed, well nourished, alert and in no acute distress   Eyes: Normal external exam. Pupils equally round and reactive to light with normal accommodation and extraocular movements intact.  Neck: Supple, no lymphadenopathy or masses.   Cardiovascular: Regular rate and rhythm, normal S1 and S2, no murmurs, gallops, or rubs. Radial pulses normal. No peripheral edema.  Pulmonary: No respiratory distress, lungs clear to auscultation bilaterally. No wheezes, rhonchi, rales.  Abdomen: soft,non tender, non distended, without masses or HSM  Skin: Warm, well perfused, normal skin turgor and color.   Neurologic: Cranial nerves II-XII grossly intact.   Psychiatric: Mood calm and affect normal  Musculoskeletal: Moving all extremities without  restriction  The above were reviewed and noted negative except as noted in HPI and Problem List.      Results  RADIOLOGY  X-ray: Ganglion cyst         Assessment & Plan  Ganglion cyst due to arthritis  Pain is potentially related to a ganglion cyst, likely caused by arthritis, contributing to discomfort in the pancreas area.  - Prescribe prednisone to manage symptoms related to the ganglion cyst.  - Schedule follow-up appointment in three weeks to assess response to treatment.    Gout  Gout is well-managed with no significant exacerbations. Concurrent use of Pentacel and gout medication is confirmed to be acceptable.  - Continue current gout medication regimen.  - Order blood work in three weeks to monitor uric acid levels.    General Health Maintenance  Routine health maintenance includes monitoring of thyroid function and other laboratory parameters.  - Order blood work including CBC, CMP, lipid panel, TSH, T4 index, and vitamin D.    Problem List Items Addressed This Visit       Adrenal nodule    Relevant Orders    Follow Up In [facility] - PCP - Established    Anxiety    Relevant Orders    Follow Up In [facility] - PCP - Established    Fatigue    Relevant Orders    Follow Up In [facility] - PCP - Established    Thyroid Stimulating Hormone    CBC and Auto Differential    Free T4 Index    Hyperlipidemia    Relevant Orders    Follow Up In [facility] - PCP - Established    Lipid Panel    Comprehensive Metabolic Panel    Vitamin D 25-Hydroxy,Total (for eval of Vitamin D levels)    Stress    Relevant Orders    Follow Up In [facility] - PCP - Established    Hep C w/o coma, chronic (Multi)    Relevant Orders    Follow Up In [facility] - PCP - Established     Other Visit Diagnoses         Ganglion cyst of wrist, left    -  Primary    Relevant Medications    predniSONE (Deltasone) 10 mg tablet    Other Relevant Orders    Follow Up In [facility] -  PCP - Established      Mild nausea        Relevant Medications    ondansetron (Zofran) 8 mg tablet    Other Relevant Orders    Follow Up In [facility] - PCP - Established      Herpes zoster without complication        Relevant Medications    multivitamin (Daily-Kia, with folic acid,) tablet    Other Relevant Orders    Follow Up In [facility] - PCP - Established      Other specified disorders of adrenal gland        Relevant Orders    Follow Up In [facility] - PCP - Established      Idiopathic gout of left wrist, unspecified chronicity        Relevant Orders    Follow Up In [facility] - PCP - Established    Uric acid           Sees  Dr. Lam      -see GYN      -Continue colchicine for gout PRN     -Go to PT      Stop marijuana     See GI     Nuclear stress wnl    Asaf Cochran MD       This medical note was created with the assistance of artificial intelligence (AI) for documentation purposes. The content has been reviewed and confirmed by the healthcare provider for accuracy and completeness. Patient consented to the use of audio recording and use of AI during their visit.

## 2025-04-24 DIAGNOSIS — K21.9 GASTROESOPHAGEAL REFLUX DISEASE WITHOUT ESOPHAGITIS: Primary | ICD-10-CM

## 2025-04-24 RX ORDER — PANTOPRAZOLE SODIUM 40 MG/1
40 TABLET, DELAYED RELEASE ORAL
Qty: 30 TABLET | Refills: 0 | Status: SHIPPED | OUTPATIENT
Start: 2025-04-24

## 2025-04-24 NOTE — TELEPHONE ENCOUNTER
Recent Visits  Date Type Provider Dept   04/21/25 Office Visit Asaf Cochran MD [facility]   10/08/24 Office Visit Asaf Cochran MD [facility]   09/13/24 Office Visit Asaf Cochran MD [facility]   08/30/24 Office Visit Asaf Cochran MD [facility]   08/23/24 Office Visit Asaf Cochran MD [facility]   07/02/24 Office Visit Asaf Cochran MD [facility]   05/01/24 Office Visit Asaf Cochran MD [facility]   Showing recent visits within past 365 days and meeting all other requirements  Future Appointments  No visits were found meeting these conditions.  Showing future appointments within next 90 days and meeting all other requirements

## 2025-04-25 ENCOUNTER — OFFICE VISIT (OUTPATIENT)
Dept: PRIMARY CARE | Facility: CLINIC | Age: 65
End: 2025-04-25
Payer: COMMERCIAL

## 2025-04-25 VITALS
DIASTOLIC BLOOD PRESSURE: 88 MMHG | SYSTOLIC BLOOD PRESSURE: 147 MMHG | RESPIRATION RATE: 16 BRPM | HEIGHT: 62 IN | WEIGHT: 122.4 LBS | HEART RATE: 102 BPM | TEMPERATURE: 98 F | OXYGEN SATURATION: 95 % | BODY MASS INDEX: 22.52 KG/M2

## 2025-04-25 DIAGNOSIS — J02.9 SORE THROAT: Primary | ICD-10-CM

## 2025-04-25 DIAGNOSIS — Z20.828 EXPOSURE TO MONONUCLEOSIS SYNDROME: ICD-10-CM

## 2025-04-25 LAB — POC RAPID MONO: NEGATIVE

## 2025-04-25 PROCEDURE — 86308 HETEROPHILE ANTIBODY SCREEN: CPT | Performed by: NURSE PRACTITIONER

## 2025-04-25 PROCEDURE — 99212 OFFICE O/P EST SF 10 MIN: CPT | Performed by: NURSE PRACTITIONER

## 2025-04-25 ASSESSMENT — PATIENT HEALTH QUESTIONNAIRE - PHQ9
SUM OF ALL RESPONSES TO PHQ9 QUESTIONS 1 AND 2: 0
1. LITTLE INTEREST OR PLEASURE IN DOING THINGS: NOT AT ALL
2. FEELING DOWN, DEPRESSED OR HOPELESS: NOT AT ALL

## 2025-04-25 ASSESSMENT — PAIN SCALES - GENERAL: PAINLEVEL_OUTOF10: 8

## 2025-04-25 NOTE — PROGRESS NOTES
"Subjective   Patient ID: Malika Casper is a 64 y.o. female who presents for URI.  Pt in office with a sore throat and a lump on (L) side of neck, bilateral earache, abdominal pain, Sx start 2 days ago OTC none  HPI     Review of Systems    Objective   /88 Comment: auto  Pulse 102   Temp 36.7 °C (98 °F)   Resp 16   Ht 1.575 m (5' 2\")   Wt 55.5 kg (122 lb 6.4 oz)   SpO2 95%   BMI 22.39 kg/m²     Physical Exam    Assessment/Plan          "

## 2025-04-25 NOTE — PROGRESS NOTES
Subjective   Patient ID: Malika Casper is a 64 y.o. female who is requesting for a mono test.    HPI   Patient is a 64 y.o. female who CONSULTED AT [facility] today. Patient is with symptoms of  sinus pain, sore throat, painful swallowing, and chronic muscle aches. She denies having any nasal congestion, nasal discharge, headache, cough, fatigue, chronic muscle ache, loss of sense of taste, loss of sense of smell, diarrhea, chills nor fever. Patient states that present condition started about 2 days ago after being exposed to ALBERT who recently tested positive for COVID. she denies shortness of breath, chest pain, palpitations, nor edema. she stated that she  tried OTC medications which afforded only slight relief of symptoms. she denies nausea, vomiting, abdominal pain, nor any other symptoms.    Patient states she had her COVID vaccine.  Patient states she had the flu shot for this season.    Review of Systems  inus pain, sore throat, painful swallowing, and chronic muscle aches.   General: no weight loss, generally healthy, no fatigue  Head:  no headache, (+) sinus pain, no vertigo, no injury  Eyes: no diplopia, no tearing, no pain,   Ears: no change in hearing, no tinnitus, no bleeding, no vertigo  Mouth:  no dental difficulties, no gingival bleeding, (+) sore throat, no loss of sense of taste, (+) painful swallowing,   Nose: no congestion, no  discharge, no bleeding, no obstruction, no loss of sense of smell  Neck: no stiffness, no pain, no tenderness, no masses, no bruit  Pulmonary: no dyspnea, no wheezing, no hemoptysis, no cough  Cardiovascular: no chest pain, no palpitations, no syncope, no orthopnea  Gastrointestinal: no change in appetite, no dysphagia, no abdominal pains, no diarrhea, no emesis, no melena  Genito Urinary: no dysuria, no urinary urgency, no nocturia, no incontinence, no change in nature of urine  Musculoskeletal: (+) chronic muscle ache, no joint pain, no  "limitation of range of motion, no paresthesia, no numbness  Constitutional: no fever, no chills, no night sweats    Objective   /88 Comment: auto  Pulse 102   Temp 36.7 °C (98 °F)   Resp 16   Ht 1.575 m (5' 2\")   Wt 55.5 kg (122 lb 6.4 oz)   SpO2 95%   BMI 22.39 kg/m²     Physical Exam  General: ambulatory, in no acute distress  Head: normocephalic, no lesions, no sinus tenderness  Eyes: pink palpebral conjunctiva, anicteric sclerae, PERRLA, EOM's full  Ears: clear external auditory canals, no ear discharge, no bleeding from the ears, tympanic membrane intact  Nose: no congestion of nasal mucosa, no nasal discharge, no bleeding, no obstruction  Throat: (+) erythema, and (+) exudate on posterior pharyngeal wall, no lesion,   Neck: supple, no masses, no bruits, no CLADP  Chest: symmetrical chest expansion, no lagging, no retractions, clear breath sounds, no rales, no wheezes    Assessment/Plan   Problem List Items Addressed This Visit    None  Visit Diagnoses         Codes      Sore throat    -  Primary J02.9    Relevant Orders    POCT Infectious Mononucleosis Antibody manually resulted (Completed)      Exposure to mononucleosis syndrome     Z20.828    Relevant Orders    POCT Infectious Mononucleosis Antibody manually resulted (Completed)        rapid mono test done  negative result discussed and explained to the patient.    Patient decline any type of strep test.     DISCHARGE SUMMARY:   Patient was seen and examined. Diagnosis, treatment, treatment options, and possible complications of today's illness discussed and explained to patient. Patient to take OTC analgesic/antipyretic if needed for pain/fever. Advised to increase oral fluid intake. Advised Listerine antiseptic mouthwash gargle TID. Patient may use Cepacol oral spray as needed to relieve throat discomfort. Advised to come back if with worsening or persistent symptoms. Patient verbalized understanding of plan of care.    Patient to come back in " 7 - 10 days if needed for worsening symptoms.

## 2025-04-27 NOTE — PATIENT INSTRUCTIONS
DISCHARGE SUMMARY:   Patient was seen and examined. Diagnosis, treatment, treatment options, and possible complications of today's illness discussed and explained to patient. Patient to take OTC analgesic/antipyretic if needed for pain/fever. Advised to increase oral fluid intake. Advised Listerine antiseptic mouthwash gargle TID. Patient may use Cepacol oral spray as needed to relieve throat discomfort. Advised to come back if with worsening or persistent symptoms. Patient verbalized understanding of plan of care.    Patient to come back in 7 - 10 days if needed for worsening symptoms.

## 2025-04-30 ENCOUNTER — OFFICE VISIT (OUTPATIENT)
Dept: ORTHOPEDIC SURGERY | Facility: CLINIC | Age: 65
End: 2025-04-30
Payer: COMMERCIAL

## 2025-04-30 ENCOUNTER — HOSPITAL ENCOUNTER (OUTPATIENT)
Dept: RADIOLOGY | Facility: CLINIC | Age: 65
Discharge: HOME | End: 2025-04-30
Payer: COMMERCIAL

## 2025-04-30 DIAGNOSIS — M25.532 LEFT WRIST PAIN: ICD-10-CM

## 2025-04-30 DIAGNOSIS — M25.432 PAIN AND SWELLING OF LEFT WRIST: ICD-10-CM

## 2025-04-30 DIAGNOSIS — M25.532 PAIN AND SWELLING OF LEFT WRIST: ICD-10-CM

## 2025-04-30 PROCEDURE — 73110 X-RAY EXAM OF WRIST: CPT | Mod: LEFT SIDE | Performed by: RADIOLOGY

## 2025-04-30 PROCEDURE — 99213 OFFICE O/P EST LOW 20 MIN: CPT | Performed by: ORTHOPAEDIC SURGERY

## 2025-04-30 PROCEDURE — 73110 X-RAY EXAM OF WRIST: CPT | Mod: LT

## 2025-04-30 NOTE — PROGRESS NOTES
History of Present Illness:   Patient presents today for evaluation of left wrist pain.  She states she has been having significant pain for almost 2 years.  She does endorse a possible history of some old injuries but denies any recent trauma.  She denies any significant numbness or tingling but does have some transient paresthesias in the area of the wrist and the radial aspect of her forearm.  She does have a history of gout.    She was seen by me in the past for her right elbow.    Medical History[1]  Surgical History[2]  Current Medications[3]    Review of Systems   GENERAL: Negative  GI: Negative  MUSCULOSKELETAL: See HPI  SKIN: Negative  NEURO:  Negative    Physical Examination:  Left wrist:  Skin healthy and intact  Prominence and swelling along the radial border    Volar flexion, dorsiflexion, pronation/supination creates pain  No tenderness to palpation over distal radius  No tenderness to palpation over distal ulna or TFCC  Positive tenderness over scaphoid and proximal row  Negative piano key sign  Negative Finkelstein test  Negative Cifuentes's test    Neurovascular exam normal distally      Imaging:  Evidence of degenerative changes secondary to scaphoid nonunion    Assessment:   Patient with a left wrist scaphoid nonunion advanced collapse    Plan:  We reviewed the x-rays with the patient discussing a variety of treatment options.  She is in pain management we discussed some topical modalities which may help with some of the neurologic irritation.    We reviewed immobilization with a thumb spica splint to help prevent range of motion and rest temporarily.    She declined a corticosteroid injection for temporary relief.    We we will provide referral to hand and wrist specialist to discuss long-term management as we do feel ultimately she will benefit from surgical intervention at some point.             [1]   Past Medical History:  Diagnosis Date    Abnormal findings on diagnostic imaging of other  specified body structures     Abnormal finding on imaging    Encounter for screening for malignant neoplasm of colon 04/27/2022    Colon cancer screening    Liver disease, unspecified 12/16/2022    Chronic liver disease    Personal history of other medical treatment     History of transvaginal ultrasound    Personal history of other medical treatment     History of ultrasound, pelvic   [2]   Past Surgical History:  Procedure Laterality Date    ANKLE SURGERY  09/18/2017    Ankle Surgery    CT ABDOMEN PELVIS ANGIOGRAM W AND/OR WO IV CONTRAST  5/3/2020    CT ABDOMEN PELVIS ANGIOGRAM W AND/OR WO IV CONTRAST 5/3/2020 [facility]    KNEE SURGERY  09/18/2017    Knee Surgery    OTHER SURGICAL HISTORY  09/10/2015    Adrenal Gland Excision    OTHER SURGICAL HISTORY  12/02/2021    Gallbladder surgery   [3]   Current Outpatient Medications:     albuterol (Ventolin HFA) 90 mcg/actuation inhaler, Inhale 2 puffs every 4 hours if needed for wheezing or shortness of breath., Disp: 16 g, Rfl: 2    atorvastatin (Lipitor) 40 mg tablet, Take 1 tablet (40 mg) by mouth once daily at bedtime., Disp: 90 tablet, Rfl: 1    colchicine 0.6 mg tablet, Take 1 tablet (0.6 mg) by mouth once daily., Disp: , Rfl:     cyclobenzaprine (Flexeril) 5 mg tablet, Take 1 tablet (5 mg) by mouth 3 times a day., Disp: , Rfl:     gabapentin (Neurontin) 800 mg tablet, Take 1 tablet (800 mg) by mouth 3 times a day., Disp: , Rfl:     HYDROcodone-acetaminophen (Norco)  mg tablet, Take 1 tablet by mouth every 4 hours if needed for moderate pain (4 - 6) or severe pain (7 - 10). Max 5 tablets daily, Disp: , Rfl:     lidocaine 4 % patch, Place 1 patch on the skin once daily as needed for mild pain (1 - 3)., Disp: , Rfl:     lisinopril 10 mg tablet, Take 1 tablet (10 mg) by mouth 2 times a day., Disp: 180 tablet, Rfl: 1    medical cannabis, Take 1 each by mouth if needed. Oil Or Sol Vap - 3.47 - 170 - Indica Cart - [place], Edb Oral Admin 50-10 Gummy  Pineapple Punch Nakia, Edb Oral Admin - 22 - 10 - Gummy - D-Berry, Disp: , Rfl:     multivitamin (Daily-Kia, with folic acid,) tablet, Take 1 tablet by mouth once daily., Disp: 90 tablet, Rfl: 1    naloxone (Narcan) 4 mg/0.1 mL nasal spray, , Disp: , Rfl:     ondansetron (Zofran) 8 mg tablet, TAKE ONE TABLET (8 MG) BY MOUTH EVERY EIGHT HOURS IF NEEDED FOR NAUSEA OR VOMITING., Disp: 30 tablet, Rfl: 2    pantoprazole (ProtoNix) 40 mg EC tablet, Take 1 tablet (40 mg) by mouth once daily in the morning. Take before meals., Disp: 30 tablet, Rfl: 0    predniSONE (Deltasone) 10 mg tablet, 4x 3days,3 x 3 days,2 x 3 days,1 x 3 days (Patient not taking: Reported on 4/25/2025), Disp: 30 tablet, Rfl: 0

## 2025-05-02 ENCOUNTER — PATIENT OUTREACH (OUTPATIENT)
Dept: PRIMARY CARE | Facility: CLINIC | Age: 65
End: 2025-05-02
Payer: COMMERCIAL

## 2025-05-02 DIAGNOSIS — I10 PRIMARY HYPERTENSION: ICD-10-CM

## 2025-05-02 NOTE — PROGRESS NOTES
Care Management monthly outreach attempted   Left voicemail requesting call back  Chart review completed    Venecia Bills  RN Care Manager  [phone]

## 2025-05-05 ENCOUNTER — PATIENT OUTREACH (OUTPATIENT)
Dept: PRIMARY CARE | Facility: CLINIC | Age: 65
End: 2025-05-05
Payer: COMMERCIAL

## 2025-05-05 DIAGNOSIS — M19.132 SNAC (SCAPHOID NON-UNION ADVANCED COLLAPSE) OF WRIST, LEFT: ICD-10-CM

## 2025-05-05 DIAGNOSIS — M48.062 SPINAL STENOSIS OF LUMBAR REGION WITH NEUROGENIC CLAUDICATION: ICD-10-CM

## 2025-05-05 DIAGNOSIS — S62.002S SNAC (SCAPHOID NON-UNION ADVANCED COLLAPSE) OF WRIST, LEFT: ICD-10-CM

## 2025-05-05 DIAGNOSIS — I10 PRIMARY HYPERTENSION: ICD-10-CM

## 2025-05-05 NOTE — PROGRESS NOTES
Care Management Monthly Outreach  Chart review completed  Confirmation of at least 2 patient identifiers  Change in insurance? No    Has patient been to ER/Urgent Care since last outreach? 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 Tyler about her Left wrist  She has a lot of pain in hand/wrist  Difficult to open jars, write, and other ADL's  She is scheduled 5/9 with hand specialist but very nervous    Feeling fully recovered from sore throat  She has blood work to do  Will call to schedule appointment at lab    Medications:   Are there medication changes since last visit? No  Refills needed? No    Social Drivers of Health: Complete  Care Gaps Addressed? Complete  Care Plan addressed: Yes    Upcoming Appointments:   Future Appointments       Date / Time Provider Department Dept Phone    5/9/2025 10:30 AM (Arrive by 10:15 AM) Jory Siddiqui MD [facility] [phone]    8/25/2025 10:30 AM Renae Mcconnell MD [facility]  [phone]          Blood Pressures Reviewed  BP Readings from Last 3 Encounters:   04/25/25 147/88   04/21/25 120/72   02/24/25 132/78     Labs Reviewed:  Lab Results   Component Value Date    CREATININE 0.68 03/05/2025    GLUCOSE 92 03/05/2025    ALKPHOS 79 03/05/2025    K 4.6 03/05/2025    PROT 7.1 03/05/2025     03/05/2025    CALCIUM 9.8 03/05/2025    AST 22 03/05/2025    ALT 20 03/05/2025    BUN 17 03/05/2025    MG 1.83 12/27/2024    PHOS 3.4 07/14/2024    GFRF >90 09/19/2023    GFRMALE CANCELED 08/08/2023     Lab Results   Component Value Date    TRIG 109 07/05/2024    CHOL 211 (H) 07/05/2024    LDLCALC 122 (H) 07/05/2024    HDL 66.9 07/05/2024     Lab Results    Component Value Date    HGBA1C 5.3 07/05/2024    HGBA1C 5.3 11/20/2022    HGBA1C 5.3 12/10/2018     Lab Results   Component Value Date    WBC 10.9 (H) 03/05/2025    RBC 4.55 03/05/2025    HGB 14.9 03/05/2025     03/05/2025   No other concerns at this time.  Agreeable to continue monthly outreaches.  Encouraged to call if questions or concerns arise.    Venecia Bills, RN  Nurse Care Manager  [phone]

## 2025-05-08 LAB
25(OH)D3+25(OH)D2 SERPL-MCNC: 41 NG/ML (ref 30–100)
ALBUMIN SERPL-MCNC: 4.8 G/DL (ref 3.6–5.1)
ALP SERPL-CCNC: 74 U/L (ref 37–153)
ALT SERPL-CCNC: 21 U/L (ref 6–29)
ANION GAP SERPL CALCULATED.4IONS-SCNC: 9 MMOL/L (CALC) (ref 7–17)
AST SERPL-CCNC: 18 U/L (ref 10–35)
BASOPHILS # BLD AUTO: 38 CELLS/UL (ref 0–200)
BASOPHILS NFR BLD AUTO: 0.4 %
BILIRUB SERPL-MCNC: 0.3 MG/DL (ref 0.2–1.2)
BUN SERPL-MCNC: 17 MG/DL (ref 7–25)
CALCIUM SERPL-MCNC: 10.1 MG/DL (ref 8.6–10.4)
CHLORIDE SERPL-SCNC: 105 MMOL/L (ref 98–110)
CHOLEST SERPL-MCNC: 153 MG/DL
CHOLEST/HDLC SERPL: 2 (CALC)
CO2 SERPL-SCNC: 29 MMOL/L (ref 20–32)
CREAT SERPL-MCNC: 0.62 MG/DL (ref 0.5–1.05)
EGFRCR SERPLBLD CKD-EPI 2021: 101 ML/MIN/1.73M2
EOSINOPHIL # BLD AUTO: 197 CELLS/UL (ref 15–500)
EOSINOPHIL NFR BLD AUTO: 2.1 %
ERYTHROCYTE [DISTWIDTH] IN BLOOD BY AUTOMATED COUNT: 12.2 % (ref 11–15)
FT4I SERPL CALC-MCNC: 2.1 (ref 1.4–3.8)
GLUCOSE SERPL-MCNC: 108 MG/DL (ref 65–99)
HCT VFR BLD AUTO: 46.2 % (ref 35–45)
HDLC SERPL-MCNC: 75 MG/DL
HGB BLD-MCNC: 15.2 G/DL (ref 11.7–15.5)
LDLC SERPL CALC-MCNC: 64 MG/DL (CALC)
LYMPHOCYTES # BLD AUTO: 2190 CELLS/UL (ref 850–3900)
LYMPHOCYTES NFR BLD AUTO: 23.3 %
MCH RBC QN AUTO: 32.1 PG (ref 27–33)
MCHC RBC AUTO-ENTMCNC: 32.9 G/DL (ref 32–36)
MCV RBC AUTO: 97.7 FL (ref 80–100)
MONOCYTES # BLD AUTO: 498 CELLS/UL (ref 200–950)
MONOCYTES NFR BLD AUTO: 5.3 %
NEUTROPHILS # BLD AUTO: 6477 CELLS/UL (ref 1500–7800)
NEUTROPHILS NFR BLD AUTO: 68.9 %
NONHDLC SERPL-MCNC: 78 MG/DL (CALC)
PLATELET # BLD AUTO: 284 THOUSAND/UL (ref 140–400)
PMV BLD REES-ECKER: 11.2 FL (ref 7.5–12.5)
POTASSIUM SERPL-SCNC: 4.9 MMOL/L (ref 3.5–5.3)
PROT SERPL-MCNC: 7.2 G/DL (ref 6.1–8.1)
RBC # BLD AUTO: 4.73 MILLION/UL (ref 3.8–5.1)
SODIUM SERPL-SCNC: 143 MMOL/L (ref 135–146)
T3RU NFR SERPL: 31 % (ref 22–35)
T4 SERPL-MCNC: 6.8 MCG/DL (ref 5.1–11.9)
TRIGL SERPL-MCNC: 65 MG/DL
TSH SERPL-ACNC: 0.74 MIU/L (ref 0.4–4.5)
URATE SERPL-MCNC: 3.7 MG/DL (ref 2.5–7)
WBC # BLD AUTO: 9.4 THOUSAND/UL (ref 3.8–10.8)

## 2025-05-09 ENCOUNTER — APPOINTMENT (OUTPATIENT)
Dept: ORTHOPEDIC SURGERY | Facility: CLINIC | Age: 65
End: 2025-05-09
Payer: COMMERCIAL

## 2025-05-09 DIAGNOSIS — R11.0 MILD NAUSEA: ICD-10-CM

## 2025-05-09 RX ORDER — ONDANSETRON HYDROCHLORIDE 8 MG/1
TABLET, FILM COATED ORAL
Qty: 30 TABLET | Refills: 2 | Status: SHIPPED | OUTPATIENT
Start: 2025-05-09

## 2025-05-09 NOTE — TELEPHONE ENCOUNTER
DR Cochran Pt    Last Visit: 4/21/2025    Next Visit: None      Patient call in requesting refill    ondansetron (Zofran) 8 mg tablet        Sig: TAKE ONE TABLET (8 MG) BY MOUTH EVERY EIGHT HOURS IF NEEDED FOR NAUSEA OR VOMITING.            Pharmacy:     [facility] [address] Phone: [phone]   Fax: [phone]          Please assist

## 2025-05-13 ENCOUNTER — PATIENT OUTREACH (OUTPATIENT)
Dept: PRIMARY CARE | Facility: CLINIC | Age: 65
End: 2025-05-13
Payer: COMMERCIAL

## 2025-05-13 DIAGNOSIS — R11.0 MILD NAUSEA: ICD-10-CM

## 2025-05-13 DIAGNOSIS — K21.9 GASTROESOPHAGEAL REFLUX DISEASE WITHOUT ESOPHAGITIS: ICD-10-CM

## 2025-05-13 DIAGNOSIS — I10 PRIMARY HYPERTENSION: ICD-10-CM

## 2025-05-13 DIAGNOSIS — K29.50 CHRONIC GASTRITIS WITHOUT BLEEDING, UNSPECIFIED GASTRITIS TYPE: ICD-10-CM

## 2025-05-13 NOTE — PROGRESS NOTES
Returning call to Danae  She has questions about her lab work    States she has been sleeping a lot during the day and all night  Discussed if this could be depression  She states she thinks it is due to chronic pain    She would like to get amylase and lipase rechecked  Her stomach is really bothering her  Has migraines  Does not take any medication for them  Has allergic reaction to ibuprofen  Is cautious to take tylenol as it is already in her Norco    Taking pantoprazole and it hasn't been helping  EGD and colonoscopy from 6/7/22 showed Chronic gastritis   Has daily bowel movement  Gets abdominal cramps while going  Confirmed she is taking pantoprazole as prescribed  Discussed eating mild foods (low in sugar, fat, acid)   She verbalized understanding  She will call  for appointment with Dr. Cochran to discuss futher    Missed her ortho appointment on 5/9 with Dr. Gibson  She will call to reschedule

## 2025-05-14 ENCOUNTER — TELEMEDICINE (OUTPATIENT)
Dept: PRIMARY CARE | Facility: CLINIC | Age: 65
End: 2025-05-14
Payer: COMMERCIAL

## 2025-05-14 DIAGNOSIS — I10 PRIMARY HYPERTENSION: Primary | ICD-10-CM

## 2025-05-14 DIAGNOSIS — F20.9 SCHIZOPHRENIA, UNSPECIFIED TYPE: ICD-10-CM

## 2025-05-14 DIAGNOSIS — K21.9 GASTROESOPHAGEAL REFLUX DISEASE WITHOUT ESOPHAGITIS: ICD-10-CM

## 2025-05-14 DIAGNOSIS — M16.12 PRIMARY OSTEOARTHRITIS OF LEFT HIP: ICD-10-CM

## 2025-05-14 DIAGNOSIS — E55.9 VITAMIN D DEFICIENCY: ICD-10-CM

## 2025-05-14 DIAGNOSIS — E78.2 MIXED HYPERLIPIDEMIA: ICD-10-CM

## 2025-05-14 DIAGNOSIS — J40 BRONCHITIS: ICD-10-CM

## 2025-05-14 PROCEDURE — 99214 OFFICE O/P EST MOD 30 MIN: CPT | Performed by: FAMILY MEDICINE

## 2025-05-14 NOTE — PROGRESS NOTES
Subjective   Patient ID: Malika Casper is a 64 y.o. female who presents for Abdominal Pain (Nausea, cramping. Pt was started on pantoprazole. Review lab work with PCP./EGD and colonoscopy from 6/7/22 showed Chronic gastritis. Pt reports normal daily bowel movements. //Pt requesting keflex. ).  History of Present Illness  See above    Review of Systems  12 Systems have been reviewed as follows.  Constitutional: Fever, weight gain, weight loss, appetite change, night sweats, fatigue, chills.  Eyes : blurry, double vision, vision, loss, tearing, redness, pain, sensitivity to light, glaucoma.  Ears, nose, mouth, and throat: Hearing loss, ringing in the ears, ear pain, nasal congestion, nasal drainage, nosebleeds, mouth, throat, irritation tooth problem.  Cardiovascular :chest pain, pressure, heart racing, palpitations, sweating, leg swelling, high or low blood pressure  Pulmonary: Cough, yellow or green sputum, blood and sputum, shortness of breath, wheezing  Gastrointestinal: Nausea, vomiting, diarrhea, constipation, pain, blood in stool, or vomitus, heartburn, difficulty swallowing  Genitourinary: incontinence, abnormal bleeding, abnormal discharge, urinary frequency, urinary hesitancy, pain, impotence sexual problem, infection, urinary retention  Musculoskeletal: Pain, stiffness, joint, redness or warmth, arthritis, back pain, weakness, muscle wasting, sprain or fracture  Neuro: Weight weakness, dizziness, change in voice, change in taste change in vision, change in hearing, loss, or change of sensation, trouble walking, balance problems coordination problems, shaking, speech problem  Endocrine , cold or heat intolerance, blood sugar problem, weight gain or loss missed periods hot flashes, sweats, change in body hair, change in libido, increased thirst, increased urination  Heme/lymph: Swelling, bleeding, problem anemia, bruising, enlarged lymph nodes  Allergic/immunologic: H. plus nasal drip, watery itchy  eyes, nasal drainage, immunosuppressed  The above were reviewed and noted negative except as noted in HPI and Problem List.    Objective     There were no vitals taken for this visit.     Physical Exam    Constitutional: Well developed, well nourished, alert and in no acute distress     Results           Assessment & Plan      Problem List Items Addressed This Visit       GERD (gastroesophageal reflux disease)    Relevant Medications    pantoprazole (ProtoNix) 40 mg EC tablet    Other Relevant Orders    Follow Up In [facility] - PCP - Established    HTN (hypertension) - Primary    Relevant Orders    Follow Up In [facility] - PCP - Established    Hyperlipidemia    Relevant Orders    Follow Up In [facility] - PCP - Established    Schizophrenia    Relevant Orders    Follow Up In [facility] - PCP - Established    Vitamin D deficiency    Relevant Orders    Follow Up In [facility] - PCP - Established    Primary osteoarthritis of left hip    Relevant Orders    Follow Up In [facility] - PCP - Established     Other Visit Diagnoses         Bronchitis        Relevant Medications    cephalexin (Keflex) 500 mg capsule    Other Relevant Orders    Follow Up In [facility] - PCP - Established         A1C next    Keflex     Inc protonix to BID    Sees  Dr. Lam      -see GYN      -Continue colchicine for gout PRN     -Go to PT      Stop marijuana     See GI     Nuclear stress wnl    Continue current medications and therapy for chronic medical conditions    Virtual Visit - Audio and Visual Communication Real Time       Asaf Cochran MD       This medical note was created with the assistance of artificial intelligence (AI) for documentation purposes. The content has been reviewed and confirmed by the healthcare provider for accuracy and completeness. Patient consented to the use of audio recording and use of AI during their visit.

## 2025-05-15 RX ORDER — PANTOPRAZOLE SODIUM 40 MG/1
40 TABLET, DELAYED RELEASE ORAL
Qty: 180 TABLET | Refills: 1 | Status: SHIPPED | OUTPATIENT
Start: 2025-05-15

## 2025-05-15 RX ORDER — CEPHALEXIN 500 MG/1
500 CAPSULE ORAL 3 TIMES DAILY
Qty: 30 CAPSULE | Refills: 0 | Status: SHIPPED | OUTPATIENT
Start: 2025-05-15 | End: 2025-05-25

## 2025-05-20 DIAGNOSIS — M10.9 GOUT, ARTHRITIS: ICD-10-CM

## 2025-05-20 RX ORDER — COLCHICINE 0.6 MG/1
0.6 TABLET ORAL DAILY
Qty: 90 TABLET | Refills: 0 | Status: SHIPPED | OUTPATIENT
Start: 2025-05-20

## 2025-05-20 NOTE — TELEPHONE ENCOUNTER
Called pt name and  verified pt requesting telephone appt instead of coming in for appt. Informed pt that she has not been here since 2020 and is recommended for her to present for appt. Pt states \"absolutly not that is not going to happen until may be May everything needs to be cleared before I can go to an appointment.\" Discussed w/ pt importance of coming in for appt and continuing w/ routine PRN care. Pt verbalizes understanding and continues to states that she is not coming in to appt until \"evertything is cleared from everything that is going on\" Pt reports positive FM. Pt denies abd pain/cramping, LOF, VB. Pt aware that it is recommended for her to come in for appt and states that she is not coming. Appt kept as scheduled and per pt request changed to telephone appt. Pt has no other questions.    Refill Pantoprazole 40 mg  Colchicine 0.6 mg  [facility] in [place]

## 2025-05-22 ENCOUNTER — PATIENT OUTREACH (OUTPATIENT)
Dept: PRIMARY CARE | Facility: CLINIC | Age: 65
End: 2025-05-22
Payer: COMMERCIAL

## 2025-05-22 DIAGNOSIS — I10 PRIMARY HYPERTENSION: ICD-10-CM

## 2025-05-22 DIAGNOSIS — F32.A ANXIETY AND DEPRESSION: ICD-10-CM

## 2025-05-22 DIAGNOSIS — E78.2 MIXED HYPERLIPIDEMIA: ICD-10-CM

## 2025-05-22 DIAGNOSIS — F41.9 ANXIETY AND DEPRESSION: ICD-10-CM

## 2025-05-22 DIAGNOSIS — G89.4 CHRONIC PAIN SYNDROME: ICD-10-CM

## 2025-05-22 RX ORDER — FLUOXETINE 10 MG/1
10 CAPSULE ORAL DAILY
Qty: 30 CAPSULE | Refills: 0 | Status: SHIPPED | OUTPATIENT
Start: 2025-05-22

## 2025-05-22 RX ORDER — FLUOXETINE 10 MG/1
10 CAPSULE ORAL DAILY
COMMUNITY
End: 2025-05-22 | Stop reason: SDUPTHER

## 2025-05-22 NOTE — PROGRESS NOTES
Spoke with Danae CARDOSOWENDY pain persists  Taking Zofran upon waking so she does not vomit  Started pantoprazole 40mg 2 tabs in the morning  Explained that she should be taking 1 tablet twice daily instead  She verbalized understanding  She last had EGD/Colonoscopy in 2022 with Dr. Palma  Encouraged her to call his office to be seen again  She agreed    She is requesting prescription for prozac  States her boyfriend Eduardo is back with her and recommended she get back on it  Dr. Cochran recommended Vraylar   Patient declines this medication  She is requesting  the lowest dose of Prozac  Will discuss with Dr. Cochran and let her know    Introduced [facility] program  She is agreeable to try  Referral formatted  She is aware it will likely be about 3-4 weeks before first appointment

## 2025-06-02 ENCOUNTER — TELEPHONE (OUTPATIENT)
Dept: PRIMARY CARE | Facility: CLINIC | Age: 65
End: 2025-06-02
Payer: COMMERCIAL

## 2025-06-03 ENCOUNTER — PATIENT OUTREACH (OUTPATIENT)
Dept: PRIMARY CARE | Facility: CLINIC | Age: 65
End: 2025-06-03
Payer: COMMERCIAL

## 2025-06-03 DIAGNOSIS — K21.9 GASTROESOPHAGEAL REFLUX DISEASE WITHOUT ESOPHAGITIS: ICD-10-CM

## 2025-06-03 DIAGNOSIS — S62.002S SNAC (SCAPHOID NON-UNION ADVANCED COLLAPSE) OF WRIST, LEFT: ICD-10-CM

## 2025-06-03 DIAGNOSIS — I10 PRIMARY HYPERTENSION: ICD-10-CM

## 2025-06-03 DIAGNOSIS — G89.4 CHRONIC PAIN SYNDROME: ICD-10-CM

## 2025-06-03 DIAGNOSIS — M19.132 SNAC (SCAPHOID NON-UNION ADVANCED COLLAPSE) OF WRIST, LEFT: ICD-10-CM

## 2025-06-03 NOTE — PROGRESS NOTES
Care Management Monthly Outreach  Confirmation of at least 2 patient identifiers  Change in insurance? No  Chart review completed  Has patient been to ER/Urgent Care since last outreach? 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 No  Refills needed? No    Social Drivers of Health: Addressed in the last 6 months  Care Gaps Addressed? Addressed in the last 6 months  Care Plan addressed: yes     Last Office Visit with PCP: 4/21/2025   Future Appointments       Date / Time Provider Department Dept Phone    8/25/2025 10:30 AM Renae Mcconnell MD [facility] [address] [phone]          BP Readings from Last 3 Encounters:   04/25/25 147/88   04/21/25 120/72   02/24/25 132/78        Labs Reviewed:  Lab Results   Component Value Date    CREATININE 0.62 05/07/2025    GLUCOSE 108 (H) 05/07/2025    ALKPHOS 74 05/07/2025    K 4.9 05/07/2025    PROT 7.2 05/07/2025     05/07/2025    CALCIUM 10.1 05/07/2025    AST 18 05/07/2025    ALT 21 05/07/2025    BUN 17 05/07/2025    MG 1.83 12/27/2024    PHOS 3.4 07/14/2024    GFRF >90 09/19/2023    GFRMALE CANCELED 08/08/2023     Lab Results   Component Value Date    TRIG 65 05/07/2025    CHOL 153 05/07/2025    LDLCALC 64 05/07/2025    HDL 75 05/07/2025     Lab Results   Component Value Date    HGBA1C 5.3 07/05/2024    HGBA1C  5.3 11/20/2022    HGBA1C 5.3 12/10/2018     Lab Results   Component Value Date    WBC 9.4 05/07/2025    RBC 4.73 05/07/2025    HGB 15.2 05/07/2025     05/07/2025       Patient is aware to contact me if any needs arise.    Venecia Bills, RN  Nurse Care Manager  [phone]

## 2025-06-09 ENCOUNTER — APPOINTMENT (OUTPATIENT)
Dept: GASTROENTEROLOGY | Facility: CLINIC | Age: 65
End: 2025-06-09
Payer: COMMERCIAL

## 2025-06-24 ENCOUNTER — TELEPHONE (OUTPATIENT)
Dept: PRIMARY CARE | Facility: CLINIC | Age: 65
End: 2025-06-24

## 2025-06-24 ENCOUNTER — APPOINTMENT (OUTPATIENT)
Dept: PRIMARY CARE | Facility: CLINIC | Age: 65
End: 2025-06-24
Payer: COMMERCIAL

## 2025-06-24 NOTE — PROGRESS NOTES
Outreached pt to check-in and offer to re-schedule missed appointment (6/24 @ 1pm). Left voicemail requesting return call.

## 2025-06-30 NOTE — PROGRESS NOTES
Outreached pt to check-in and offer to re-schedule missed appointment. Pt reports many stressors. Offered to close referral or schedule phone intake to help pt receive care. Pt reports she will think on scheduling and call back in a few days. If no response in 2 weeks, M will close.

## 2025-07-02 NOTE — PROGRESS NOTES
TYRA called pt and she confirmed that she is not interested in mental health services. Will close referral.

## 2025-07-02 NOTE — PROGRESS NOTES
Pt called [facility] and spoke with FELIX Mayfield: pt called and requesting to cancel upcoming appt - no longer needed

## 2025-07-08 ENCOUNTER — PATIENT OUTREACH (OUTPATIENT)
Dept: PRIMARY CARE | Facility: CLINIC | Age: 65
End: 2025-07-08
Payer: COMMERCIAL

## 2025-07-08 DIAGNOSIS — R11.0 MILD NAUSEA: ICD-10-CM

## 2025-07-08 NOTE — TELEPHONE ENCOUNTER
Patient called in asking for a xray she has a sore spot on top of her head declined an appointment

## 2025-07-08 NOTE — PROGRESS NOTES
Care Management monthly outreach attempted   Left voicemail requesting call back  Chart review completed    Venecia Bills  RN Care Manager  [phone]

## 2025-07-09 RX ORDER — ONDANSETRON 8 MG/1
TABLET, FILM COATED ORAL
Qty: 30 TABLET | Refills: 2 | Status: SHIPPED | OUTPATIENT
Start: 2025-07-09

## 2025-07-11 ENCOUNTER — PATIENT OUTREACH (OUTPATIENT)
Dept: PRIMARY CARE | Facility: CLINIC | Age: 65
End: 2025-07-11
Payer: COMMERCIAL

## 2025-07-11 DIAGNOSIS — I10 PRIMARY HYPERTENSION: ICD-10-CM

## 2025-07-11 DIAGNOSIS — M19.132 SNAC (SCAPHOID NON-UNION ADVANCED COLLAPSE) OF WRIST, LEFT: ICD-10-CM

## 2025-07-11 DIAGNOSIS — K21.9 GASTROESOPHAGEAL REFLUX DISEASE WITHOUT ESOPHAGITIS: ICD-10-CM

## 2025-07-11 DIAGNOSIS — S62.002S SNAC (SCAPHOID NON-UNION ADVANCED COLLAPSE) OF WRIST, LEFT: ICD-10-CM
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
## 2025-07-15 ENCOUNTER — APPOINTMENT (OUTPATIENT)
Dept: RADIOLOGY | Facility: HOSPITAL | Age: 65
End: 2025-07-15
Payer: COMMERCIAL

## 2025-07-15 ENCOUNTER — HOSPITAL ENCOUNTER (EMERGENCY)
Facility: HOSPITAL | Age: 65
Discharge: HOME | End: 2025-07-15
Attending: EMERGENCY MEDICINE
Payer: COMMERCIAL

## 2025-07-15 VITALS
TEMPERATURE: 97.3 F | BODY MASS INDEX: 22.45 KG/M2 | SYSTOLIC BLOOD PRESSURE: 137 MMHG | WEIGHT: 122 LBS | HEIGHT: 62 IN | OXYGEN SATURATION: 96 % | HEART RATE: 77 BPM | DIASTOLIC BLOOD PRESSURE: 67 MMHG | RESPIRATION RATE: 16 BRPM

## 2025-07-15 DIAGNOSIS — G44.52 NEW DAILY PERSISTENT HEADACHE: Primary | ICD-10-CM

## 2025-07-15 PROCEDURE — 2500000001 HC RX 250 WO HCPCS SELF ADMINISTERED DRUGS (ALT 637 FOR MEDICARE OP): Performed by: EMERGENCY MEDICINE

## 2025-07-15 PROCEDURE — 99284 EMERGENCY DEPT VISIT MOD MDM: CPT | Mod: 25 | Performed by: EMERGENCY MEDICINE

## 2025-07-15 PROCEDURE — 70450 CT HEAD/BRAIN W/O DYE: CPT

## 2025-07-15 PROCEDURE — 70450 CT HEAD/BRAIN W/O DYE: CPT | Performed by: SURGERY

## 2025-07-15 RX ORDER — OXYCODONE AND ACETAMINOPHEN 5; 325 MG/1; MG/1
1 TABLET ORAL ONCE
Refills: 0 | Status: COMPLETED | OUTPATIENT
Start: 2025-07-15 | End: 2025-07-15

## 2025-07-15 RX ADMIN — OXYCODONE HYDROCHLORIDE AND ACETAMINOPHEN 1 TABLET: 5; 325 TABLET ORAL at 23:01

## 2025-07-15 ASSESSMENT — PAIN DESCRIPTION - DESCRIPTORS
DESCRIPTORS: ACHING
DESCRIPTORS: ACHING

## 2025-07-15 ASSESSMENT — PAIN DESCRIPTION - LOCATION
LOCATION: HEAD
LOCATION: HEAD

## 2025-07-15 ASSESSMENT — PAIN SCALES - GENERAL
PAINLEVEL_OUTOF10: 9
PAINLEVEL_OUTOF10: 10 - WORST POSSIBLE PAIN

## 2025-07-15 ASSESSMENT — PAIN - FUNCTIONAL ASSESSMENT
PAIN_FUNCTIONAL_ASSESSMENT: 0-10
PAIN_FUNCTIONAL_ASSESSMENT: 0-10

## 2025-07-16 ENCOUNTER — PATIENT OUTREACH (OUTPATIENT)
Dept: PRIMARY CARE | Facility: CLINIC | Age: 65
End: 2025-07-16
Payer: COMMERCIAL

## 2025-07-16 DIAGNOSIS — I10 PRIMARY HYPERTENSION: ICD-10-CM

## 2025-07-16 DIAGNOSIS — S62.002S SNAC (SCAPHOID NON-UNION ADVANCED COLLAPSE) OF WRIST, LEFT: ICD-10-CM

## 2025-07-16 DIAGNOSIS — K21.9 GASTROESOPHAGEAL REFLUX DISEASE WITHOUT ESOPHAGITIS: ICD-10-CM

## 2025-07-16 DIAGNOSIS — M19.132 SNAC (SCAPHOID NON-UNION ADVANCED COLLAPSE) OF WRIST, LEFT: ICD-10-CM

## 2025-07-16 NOTE — ED PROVIDER NOTES
Emergency Department Provider Note       History of Present Illness     History provided by: Patient  Limitations to History: None  External Records Reviewed with Brief Summary: None    HPI:  Malika Casper is a 64 y.o. female who presents to the emergency room with headache x 22 months.  Patient states she has not told her primary care doctor or pain management doctor about the headaches because she thought they would just go away.  They have been slowly getting worse and worse.  To the point where she touches her hair and it hurts.  She describes the headache as along the top right side of her head going down towards her ear, and then over towards her forehead.  It does wax and wane but is present there all the time and came for pain control.  She has no dizziness, no vomiting, no change in vision, no neck pain, and no history of any brain aneurysms or tumors or cancer.  She wanted to come in to get checked out because she was worried as the pain was intense this evening.    Physical Exam   Triage vitals:  T 36.5 °C (97.7 °F)  HR 87  /74  RR 16  O2 97 % None (Room air)    General: Awake, alert, in no acute distress  Eyes: Gaze conjugate.  No scleral icterus or injection  HENT: Normo-cephalic, atraumatic. No stridor  CV: Regular rate, regular rhythm. Radial pulses 2+ bilaterally  Resp: Breathing non-labored, speaking in full sentences.  Clear to auscultation bilaterally  GI: Soft, non-distended, non-tender. No rebound or guarding.  MSK/Extremities: No gross bony deformities. Moving all extremities  Skin: Warm. Appropriate color  Neuro: Alert. Oriented. Face symmetric. Speech is fluent.  Gross strength and sensation intact in b/l UE and LEs  Psych: Appropriate mood and affect      Medical Decision Making & ED Course   ED Course: Patient has had a chronic headache for the past 22 months, and has not told her primary care doctor.  We advise she talk to her primary care doctor as well as pain  management this may be trigeminal neuralgia in the upper distribution of the nerve.  CT scan reveals no sign of acute intracranial mass or bony lesions.  But again she is to follow-up with the primary care doctor for further evaluation.  Patient states she understands, and would like to go home.  I notified her we will fill out discharge instructions for her, she states that she may leave before discharge instructions will wait as long as she can.  She otherwise feels better after the medications here in the emergency room.  Diagnoses as of 07/15/25 5740   New daily persistent headache       Disposition   As a result of the work-up, the patient was discharged home.  she was informed of her diagnosis and instructed to come back with any concerns or worsening of condition.  she and was agreeable to the plan as discussed above.  she was given the opportunity to ask questions.  All of the patient's questions were answered.    Procedures   Procedures    Patient was seen independently    Wayne Martins DO  Emergency Medicine                                                       Wayne Martins DO  07/15/25 [id number]

## 2025-07-16 NOTE — DISCHARGE INSTRUCTIONS
Please follow-up with your primary care doctor and pain management doctor as directed for further management and evaluation of the chronic headaches.

## 2025-07-16 NOTE — PROGRESS NOTES
Patient was in ER yesterday for headache  Very sensitive to touch on the top of her head  She would like to figure out what is causing the pain    ED with Wayne Martins DO (07/15/2025)   Medical Decision Making & ED Course   ED Course: Patient has had a chronic headache for the past 22 months, and has not told her primary care doctor.  We advise she talk to her primary care doctor as well as pain management this may be trigeminal neuralgia in the upper distribution of the nerve.  CT scan reveals no sign of acute intracranial mass or bony lesions.  But again she is to follow-up with the primary care doctor for further evaluation.  Patient states she understands, and would like to go home.  I notified her we will fill out discharge instructions for her, she states that she may leave before discharge instructions will wait as long as she can.  She otherwise feels better after the medications here in the emergency room.

## 2025-07-18 ENCOUNTER — TELEPHONE (OUTPATIENT)
Dept: PRIMARY CARE | Facility: CLINIC | Age: 65
End: 2025-07-18
Payer: COMMERCIAL

## 2025-07-18 NOTE — TELEPHONE ENCOUNTER
CB patient     Patient contacted Case Management nurse ( Luzma Bills RN ) to discuss recent office visit encounter with PCP. Patient expressed leaving without being seen due to worsening symptoms of headaches. Patient asked to speak with  ( GE Downs ). Patient expressed she is feeling better at this time.

## 2025-07-28 ENCOUNTER — TELEPHONE (OUTPATIENT)
Dept: PRIMARY CARE | Facility: CLINIC | Age: 65
End: 2025-07-28
Payer: COMMERCIAL
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
## 2025-07-29 ENCOUNTER — PATIENT OUTREACH (OUTPATIENT)
Dept: PRIMARY CARE | Facility: CLINIC | Age: 65
End: 2025-07-29
Payer: COMMERCIAL

## 2025-07-29 DIAGNOSIS — K21.9 GASTROESOPHAGEAL REFLUX DISEASE WITHOUT ESOPHAGITIS: ICD-10-CM

## 2025-07-29 DIAGNOSIS — I10 PRIMARY HYPERTENSION: ICD-10-CM

## 2025-07-29 DIAGNOSIS — F43.9 STRESS: ICD-10-CM
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
## 2025-08-07 ENCOUNTER — HOSPITAL ENCOUNTER (EMERGENCY)
Facility: HOSPITAL | Age: 65
Discharge: HOME | End: 2025-08-07
Attending: EMERGENCY MEDICINE
Payer: COMMERCIAL

## 2025-08-07 ENCOUNTER — APPOINTMENT (OUTPATIENT)
Dept: RADIOLOGY | Facility: HOSPITAL | Age: 65
End: 2025-08-07
Payer: COMMERCIAL

## 2025-08-07 VITALS
BODY MASS INDEX: 20.24 KG/M2 | DIASTOLIC BLOOD PRESSURE: 75 MMHG | HEIGHT: 62 IN | TEMPERATURE: 98.6 F | HEART RATE: 83 BPM | SYSTOLIC BLOOD PRESSURE: 135 MMHG | RESPIRATION RATE: 16 BRPM | OXYGEN SATURATION: 95 % | WEIGHT: 110 LBS
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
## 2025-08-07 LAB
ALBUMIN SERPL BCP-MCNC: 4.5 G/DL (ref 3.4–5)
ALP SERPL-CCNC: 60 U/L (ref 33–136)
ALT SERPL W P-5'-P-CCNC: 22 U/L (ref 7–45)
ANION GAP SERPL CALC-SCNC: 13 MMOL/L (ref 10–20)
APPEARANCE UR: CLEAR
AST SERPL W P-5'-P-CCNC: 21 U/L (ref 9–39)
BACTERIA #/AREA URNS AUTO: ABNORMAL /HPF
BASOPHILS # BLD AUTO: 0.04 X10*3/UL (ref 0–0.1)
BASOPHILS NFR BLD AUTO: 0.3 %
BILIRUB SERPL-MCNC: 0.4 MG/DL (ref 0–1.2)
BILIRUB UR STRIP.AUTO-MCNC: NEGATIVE MG/DL
BUN SERPL-MCNC: 11 MG/DL (ref 6–23)
CALCIUM SERPL-MCNC: 9.6 MG/DL (ref 8.6–10.3)
CHLORIDE SERPL-SCNC: 100 MMOL/L (ref 98–107)
CO2 SERPL-SCNC: 29 MMOL/L (ref 21–32)
COLOR UR: ABNORMAL
CREAT SERPL-MCNC: 0.65 MG/DL (ref 0.5–1.05)
EGFRCR SERPLBLD CKD-EPI 2021: >90 ML/MIN/1.73M*2
EOSINOPHIL # BLD AUTO: 0.19 X10*3/UL (ref 0–0.7)
EOSINOPHIL NFR BLD AUTO: 1.5 %
ERYTHROCYTE [DISTWIDTH] IN BLOOD BY AUTOMATED COUNT: 13.2 % (ref 11.5–14.5)
GLUCOSE SERPL-MCNC: 100 MG/DL (ref 74–99)
GLUCOSE UR STRIP.AUTO-MCNC: NORMAL MG/DL
HCT VFR BLD AUTO: 40.8 % (ref 36–46)
HGB BLD-MCNC: 13.6 G/DL (ref 12–16)
IMM GRANULOCYTES # BLD AUTO: 0.05 X10*3/UL (ref 0–0.7)
IMM GRANULOCYTES NFR BLD AUTO: 0.4 % (ref 0–0.9)
KETONES UR STRIP.AUTO-MCNC: NEGATIVE MG/DL
LACTATE SERPL-SCNC: 0.5 MMOL/L (ref 0.4–2)
LEUKOCYTE ESTERASE UR QL STRIP.AUTO: ABNORMAL
LYMPHOCYTES # BLD AUTO: 2.17 X10*3/UL (ref 1.2–4.8)
LYMPHOCYTES NFR BLD AUTO: 17.5 %
MCH RBC QN AUTO: 32.5 PG (ref 26–34)
MCHC RBC AUTO-ENTMCNC: 33.3 G/DL (ref 32–36)
MCV RBC AUTO: 98 FL (ref 80–100)
MONOCYTES # BLD AUTO: 0.86 X10*3/UL (ref 0.1–1)
MONOCYTES NFR BLD AUTO: 6.9 %
NEUTROPHILS # BLD AUTO: 9.1 X10*3/UL (ref 1.2–7.7)
NEUTROPHILS NFR BLD AUTO: 73.4 %
NITRITE UR QL STRIP.AUTO: NEGATIVE
NRBC BLD-RTO: 0 /100 WBCS (ref 0–0)
PH UR STRIP.AUTO: 5.5 [PH]
PLATELET # BLD AUTO: 218 X10*3/UL (ref 150–450)
POTASSIUM SERPL-SCNC: 3.8 MMOL/L (ref 3.5–5.3)
PROT SERPL-MCNC: 6.9 G/DL (ref 6.4–8.2)
PROT UR STRIP.AUTO-MCNC: NEGATIVE MG/DL
RBC # BLD AUTO: 4.18 X10*6/UL (ref 4–5.2)
RBC # UR STRIP.AUTO: ABNORMAL MG/DL
RBC #/AREA URNS AUTO: ABNORMAL /HPF
SODIUM SERPL-SCNC: 138 MMOL/L (ref 136–145)
SP GR UR STRIP.AUTO: 1.01
SQUAMOUS #/AREA URNS AUTO: ABNORMAL /HPF
UROBILINOGEN UR STRIP.AUTO-MCNC: NORMAL MG/DL
WBC # BLD AUTO: 12.4 X10*3/UL (ref 4.4–11.3)
WBC #/AREA URNS AUTO: ABNORMAL /HPF

## 2025-08-07 PROCEDURE — 84075 ASSAY ALKALINE PHOSPHATASE: CPT

## 2025-08-07 PROCEDURE — 99284 EMERGENCY DEPT VISIT MOD MDM: CPT | Performed by: EMERGENCY MEDICINE

## 2025-08-07 PROCEDURE — 87086 URINE CULTURE/COLONY COUNT: CPT | Mod: STJLAB

## 2025-08-07 PROCEDURE — 73630 X-RAY EXAM OF FOOT: CPT | Mod: RT

## 2025-08-07 PROCEDURE — 36415 COLL VENOUS BLD VENIPUNCTURE: CPT

## 2025-08-07 PROCEDURE — 85025 COMPLETE CBC W/AUTO DIFF WBC: CPT

## 2025-08-07 PROCEDURE — 96365 THER/PROPH/DIAG IV INF INIT: CPT

## 2025-08-07 PROCEDURE — 81001 URINALYSIS AUTO W/SCOPE: CPT

## 2025-08-07 PROCEDURE — 2500000004 HC RX 250 GENERAL PHARMACY W/ HCPCS (ALT 636 FOR OP/ED)

## 2025-08-07 PROCEDURE — 87040 BLOOD CULTURE FOR BACTERIA: CPT | Mod: STJLAB

## 2025-08-07 PROCEDURE — 83605 ASSAY OF LACTIC ACID: CPT

## 2025-08-07 PROCEDURE — 99284 EMERGENCY DEPT VISIT MOD MDM: CPT | Mod: 25 | Performed by: EMERGENCY MEDICINE

## 2025-08-07 PROCEDURE — 90715 TDAP VACCINE 7 YRS/> IM: CPT

## 2025-08-07 PROCEDURE — 73630 X-RAY EXAM OF FOOT: CPT | Mod: RIGHT SIDE | Performed by: RADIOLOGY

## 2025-08-07 PROCEDURE — 90471 IMMUNIZATION ADMIN: CPT

## 2025-08-07 RX ORDER — AMOXICILLIN AND CLAVULANATE POTASSIUM 875; 125 MG/1; MG/1
1 TABLET, FILM COATED ORAL EVERY 12 HOURS
Qty: 20 TABLET | Refills: 0 | Status: ON HOLD | OUTPATIENT
Start: 2025-08-07 | End: 2025-08-17

## 2025-08-07 RX ADMIN — TETANUS TOXOID, REDUCED DIPHTHERIA TOXOID AND ACELLULAR PERTUSSIS VACCINE, ADSORBED 0.5 ML: 5; 2.5; 8; 8; 2.5 SUSPENSION INTRAMUSCULAR at 22:52

## 2025-08-07 RX ADMIN — PIPERACILLIN SODIUM AND TAZOBACTAM SODIUM 4.5 G: 4; .5 INJECTION, SOLUTION INTRAVENOUS at 22:38

## 2025-08-07 ASSESSMENT — PAIN DESCRIPTION - ORIENTATION: ORIENTATION: RIGHT

## 2025-08-07 ASSESSMENT — PAIN DESCRIPTION - LOCATION: LOCATION: FOOT

## 2025-08-07 ASSESSMENT — PAIN SCALES - GENERAL: PAINLEVEL_OUTOF10: 6

## 2025-08-07 ASSESSMENT — PAIN - FUNCTIONAL ASSESSMENT: PAIN_FUNCTIONAL_ASSESSMENT: 0-10

## 2025-08-07 ASSESSMENT — PAIN DESCRIPTION - PROGRESSION: CLINICAL_PROGRESSION: NOT CHANGED

## 2025-08-07 ASSESSMENT — PAIN DESCRIPTION - PAIN TYPE: TYPE: ACUTE PAIN

## 2025-08-08 ENCOUNTER — HOSPITAL ENCOUNTER (INPATIENT)
Facility: HOSPITAL | Age: 65
End: 2025-08-08
Attending: STUDENT IN AN ORGANIZED HEALTH CARE EDUCATION/TRAINING PROGRAM | Admitting: STUDENT IN AN ORGANIZED HEALTH CARE EDUCATION/TRAINING PROGRAM
Payer: COMMERCIAL

## 2025-08-08 ENCOUNTER — APPOINTMENT (OUTPATIENT)
Dept: RADIOLOGY | Facility: HOSPITAL | Age: 65
End: 2025-08-08
Payer: COMMERCIAL

## 2025-08-08 DIAGNOSIS — F17.210 CIGARETTE NICOTINE DEPENDENCE WITHOUT COMPLICATION: ICD-10-CM

## 2025-08-08 DIAGNOSIS — R11.0 MILD NAUSEA: ICD-10-CM

## 2025-08-08 DIAGNOSIS — S91.351D: Primary | ICD-10-CM
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
## 2025-08-08 DIAGNOSIS — F41.9 ANXIETY: ICD-10-CM

## 2025-08-08 PROBLEM — L03.90 CELLULITIS: Status: ACTIVE | Noted: 2025-08-08

## 2025-08-08 LAB
ALBUMIN SERPL BCP-MCNC: 4 G/DL (ref 3.4–5)
ALP SERPL-CCNC: 49 U/L (ref 33–136)
ALT SERPL W P-5'-P-CCNC: 19 U/L (ref 7–45)
ANION GAP SERPL CALC-SCNC: 11 MMOL/L (ref 10–20)
AST SERPL W P-5'-P-CCNC: 58 U/L (ref 9–39)
BASOPHILS # BLD AUTO: 0.04 X10*3/UL (ref 0–0.1)
BASOPHILS NFR BLD AUTO: 0.5 %
BILIRUB SERPL-MCNC: 0.4 MG/DL (ref 0–1.2)
BUN SERPL-MCNC: 14 MG/DL (ref 6–23)
CALCIUM SERPL-MCNC: 8.6 MG/DL (ref 8.6–10.3)
CHLORIDE SERPL-SCNC: 105 MMOL/L (ref 98–107)
CO2 SERPL-SCNC: 27 MMOL/L (ref 21–32)
CREAT SERPL-MCNC: 0.69 MG/DL (ref 0.5–1.05)
CRP SERPL-MCNC: 0.93 MG/DL
EGFRCR SERPLBLD CKD-EPI 2021: >90 ML/MIN/1.73M*2
EOSINOPHIL # BLD AUTO: 0.21 X10*3/UL (ref 0–0.7)
EOSINOPHIL NFR BLD AUTO: 2.6 %
ERYTHROCYTE [DISTWIDTH] IN BLOOD BY AUTOMATED COUNT: 13.6 % (ref 11.5–14.5)
ERYTHROCYTE [SEDIMENTATION RATE] IN BLOOD BY WESTERGREN METHOD: 5 MM/H (ref 0–30)
GLUCOSE SERPL-MCNC: 100 MG/DL (ref 74–99)
HCT VFR BLD AUTO: 41.4 % (ref 36–46)
HGB BLD-MCNC: 13.4 G/DL (ref 12–16)
HOLD SPECIMEN: NORMAL
IMM GRANULOCYTES # BLD AUTO: 0.01 X10*3/UL (ref 0–0.7)
IMM GRANULOCYTES NFR BLD AUTO: 0.1 % (ref 0–0.9)
LACTATE SERPL-SCNC: 0.5 MMOL/L (ref 0.4–2)
LYMPHOCYTES # BLD AUTO: 2.37 X10*3/UL (ref 1.2–4.8)
LYMPHOCYTES NFR BLD AUTO: 29.4 %
MCH RBC QN AUTO: 32.8 PG (ref 26–34)
MCHC RBC AUTO-ENTMCNC: 32.4 G/DL (ref 32–36)
MCV RBC AUTO: 101 FL (ref 80–100)
MONOCYTES # BLD AUTO: 0.72 X10*3/UL (ref 0.1–1)
MONOCYTES NFR BLD AUTO: 8.9 %
NEUTROPHILS # BLD AUTO: 4.72 X10*3/UL (ref 1.2–7.7)
NEUTROPHILS NFR BLD AUTO: 58.5 %
NRBC BLD-RTO: 0 /100 WBCS (ref 0–0)
PLATELET # BLD AUTO: 221 X10*3/UL (ref 150–450)
POTASSIUM SERPL-SCNC: 6 MMOL/L (ref 3.5–5.3)
PROT SERPL-MCNC: 6.4 G/DL (ref 6.4–8.2)
RBC # BLD AUTO: 4.09 X10*6/UL (ref 4–5.2)
SODIUM SERPL-SCNC: 137 MMOL/L (ref 136–145)
WBC # BLD AUTO: 8.1 X10*3/UL (ref 4.4–11.3)

## 2025-08-08 PROCEDURE — 86140 C-REACTIVE PROTEIN: CPT | Performed by: STUDENT IN AN ORGANIZED HEALTH CARE EDUCATION/TRAINING PROGRAM

## 2025-08-08 PROCEDURE — 36415 COLL VENOUS BLD VENIPUNCTURE: CPT | Performed by: STUDENT IN AN ORGANIZED HEALTH CARE EDUCATION/TRAINING PROGRAM

## 2025-08-08 PROCEDURE — 2500000001 HC RX 250 WO HCPCS SELF ADMINISTERED DRUGS (ALT 637 FOR MEDICARE OP)

## 2025-08-08 PROCEDURE — 99285 EMERGENCY DEPT VISIT HI MDM: CPT | Performed by: STUDENT IN AN ORGANIZED HEALTH CARE EDUCATION/TRAINING PROGRAM

## 2025-08-08 PROCEDURE — G0378 HOSPITAL OBSERVATION PER HR: HCPCS

## 2025-08-08 PROCEDURE — 85652 RBC SED RATE AUTOMATED: CPT | Performed by: STUDENT IN AN ORGANIZED HEALTH CARE EDUCATION/TRAINING PROGRAM

## 2025-08-08 PROCEDURE — 73630 X-RAY EXAM OF FOOT: CPT | Mod: RIGHT SIDE | Performed by: RADIOLOGY

## 2025-08-08 PROCEDURE — 85025 COMPLETE CBC W/AUTO DIFF WBC: CPT | Performed by: STUDENT IN AN ORGANIZED HEALTH CARE EDUCATION/TRAINING PROGRAM

## 2025-08-08 PROCEDURE — 83605 ASSAY OF LACTIC ACID: CPT | Performed by: STUDENT IN AN ORGANIZED HEALTH CARE EDUCATION/TRAINING PROGRAM

## 2025-08-08 PROCEDURE — 96365 THER/PROPH/DIAG IV INF INIT: CPT | Mod: 59

## 2025-08-08 PROCEDURE — 96372 THER/PROPH/DIAG INJ SC/IM: CPT

## 2025-08-08 PROCEDURE — 80053 COMPREHEN METABOLIC PANEL: CPT | Performed by: STUDENT IN AN ORGANIZED HEALTH CARE EDUCATION/TRAINING PROGRAM

## 2025-08-08 PROCEDURE — 84075 ASSAY ALKALINE PHOSPHATASE: CPT | Performed by: STUDENT IN AN ORGANIZED HEALTH CARE EDUCATION/TRAINING PROGRAM

## 2025-08-08 PROCEDURE — 2500000004 HC RX 250 GENERAL PHARMACY W/ HCPCS (ALT 636 FOR OP/ED)

## 2025-08-08 PROCEDURE — 73630 X-RAY EXAM OF FOOT: CPT | Mod: RT

## 2025-08-08 RX ORDER — POLYETHYLENE GLYCOL 3350 17 G/17G
17 POWDER, FOR SOLUTION ORAL DAILY
Status: DISPENSED | OUTPATIENT
Start: 2025-08-09

## 2025-08-08 RX ORDER — HYDROCODONE BITARTRATE AND ACETAMINOPHEN 5; 325 MG/1; MG/1
2 TABLET ORAL EVERY 4 HOURS PRN
Refills: 0 | Status: DISPENSED | OUTPATIENT
Start: 2025-08-08

## 2025-08-08 RX ORDER — ACETAMINOPHEN 325 MG/1
975 TABLET ORAL EVERY 8 HOURS PRN
Status: ACTIVE | OUTPATIENT
Start: 2025-08-08

## 2025-08-08 RX ORDER — ACETAMINOPHEN 500 MG
10 TABLET ORAL NIGHTLY PRN
Status: DISPENSED | OUTPATIENT
Start: 2025-08-08

## 2025-08-08 RX ORDER — COLCHICINE 0.6 MG/1
0.6 TABLET ORAL DAILY
Status: DISPENSED | OUTPATIENT
Start: 2025-08-09

## 2025-08-08 RX ORDER — ONDANSETRON 8 MG/1
8 TABLET, FILM COATED ORAL EVERY 8 HOURS PRN
Qty: 30 TABLET | Refills: 2 | Status: ON HOLD | OUTPATIENT
Start: 2025-08-08

## 2025-08-08 RX ORDER — VANCOMYCIN HYDROCHLORIDE 1 G/20ML
INJECTION, POWDER, LYOPHILIZED, FOR SOLUTION INTRAVENOUS DAILY PRN
Status: DISPENSED | OUTPATIENT
Start: 2025-08-08

## 2025-08-08 RX ORDER — VANCOMYCIN HYDROCHLORIDE 750 MG/150ML
750 INJECTION, SOLUTION INTRAVENOUS EVERY 12 HOURS
Status: DISCONTINUED | OUTPATIENT
Start: 2025-08-09 | End: 2025-08-10 | Stop reason: DRUGHIGH

## 2025-08-08 RX ORDER — HYDROCODONE BITARTRATE AND ACETAMINOPHEN 10; 325 MG/1; MG/1
1 TABLET ORAL ONCE
Refills: 0 | Status: COMPLETED | OUTPATIENT
Start: 2025-08-08 | End: 2025-08-08

## 2025-08-08 RX ORDER — HYDROCODONE BITARTRATE AND ACETAMINOPHEN 5; 325 MG/1; MG/1
1 TABLET ORAL EVERY 4 HOURS PRN
Refills: 0 | Status: DISCONTINUED | OUTPATIENT
Start: 2025-08-08 | End: 2025-08-08

## 2025-08-08 RX ORDER — VANCOMYCIN HYDROCHLORIDE 1 G/200ML
1000 INJECTION, SOLUTION INTRAVENOUS ONCE
Status: COMPLETED | OUTPATIENT
Start: 2025-08-08 | End: 2025-08-08

## 2025-08-08 RX ORDER — LISINOPRIL 10 MG/1
10 TABLET ORAL 2 TIMES DAILY
Status: DISPENSED | OUTPATIENT
Start: 2025-08-08

## 2025-08-08 RX ORDER — GABAPENTIN 400 MG/1
800 CAPSULE ORAL 3 TIMES DAILY
Status: DISPENSED | OUTPATIENT
Start: 2025-08-08

## 2025-08-08 RX ORDER — ENOXAPARIN SODIUM 100 MG/ML
40 INJECTION SUBCUTANEOUS EVERY 24 HOURS
Status: DISPENSED | OUTPATIENT
Start: 2025-08-08

## 2025-08-08 RX ORDER — ONDANSETRON HYDROCHLORIDE 2 MG/ML
4 INJECTION, SOLUTION INTRAVENOUS EVERY 4 HOURS PRN
Status: DISPENSED | OUTPATIENT
Start: 2025-08-08

## 2025-08-08 RX ORDER — HYDROCODONE BITARTRATE AND ACETAMINOPHEN 5; 325 MG/1; MG/1
1 TABLET ORAL EVERY 4 HOURS PRN
Refills: 0 | Status: DISPENSED | OUTPATIENT
Start: 2025-08-08

## 2025-08-08 RX ORDER — CYCLOBENZAPRINE HCL 5 MG
5 TABLET ORAL 3 TIMES DAILY
Status: DISPENSED | OUTPATIENT
Start: 2025-08-08

## 2025-08-08 RX ORDER — ATORVASTATIN CALCIUM 40 MG/1
40 TABLET, FILM COATED ORAL NIGHTLY
Status: DISPENSED | OUTPATIENT
Start: 2025-08-08

## 2025-08-08 RX ORDER — SODIUM CHLORIDE, SODIUM LACTATE, POTASSIUM CHLORIDE, CALCIUM CHLORIDE 600; 310; 30; 20 MG/100ML; MG/100ML; MG/100ML; MG/100ML
50 INJECTION, SOLUTION INTRAVENOUS CONTINUOUS
Status: ACTIVE | OUTPATIENT
Start: 2025-08-08 | End: 2025-08-09

## 2025-08-08 RX ADMIN — CYCLOBENZAPRINE HYDROCHLORIDE 5 MG: 5 TABLET, FILM COATED ORAL at 21:01

## 2025-08-08 RX ADMIN — HYDROCODONE BITARTRATE AND ACETAMINOPHEN 1 TABLET: 10; 325 TABLET ORAL at 19:38

## 2025-08-08 RX ADMIN — ENOXAPARIN SODIUM 40 MG: 100 INJECTION SUBCUTANEOUS at 21:01

## 2025-08-08 RX ADMIN — VANCOMYCIN HYDROCHLORIDE 1000 MG: 1 INJECTION, SOLUTION INTRAVENOUS at 21:52

## 2025-08-08 RX ADMIN — ATORVASTATIN CALCIUM 40 MG: 40 TABLET, FILM COATED ORAL at 21:02

## 2025-08-08 RX ADMIN — SODIUM CHLORIDE, SODIUM LACTATE, POTASSIUM CHLORIDE, AND CALCIUM CHLORIDE 50 ML/HR: .6; .31; .03; .02 INJECTION, SOLUTION INTRAVENOUS at 20:45

## 2025-08-08 RX ADMIN — AMPICILLIN SODIUM AND SULBACTAM SODIUM 3 G: 2; 1 INJECTION, POWDER, FOR SOLUTION INTRAMUSCULAR; INTRAVENOUS at 18:45

## 2025-08-08 RX ADMIN — LISINOPRIL 10 MG: 10 TABLET ORAL at 21:01

## 2025-08-08 RX ADMIN — GABAPENTIN 800 MG: 400 CAPSULE ORAL at 21:02

## 2025-08-08 SDOH — ECONOMIC STABILITY: FOOD INSECURITY: WITHIN THE PAST 12 MONTHS, THE FOOD YOU BOUGHT JUST DIDN'T LAST AND YOU DIDN'T HAVE MONEY TO GET MORE.: NEVER TRUE

## 2025-08-08 SDOH — ECONOMIC STABILITY: HOUSING INSECURITY: IN THE PAST 12 MONTHS, HOW MANY TIMES HAVE YOU MOVED WHERE YOU WERE LIVING?: 0

## 2025-08-08 SDOH — ECONOMIC STABILITY: FOOD INSECURITY: HOW HARD IS IT FOR YOU TO PAY FOR THE VERY BASICS LIKE FOOD, HOUSING, MEDICAL CARE, AND HEATING?: HARD

## 2025-08-08 SDOH — SOCIAL STABILITY: SOCIAL NETWORK: IN A TYPICAL WEEK, HOW MANY TIMES DO YOU TALK ON THE PHONE WITH FAMILY, FRIENDS, OR NEIGHBORS?: THREE TIMES A WEEK

## 2025-08-08 SDOH — SOCIAL STABILITY: SOCIAL NETWORK
DO YOU BELONG TO ANY CLUBS OR ORGANIZATIONS SUCH AS CHURCH GROUPS, UNIONS, FRATERNAL OR ATHLETIC GROUPS, OR SCHOOL GROUPS?: NO

## 2025-08-08 SDOH — HEALTH STABILITY: MENTAL HEALTH
DO YOU FEEL STRESS - TENSE, RESTLESS, NERVOUS, OR ANXIOUS, OR UNABLE TO SLEEP AT NIGHT BECAUSE YOUR MIND IS TROUBLED ALL THE TIME - THESE DAYS?: ONLY A LITTLE

## 2025-08-08 SDOH — ECONOMIC STABILITY: FOOD INSECURITY: WITHIN THE PAST 12 MONTHS, YOU WORRIED THAT YOUR FOOD WOULD RUN OUT BEFORE YOU GOT THE MONEY TO BUY MORE.: NEVER TRUE

## 2025-08-08 SDOH — SOCIAL STABILITY: SOCIAL NETWORK: HOW OFTEN DO YOU ATTEND MEETINGS OF THE CLUBS OR ORGANIZATIONS YOU BELONG TO?: NEVER

## 2025-08-08 SDOH — SOCIAL STABILITY: SOCIAL NETWORK: HOW OFTEN DO YOU GET TOGETHER WITH FRIENDS OR RELATIVES?: ONCE A WEEK

## 2025-08-08 SDOH — SOCIAL STABILITY: SOCIAL INSECURITY: ARE YOU MARRIED, WIDOWED, DIVORCED, SEPARATED, NEVER MARRIED, OR LIVING WITH A PARTNER?: WIDOWED

## 2025-08-08 SDOH — SOCIAL STABILITY: SOCIAL NETWORK: HOW OFTEN DO YOU ATTEND CHURCH OR RELIGIOUS SERVICES?: NEVER

## 2025-08-08 SDOH — SOCIAL STABILITY: SOCIAL INSECURITY: HAS ANYONE EVER THREATENED TO HURT YOUR FAMILY OR YOUR PETS?: NO

## 2025-08-08 SDOH — SOCIAL STABILITY: SOCIAL INSECURITY: DO YOU FEEL UNSAFE GOING BACK TO THE PLACE WHERE YOU ARE LIVING?: NO

## 2025-08-08 SDOH — ECONOMIC STABILITY: HOUSING INSECURITY: AT ANY TIME IN THE PAST 12 MONTHS, WERE YOU HOMELESS OR LIVING IN A SHELTER (INCLUDING NOW)?: NO

## 2025-08-08 SDOH — ECONOMIC STABILITY: HOUSING INSECURITY: IN THE LAST 12 MONTHS, WAS THERE A TIME WHEN YOU WERE NOT ABLE TO PAY THE MORTGAGE OR RENT ON TIME?: NO

## 2025-08-08 SDOH — SOCIAL STABILITY: SOCIAL INSECURITY
WITHIN THE LAST YEAR, HAVE YOU BEEN RAPED OR FORCED TO HAVE ANY KIND OF SEXUAL ACTIVITY BY YOUR PARTNER OR EX-PARTNER?: NO

## 2025-08-08 SDOH — SOCIAL STABILITY: SOCIAL INSECURITY: HAVE YOU HAD THOUGHTS OF HARMING ANYONE ELSE?: NO

## 2025-08-08 SDOH — ECONOMIC STABILITY: INCOME INSECURITY: IN THE PAST 12 MONTHS HAS THE ELECTRIC, GAS, OIL, OR WATER COMPANY THREATENED TO SHUT OFF SERVICES IN YOUR HOME?: NO

## 2025-08-08 SDOH — SOCIAL STABILITY: SOCIAL INSECURITY: WITHIN THE LAST YEAR, HAVE YOU BEEN HUMILIATED OR EMOTIONALLY ABUSED IN OTHER WAYS BY YOUR PARTNER OR EX-PARTNER?: NO

## 2025-08-08 SDOH — SOCIAL STABILITY: SOCIAL INSECURITY: ABUSE: ADULT

## 2025-08-08 SDOH — HEALTH STABILITY: PHYSICAL HEALTH: ON AVERAGE, HOW MANY MINUTES DO YOU ENGAGE IN EXERCISE AT THIS LEVEL?: 60 MIN

## 2025-08-08 SDOH — SOCIAL STABILITY: SOCIAL INSECURITY
WITHIN THE LAST YEAR, HAVE YOU BEEN KICKED, HIT, SLAPPED, OR OTHERWISE PHYSICALLY HURT BY YOUR PARTNER OR EX-PARTNER?: NO

## 2025-08-08 SDOH — SOCIAL STABILITY: SOCIAL INSECURITY: DOES ANYONE TRY TO KEEP YOU FROM HAVING/CONTACTING OTHER FRIENDS OR DOING THINGS OUTSIDE YOUR HOME?: NO

## 2025-08-08 SDOH — HEALTH STABILITY: PHYSICAL HEALTH
HOW OFTEN DO YOU NEED TO HAVE SOMEONE HELP YOU WHEN YOU READ INSTRUCTIONS, PAMPHLETS, OR OTHER WRITTEN MATERIAL FROM YOUR DOCTOR OR PHARMACY?: NEVER

## 2025-08-08 SDOH — SOCIAL STABILITY: SOCIAL INSECURITY: WITHIN THE LAST YEAR, HAVE YOU BEEN AFRAID OF YOUR PARTNER OR EX-PARTNER?: NO

## 2025-08-08 SDOH — HEALTH STABILITY: PHYSICAL HEALTH: ON AVERAGE, HOW MANY DAYS PER WEEK DO YOU ENGAGE IN MODERATE TO STRENUOUS EXERCISE (LIKE A BRISK WALK)?: 1 DAY

## 2025-08-08 SDOH — ECONOMIC STABILITY: TRANSPORTATION INSECURITY: IN THE PAST 12 MONTHS, HAS LACK OF TRANSPORTATION KEPT YOU FROM MEDICAL APPOINTMENTS OR FROM GETTING MEDICATIONS?: NO

## 2025-08-08 SDOH — SOCIAL STABILITY: SOCIAL INSECURITY: ARE THERE ANY APPARENT SIGNS OF INJURIES/BEHAVIORS THAT COULD BE RELATED TO ABUSE/NEGLECT?: NO

## 2025-08-08 SDOH — SOCIAL STABILITY: SOCIAL INSECURITY: ARE YOU OR HAVE YOU BEEN THREATENED OR ABUSED PHYSICALLY, EMOTIONALLY, OR SEXUALLY BY ANYONE?: NO

## 2025-08-08 SDOH — SOCIAL STABILITY: SOCIAL INSECURITY: DO YOU FEEL ANYONE HAS EXPLOITED OR TAKEN ADVANTAGE OF YOU FINANCIALLY OR OF YOUR PERSONAL PROPERTY?: NO

## 2025-08-08 SDOH — SOCIAL STABILITY: SOCIAL INSECURITY: HAVE YOU HAD ANY THOUGHTS OF HARMING ANYONE ELSE?: NO

## 2025-08-08 ASSESSMENT — COGNITIVE AND FUNCTIONAL STATUS - GENERAL
DAILY ACTIVITIY SCORE: 24
MOBILITY SCORE: 24
PATIENT BASELINE BEDBOUND: NO

## 2025-08-08 ASSESSMENT — LIFESTYLE VARIABLES
SKIP TO QUESTIONS 9-10: 1
HOW OFTEN DO YOU HAVE 6 OR MORE DRINKS ON ONE OCCASION: NEVER
SUBSTANCE_ABUSE_PAST_12_MONTHS: YES
HOW MANY STANDARD DRINKS CONTAINING ALCOHOL DO YOU HAVE ON A TYPICAL DAY: PATIENT DOES NOT DRINK
HAVE YOU EVER FELT YOU SHOULD CUT DOWN ON YOUR DRINKING: NO
TOTAL SCORE: 0
HOW OFTEN DO YOU HAVE A DRINK CONTAINING ALCOHOL: NEVER
AUDIT-C TOTAL SCORE: 0
PRESCIPTION_ABUSE_PAST_12_MONTHS: NO
EVER HAD A DRINK FIRST THING IN THE MORNING TO STEADY YOUR NERVES TO GET RID OF A HANGOVER: NO
EVER FELT BAD OR GUILTY ABOUT YOUR DRINKING: NO
HAVE PEOPLE ANNOYED YOU BY CRITICIZING YOUR DRINKING: NO
AUDIT-C TOTAL SCORE: 0

## 2025-08-08 ASSESSMENT — ACTIVITIES OF DAILY LIVING (ADL)
ASSISTIVE_DEVICE: CANE;EYEGLASSES;DENTURES PARTIAL
JUDGMENT_ADEQUATE_SAFELY_COMPLETE_DAILY_ACTIVITIES: YES
GROOMING: INDEPENDENT
ADEQUATE_TO_COMPLETE_ADL: YES
HEARING - LEFT EAR: FUNCTIONAL
FEEDING YOURSELF: INDEPENDENT
TOILETING: INDEPENDENT
HEARING - RIGHT EAR: FUNCTIONAL
WALKS IN HOME: INDEPENDENT
DRESSING YOURSELF: INDEPENDENT
PATIENT'S MEMORY ADEQUATE TO SAFELY COMPLETE DAILY ACTIVITIES?: YES
BATHING: INDEPENDENT
LACK_OF_TRANSPORTATION: NO
LACK_OF_TRANSPORTATION: NO

## 2025-08-08 ASSESSMENT — PAIN SCALES - GENERAL
PAINLEVEL_OUTOF10: 10 - WORST POSSIBLE PAIN
PAINLEVEL_OUTOF10: 5 - MODERATE PAIN

## 2025-08-08 ASSESSMENT — PATIENT HEALTH QUESTIONNAIRE - PHQ9
SUM OF ALL RESPONSES TO PHQ9 QUESTIONS 1 & 2: 0
1. LITTLE INTEREST OR PLEASURE IN DOING THINGS: NOT AT ALL
2. FEELING DOWN, DEPRESSED OR HOPELESS: NOT AT ALL

## 2025-08-08 ASSESSMENT — PAIN - FUNCTIONAL ASSESSMENT
PAIN_FUNCTIONAL_ASSESSMENT: 0-10
PAIN_FUNCTIONAL_ASSESSMENT: 0-10

## 2025-08-08 ASSESSMENT — PAIN DESCRIPTION - LOCATION: LOCATION: FOOT

## 2025-08-08 ASSESSMENT — PAIN DESCRIPTION - ORIENTATION: ORIENTATION: RIGHT

## 2025-08-08 NOTE — DISCHARGE INSTRUCTIONS
Please take full course of antibiotics.  Follow-up with your primary care provider    Please return to the ER or seek immediate medical attention if you experience Increasing redness, increasing warmth to touch, milky foul smelling drainage from your wound, fever, or worsening symptoms    You are welcome back any time. Thank you for entrusting your care to us, I hope we made your visit as pleasant as possible. Wishing you well!    Dr. Leslie

## 2025-08-08 NOTE — ED PROVIDER NOTES
"Transition of care note:  received signout from Dr. Reyes at 2200 following sepsis order set/workup results    ED Course as of 08/08/25 0120   u Aug 07, 2025   2143 Sepsis order set used due to tachycardia with erythema of the right foot with signs of lymphangitis.  [JL]   2150 Tetanus booster ordered for unknown tetanus vaccine status [JL]   2212 Pt signed out to Dr Leslie [JL]   2300 XR foot right 3+ views  . Mild medial midfoot soft tissue swelling which may represent  cellulitis.  2. No soft tissue gas.   [IS]   Fri Aug 08, 2025   0117 Shared decision making with patient had, advised that she does have inflammatory markers showing infection, there is no significant increase in redness after it was marked.  X-ray foot showing some soft tissue swelling representing cellulitis but no soft tissue gas.  Patient felt comfortable going home with oral antibiotics and advised on strict return precautions regarding worsening symptoms.  Patient voiced understanding that should get worse she may need to come into the hospital for IV antibiotics.  Urinalysis notable for 1+ bacteria and 250 leukocyte esterase, patient has no urinary symptoms, no indication to treat.  Will discharge home with Augmentin. [IS]      ED Course User Index  [IS] Mitchel Leslie MD  [JL] Cassie Reyes MD         Diagnoses as of 08/08/25 0120   Cat bite, initial encounter      Visit Vitals  /75 (BP Location: Right arm, Patient Position: Sitting)   Pulse 83   Temp 37 °C (98.6 °F)   Resp 16   Ht 1.575 m (5' 2\")   Wt 49.9 kg (110 lb)   SpO2 95%   BMI 20.12 kg/m²   OB Status Postmenopausal   Smoking Status Every Day   BSA 1.48 m²      Procedures                                                      Mitchel Leslie MD  Resident  08/08/25 0120    "

## 2025-08-08 NOTE — ED PROVIDER NOTES
Emergency Department Provider Note       EMERGENCY DEPARTMENT ENCOUNTER      Pt Name: Malika Casper  MRN: [id number]  Birthdate 1960  Date of evaluation: 8/7/2025  Provider: Cassie Reyes MD    CHIEF COMPLAINT       Chief Complaint   Patient presents with    Animal Bite     Pt reports she was bit on her right foot by her cat. Pt endorses pain with swelling over the foot and reports the bit was the morning prior to arrival.         HISTORY OF PRESENT ILLNESS    Malika Casper is a 64 y.o. female with history of hypertension, chronic pain syndrome who presents with a cat bite to the right foot. The patient's tetanus status is unknown.  Patient reports his cat bit her this morning, since then the pain has increased now a 5 out of 10 burning throbbing, she has also noticed her foot turning red and warm progressively more throughout the day.  Patient reports washing foot with soap and water after incident and putting clean socks on. patient denies fever, chills, shortness.     - Time of incident: this morning 0915   - Associated with pain and swelling.   - Not associated with fever.   - Severity: moderate   - Timing: constant   - Progression: gradually worsening   - Quality of pain: burning and throbbing   - Improved by nothing.   - Not improved by OTC medications or rest.   - Notifications: none   - Animal in possession: yes   - Animal's rabies vaccination status: up to date        History provided by:  Patient      Nursing Notes were reviewed.    PAST MEDICAL HISTORY   Medical History[1]      SURGICAL HISTORY     Surgical History[2]      CURRENT MEDICATIONS       Previous Medications    ALBUTEROL (VENTOLIN HFA) 90 MCG/ACTUATION INHALER    Inhale 2 puffs every 4 hours if needed for wheezing or shortness of breath.    ATORVASTATIN (LIPITOR) 40 MG TABLET    Take 1 tablet (40 mg) by mouth once daily at bedtime.    COLCHICINE 0.6 MG TABLET    Take 1 tablet (0.6 mg) by mouth once daily.    CYCLOBENZAPRINE  (FLEXERIL) 5 MG TABLET    Take 1 tablet (5 mg) by mouth 3 times a day.    FLUOXETINE (PROZAC) 10 MG CAPSULE    Take 1 capsule (10 mg) by mouth once daily.    GABAPENTIN (NEURONTIN) 800 MG TABLET    Take 1 tablet (800 mg) by mouth 3 times a day.    HYDROCODONE-ACETAMINOPHEN (NORCO)  MG TABLET    Take 1 tablet by mouth every 4 hours if needed for moderate pain (4 - 6) or severe pain (7 - 10). Max 5 tablets daily    LIDOCAINE 4 % PATCH    Place 1 patch on the skin once daily as needed for mild pain (1 - 3).    LISINOPRIL 10 MG TABLET    Take 1 tablet (10 mg) by mouth 2 times a day.    MEDICAL CANNABIS    Take 1 each by mouth if needed. Oil Or Sol Vap - 3.47 - 170 - Indica Cart - La Yg Cake, Edb Oral Admin 50-10 Gummy Pineapple Punch Ubgood, Edb Oral Admin - 22 - 10 - Gummy - D-Berry    MULTIVITAMIN (DAILY-JOEY, WITH FOLIC ACID,) TABLET    Take 1 tablet by mouth once daily.    NALOXONE (NARCAN) 4 MG/0.1 ML NASAL SPRAY        ONDANSETRON (ZOFRAN) 8 MG TABLET    TAKE ONE TABLET (8 MG) BY MOUTH EVERY EIGHT HOURS IF NEEDED FOR NAUSEA OR VOMITING.    PANTOPRAZOLE (PROTONIX) 40 MG EC TABLET    Take 1 tablet (40 mg) by mouth once daily in the morning. Take before meals.    PREDNISONE (DELTASONE) 10 MG TABLET    4x 3days,3 x 3 days,2 x 3 days,1 x 3 days       ALLERGIES     Amlodipine, Ibuprofen, Sulfamethoxazole-trimethoprim, and Vraylar [cariprazine]    FAMILY HISTORY     Family History[3]       SOCIAL HISTORY     Social History[4]    SCREENINGS                        PHYSICAL EXAM    (up to 7 for level 4, 8 or more for level 5)     ED Triage Vitals [08/07/25 2059]   Temperature Heart Rate Respirations BP   37 °C (98.6 °F) (!) 102 16 --      Pulse Ox Temp src Heart Rate Source Patient Position   95 % -- -- --      BP Location FiO2 (%)     Right arm --       Physical Exam  Vitals and nursing note reviewed.   Constitutional:       Appearance: She is well-developed.   HENT:      Head: Normocephalic and atraumatic.      Eyes:      Conjunctiva/sclera: Conjunctivae normal.       Cardiovascular:      Rate and Rhythm: Normal rate and regular rhythm.   Pulmonary:      Effort: Pulmonary effort is normal.      Breath sounds: Normal breath sounds.   Abdominal:      Palpations: Abdomen is soft.     Musculoskeletal:      Cervical back: Neck supple.        Feet:    Feet:      Right foot:      Skin integrity: Erythema and warmth present.      Comments: Bite marks noted on dorsal surface of foot and along medial edge.  Erythema, warmth, swelling noted along dorsum of foot with concern for lymphangitis going up medial side of lower leg    Skin:     General: Skin is warm and dry.      Capillary Refill: Capillary refill takes less than 2 seconds.     Neurological:      Mental Status: She is alert.     Psychiatric:         Mood and Affect: Mood normal.          DIAGNOSTIC RESULTS     LABS:  Labs Reviewed   BLOOD CULTURE   BLOOD CULTURE   CBC WITH AUTO DIFFERENTIAL   COMPREHENSIVE METABOLIC PANEL   LACTATE   URINALYSIS WITH REFLEX CULTURE AND MICROSCOPIC    Narrative:     The following orders were created for panel order Urinalysis with Reflex Culture and Microscopic.  Procedure                               Abnormality         Status                     ---------                               -----------         ------                     Urinalysis with Reflex C...[[id number]]                                                 Extra Urine Gray Tube[[id number]]                                                         Please view results for these tests on the individual orders.   URINALYSIS WITH REFLEX CULTURE AND MICROSCOPIC   EXTRA URINE GRAY TUBE       All other labs were within normal range or not returned as of this dictation.    Imaging  XR foot right 3+ views    (Results Pending)        Procedures  Procedures     EMERGENCY DEPARTMENT COURSE/MDM:   Medical Decision Making  Malika Casper is a 64 y.o. female with history of hypertension,  chronic pain syndrome who presents with a cat bite to the right foot. The patient's tetanus status is unknown.  Patient reports his cat bit her this morning, since then the pain has increased now a 5 out of 10 burning throbbing, she has also noticed her foot turning red and warm progressively more throughout the day.  Patient reports washing foot with soap and water after incident and putting clean socks on. Patient denies fever, chills, shortness.     - Time of incident: this morning 0915   - Associated with pain and swelling.   - Not associated with fever.   - Severity: moderate   - Timing: constant   - Progression: gradually worsening   - Quality of pain: burning and throbbing   - Improved by nothing.   - Not improved by OTC medications or rest.   - Notifications: none   - Animal in possession: yes   - Animal's rabies vaccination status: up to date    Patient pending sepsis lab workup, right foot x-ray to look for signs of osteomyelitis or foreign object.   If septic workup negative discharged home on Augmentin, if positive plan to admit patient for IV antibiotics. Care of patient signed off to Dr Mitchel Leslie 2200.     Differential diagnoses considered, but not limited to: Animal bite, sepsis, cellulitis, foreign object in skin, osteomyelitis        Please see ED course for additional MDM.    ED Course as of 08/07/25 2213   Thu Aug 07, 2025   2143 Sepsis order set used due to tachycardia with erythema of the right foot with signs of lymphangitis.  [JL]   2150 Tetanus booster ordered for unknown tetanus vaccine status [JL]   2212 Pt signed out to Dr Leslie [JL]      ED Course User Index  [JL] Cassie Reyes MD        XR foot right 3+ views    (Results Pending)       Patient and or family in agreement and understanding of treatment plan.  All questions answered.      I reviewed the case with the attending ED physician. The attending ED physician agrees with the plan. Patient and/or patient´s representative was  counseled regarding labs, imaging, likely diagnosis, and plan. All questions were answered.    ED Medications administered this visit:    Medications   piperacillin-tazobactam (Zosyn) 4.5 g in dextrose (iso)  mL (has no administration in time range)   diphth,pertus(acell),tetanus (BoostRIX) 2.5-8-5 Lf-mcg-Lf/0.5mL vaccine 0.5 mL (has no administration in time range)       New Prescriptions from this visit:    New Prescriptions    No medications on file       Follow-up:  No follow-up provider specified.      Final Impression: No diagnosis found.      (Please note that portions of this note were completed with a voice recognition program.  Efforts were made to edit the dictations but occasionally words are mis-transcribed.)                                                         Cassie Reyes MD  08/07/25 7864         [1]   Past Medical History:  Diagnosis Date    Abnormal findings on diagnostic imaging of other specified body structures     Abnormal finding on imaging    Encounter for screening for malignant neoplasm of colon 04/27/2022    Colon cancer screening    Liver disease, unspecified 12/16/2022    Chronic liver disease    Personal history of other medical treatment     History of transvaginal ultrasound    Personal history of other medical treatment     History of ultrasound, pelvic   [2]   Past Surgical History:  Procedure Laterality Date    ANKLE SURGERY  09/18/2017    Ankle Surgery    CT ABDOMEN PELVIS ANGIOGRAM W AND/OR WO IV CONTRAST  5/3/2020    CT ABDOMEN PELVIS ANGIOGRAM W AND/OR WO IV CONTRAST 5/3/2020 [facility]    KNEE SURGERY  09/18/2017    Knee Surgery    OTHER SURGICAL HISTORY  09/10/2015    Adrenal Gland Excision    OTHER SURGICAL HISTORY  12/02/2021    Gallbladder surgery   [3]   Family History  Problem Relation Name Age of Onset    Colon cancer Father      Prostate cancer Father      Breast cancer Mother's Sister  31   [4]   Social History  Socioeconomic History    Marital  status: Single   Tobacco Use    Smoking status: Every Day     Current packs/day: 0.50     Average packs/day: 0.5 packs/day for 25.0 years (12.5 ttl pk-yrs)     Types: Cigarettes     Passive exposure: Current    Smokeless tobacco: Never   Vaping Use    Vaping status: Never Used   Substance and Sexual Activity    Alcohol use: Not Currently    Drug use: Not Currently    Sexual activity: Defer     Social Drivers of Health     Financial Resource Strain: Medium Risk (7/14/2024)    Overall Financial Resource Strain (CARDIA)     Difficulty of Paying Living Expenses: Somewhat hard   Food Insecurity: Not on File (4/3/2023)    Received from [facility]    Food Insecurity     Food: 0   Transportation Needs: No Transportation Needs (7/14/2024)    PRAPARE - Transportation     Lack of Transportation (Medical): No     Lack of Transportation (Non-Medical): No   Physical Activity: Not on File (3/28/2023)    Received from [facility]    Physical Activity     Physical Activity: 0   Stress: Not on File (4/3/2023)    Received from [facility]    Stress     Stress: 0   Social Connections: Not on File (4/3/2023)    Received from [facility]    Social Connections     Connectedness: 0   Housing Stability: Low Risk  (7/14/2024)    Housing Stability Vital Sign     Unable to Pay for Housing in the Last Year: No     Number of Times Moved in the Last Year: 1     Homeless in the Last Year: No        Cassie Reyes MD  08/07/25 2515

## 2025-08-08 NOTE — ED PROVIDER NOTES
EMERGENCY DEPARTMENT ENCOUNTER      Pt Name: Malika Casper  MRN: [id number]  Birthdate 1960  Date of evaluation: 8/8/2025  Provider: Stefani LoPresti, DO    CHIEF COMPLAINT       Chief Complaint   Patient presents with    Animal Bite     Returning to ED for cat bite redness, swelling, pain worsening from  yesterday         HISTORY OF PRESENT ILLNESS    Patient is a 63 yo F presenting to the ED with a cat bite on her R foot. She was seen in the ED yesterday for the cat bite and is returning today for worsening pain, erythema, and swelling around the cat bite. A dose of zosyn was given yesterday in the ED and she was sent home with a prescription for Augmentin. She took one dose of Augmentin this morning. She is no longer able to ambulate without significant pain in the R foot. Erythema has not spread further up her leg, but the foot is significantly more swollen than before. Photos from today and yesterday can be found in the media section of the chart.  She did have an episode of vomiting and nausea yesterday.  She denies any purulent discharge from the wound, fever, or lightheadedness.      History provided by:  Patient   used: No        Nursing Notes were reviewed.    PAST MEDICAL HISTORY   Medical History[1]      SURGICAL HISTORY     Surgical History[2]      CURRENT MEDICATIONS       Current Discharge Medication List        CONTINUE these medications which have NOT CHANGED    Details   amoxicillin-clavulanate (Augmentin) 875-125 mg tablet Take 1 tablet by mouth every 12 hours for 10 days.  Qty: 20 tablet, Refills: 0    Associated Diagnoses: Cat bite, initial encounter      atorvastatin (Lipitor) 40 mg tablet Take 1 tablet (40 mg) by mouth once daily at bedtime.  Qty: 90 tablet, Refills: 1    Associated Diagnoses: Mixed hyperlipidemia      colchicine 0.6 mg tablet Take 1 tablet (0.6 mg) by mouth once daily.  Qty: 90 tablet, Refills: 0    Associated Diagnoses: Gout, arthritis       cyclobenzaprine (Flexeril) 5 mg tablet Take 1 tablet (5 mg) by mouth 3 times a day.      gabapentin (Neurontin) 800 mg tablet Take 1 tablet (800 mg) by mouth 3 times a day.      HYDROcodone-acetaminophen (Norco)  mg tablet Take 1 tablet by mouth every 4 hours if needed for moderate pain (4 - 6) or severe pain (7 - 10). Max 5 tablets daily      lisinopril 10 mg tablet Take 1 tablet (10 mg) by mouth 2 times a day.  Qty: 180 tablet, Refills: 1    Associated Diagnoses: Hypertension, unspecified type      multivitamin (Daily-Kia, with folic acid,) tablet Take 1 tablet by mouth once daily.  Qty: 90 tablet, Refills: 1    Associated Diagnoses: Herpes zoster without complication      albuterol (Ventolin HFA) 90 mcg/actuation inhaler Inhale 2 puffs every 4 hours if needed for wheezing or shortness of breath.  Qty: 16 g, Refills: 2    Associated Diagnoses: Acute cough      FLUoxetine (PROzac) 10 mg capsule Take 1 capsule (10 mg) by mouth once daily.  Qty: 30 capsule, Refills: 0    Associated Diagnoses: Anxiety and depression      lidocaine 4 % patch Place 1 patch on the skin once daily as needed for mild pain (1 - 3).      medical cannabis Take 1 each by mouth if needed. Oil Or Sol Vap - 3.47 - 170 - Indica Cart - [place]g [place], Edb Oral Admin 50-10 Gummy Pineapple Punch Nakia, Edb Oral Admin - 22 - 10 - Gummy - D-Berry      naloxone (Narcan) 4 mg/0.1 mL nasal spray Administer 1 spray (4 mg) into affected nostril(s) if needed.      ondansetron (Zofran) 8 mg tablet TAKE ONE TABLET (8 MG) BY MOUTH EVERY EIGHT HOURS IF NEEDED FOR NAUSEA OR VOMITING.  Qty: 30 tablet, Refills: 2    Associated Diagnoses: Mild nausea      pantoprazole (ProtoNix) 40 mg EC tablet Take 1 tablet (40 mg) by mouth once daily in the morning. Take before meals.  Qty: 180 tablet, Refills: 1    Associated Diagnoses: Gastroesophageal reflux disease without esophagitis      predniSONE (Deltasone) 10 mg tablet 4x 3days,3 x 3 days,2 x 3 days,1 x 3  days  Qty: 30 tablet, Refills: 0    Associated Diagnoses: Ganglion cyst of wrist, left             ALLERGIES     Amlodipine, Ibuprofen, Sulfamethoxazole-trimethoprim, and Vraylar [cariprazine]    FAMILY HISTORY     Family History[3]       SOCIAL HISTORY     Social History[4]    SCREENINGS                        PHYSICAL EXAM    (up to 7 for level 4, 8 or more for level 5)     ED Triage Vitals [08/08/25 1634]   Temperature Heart Rate Respirations BP   37.1 °C (98.8 °F) 95 18 (!) 189/96      Pulse Ox Temp Source Heart Rate Source Patient Position   100 % Temporal -- --      BP Location FiO2 (%)     -- --       Physical Exam  Vitals and nursing note reviewed.   Constitutional:       General: She is not in acute distress.     Appearance: Normal appearance. She is not ill-appearing.   HENT:      Head: Normocephalic and atraumatic.      Nose: Nose normal.      Mouth/Throat:      Mouth: Mucous membranes are moist.     Eyes:      Extraocular Movements: Extraocular movements intact.       Cardiovascular:      Rate and Rhythm: Normal rate and regular rhythm.      Pulses: Normal pulses.      Heart sounds: Normal heart sounds.   Pulmonary:      Effort: Pulmonary effort is normal. No respiratory distress.      Breath sounds: Normal breath sounds. No wheezing.   Abdominal:      General: Abdomen is flat.     Musculoskeletal:      Cervical back: Normal range of motion.      Right foot: Normal capillary refill. Swelling and tenderness present. No crepitus. Normal pulse.      Left foot: Normal.        Feet:       Comments: Bite marks noted on foot, no open laceration, no drainage from sites, erythema has not spread further up leg     Skin:     General: Skin is warm and dry.      Capillary Refill: Capillary refill takes less than 2 seconds.      Findings: Erythema (Throughout R foot, area of induration over R toe, no flutuance) present.     Neurological:      General: No focal deficit present.      Mental Status: She is alert and  oriented to person, place, and time.     Psychiatric:         Mood and Affect: Mood normal.         Behavior: Behavior normal.          DIAGNOSTIC RESULTS     LABS:  Labs Reviewed   CBC WITH AUTO DIFFERENTIAL - Abnormal       Result Value    WBC 8.1      nRBC 0.0      RBC 4.09      Hemoglobin 13.4      Hematocrit 41.4       (*)     MCH 32.8      MCHC 32.4      RDW 13.6      Platelets 221      Neutrophils % 58.5      Immature Granulocytes %, Automated 0.1      Lymphocytes % 29.4      Monocytes % 8.9      Eosinophils % 2.6      Basophils % 0.5      Neutrophils Absolute 4.72      Immature Granulocytes Absolute, Automated 0.01      Lymphocytes Absolute 2.37      Monocytes Absolute 0.72      Eosinophils Absolute 0.21      Basophils Absolute 0.04     COMPREHENSIVE METABOLIC PANEL - Abnormal    Glucose 100 (*)     Sodium 137      Potassium 6.0 (*)     Chloride 105      Bicarbonate 27      Anion Gap 11      Urea Nitrogen 14      Creatinine 0.69      eGFR >90      Calcium 8.6      Albumin 4.0      Alkaline Phosphatase 49      Total Protein 6.4      AST 58 (*)     Bilirubin, Total 0.4      ALT 19     C-REACTIVE PROTEIN - Normal    C-Reactive Protein 0.93     SEDIMENTATION RATE, AUTOMATED - Normal    Sedimentation Rate 5     LACTATE - Normal    Lactate 0.5      Narrative:     Venipuncture immediately after or during the administration of Metamizole may lead to falsely low results. Testing should be performed immediately prior to Metamizole dosing.   BASIC METABOLIC PANEL   CBC   MAGNESIUM       All other labs were within normal range or not returned as of this dictation.    Imaging  XR foot right 3+ views   Final Result   No acute bony abnormalities.   Signed by Fernando Goodson MD           Procedures  Procedures     EMERGENCY DEPARTMENT COURSE/MDM:   Medical Decision Making  Patient is a 65 yo F with a PMH of HTN and chronic pain  presenting to the ED with a cat bite on her R foot. She was seen in the ED yesterday for  the cat bite and is returning today for worsening pain, erythema, and swelling around the cat bite. She is hemodynamically stable and vital signs are within normal limits. Did receive a dose of zosyn in the ED yesterday before being discharged. She did have an elevated white count and initial tachycardia yesterday and was given strict return precautions. Blood cultures were negative. She did have an episode of vomiting last night. Took one dose of her prescribed Augmentin this morning. On exam, she is generally well appearing. No tachycardia or fever noted. She does have an area of induration around the cat bite on her big toe. Comparing it to photos from yesterday, it is more erythematous compared to before. She is now unable to ambulate on it without significant pain.     Differential diagnosis includes but is not limited to cellulitis, necrotizing fascitis. Will proceed with XR, CBC, CMP, lactate, Sed rate, CRP, and lactate. A dose of Unasyn was given in the ED as well as her home dose of Norco 10. Results are discussed in ED course. Call for admission was placed for continued IV abx treatment and inability to ambulate without significant pain.  Plan was discussed with patient and she was agreeable.    Amount and/or Complexity of Data Reviewed  External Data Reviewed: labs and notes.     Details: Prior ED visit was reviewed as well as blood cultures and CBC  Labs: ordered. Decision-making details documented in ED Course.  Radiology: ordered. Decision-making details documented in ED Course.        Please see ED course for additional MDM.    ED Course as of 08/08/25 2334   Fri Aug 08, 2025   1901 CBC and Auto Differential(!)  WBC is not elevated, no anemia noted [SL]   1901 Sedimentation rate, automated  Sed rate is within normal limits which is reassuring  [SL]   1911 Lactate  Lactate is within normal limits unlikely for any systemic infection, blood cultures were negative yesterday, no ischemic process [SL]    1912 Placed call for admission [SL]   1943 Has been admitted for observation [SL]   2121 XR foot right 3+ views  No acute bony abnormalities. No free air identified [SL]   2122 C-reactive protein  CRP is within normal limits  [SL]   2122 Comprehensive metabolic panel(!)  CMP did have an elevated potassium, however hemolysis was noted  [SL]      ED Course User Index  [SL] Stefani Lopresti, DO         Diagnoses as of 08/08/25 2334   Cat bite of right foot, subsequent encounter        XR foot right 3+ views   Final Result   No acute bony abnormalities.   Signed by Fernando Goodson MD          Patient and or family in agreement and understanding of treatment plan.  All questions answered.      I reviewed the case with the attending ED physician. The attending ED physician agrees with the plan. Patient and/or patient´s representative was counseled regarding labs, imaging, likely diagnosis, and plan. All questions were answered.    ED Medications administered this visit:    Medications   vancomycin (Vancocin) pharmacy to dose - pharmacy monitoring (has no administration in time range)   ampicillin-sulbactam (Unasyn) 3 g in sodium chloride 0.9%  mL (has no administration in time range)   lactated Ringer's infusion (50 mL/hr intravenous New Bag 8/8/25 2045)   acetaminophen (Tylenol) tablet 975 mg (has no administration in time range)   ondansetron (Zofran) injection 4 mg (has no administration in time range)   melatonin tablet 10 mg (has no administration in time range)   vancomycin (Vancocin) 750 mg in dextrose 5%  mL (has no administration in time range)   atorvastatin (Lipitor) tablet 40 mg (40 mg oral Given 8/8/25 2102)   colchicine tablet 0.6 mg (has no administration in time range)   cyclobenzaprine (Flexeril) tablet 5 mg (5 mg oral Given 8/8/25 2101)   gabapentin (Neurontin) capsule 800 mg (800 mg oral Given 8/8/25 2102)   lisinopril tablet 10 mg (10 mg oral Given 8/8/25 2101)   enoxaparin (Lovenox)  syringe 40 mg (40 mg subcutaneous Given 8/8/25 2101)   polyethylene glycol (Glycolax, Miralax) packet 17 g (has no administration in time range)   HYDROcodone-acetaminophen (Norco) 5-325 mg per tablet 1 tablet (has no administration in time range)   HYDROcodone-acetaminophen (Norco) 5-325 mg per tablet 2 tablet (has no administration in time range)   HYDROcodone-acetaminophen (Norco)  mg per tablet 1 tablet (1 tablet oral Given 8/8/25 1938)   ampicillin-sulbactam (Unasyn) 3 g in sodium chloride 0.9%  mL (0 g intravenous Stopped 8/8/25 2015)   vancomycin (Vancocin) 1,000 mg in dextrose 5%  mL (0 mg intravenous Stopped 8/8/25 2254)       New Prescriptions from this visit:    Current Discharge Medication List          Follow-up:  No follow-up provider specified.      Final Impression:   1. Cat bite of right foot, subsequent encounter          (Please note that portions of this note were completed with a voice recognition program.  Efforts were made to edit the dictations but occasionally words are mis-transcribed.)      Stefani Lopresti,   Resident  08/08/25 2244         [1]   Past Medical History:  Diagnosis Date    Abnormal findings on diagnostic imaging of other specified body structures     Abnormal finding on imaging    Encounter for screening for malignant neoplasm of colon 04/27/2022    Colon cancer screening    Liver disease, unspecified 12/16/2022    Chronic liver disease    Personal history of other medical treatment     History of transvaginal ultrasound    Personal history of other medical treatment     History of ultrasound, pelvic   [2]   Past Surgical History:  Procedure Laterality Date    ANKLE SURGERY  09/18/2017    Ankle Surgery    CT ABDOMEN PELVIS ANGIOGRAM W AND/OR WO IV CONTRAST  5/3/2020    CT ABDOMEN PELVIS ANGIOGRAM W AND/OR WO IV CONTRAST 5/3/2020 [facility]    KNEE SURGERY  09/18/2017    Knee Surgery    OTHER SURGICAL HISTORY  09/10/2015    Adrenal Gland Excision     OTHER SURGICAL HISTORY  12/02/2021    Gallbladder surgery   [3]   Family History  Problem Relation Name Age of Onset    Colon cancer Father      Prostate cancer Father      Breast cancer Mother's Sister  31   [4]   Social History  Socioeconomic History    Marital status: Single   Tobacco Use    Smoking status: Every Day     Current packs/day: 0.50     Average packs/day: 0.5 packs/day for 25.0 years (12.5 ttl pk-yrs)     Types: Cigarettes     Passive exposure: Current    Smokeless tobacco: Never   Vaping Use    Vaping status: Never Used   Substance and Sexual Activity    Alcohol use: Not Currently    Drug use: Yes     Types: Marijuana    Sexual activity: Defer     Social Drivers of Health     Financial Resource Strain: High Risk (8/8/2025)    Overall Financial Resource Strain (CARDIA)     Difficulty of Paying Living Expenses: Hard   Food Insecurity: No Food Insecurity (8/8/2025)    Hunger Vital Sign     Worried About Running Out of Food in the Last Year: Never true     Ran Out of Food in the Last Year: Never true   Transportation Needs: No Transportation Needs (8/8/2025)    PRAPARE - Transportation     Lack of Transportation (Medical): No     Lack of Transportation (Non-Medical): No   Physical Activity: Insufficiently Active (8/8/2025)    Exercise Vital Sign     Days of Exercise per Week: 1 day     Minutes of Exercise per Session: 60 min   Stress: No Stress Concern Present (8/8/2025)    [employer] [place] [employer] - Occupational Stress Questionnaire     Feeling of Stress: Only a little   Social Connections: Socially Isolated (8/8/2025)    Social Connection and Isolation Panel     Frequency of Communication with Friends and Family: Three times a week     Frequency of Social Gatherings with Friends and Family: Once a week     Attends Anabaptist Services: Never     Active Member of Clubs or Organizations: No     Attends Club or Organization Meetings: Never     Marital Status:    Intimate Partner  Violence: Not At Risk (8/8/2025)    Humiliation, Afraid, Rape, and Kick questionnaire     Fear of Current or Ex-Partner: No     Emotionally Abused: No     Physically Abused: No     Sexually Abused: No   Housing Stability: Low Risk  (8/8/2025)    Housing Stability Vital Sign     Unable to Pay for Housing in the Last Year: No     Number of Times Moved in the Last Year: 0     Homeless in the Last Year: No        Kilo Asif MD  08/09/25 0149

## 2025-08-08 NOTE — CONSULTS
Vancomycin Dosing by Pharmacy- INITIAL    Malika Casper is a 64 y.o. year old female who Pharmacy has been consulted for vancomycin dosing for cellulitis, skin and soft tissue. Based on the patient's indication and renal status this patient will be dosed based on a goal AUC of 400-600.     Renal function is currently stable.    Visit Vitals  /82   Pulse 80   Temp 37.1 °C (98.8 °F) (Temporal)   Resp 17        Lab Results   Component Value Date    CREATININE 0.69 2025    CREATININE 0.65 2025    CREATININE 0.62 2025    CREATININE 0.68 2025    CREATININE 0.53 2024    CREATININE 0.58 2024        Patient weight is as follows:   Vitals:    25 1634   Weight: 52.2 kg (115 lb)       Cultures:  No results found for the encounter in last 14 days.        No intake/output data recorded.  I/O during current shift:  No intake/output data recorded.    Temp (24hrs), Av.1 °C (98.7 °F), Min:37 °C (98.6 °F), Max:37.1 °C (98.8 °F)         Assessment/Plan     Patient will be given a loading dose of 1000 mg.  Will initiate vancomycin maintenance, 750 mg every 12 hours.    This dosing regimen is predicted by [employer] to result in the following pharmacokinetic parameters:  Loading dose: 1000mg  Regimen: 750 mg IV every 12 hours.  Start time: 19:47 on 2025  Exposure target: AUC24 (range) 400-600 mg/L.hr   AEQ78-39: 394 mg/L.hr  AUC24,ss: 509 mg/L.hr  Probability of AUC24 > 400: 75 %  Ctrough,ss: 15.8 mg/L  Probability of Ctrough,ss > 20: 31 %      Follow-up level will be ordered on 8/10 at 0500 unless clinically indicated sooner.  Will continue to monitor renal function daily while on vancomycin and order serum creatinine at least every 48 hours if not already ordered.  Follow for continued vancomycin needs, clinical response, and signs/symptoms of toxicity.       Ruben Humphreys, GennaroD
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
## 2025-08-09 PROBLEM — S91.351D: Status: ACTIVE | Noted: 2025-08-09

## 2025-08-09 LAB
ALBUMIN SERPL BCP-MCNC: 3.6 G/DL (ref 3.4–5)
ANION GAP SERPL CALC-SCNC: 10 MMOL/L (ref 10–20)
ANION GAP SERPL CALC-SCNC: 9 MMOL/L (ref 10–20)
BACTERIA UR CULT: NO GROWTH
BUN SERPL-MCNC: 14 MG/DL (ref 6–23)
BUN SERPL-MCNC: 14 MG/DL (ref 6–23)
CALCIUM SERPL-MCNC: 8.8 MG/DL (ref 8.6–10.3)
CALCIUM SERPL-MCNC: 8.9 MG/DL (ref 8.6–10.3)
CHLORIDE SERPL-SCNC: 106 MMOL/L (ref 98–107)
CHLORIDE SERPL-SCNC: 106 MMOL/L (ref 98–107)
CO2 SERPL-SCNC: 31 MMOL/L (ref 21–32)
CO2 SERPL-SCNC: 32 MMOL/L (ref 21–32)
CREAT SERPL-MCNC: 0.64 MG/DL (ref 0.5–1.05)
CREAT SERPL-MCNC: 0.65 MG/DL (ref 0.5–1.05)
EGFRCR SERPLBLD CKD-EPI 2021: >90 ML/MIN/1.73M*2
EGFRCR SERPLBLD CKD-EPI 2021: >90 ML/MIN/1.73M*2
ERYTHROCYTE [DISTWIDTH] IN BLOOD BY AUTOMATED COUNT: 13.4 % (ref 11.5–14.5)
GLUCOSE SERPL-MCNC: 104 MG/DL (ref 74–99)
GLUCOSE SERPL-MCNC: 105 MG/DL (ref 74–99)
HCT VFR BLD AUTO: 39.9 % (ref 36–46)
HGB BLD-MCNC: 13.1 G/DL (ref 12–16)
MAGNESIUM SERPL-MCNC: 1.94 MG/DL (ref 1.6–2.4)
MCH RBC QN AUTO: 33 PG (ref 26–34)
MCHC RBC AUTO-ENTMCNC: 32.8 G/DL (ref 32–36)
MCV RBC AUTO: 101 FL (ref 80–100)
NRBC BLD-RTO: 0 /100 WBCS (ref 0–0)
PHOSPHATE SERPL-MCNC: 3.9 MG/DL (ref 2.5–4.9)
PLATELET # BLD AUTO: 205 X10*3/UL (ref 150–450)
POTASSIUM SERPL-SCNC: 3.8 MMOL/L (ref 3.5–5.3)
POTASSIUM SERPL-SCNC: 3.8 MMOL/L (ref 3.5–5.3)
RBC # BLD AUTO: 3.97 X10*6/UL (ref 4–5.2)
SODIUM SERPL-SCNC: 143 MMOL/L (ref 136–145)
SODIUM SERPL-SCNC: 143 MMOL/L (ref 136–145)
WBC # BLD AUTO: 7.2 X10*3/UL (ref 4.4–11.3)

## 2025-08-09 PROCEDURE — 80048 BASIC METABOLIC PNL TOTAL CA: CPT | Mod: CCI

## 2025-08-09 PROCEDURE — 2500000001 HC RX 250 WO HCPCS SELF ADMINISTERED DRUGS (ALT 637 FOR MEDICARE OP): Performed by: STUDENT IN AN ORGANIZED HEALTH CARE EDUCATION/TRAINING PROGRAM

## 2025-08-09 PROCEDURE — S4991 NICOTINE PATCH NONLEGEND: HCPCS | Performed by: STUDENT IN AN ORGANIZED HEALTH CARE EDUCATION/TRAINING PROGRAM

## 2025-08-09 PROCEDURE — 80069 RENAL FUNCTION PANEL: CPT

## 2025-08-09 PROCEDURE — 2500000004 HC RX 250 GENERAL PHARMACY W/ HCPCS (ALT 636 FOR OP/ED): Performed by: STUDENT IN AN ORGANIZED HEALTH CARE EDUCATION/TRAINING PROGRAM

## 2025-08-09 PROCEDURE — 2500000001 HC RX 250 WO HCPCS SELF ADMINISTERED DRUGS (ALT 637 FOR MEDICARE OP)

## 2025-08-09 PROCEDURE — 97110 THERAPEUTIC EXERCISES: CPT | Mod: GP

## 2025-08-09 PROCEDURE — 97161 PT EVAL LOW COMPLEX 20 MIN: CPT | Mod: GP

## 2025-08-09 PROCEDURE — 2500000002 HC RX 250 W HCPCS SELF ADMINISTERED DRUGS (ALT 637 FOR MEDICARE OP, ALT 636 FOR OP/ED): Performed by: STUDENT IN AN ORGANIZED HEALTH CARE EDUCATION/TRAINING PROGRAM

## 2025-08-09 PROCEDURE — 2500000004 HC RX 250 GENERAL PHARMACY W/ HCPCS (ALT 636 FOR OP/ED)

## 2025-08-09 PROCEDURE — 83735 ASSAY OF MAGNESIUM: CPT

## 2025-08-09 PROCEDURE — 36415 COLL VENOUS BLD VENIPUNCTURE: CPT

## 2025-08-09 PROCEDURE — 99222 1ST HOSP IP/OBS MODERATE 55: CPT

## 2025-08-09 PROCEDURE — 80048 BASIC METABOLIC PNL TOTAL CA: CPT

## 2025-08-09 PROCEDURE — 85027 COMPLETE CBC AUTOMATED: CPT

## 2025-08-09 PROCEDURE — 1100000001 HC PRIVATE ROOM DAILY

## 2025-08-09 PROCEDURE — 94640 AIRWAY INHALATION TREATMENT: CPT

## 2025-08-09 RX ORDER — LORAZEPAM 0.5 MG/1
0.5 TABLET ORAL ONCE
Status: COMPLETED | OUTPATIENT
Start: 2025-08-09 | End: 2025-08-09

## 2025-08-09 RX ORDER — HYDRALAZINE HYDROCHLORIDE 20 MG/ML
10 INJECTION INTRAMUSCULAR; INTRAVENOUS ONCE
Status: COMPLETED | OUTPATIENT
Start: 2025-08-09 | End: 2025-08-09

## 2025-08-09 RX ORDER — IPRATROPIUM BROMIDE AND ALBUTEROL SULFATE 2.5; .5 MG/3ML; MG/3ML
3 SOLUTION RESPIRATORY (INHALATION) 3 TIMES DAILY
Status: DISPENSED | OUTPATIENT
Start: 2025-08-10

## 2025-08-09 RX ORDER — IBUPROFEN 200 MG
1 TABLET ORAL DAILY
Status: DISPENSED | OUTPATIENT
Start: 2025-08-09

## 2025-08-09 RX ORDER — IPRATROPIUM BROMIDE AND ALBUTEROL SULFATE 2.5; .5 MG/3ML; MG/3ML
3 SOLUTION RESPIRATORY (INHALATION) EVERY 2 HOUR PRN
Status: DISPENSED | OUTPATIENT
Start: 2025-08-09

## 2025-08-09 RX ORDER — IPRATROPIUM BROMIDE AND ALBUTEROL SULFATE 2.5; .5 MG/3ML; MG/3ML
3 SOLUTION RESPIRATORY (INHALATION)
Status: DISCONTINUED | OUTPATIENT
Start: 2025-08-09 | End: 2025-08-09

## 2025-08-09 RX ADMIN — GABAPENTIN 800 MG: 400 CAPSULE ORAL at 08:01

## 2025-08-09 RX ADMIN — LORAZEPAM 0.5 MG: 0.5 TABLET ORAL at 18:26

## 2025-08-09 RX ADMIN — AMPICILLIN SODIUM AND SULBACTAM SODIUM 3 G: 2; 1 INJECTION, POWDER, FOR SOLUTION INTRAMUSCULAR; INTRAVENOUS at 23:20

## 2025-08-09 RX ADMIN — COLCHICINE 0.6 MG: 0.6 TABLET, FILM COATED ORAL at 08:01

## 2025-08-09 RX ADMIN — LISINOPRIL 10 MG: 10 TABLET ORAL at 20:04

## 2025-08-09 RX ADMIN — AMPICILLIN SODIUM AND SULBACTAM SODIUM 3 G: 2; 1 INJECTION, POWDER, FOR SOLUTION INTRAMUSCULAR; INTRAVENOUS at 00:31

## 2025-08-09 RX ADMIN — CYCLOBENZAPRINE HYDROCHLORIDE 5 MG: 5 TABLET, FILM COATED ORAL at 08:01

## 2025-08-09 RX ADMIN — Medication 10 MG: at 20:03

## 2025-08-09 RX ADMIN — HYDROCODONE BITARTRATE AND ACETAMINOPHEN 2 TABLET: 5; 325 TABLET ORAL at 13:49

## 2025-08-09 RX ADMIN — HYDROCODONE BITARTRATE AND ACETAMINOPHEN 1 TABLET: 5; 325 TABLET ORAL at 06:10

## 2025-08-09 RX ADMIN — VANCOMYCIN HYDROCHLORIDE 750 MG: 750 INJECTION, SOLUTION INTRAVENOUS at 20:54

## 2025-08-09 RX ADMIN — CYCLOBENZAPRINE HYDROCHLORIDE 5 MG: 5 TABLET, FILM COATED ORAL at 14:02

## 2025-08-09 RX ADMIN — ATORVASTATIN CALCIUM 40 MG: 40 TABLET, FILM COATED ORAL at 20:03

## 2025-08-09 RX ADMIN — HYDROCODONE BITARTRATE AND ACETAMINOPHEN 2 TABLET: 5; 325 TABLET ORAL at 16:49

## 2025-08-09 RX ADMIN — IPRATROPIUM BROMIDE AND ALBUTEROL SULFATE 3 ML: 2.5; .5 SOLUTION RESPIRATORY (INHALATION) at 20:07

## 2025-08-09 RX ADMIN — HYDRALAZINE HYDROCHLORIDE 10 MG: 20 INJECTION INTRAMUSCULAR; INTRAVENOUS at 16:31

## 2025-08-09 RX ADMIN — AMPICILLIN SODIUM AND SULBACTAM SODIUM 3 G: 2; 1 INJECTION, POWDER, FOR SOLUTION INTRAMUSCULAR; INTRAVENOUS at 17:35

## 2025-08-09 RX ADMIN — NICOTINE 1 PATCH: 14 PATCH, EXTENDED RELEASE TRANSDERMAL at 14:22

## 2025-08-09 RX ADMIN — GABAPENTIN 800 MG: 400 CAPSULE ORAL at 20:04

## 2025-08-09 RX ADMIN — ENOXAPARIN SODIUM 40 MG: 100 INJECTION SUBCUTANEOUS at 20:05

## 2025-08-09 RX ADMIN — AMPICILLIN SODIUM AND SULBACTAM SODIUM 3 G: 2; 1 INJECTION, POWDER, FOR SOLUTION INTRAMUSCULAR; INTRAVENOUS at 06:05

## 2025-08-09 RX ADMIN — GABAPENTIN 800 MG: 400 CAPSULE ORAL at 14:02

## 2025-08-09 RX ADMIN — AMPICILLIN SODIUM AND SULBACTAM SODIUM 3 G: 2; 1 INJECTION, POWDER, FOR SOLUTION INTRAMUSCULAR; INTRAVENOUS at 11:05

## 2025-08-09 RX ADMIN — ONDANSETRON 4 MG: 2 INJECTION INTRAMUSCULAR; INTRAVENOUS at 20:05

## 2025-08-09 RX ADMIN — LISINOPRIL 10 MG: 10 TABLET ORAL at 08:01

## 2025-08-09 RX ADMIN — CYCLOBENZAPRINE HYDROCHLORIDE 5 MG: 5 TABLET, FILM COATED ORAL at 20:03

## 2025-08-09 ASSESSMENT — COGNITIVE AND FUNCTIONAL STATUS - GENERAL
CLIMB 3 TO 5 STEPS WITH RAILING: A LITTLE
MOVING FROM LYING ON BACK TO SITTING ON SIDE OF FLAT BED WITH BEDRAILS: A LITTLE
DAILY ACTIVITIY SCORE: 24
MOBILITY SCORE: 24
MOBILITY SCORE: 21
TURNING FROM BACK TO SIDE WHILE IN FLAT BAD: A LITTLE

## 2025-08-09 ASSESSMENT — PAIN - FUNCTIONAL ASSESSMENT
PAIN_FUNCTIONAL_ASSESSMENT: 0-10

## 2025-08-09 ASSESSMENT — PAIN SCALES - GENERAL
PAINLEVEL_OUTOF10: 4
PAINLEVEL_OUTOF10: 3
PAINLEVEL_OUTOF10: 9
PAINLEVEL_OUTOF10: 6
PAINLEVEL_OUTOF10: 8
PAINLEVEL_OUTOF10: 6
PAINLEVEL_OUTOF10: 7
PAINLEVEL_OUTOF10: 4

## 2025-08-09 ASSESSMENT — PAIN DESCRIPTION - LOCATION
LOCATION: FOOT

## 2025-08-09 ASSESSMENT — PAIN DESCRIPTION - ORIENTATION: ORIENTATION: RIGHT

## 2025-08-09 ASSESSMENT — ACTIVITIES OF DAILY LIVING (ADL): LACK_OF_TRANSPORTATION: NO
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
## 2025-08-09 NOTE — CARE PLAN
The patient's goals for the shift include sleep    The clinical goals for the shift include pain control

## 2025-08-09 NOTE — H&P
History Of Present Illness  64 YOF presenting to [facility] ED with worsening cellulitis.    Seen in the ED on 08/07 for feline bite of R foot, given dose of zosyn, discharged on augmentin. Took 2 doses of augmentin the following day, the pain worsened and erythema ascended on the foot prompting her to present to ED.  She was bit by her in door house cat.  Denies fever, chills, night sweats, chest pain, SOB or palpitations. Is endorsing severe pain in the RLE.    In the ED: AF HDS & SpO2 WNL on RA.  CBC negative for leukocytosis.  CMP notable for hyperkalemia. Plain film radiographs negative for gas or soft tissue edema. Admitted for further treatment. Given zosyn in ED.       Past Medical History  She has a past medical history of Abnormal findings on diagnostic imaging of other specified body structures, Encounter for screening for malignant neoplasm of colon (04/27/2022), Liver disease, unspecified (12/16/2022), Personal history of other medical treatment, and Personal history of other medical treatment.    Surgical History  She has a past surgical history that includes Other surgical history (09/10/2015); Other surgical history (12/02/2021); Knee surgery (09/18/2017); Ankle surgery (09/18/2017); and CT angio abdomen pelvis w and or wo IV IV contrast (5/3/2020).     Social History  She reports that she has been smoking cigarettes. She has a 12.5 pack-year smoking history. She has been exposed to tobacco smoke. She has never used smokeless tobacco. She reports that she does not currently use alcohol. She reports current drug use. Drug: Marijuana.    Family History  Family History[1]     Allergies  Amlodipine, Ibuprofen, Sulfamethoxazole-trimethoprim, and Vraylar [cariprazine]    Review of Systems    12 pt ROS obtained: positives & pertinent negatives listed in HPI   Physical Exam   Constitutional: A&Ox4, NAD, resting comfortable   Head and Face: Atraumatic, normocephalic   Eyes: Normal external exam, EOMI  ENT: Normal  external inspection of ears and nose. Oropharynx normal.  Cardiovascular: RRR, S1/S2, no murmurs, rubs, or gallops, radial pulses +2  Pulmonary: CTAB, no respiratory distress, no wheezing, rales or rhonchi, on RA  Abdomen: +BS, soft, non-tender, nondistended, no guarding rigidity or rebound tenderness, no masses noted  MSK: Negative for edema, No joint swelling, normal movements of all extremities.   RLE: tender to palpation, DP intact, mild erythema on anterior portion, no crepitus or edema   Neuro: No focal deficits, normal motor function, normal sensation, follows all commands  Skin- No lesions, contusions, or erythema.  Psychiatric: Judgment intact. Appropriate mood, affect and behavior       Last Recorded Vitals  /75 (BP Location: Right arm, Patient Position: Lying)   Pulse 83   Temp 36.7 °C (98.1 °F) (Temporal)   Resp 17   Wt 53.1 kg (117 lb)   SpO2 97%     Relevant Results           Assessment/Plan   Assessment & Plan  Cellulitis  Feline bite  Hyperkalemia     - Start Vanc, continue Unasyn  - Inflammatory markers unremarkable  - Recheck RFP, if K+ elevated will give HyperK cocktail  - Pain control  - Will need discharged on Ani-MRSA agent    IVF: LR  DVT Ppx: Lovenox  Diet: regular  Code Status: FULL CODE    Complexity moderate, anticipate DC in next 24 hours pending clinical improvement             Sly Almonte DO         [1]   Family History  Problem Relation Name Age of Onset    Colon cancer Father      Prostate cancer Father      Breast cancer Mother's Sister  31

## 2025-08-09 NOTE — CARE PLAN
The patient's goals for the shift include sleep    The clinical goals for the shift include pain control    Problem: Pain - Adult  Goal: Verbalizes/displays adequate comfort level or baseline comfort level  Outcome: Progressing     Problem: Safety - Adult  Goal: Free from fall injury  Outcome: Progressing     Problem: Discharge Planning  Goal: Discharge to home or other facility with appropriate resources  Outcome: Progressing
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
## 2025-08-10 LAB
ALBUMIN SERPL BCP-MCNC: 3.9 G/DL (ref 3.4–5)
ALP SERPL-CCNC: 55 U/L (ref 33–136)
ALT SERPL W P-5'-P-CCNC: 16 U/L (ref 7–45)
ANION GAP SERPL CALC-SCNC: 12 MMOL/L (ref 10–20)
AST SERPL W P-5'-P-CCNC: 18 U/L (ref 9–39)
BACTERIA BLD CULT: NORMAL
BACTERIA BLD CULT: NORMAL
BILIRUB SERPL-MCNC: 0.4 MG/DL (ref 0–1.2)
BUN SERPL-MCNC: 10 MG/DL (ref 6–23)
CALCIUM SERPL-MCNC: 9.6 MG/DL (ref 8.6–10.3)
CHLORIDE SERPL-SCNC: 105 MMOL/L (ref 98–107)
CO2 SERPL-SCNC: 30 MMOL/L (ref 21–32)
CREAT SERPL-MCNC: 0.56 MG/DL (ref 0.5–1.05)
EGFRCR SERPLBLD CKD-EPI 2021: >90 ML/MIN/1.73M*2
ERYTHROCYTE [DISTWIDTH] IN BLOOD BY AUTOMATED COUNT: 13.3 % (ref 11.5–14.5)
GLUCOSE SERPL-MCNC: 93 MG/DL (ref 74–99)
HCT VFR BLD AUTO: 43.9 % (ref 36–46)
HGB BLD-MCNC: 14.2 G/DL (ref 12–16)
MAGNESIUM SERPL-MCNC: 1.88 MG/DL (ref 1.6–2.4)
MCH RBC QN AUTO: 32.2 PG (ref 26–34)
MCHC RBC AUTO-ENTMCNC: 32.3 G/DL (ref 32–36)
MCV RBC AUTO: 100 FL (ref 80–100)
NRBC BLD-RTO: 0 /100 WBCS (ref 0–0)
PHOSPHATE SERPL-MCNC: 4 MG/DL (ref 2.5–4.9)
PLATELET # BLD AUTO: 218 X10*3/UL (ref 150–450)
POTASSIUM SERPL-SCNC: 3.9 MMOL/L (ref 3.5–5.3)
PROT SERPL-MCNC: 6.4 G/DL (ref 6.4–8.2)
RBC # BLD AUTO: 4.41 X10*6/UL (ref 4–5.2)
SODIUM SERPL-SCNC: 143 MMOL/L (ref 136–145)
VANCOMYCIN TROUGH SERPL-MCNC: 8.5 UG/ML (ref 5–20)
WBC # BLD AUTO: 7.2 X10*3/UL (ref 4.4–11.3)

## 2025-08-10 PROCEDURE — 80202 ASSAY OF VANCOMYCIN: CPT

## 2025-08-10 PROCEDURE — 1100000001 HC PRIVATE ROOM DAILY

## 2025-08-10 PROCEDURE — 2500000002 HC RX 250 W HCPCS SELF ADMINISTERED DRUGS (ALT 637 FOR MEDICARE OP, ALT 636 FOR OP/ED): Performed by: STUDENT IN AN ORGANIZED HEALTH CARE EDUCATION/TRAINING PROGRAM

## 2025-08-10 PROCEDURE — 2500000001 HC RX 250 WO HCPCS SELF ADMINISTERED DRUGS (ALT 637 FOR MEDICARE OP)

## 2025-08-10 PROCEDURE — 2500000001 HC RX 250 WO HCPCS SELF ADMINISTERED DRUGS (ALT 637 FOR MEDICARE OP): Performed by: STUDENT IN AN ORGANIZED HEALTH CARE EDUCATION/TRAINING PROGRAM

## 2025-08-10 PROCEDURE — 94640 AIRWAY INHALATION TREATMENT: CPT

## 2025-08-10 PROCEDURE — 84100 ASSAY OF PHOSPHORUS: CPT | Performed by: STUDENT IN AN ORGANIZED HEALTH CARE EDUCATION/TRAINING PROGRAM

## 2025-08-10 PROCEDURE — 85027 COMPLETE CBC AUTOMATED: CPT

## 2025-08-10 PROCEDURE — 2500000004 HC RX 250 GENERAL PHARMACY W/ HCPCS (ALT 636 FOR OP/ED)

## 2025-08-10 PROCEDURE — 83735 ASSAY OF MAGNESIUM: CPT

## 2025-08-10 PROCEDURE — 99233 SBSQ HOSP IP/OBS HIGH 50: CPT | Performed by: STUDENT IN AN ORGANIZED HEALTH CARE EDUCATION/TRAINING PROGRAM

## 2025-08-10 PROCEDURE — S4991 NICOTINE PATCH NONLEGEND: HCPCS | Performed by: STUDENT IN AN ORGANIZED HEALTH CARE EDUCATION/TRAINING PROGRAM

## 2025-08-10 PROCEDURE — 84075 ASSAY ALKALINE PHOSPHATASE: CPT | Performed by: STUDENT IN AN ORGANIZED HEALTH CARE EDUCATION/TRAINING PROGRAM

## 2025-08-10 PROCEDURE — 36415 COLL VENOUS BLD VENIPUNCTURE: CPT

## 2025-08-10 RX ORDER — HYDROXYZINE HYDROCHLORIDE 25 MG/1
25 TABLET, FILM COATED ORAL ONCE
Status: COMPLETED | OUTPATIENT
Start: 2025-08-10 | End: 2025-08-10

## 2025-08-10 RX ORDER — VANCOMYCIN HYDROCHLORIDE 1 G/200ML
1000 INJECTION, SOLUTION INTRAVENOUS EVERY 12 HOURS
Status: DISPENSED | OUTPATIENT
Start: 2025-08-10

## 2025-08-10 RX ADMIN — NICOTINE 1 PATCH: 14 PATCH, EXTENDED RELEASE TRANSDERMAL at 08:46

## 2025-08-10 RX ADMIN — CYCLOBENZAPRINE HYDROCHLORIDE 5 MG: 5 TABLET, FILM COATED ORAL at 08:44

## 2025-08-10 RX ADMIN — AMPICILLIN SODIUM AND SULBACTAM SODIUM 3 G: 2; 1 INJECTION, POWDER, FOR SOLUTION INTRAMUSCULAR; INTRAVENOUS at 17:28

## 2025-08-10 RX ADMIN — HYDROCODONE BITARTRATE AND ACETAMINOPHEN 2 TABLET: 5; 325 TABLET ORAL at 02:46

## 2025-08-10 RX ADMIN — IPRATROPIUM BROMIDE AND ALBUTEROL SULFATE 3 ML: 2.5; .5 SOLUTION RESPIRATORY (INHALATION) at 19:31

## 2025-08-10 RX ADMIN — CYCLOBENZAPRINE HYDROCHLORIDE 5 MG: 5 TABLET, FILM COATED ORAL at 15:11

## 2025-08-10 RX ADMIN — CYCLOBENZAPRINE HYDROCHLORIDE 5 MG: 5 TABLET, FILM COATED ORAL at 20:39

## 2025-08-10 RX ADMIN — ONDANSETRON 4 MG: 2 INJECTION INTRAMUSCULAR; INTRAVENOUS at 13:35

## 2025-08-10 RX ADMIN — HYDROXYZINE HYDROCHLORIDE 25 MG: 25 TABLET, FILM COATED ORAL at 10:24

## 2025-08-10 RX ADMIN — GABAPENTIN 800 MG: 400 CAPSULE ORAL at 15:11

## 2025-08-10 RX ADMIN — GABAPENTIN 800 MG: 400 CAPSULE ORAL at 20:39

## 2025-08-10 RX ADMIN — AMPICILLIN SODIUM AND SULBACTAM SODIUM 3 G: 2; 1 INJECTION, POWDER, FOR SOLUTION INTRAMUSCULAR; INTRAVENOUS at 05:23

## 2025-08-10 RX ADMIN — HYDROCODONE BITARTRATE AND ACETAMINOPHEN 2 TABLET: 5; 325 TABLET ORAL at 18:23

## 2025-08-10 RX ADMIN — VANCOMYCIN HYDROCHLORIDE 1000 MG: 1 INJECTION, SOLUTION INTRAVENOUS at 08:51

## 2025-08-10 RX ADMIN — HYDROCODONE BITARTRATE AND ACETAMINOPHEN 2 TABLET: 5; 325 TABLET ORAL at 12:36

## 2025-08-10 RX ADMIN — HYDROCODONE BITARTRATE AND ACETAMINOPHEN 2 TABLET: 5; 325 TABLET ORAL at 07:58

## 2025-08-10 RX ADMIN — LISINOPRIL 10 MG: 10 TABLET ORAL at 08:44

## 2025-08-10 RX ADMIN — IPRATROPIUM BROMIDE AND ALBUTEROL SULFATE 3 ML: 2.5; .5 SOLUTION RESPIRATORY (INHALATION) at 14:24

## 2025-08-10 RX ADMIN — GABAPENTIN 800 MG: 400 CAPSULE ORAL at 08:44

## 2025-08-10 RX ADMIN — VANCOMYCIN HYDROCHLORIDE 1000 MG: 1 INJECTION, SOLUTION INTRAVENOUS at 20:06

## 2025-08-10 RX ADMIN — IPRATROPIUM BROMIDE AND ALBUTEROL SULFATE 3 ML: 2.5; .5 SOLUTION RESPIRATORY (INHALATION) at 08:02

## 2025-08-10 RX ADMIN — HYDROCODONE BITARTRATE AND ACETAMINOPHEN 2 TABLET: 5; 325 TABLET ORAL at 22:53

## 2025-08-10 RX ADMIN — ATORVASTATIN CALCIUM 40 MG: 40 TABLET, FILM COATED ORAL at 20:39

## 2025-08-10 RX ADMIN — AMPICILLIN SODIUM AND SULBACTAM SODIUM 3 G: 2; 1 INJECTION, POWDER, FOR SOLUTION INTRAMUSCULAR; INTRAVENOUS at 23:52

## 2025-08-10 RX ADMIN — COLCHICINE 0.6 MG: 0.6 TABLET, FILM COATED ORAL at 08:44

## 2025-08-10 RX ADMIN — AMPICILLIN SODIUM AND SULBACTAM SODIUM 3 G: 2; 1 INJECTION, POWDER, FOR SOLUTION INTRAMUSCULAR; INTRAVENOUS at 12:45

## 2025-08-10 RX ADMIN — LISINOPRIL 10 MG: 10 TABLET ORAL at 20:39

## 2025-08-10 RX ADMIN — ENOXAPARIN SODIUM 40 MG: 100 INJECTION SUBCUTANEOUS at 20:39

## 2025-08-10 ASSESSMENT — COGNITIVE AND FUNCTIONAL STATUS - GENERAL
MOBILITY SCORE: 24
DAILY ACTIVITIY SCORE: 24
DAILY ACTIVITIY SCORE: 24
MOBILITY SCORE: 24

## 2025-08-10 ASSESSMENT — PAIN DESCRIPTION - LOCATION
LOCATION: FOOT

## 2025-08-10 ASSESSMENT — PAIN DESCRIPTION - DESCRIPTORS
DESCRIPTORS: SHARP
DESCRIPTORS: SHARP

## 2025-08-10 ASSESSMENT — PAIN SCALES - GENERAL
PAINLEVEL_OUTOF10: 10 - WORST POSSIBLE PAIN
PAINLEVEL_OUTOF10: 8
PAINLEVEL_OUTOF10: 9
PAINLEVEL_OUTOF10: 8
PAINLEVEL_OUTOF10: 2
PAINLEVEL_OUTOF10: 10 - WORST POSSIBLE PAIN
PAINLEVEL_OUTOF10: 8
PAINLEVEL_OUTOF10: 7
PAINLEVEL_OUTOF10: 10 - WORST POSSIBLE PAIN
PAINLEVEL_OUTOF10: 3

## 2025-08-10 ASSESSMENT — PAIN - FUNCTIONAL ASSESSMENT
PAIN_FUNCTIONAL_ASSESSMENT: 0-10

## 2025-08-10 ASSESSMENT — PAIN DESCRIPTION - ORIENTATION: ORIENTATION: RIGHT

## 2025-08-10 NOTE — PROGRESS NOTES
"Malika Casper is a 64 y.o. female on day 1 of admission presenting with Cellulitis.    Subjective   Patient seen and examined at bedside.  Patient reports that she is still having a severe amount of pain in her right foot.  She states she is having a lot of trouble moving her toes.  She has been able to put weight on it and ambulate to the bathroom.  She is also feeling anxious.       Objective     Physical Exam    Constitutional: Well developed, no distress, alert and cooperative  Skin: Warm and dry  Eyes: EOMI, clear sclera  ENMT: mucous membranes moist  Respiratory: Patent airways, CTAB  Cardiovascular: Regular, rate and rhythm  Abdominal: Nondistended, soft, non-tender, +BS  MSK: ROM intact, R foot with multiple lacerations and punctures at the ball of foot with concern for developing purulence, overall swelling and erythema improving  Neuro: alert and oriented x3  Psych: anxious     Last Recorded Vitals  Blood pressure 143/70, pulse 86, temperature 36.5 °C (97.7 °F), temperature source Temporal, resp. rate 20, height 1.575 m (5' 2\"), weight 53.1 kg (117 lb), SpO2 97%.  Intake/Output last 3 Shifts:  I/O last 3 completed shifts:  In: 1449.2 (27.3 mL/kg) [I.V.:1199.2 (22.6 mL/kg); IV Piggyback:250]  Out: - (0 mL/kg)   Weight: 53.1 kg     Relevant Results  Scheduled medications  Scheduled Medications[1]  Continuous medications  Continuous Medications[2]  PRN medications  PRN Medications[3]  Results from last 7 days   Lab Units 08/10/25  0529 08/09/25  0147 08/08/25  1833   WBC AUTO x10*3/uL 7.2 7.2 8.1   RBC AUTO x10*6/uL 4.41 3.97* 4.09   HEMOGLOBIN g/dL 14.2 13.1 13.4     Results from last 7 days   Lab Units 08/10/25  0529 08/09/25  0147 08/08/25  1920 08/07/25  2230   SODIUM mmol/L 143 143  143 137 138   POTASSIUM mmol/L 3.9 3.8  3.8 6.0* 3.8   CHLORIDE mmol/L 105 106  106 105 100   CO2 mmol/L 30 32  31 27 29   BUN mg/dL 10 14  14 14 11   CREATININE mg/dL 0.56 0.65  0.64 0.69 0.65   CALCIUM mg/dL 9.6 " 8.9  8.8 8.6 9.6   PHOSPHORUS mg/dL 4.0 3.9  --   --    MAGNESIUM mg/dL 1.88 1.94  --   --    BILIRUBIN TOTAL mg/dL 0.4  --  0.4 0.4   ALT U/L 16  --  19 22   AST U/L 18  --  58* 21       Imaging  XR foot right 3+ views  Result Date: 8/8/2025  No acute bony abnormalities. Signed by Fernando Goodson MD      Cardiology, Vascular, and Other Imaging  No other imaging results found for the past 2 days       Assessment/Plan   Assessment & Plan  Cellulitis  Feline bite  Hyperkalemia   Cat bite of right foot, subsequent encounter      -overall cellulitis improving, puncture wound with concern for purulence   -podiatry consulted   -continue unasyn and vanc   -PRN norco   -continue home meds     DVT ppx: lovenox     Dispo: Is a 64-year-old female who presented with right foot cellulitis after her indoor cat bit her.  Currently receiving IV antibiotics and pain control.  Overall cellulitis improving however small area at puncture site concern for possible development of purulence.  Podiatry is consulted.  Anticipate discharge in 24 to 48 hours.    Plan discussed with patient at bedside    This note is created using voice recognition software. All efforts are made to minimize errors, if there are errors there due to transcription.    Ale Bowser DO   Hospitalist             [1] ampicillin-sulbactam, 3 g, intravenous, q6h  atorvastatin, 40 mg, oral, Nightly  colchicine, 0.6 mg, oral, Daily  cyclobenzaprine, 5 mg, oral, TID  enoxaparin, 40 mg, subcutaneous, q24h  gabapentin, 800 mg, oral, TID  ipratropium-albuteroL, 3 mL, nebulization, TID  lisinopril, 10 mg, oral, BID  nicotine, 1 patch, transdermal, Daily  polyethylene glycol, 17 g, oral, Daily  vancomycin, 1,000 mg, intravenous, q12h    [2]    [3] PRN medications: acetaminophen, HYDROcodone-acetaminophen, HYDROcodone-acetaminophen, ipratropium-albuteroL, melatonin, ondansetron, vancomycin

## 2025-08-10 NOTE — CARE PLAN
The patient's goals for the shift include decreased pain     The clinical goals for the shift include Pt will report decreased pain through the end of the shift

## 2025-08-10 NOTE — CARE PLAN
The patient's goals for the shift include sleep    The clinical goals for the shift include pain control      Problem: Pain - Adult  Goal: Verbalizes/displays adequate comfort level or baseline comfort level  Outcome: Progressing     Problem: Safety - Adult  Goal: Free from fall injury  Outcome: Progressing     Problem: Pain  Goal: STG - Patient verbalizes a reduction in pain level  Outcome: Progressing

## 2025-08-10 NOTE — PROGRESS NOTES
Vancomycin Dosing by Pharmacy- FOLLOW UP    Malika Casper is a 64 y.o. year old female who Pharmacy has been consulted for vancomycin dosing for cellulitis, skin and soft tissue. Based on the patient's indication and renal status this patient is being dosed based on a goal AUC of 400-600.     Renal function is currently stable.    Current vancomycin dose: 750 mg given every 12 hours    Most recent random level: 8.5 mcg/mL    Visit Vitals  /71 (BP Location: Left arm, Patient Position: Lying)   Pulse 74   Temp 36 °C (96.8 °F) (Temporal)   Resp 16        Lab Results   Component Value Date    CREATININE 0.56 08/10/2025    CREATININE 0.64 2025    CREATININE 0.65 2025    CREATININE 0.69 2025    CREATININE 0.62 2025    CREATININE 0.68 2025        Patient weight is as follows:   Vitals:    25   Weight: 53.1 kg (117 lb)       Cultures:  No results found for the encounter in last 14 days.       I/O last 3 completed shifts:  In: 1449.2 (27.3 mL/kg) [I.V.:1199.2 (22.6 mL/kg); IV Piggyback:250]  Out: - (0 mL/kg)   Weight: 53.1 kg   I/O during current shift:  No intake/output data recorded.    Temp (24hrs), Av.4 °C (97.5 °F), Min:36 °C (96.8 °F), Max:36.8 °C (98.2 °F)      Assessment/Plan    Below goal AUC. Orders placed for new vancomcyin regimen of 1000 every 12 hours to begin at 8/10 @0800.     This dosing regimen is predicted by [email or web address] to result in the following pharmacokinetic parameters:    Loading dose: N/A  Regimen: 1000 mg IV every 12 hours.  Start time: 08:05 on 08/10/2025  Exposure target: AUC24 (range) 400-600 mg/L.hr   [id number]: 478 mg/L.hr  AUC24,ss: 541 mg/L.hr  Probability of AUC24 > 400: >95 %  Ctrough,ss: 15.8 mg/L  Probability of Ctrough,ss > 20: 17 %    The next level will be obtained on  at 0500. May be obtained sooner if clinically indicated.   Will continue to monitor renal function daily while on vancomycin and order serum creatinine at least  every 48 hours if not already ordered.  Follow for continued vancomycin needs, clinical response, and signs/symptoms of toxicity.       Ruddy Figueroa, PharmD

## 2025-08-11 ENCOUNTER — PHARMACY VISIT (OUTPATIENT)
Dept: PHARMACY | Facility: CLINIC | Age: 65
End: 2025-08-11
Payer: MEDICAID

## 2025-08-11 VITALS
HEIGHT: 62 IN | HEART RATE: 77 BPM | TEMPERATURE: 99 F | RESPIRATION RATE: 18 BRPM | WEIGHT: 117 LBS | BODY MASS INDEX: 21.53 KG/M2 | DIASTOLIC BLOOD PRESSURE: 81 MMHG | SYSTOLIC BLOOD PRESSURE: 145 MMHG | OXYGEN SATURATION: 98 %

## 2025-08-11 VITALS
WEIGHT: 117 LBS | HEART RATE: 83 BPM | OXYGEN SATURATION: 98 % | BODY MASS INDEX: 21.53 KG/M2 | TEMPERATURE: 97.5 F | RESPIRATION RATE: 16 BRPM | HEIGHT: 62 IN | DIASTOLIC BLOOD PRESSURE: 74 MMHG | SYSTOLIC BLOOD PRESSURE: 141 MMHG

## 2025-08-11 LAB
ANION GAP SERPL CALC-SCNC: 10 MMOL/L (ref 10–20)
BUN SERPL-MCNC: 11 MG/DL (ref 6–23)
CALCIUM SERPL-MCNC: 9.1 MG/DL (ref 8.6–10.3)
CHLORIDE SERPL-SCNC: 106 MMOL/L (ref 98–107)
CO2 SERPL-SCNC: 29 MMOL/L (ref 21–32)
CREAT SERPL-MCNC: 0.54 MG/DL (ref 0.5–1.05)
EGFRCR SERPLBLD CKD-EPI 2021: >90 ML/MIN/1.73M*2
ERYTHROCYTE [DISTWIDTH] IN BLOOD BY AUTOMATED COUNT: 13.4 % (ref 11.5–14.5)
GLUCOSE SERPL-MCNC: 93 MG/DL (ref 74–99)
HCT VFR BLD AUTO: 39.3 % (ref 36–46)
HGB BLD-MCNC: 13.2 G/DL (ref 12–16)
MAGNESIUM SERPL-MCNC: 1.96 MG/DL (ref 1.6–2.4)
MCH RBC QN AUTO: 33.4 PG (ref 26–34)
MCHC RBC AUTO-ENTMCNC: 33.6 G/DL (ref 32–36)
MCV RBC AUTO: 100 FL (ref 80–100)
NRBC BLD-RTO: 0 /100 WBCS (ref 0–0)
PHOSPHATE SERPL-MCNC: 4 MG/DL (ref 2.5–4.9)
PLATELET # BLD AUTO: 208 X10*3/UL (ref 150–450)
POTASSIUM SERPL-SCNC: 3.9 MMOL/L (ref 3.5–5.3)
RBC # BLD AUTO: 3.95 X10*6/UL (ref 4–5.2)
SODIUM SERPL-SCNC: 141 MMOL/L (ref 136–145)
VANCOMYCIN SERPL-MCNC: 12.2 UG/ML (ref 5–20)
WBC # BLD AUTO: 6.6 X10*3/UL (ref 4.4–11.3)

## 2025-08-11 PROCEDURE — RXMED WILLOW AMBULATORY MEDICATION CHARGE

## 2025-08-11 PROCEDURE — S4991 NICOTINE PATCH NONLEGEND: HCPCS | Performed by: STUDENT IN AN ORGANIZED HEALTH CARE EDUCATION/TRAINING PROGRAM

## 2025-08-11 PROCEDURE — 2500000004 HC RX 250 GENERAL PHARMACY W/ HCPCS (ALT 636 FOR OP/ED)

## 2025-08-11 PROCEDURE — 99239 HOSP IP/OBS DSCHRG MGMT >30: CPT | Performed by: STUDENT IN AN ORGANIZED HEALTH CARE EDUCATION/TRAINING PROGRAM

## 2025-08-11 PROCEDURE — 83735 ASSAY OF MAGNESIUM: CPT

## 2025-08-11 PROCEDURE — 80048 BASIC METABOLIC PNL TOTAL CA: CPT

## 2025-08-11 PROCEDURE — 80202 ASSAY OF VANCOMYCIN: CPT

## 2025-08-11 PROCEDURE — 2500000002 HC RX 250 W HCPCS SELF ADMINISTERED DRUGS (ALT 637 FOR MEDICARE OP, ALT 636 FOR OP/ED): Performed by: STUDENT IN AN ORGANIZED HEALTH CARE EDUCATION/TRAINING PROGRAM

## 2025-08-11 PROCEDURE — 84100 ASSAY OF PHOSPHORUS: CPT | Performed by: STUDENT IN AN ORGANIZED HEALTH CARE EDUCATION/TRAINING PROGRAM

## 2025-08-11 PROCEDURE — 94640 AIRWAY INHALATION TREATMENT: CPT

## 2025-08-11 PROCEDURE — 2500000001 HC RX 250 WO HCPCS SELF ADMINISTERED DRUGS (ALT 637 FOR MEDICARE OP)

## 2025-08-11 PROCEDURE — 85027 COMPLETE CBC AUTOMATED: CPT

## 2025-08-11 PROCEDURE — 0H9MXZZ DRAINAGE OF RIGHT FOOT SKIN, EXTERNAL APPROACH: ICD-10-PCS | Performed by: STUDENT IN AN ORGANIZED HEALTH CARE EDUCATION/TRAINING PROGRAM

## 2025-08-11 PROCEDURE — 36415 COLL VENOUS BLD VENIPUNCTURE: CPT

## 2025-08-11 RX ORDER — DOXYCYCLINE 100 MG/1
100 CAPSULE ORAL 2 TIMES DAILY
Qty: 12 CAPSULE | Refills: 0 | Status: SHIPPED | OUTPATIENT
Start: 2025-08-11 | End: 2025-08-17

## 2025-08-11 RX ORDER — IBUPROFEN 200 MG
1 TABLET ORAL DAILY
Qty: 28 PATCH | Refills: 0 | Status: SHIPPED | OUTPATIENT
Start: 2025-08-12 | End: 2025-09-11

## 2025-08-11 RX ADMIN — HYDROCODONE BITARTRATE AND ACETAMINOPHEN 2 TABLET: 5; 325 TABLET ORAL at 06:07

## 2025-08-11 RX ADMIN — POLYETHYLENE GLYCOL 3350 17 G: 17 POWDER, FOR SOLUTION ORAL at 08:53

## 2025-08-11 RX ADMIN — NICOTINE 1 PATCH: 14 PATCH, EXTENDED RELEASE TRANSDERMAL at 08:17

## 2025-08-11 RX ADMIN — HYDROCODONE BITARTRATE AND ACETAMINOPHEN 2 TABLET: 5; 325 TABLET ORAL at 10:09

## 2025-08-11 RX ADMIN — LISINOPRIL 10 MG: 10 TABLET ORAL at 08:52

## 2025-08-11 RX ADMIN — CYCLOBENZAPRINE HYDROCHLORIDE 5 MG: 5 TABLET, FILM COATED ORAL at 08:52

## 2025-08-11 RX ADMIN — IPRATROPIUM BROMIDE AND ALBUTEROL SULFATE 3 ML: 2.5; .5 SOLUTION RESPIRATORY (INHALATION) at 07:37

## 2025-08-11 RX ADMIN — GABAPENTIN 800 MG: 400 CAPSULE ORAL at 08:52

## 2025-08-11 RX ADMIN — VANCOMYCIN HYDROCHLORIDE 1000 MG: 1 INJECTION, SOLUTION INTRAVENOUS at 08:17

## 2025-08-11 RX ADMIN — ONDANSETRON 4 MG: 2 INJECTION INTRAMUSCULAR; INTRAVENOUS at 08:04

## 2025-08-11 RX ADMIN — COLCHICINE 0.6 MG: 0.6 TABLET, FILM COATED ORAL at 08:52

## 2025-08-11 RX ADMIN — AMPICILLIN SODIUM AND SULBACTAM SODIUM 3 G: 2; 1 INJECTION, POWDER, FOR SOLUTION INTRAMUSCULAR; INTRAVENOUS at 06:07

## 2025-08-11 ASSESSMENT — ENCOUNTER SYMPTOMS
SHORTNESS OF BREATH: 0
NAUSEA: 1
COUGH: 1
HALLUCINATIONS: 0
SEIZURES: 0
EYE REDNESS: 0
WOUND: 1
VOMITING: 0
FEVER: 0
EYE ITCHING: 0
DIZZINESS: 0
CHILLS: 0
WHEEZING: 1
NERVOUS/ANXIOUS: 1
EYE PAIN: 0

## 2025-08-11 ASSESSMENT — COGNITIVE AND FUNCTIONAL STATUS - GENERAL
MOBILITY SCORE: 24
DAILY ACTIVITIY SCORE: 24

## 2025-08-11 ASSESSMENT — PAIN DESCRIPTION - ORIENTATION: ORIENTATION: RIGHT

## 2025-08-11 ASSESSMENT — PAIN - FUNCTIONAL ASSESSMENT
PAIN_FUNCTIONAL_ASSESSMENT: 0-10

## 2025-08-11 ASSESSMENT — PAIN SCALES - GENERAL
PAINLEVEL_OUTOF10: 10 - WORST POSSIBLE PAIN
PAINLEVEL_OUTOF10: 8
PAINLEVEL_OUTOF10: 10 - WORST POSSIBLE PAIN
PAINLEVEL_OUTOF10: 8

## 2025-08-11 ASSESSMENT — PAIN DESCRIPTION - LOCATION: LOCATION: FOOT

## 2025-08-11 ASSESSMENT — PAIN DESCRIPTION - DESCRIPTORS
DESCRIPTORS: DISCOMFORT
DESCRIPTORS: SHARP

## 2025-08-11 NOTE — CONSULTS
Consults   Reason For Consult  Cat bite, right foot    History Of Present Illness  Malika Casper is a 64 y.o. female presenting with cellulitis, right foot.  Patient states that she was bitten by her cat over 1 week ago, and developed some redness and swelling.  Patient reported to [facility] ED on 8/7/2025, and was given a dose of Zosyn and discharged on p.o. Augmentin.  Patient states that the redness and pain worsened, so she brought ported back to the ED on 8/8/2025.  Patient states that the redness and swelling is significantly improved since admission.  Patient admits to mild nausea, denies any other constitutional symptoms, and has no other complaints at this time.     Past Medical History  She has a past medical history of Abnormal findings on diagnostic imaging of other specified body structures, Encounter for screening for malignant neoplasm of colon (04/27/2022), Liver disease, unspecified (12/16/2022), Personal history of other medical treatment, and Personal history of other medical treatment.    Surgical History  She has a past surgical history that includes Other surgical history (09/10/2015); Other surgical history (12/02/2021); Knee surgery (09/18/2017); Ankle surgery (09/18/2017); and CT angio abdomen pelvis w and or wo IV IV contrast (5/3/2020).     Social History  She reports that she has been smoking cigarettes. She has a 12.5 pack-year smoking history. She has been exposed to tobacco smoke. She has never used smokeless tobacco. She reports that she does not currently use alcohol. She reports current drug use. Drug: Marijuana.    Family History  Family History[1]     Allergies  Amlodipine, Ibuprofen, Sulfamethoxazole-trimethoprim, and Vraylar [cariprazine]    Review of Systems  Review of Systems   Constitutional:  Negative for chills and fever.   HENT:  Negative for nosebleeds and sneezing.    Eyes:  Negative for pain, redness and itching.   Respiratory:  Positive for cough and wheezing.  "Negative for shortness of breath.    Cardiovascular:  Negative for leg swelling.   Gastrointestinal:  Positive for nausea. Negative for vomiting.   Endocrine: Negative for cold intolerance and heat intolerance.   Skin:  Positive for wound.   Allergic/Immunologic: Negative for environmental allergies and food allergies.   Neurological:  Negative for dizziness and seizures.   Psychiatric/Behavioral:  Negative for hallucinations. The patient is nervous/anxious.          Physical Exam  Vascular: DP and PT pulses  palpable 2/4 bilaterally.  CFT under 3 seconds when tested at distal digits.  Skin temp warm to warm from proximal to distal.      Neuro: Protective sensation intact, light tough sensation intact.  No Tinel's or Valleix's signs elicited.      Derm: Small cutaneous abscess noted to medial aspect of first metatarsal head, right foot presenting as small pustule centrally.  Perilesional erythema noted, no proximal streaking.  Pain elicited upon palpation.  Several small excoriations noted to right foot with stable eschar, no SOI noted.  No subcutaneous nodules.  Interdigital spaces are CDI.    Musc: No gross deformities noted.  No POP noted to any other area of the foot/ankle.     Medications  Scheduled medications  Scheduled Medications[2]  Continuous medications  Continuous Medications[3]  PRN medications  PRN Medications[4]     Last Recorded Vitals  Blood pressure 159/74, pulse 73, temperature 36 °C (96.8 °F), resp. rate 18, height 1.575 m (5' 2\"), weight 53.1 kg (117 lb), SpO2 98%.    Relevant Results  Results for orders placed or performed during the hospital encounter of 08/08/25 (from the past 24 hours)   Basic metabolic panel   Result Value Ref Range    Glucose 93 74 - 99 mg/dL    Sodium 141 136 - 145 mmol/L    Potassium 3.9 3.5 - 5.3 mmol/L    Chloride 106 98 - 107 mmol/L    Bicarbonate 29 21 - 32 mmol/L    Anion Gap 10 10 - 20 mmol/L    Urea Nitrogen 11 6 - 23 mg/dL    Creatinine 0.54 0.50 - 1.05 mg/dL    " eGFR >90 >60 mL/min/1.73m*2    Calcium 9.1 8.6 - 10.3 mg/dL   Magnesium   Result Value Ref Range    Magnesium 1.96 1.60 - 2.40 mg/dL   Vancomycin   Result Value Ref Range    Vancomycin 12.2 5.0 - 20.0 ug/mL   Phosphorus   Result Value Ref Range    Phosphorus 4.0 2.5 - 4.9 mg/dL   CBC   Result Value Ref Range    WBC 6.6 4.4 - 11.3 x10*3/uL    nRBC 0.0 0.0 - 0.0 /100 WBCs    RBC 3.95 (L) 4.00 - 5.20 x10*6/uL    Hemoglobin 13.2 12.0 - 16.0 g/dL    Hematocrit 39.3 36.0 - 46.0 %     80 - 100 fL    MCH 33.4 26.0 - 34.0 pg    MCHC 33.6 32.0 - 36.0 g/dL    RDW 13.4 11.5 - 14.5 %    Platelets 208 150 - 450 x10*3/uL     *Note: Due to a large number of results and/or encounters for the requested time period, some results have not been displayed. A complete set of results can be found in Results Review.      Imaging  No results found.    Cardiology, Vascular, and Other Imaging  No other imaging results found for the past 2 days        Assessment/Plan     Assessment:  - Cellulitis, right foot  - Cat bite, right foot      Plan:  - Pt examined and evaluated.  All findings discussed to patient to their satisfaction and understanding.  - Reviewed labs and chart.  No leukocytosis noted, vital stable.  No soft tissue emphysema, foreign body, or fracture noted on x-rays.  - Systemic conditions managed by primary team.  - Performed incision and drainage of superficial cutaneous abscess, right foot with #15 scalpel blade after sterile preparation with Betadine solution.  Less than 0.1 cc of purulence was expressed.  Flushed procedure site with copious amounts of normal sterile saline no fluctuance, soft tissue crepitus, or signs of deeper infection present at this time.  -Instructed patient to take entire course of antibiotics upon discharge until all medication is gone.  Instructed patient to monitor for worsening SOI, and report to [facility] ED immediately if present.  - No contraindication to discharge from/standpoint.  Patient to  follow-up in office next week.  Will continue to follow inpatient.  Please contact podiatry with any acute questions/concerns.    - Thank you for the consult.     Ruddy Ellison DPM  Podiatric Surgery  Doc Halo/Iron PUENTE spent >50 minutes in the professional and overall care of this patient.      Ruddy Ellison DPM        [1]   Family History  Problem Relation Name Age of Onset    Colon cancer Father      Prostate cancer Father      Breast cancer Mother's Sister  31   [2] ampicillin-sulbactam, 3 g, intravenous, q6h  atorvastatin, 40 mg, oral, Nightly  colchicine, 0.6 mg, oral, Daily  cyclobenzaprine, 5 mg, oral, TID  enoxaparin, 40 mg, subcutaneous, q24h  gabapentin, 800 mg, oral, TID  ipratropium-albuteroL, 3 mL, nebulization, TID  lisinopril, 10 mg, oral, BID  nicotine, 1 patch, transdermal, Daily  polyethylene glycol, 17 g, oral, Daily  vancomycin, 1,000 mg, intravenous, q12h  [3]    [4] PRN medications: acetaminophen, HYDROcodone-acetaminophen, HYDROcodone-acetaminophen, ipratropium-albuteroL, melatonin, ondansetron, vancomycin

## 2025-08-11 NOTE — PROGRESS NOTES
08/11/25 1149   Discharge Planning   Living Arrangements Alone   Support Systems Parent;Children   Type of Residence Private residence   Home or Post Acute Services None   Expected Discharge Disposition Home     Met with patient. Identified self and role. Patient lives alone and is independent. Patient does not have a PCP. Provided patient with a list of  PCP's. Verified address and insurance. Patient would like her Dad Dudley Dexterdolores as her primary contact [phone] and then her son Tre. Patient is being discharged home will with transport self.

## 2025-08-11 NOTE — PROGRESS NOTES
Vancomycin Dosing by Pharmacy- FOLLOW UP    Malika Casper is a 64 y.o. year old female who Pharmacy has been consulted for vancomycin dosing for cellulitis, skin and soft tissue. Based on the patient's indication and renal status this patient is being dosed based on a goal AUC of 400-600.     Renal function is currently stable.    Current vancomycin dose: 1000 mg given every 12 hours    Most recent random level: 12.2 mcg/mL    Visit Vitals  /74 (BP Location: Left arm, Patient Position: Lying)   Pulse 73   Temp 36 °C (96.8 °F)   Resp 18        Lab Results   Component Value Date    CREATININE 0.54 2025    CREATININE 0.56 08/10/2025    CREATININE 0.64 2025    CREATININE 0.65 2025    CREATININE 0.62 2025    CREATININE 0.68 2025        Patient weight is as follows:   Vitals:    25   Weight: 53.1 kg (117 lb)       Cultures:  No results found for the encounter in last 14 days.       I/O last 3 completed shifts:  In: 2431.7 (45.8 mL/kg) [P.O.:580; I.V.:801.7 (15.1 mL/kg); IV Piggyback:1050]  Out: - (0 mL/kg)   Weight: 53.1 kg   I/O during current shift:  No intake/output data recorded.    Temp (24hrs), Av.6 °C (97.9 °F), Min:36 °C (96.8 °F), Max:37.2 °C (99 °F)      Assessment/Plan    Within goal AUC range. Continue current vancomycin regimen.    This dosing regimen is predicted by [employer] to result in the following pharmacokinetic parameters:    Loading dose: N/A  Regimen: 1000 mg IV every 12 hours.  Start time: 08:06 on 2025  Exposure target: AUC24 (range) 400-600 mg/L.hr   TDH89-55: 483 mg/L.hr  AUC24,ss: 494 mg/L.hr  Probability of AUC24 > 400: 93 %  Ctrough,ss: 13.6 mg/L  Probability of Ctrough,ss > 20: %    The next level will be obtained on  at 0500. May be obtained sooner if clinically indicated.   Will continue to monitor renal function daily while on vancomycin and order serum creatinine at least every 48 hours if not already ordered.  Follow for  continued vancomycin needs, clinical response, and signs/symptoms of toxicity.       Ruddy Figueroa, PharmD

## 2025-08-11 NOTE — PROGRESS NOTES
Occupational Therapy                 Therapy Communication Note    Patient Name: Malika Casper  MRN: [id number]  Department:   Room: 3126/3126-A  Today's Date: 8/11/2025   Time: 1207    Discipline: Occupational Therapy    Comment: Received orders for OT eval, and chart review completed. Spoke with pt's RN, and pt with discharges orders and planning on returning home soon. Pt has been up independent and does not have acute OT needs. Will discharge OT services.

## 2025-08-11 NOTE — DISCHARGE SUMMARY
Discharge Diagnosis  Cellulitis       Issues Requiring Follow-Up  Cat bite   Cellulitis       Discharge Meds     Medication List      START taking these medications     doxycycline 100 mg capsule; Commonly known as: Vibramycin; Take 1   capsule (100 mg) by mouth 2 times a day for 6 days. Take with at least 8   ounces (large glass) of water, do not lie down for 30 minutes after   nicotine 14 mg/24 hr patch; Commonly known as: Nicoderm CQ; Place 1   patch over 24 hours on the skin once daily.; Start taking on: August 12, 2025     CONTINUE taking these medications     albuterol 90 mcg/actuation inhaler; Commonly known as: Ventolin HFA;   Inhale 2 puffs every 4 hours if needed for wheezing or shortness of   breath.   amoxicillin-clavulanate 875-125 mg tablet; Commonly known as: Augmentin;   Take 1 tablet by mouth every 12 hours for 10 days.   atorvastatin 40 mg tablet; Commonly known as: Lipitor; Take 1 tablet (40   mg) by mouth once daily at bedtime.   colchicine 0.6 mg tablet; Take 1 tablet (0.6 mg) by mouth once daily.   cyclobenzaprine 5 mg tablet; Commonly known as: Flexeril   Daily-Kia (with folic acid) 400 mcg tablet; Generic drug: multivitamin;   Take 1 tablet by mouth once daily.   gabapentin 800 mg tablet; Commonly known as: Neurontin   HYDROcodone-acetaminophen  mg tablet; Commonly known as: Norco   lidocaine 4 % patch   lisinopril 10 mg tablet; Take 1 tablet (10 mg) by mouth 2 times a day.   medical cannabis   naloxone 4 mg/0.1 mL nasal spray; Commonly known as: Narcan   ondansetron 8 mg tablet; Commonly known as: Zofran; TAKE ONE TABLET (8   MG) BY MOUTH EVERY EIGHT HOURS IF NEEDED FOR NAUSEA OR VOMITING.     STOP taking these medications     FLUoxetine 10 mg capsule; Commonly known as: PROzac   pantoprazole 40 mg EC tablet; Commonly known as: ProtoNix   predniSONE 10 mg tablet; Commonly known as: Deltasone       Test Results Pending At Discharge  Pending Labs       No current pending labs.             Hospital Course   64-year-old female who presented with right foot cellulitis after her indoor cat bit her. Currently receiving IV antibiotics and pain control. Overall cellulitis improving, however small area at puncture site concerning for development of purulence. Podiatry was consulted. Pt had bedside I&D of superficial cutaneous abscess. She continued to improve and was determined to be medically stable for discharge home with instructions for close follow up.     D/c >30min    Pertinent Physical Exam At Time of Discharge  Physical Exam  Constitutional: Well developed, no distress, alert and cooperative  Skin: Warm and dry  Eyes: EOMI, clear sclera  ENMT: mucous membranes moist  Respiratory: Patent airways, CTAB  Cardiovascular: Regular, rate and rhythm  MSK: ROM intact, R foot wrapped with c/d/I dressing   Neuro: alert and oriented x3  Psych: anxious     Outpatient Follow-Up  Future Appointments   Date Time Provider Department Center   8/25/2025 10:30 AM Renae Mcconnell MD [id number] [place]         Ale Bowser DO

## 2025-08-11 NOTE — CARE PLAN
The patient's goals for the shift include sleep    The clinical goals for the shift include verbalized decrease in pain/able to get some sleep    Problem: Pain - Adult  Goal: Verbalizes/displays adequate comfort level or baseline comfort level  Outcome: Progressing     Problem: Safety - Adult  Goal: Free from fall injury  Outcome: Progressing     Problem: Chronic Conditions and Co-morbidities  Goal: Patient's chronic conditions and co-morbidity symptoms are monitored and maintained or improved  Outcome: Progressing     Problem: Nutrition  Goal: Nutrient intake appropriate for maintaining nutritional needs  Outcome: Progressing

## 2025-08-11 NOTE — DISCHARGE INSTRUCTIONS
-Please continue the Augmentin you were previously given. In addition you were given a prescription for doxycycline, please complete both of these medications     -Please follow up with Podiatry Dr. Ellison in 1 week, please call to schedule   -Please follow up with your PCP in 7-10 days, please call to schedule   -We also recommend establishing care with the [facility] for extra support and adjustment to some of the big changes going on in your life     -Please take  your cat to the vet as previously scheduled/recommended

## 2025-08-11 NOTE — NURSING NOTE
Patient discharged to home via  and will transport herself home. Medications were delivered to bedside. Patient is aware that she will need to follow up with her PCP and Dr. Ellison. Patient was also instructed to follow up with psych at the [facility]. Patient provided with nicotine patches and was informed that she should not use them if she was going to smoke.

## 2025-08-11 NOTE — CARE PLAN
"The patient's goals for the shift include \"discharge to home.\"    The clinical goals for the shift include Patient will verbalize a decrease in pain by discharge this afternoon.    Patient will discharge to home this afternoon on oral antibiotics.  "

## 2025-08-12 ENCOUNTER — HOSPITAL ENCOUNTER (EMERGENCY)
Facility: HOSPITAL | Age: 65
Discharge: HOME | End: 2025-08-12
Attending: STUDENT IN AN ORGANIZED HEALTH CARE EDUCATION/TRAINING PROGRAM
Payer: COMMERCIAL

## 2025-08-12 ENCOUNTER — PATIENT OUTREACH (OUTPATIENT)
Dept: PRIMARY CARE | Facility: CLINIC | Age: 65
End: 2025-08-12
Payer: COMMERCIAL

## 2025-08-12 ENCOUNTER — APPOINTMENT (OUTPATIENT)
Dept: RADIOLOGY | Facility: HOSPITAL | Age: 65
End: 2025-08-12
Payer: COMMERCIAL

## 2025-08-12 VITALS
RESPIRATION RATE: 20 BRPM | BODY MASS INDEX: 21.53 KG/M2 | HEIGHT: 62 IN | DIASTOLIC BLOOD PRESSURE: 84 MMHG | OXYGEN SATURATION: 98 % | SYSTOLIC BLOOD PRESSURE: 156 MMHG | HEART RATE: 83 BPM | TEMPERATURE: 98.1 F | WEIGHT: 117 LBS

## 2025-08-12 DIAGNOSIS — L03.115 CELLULITIS OF RIGHT FOOT: ICD-10-CM

## 2025-08-12 DIAGNOSIS — B37.0 CANDIDIASIS OF MOUTH: ICD-10-CM

## 2025-08-12 DIAGNOSIS — R26.2 DIFFICULTY IN WALKING: Primary | ICD-10-CM

## 2025-08-12 LAB
ALBUMIN SERPL BCP-MCNC: 4.4 G/DL (ref 3.4–5)
ALBUMIN SERPL BCP-MCNC: NORMAL G/DL
ALP SERPL-CCNC: 59 U/L (ref 33–136)
ALP SERPL-CCNC: NORMAL U/L
ALT SERPL W P-5'-P-CCNC: 23 U/L (ref 7–45)
ALT SERPL W P-5'-P-CCNC: NORMAL U/L
ANION GAP SERPL CALC-SCNC: 11 MMOL/L (ref 10–20)
ANION GAP SERPL CALC-SCNC: NORMAL MMOL/L
AST SERPL W P-5'-P-CCNC: 28 U/L (ref 9–39)
AST SERPL W P-5'-P-CCNC: NORMAL U/L
BACTERIA BLD CULT: NORMAL
BACTERIA BLD CULT: NORMAL
BASOPHILS # BLD AUTO: 0.04 X10*3/UL (ref 0–0.1)
BASOPHILS NFR BLD AUTO: 0.4 %
BILIRUB SERPL-MCNC: 0.8 MG/DL (ref 0–1.2)
BILIRUB SERPL-MCNC: NORMAL MG/DL
BUN SERPL-MCNC: 8 MG/DL (ref 6–23)
BUN SERPL-MCNC: NORMAL MG/DL
CALCIUM SERPL-MCNC: 9.8 MG/DL (ref 8.6–10.3)
CALCIUM SERPL-MCNC: NORMAL MG/DL
CHLORIDE SERPL-SCNC: 103 MMOL/L (ref 98–107)
CHLORIDE SERPL-SCNC: NORMAL MMOL/L
CO2 SERPL-SCNC: 30 MMOL/L (ref 21–32)
CO2 SERPL-SCNC: NORMAL MMOL/L
CREAT SERPL-MCNC: 0.59 MG/DL (ref 0.5–1.05)
CREAT SERPL-MCNC: NORMAL MG/DL
EGFRCR SERPLBLD CKD-EPI 2021: >90 ML/MIN/1.73M*2
EGFRCR SERPLBLD CKD-EPI 2021: NORMAL ML/MIN/{1.73_M2}
EOSINOPHIL # BLD AUTO: 0.42 X10*3/UL (ref 0–0.7)
EOSINOPHIL NFR BLD AUTO: 3.9 %
ERYTHROCYTE [DISTWIDTH] IN BLOOD BY AUTOMATED COUNT: 13.8 % (ref 11.5–14.5)
ERYTHROCYTE [SEDIMENTATION RATE] IN BLOOD BY WESTERGREN METHOD: 19 MM/H (ref 0–30)
GLUCOSE SERPL-MCNC: 94 MG/DL (ref 74–99)
GLUCOSE SERPL-MCNC: NORMAL MG/DL
HCT VFR BLD AUTO: 43.4 % (ref 36–46)
HGB BLD-MCNC: 14.9 G/DL (ref 12–16)
IMM GRANULOCYTES # BLD AUTO: 0.03 X10*3/UL (ref 0–0.7)
IMM GRANULOCYTES NFR BLD AUTO: 0.3 % (ref 0–0.9)
LYMPHOCYTES # BLD AUTO: 1.88 X10*3/UL (ref 1.2–4.8)
LYMPHOCYTES NFR BLD AUTO: 17.5 %
MAGNESIUM SERPL-MCNC: NORMAL MG/DL
MCH RBC QN AUTO: 33.5 PG (ref 26–34)
MCHC RBC AUTO-ENTMCNC: 34.3 G/DL (ref 32–36)
MCV RBC AUTO: 98 FL (ref 80–100)
MONOCYTES # BLD AUTO: 0.59 X10*3/UL (ref 0.1–1)
MONOCYTES NFR BLD AUTO: 5.5 %
NEUTROPHILS # BLD AUTO: 7.8 X10*3/UL (ref 1.2–7.7)
NEUTROPHILS NFR BLD AUTO: 72.4 %
NRBC BLD-RTO: 0 /100 WBCS (ref 0–0)
PLATELET # BLD AUTO: 255 X10*3/UL (ref 150–450)
POTASSIUM SERPL-SCNC: 3.7 MMOL/L (ref 3.5–5.3)
POTASSIUM SERPL-SCNC: NORMAL MMOL/L
PROT SERPL-MCNC: 6.8 G/DL (ref 6.4–8.2)
PROT SERPL-MCNC: NORMAL G/DL
RBC # BLD AUTO: 4.45 X10*6/UL (ref 4–5.2)
SODIUM SERPL-SCNC: 140 MMOL/L (ref 136–145)
SODIUM SERPL-SCNC: NORMAL MMOL/L
WBC # BLD AUTO: 10.8 X10*3/UL (ref 4.4–11.3)

## 2025-08-12 PROCEDURE — 73630 X-RAY EXAM OF FOOT: CPT | Mod: RIGHT SIDE | Performed by: INTERNAL MEDICINE

## 2025-08-12 PROCEDURE — 36415 COLL VENOUS BLD VENIPUNCTURE: CPT | Performed by: STUDENT IN AN ORGANIZED HEALTH CARE EDUCATION/TRAINING PROGRAM

## 2025-08-12 PROCEDURE — 85652 RBC SED RATE AUTOMATED: CPT

## 2025-08-12 PROCEDURE — 2500000001 HC RX 250 WO HCPCS SELF ADMINISTERED DRUGS (ALT 637 FOR MEDICARE OP): Performed by: STUDENT IN AN ORGANIZED HEALTH CARE EDUCATION/TRAINING PROGRAM

## 2025-08-12 PROCEDURE — 73630 X-RAY EXAM OF FOOT: CPT | Mod: RT

## 2025-08-12 PROCEDURE — 80053 COMPREHEN METABOLIC PANEL: CPT | Performed by: STUDENT IN AN ORGANIZED HEALTH CARE EDUCATION/TRAINING PROGRAM

## 2025-08-12 PROCEDURE — 99284 EMERGENCY DEPT VISIT MOD MDM: CPT | Performed by: STUDENT IN AN ORGANIZED HEALTH CARE EDUCATION/TRAINING PROGRAM

## 2025-08-12 PROCEDURE — 85025 COMPLETE CBC W/AUTO DIFF WBC: CPT

## 2025-08-12 PROCEDURE — 83735 ASSAY OF MAGNESIUM: CPT

## 2025-08-12 PROCEDURE — 36415 COLL VENOUS BLD VENIPUNCTURE: CPT

## 2025-08-12 PROCEDURE — 2500000004 HC RX 250 GENERAL PHARMACY W/ HCPCS (ALT 636 FOR OP/ED): Performed by: STUDENT IN AN ORGANIZED HEALTH CARE EDUCATION/TRAINING PROGRAM

## 2025-08-12 RX ORDER — ACETAMINOPHEN 325 MG/1
650 TABLET ORAL ONCE
Status: DISCONTINUED | OUTPATIENT
Start: 2025-08-12 | End: 2025-08-12 | Stop reason: HOSPADM

## 2025-08-12 RX ORDER — AMOXICILLIN AND CLAVULANATE POTASSIUM 875; 125 MG/1; MG/1
1 TABLET, FILM COATED ORAL ONCE
Status: DISCONTINUED | OUTPATIENT
Start: 2025-08-12 | End: 2025-08-12

## 2025-08-12 RX ORDER — OXYCODONE HYDROCHLORIDE 5 MG/1
5 TABLET ORAL ONCE
Refills: 0 | Status: COMPLETED | OUTPATIENT
Start: 2025-08-12 | End: 2025-08-12

## 2025-08-12 RX ORDER — MORPHINE SULFATE 4 MG/ML
4 INJECTION, SOLUTION INTRAMUSCULAR; INTRAVENOUS ONCE
Status: DISCONTINUED | OUTPATIENT
Start: 2025-08-12 | End: 2025-08-12

## 2025-08-12 RX ORDER — HYDROCODONE BITARTRATE AND ACETAMINOPHEN 10; 325 MG/1; MG/1
1 TABLET ORAL ONCE
Refills: 0 | Status: COMPLETED | OUTPATIENT
Start: 2025-08-12 | End: 2025-08-12

## 2025-08-12 RX ADMIN — HYDROCODONE BITARTRATE AND ACETAMINOPHEN 1 TABLET: 10; 325 TABLET ORAL at 13:27

## 2025-08-12 RX ADMIN — OXYCODONE HYDROCHLORIDE 5 MG: 5 TABLET ORAL at 09:46

## 2025-08-12 RX ADMIN — PIPERACILLIN SODIUM AND TAZOBACTAM SODIUM 4.5 G: 4; .5 INJECTION, SOLUTION INTRAVENOUS at 10:00

## 2025-08-12 ASSESSMENT — LIFESTYLE VARIABLES
EVER FELT BAD OR GUILTY ABOUT YOUR DRINKING: NO
TOTAL SCORE: 0
EVER HAD A DRINK FIRST THING IN THE MORNING TO STEADY YOUR NERVES TO GET RID OF A HANGOVER: NO
HAVE YOU EVER FELT YOU SHOULD CUT DOWN ON YOUR DRINKING: NO
HAVE PEOPLE ANNOYED YOU BY CRITICIZING YOUR DRINKING: NO

## 2025-08-12 ASSESSMENT — PAIN SCALES - GENERAL
PAINLEVEL_OUTOF10: 10 - WORST POSSIBLE PAIN
PAINLEVEL_OUTOF10: 10 - WORST POSSIBLE PAIN

## 2025-08-12 ASSESSMENT — PAIN DESCRIPTION - FREQUENCY: FREQUENCY: CONSTANT/CONTINUOUS

## 2025-08-12 ASSESSMENT — PAIN DESCRIPTION - DESCRIPTORS: DESCRIPTORS: ACHING

## 2025-08-12 ASSESSMENT — PAIN DESCRIPTION - PAIN TYPE: TYPE: CHRONIC PAIN

## 2025-08-12 ASSESSMENT — PAIN DESCRIPTION - PROGRESSION: CLINICAL_PROGRESSION: NOT CHANGED

## 2025-08-12 ASSESSMENT — PAIN DESCRIPTION - LOCATION: LOCATION: FOOT

## 2025-08-12 ASSESSMENT — PAIN DESCRIPTION - ONSET: ONSET: ONGOING

## 2025-08-12 ASSESSMENT — PAIN - FUNCTIONAL ASSESSMENT: PAIN_FUNCTIONAL_ASSESSMENT: 0-10

## 2025-08-12 ASSESSMENT — PAIN DESCRIPTION - ORIENTATION: ORIENTATION: RIGHT

## 2025-08-13 ENCOUNTER — DOCUMENTATION (OUTPATIENT)
Dept: CASE MANAGEMENT | Facility: HOSPITAL | Age: 65
End: 2025-08-13
Payer: COMMERCIAL

## 2025-08-13 ENCOUNTER — PATIENT OUTREACH (OUTPATIENT)
Dept: CARE COORDINATION | Age: 65
End: 2025-08-13
Payer: COMMERCIAL
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
## 2025-08-13 DIAGNOSIS — L03.818 CELLULITIS OF OTHER SPECIFIED SITE: ICD-10-CM

## 2025-08-13 LAB — HOLD SPECIMEN: NORMAL

## 2025-08-14 ENCOUNTER — PATIENT OUTREACH (OUTPATIENT)
Dept: CARE COORDINATION | Age: 65
End: 2025-08-14
Payer: COMMERCIAL

## 2025-08-14 ENCOUNTER — DOCUMENTATION (OUTPATIENT)
Dept: CARE COORDINATION | Age: 65
End: 2025-08-14
Payer: COMMERCIAL

## 2025-08-14 ENCOUNTER — DOCUMENTATION (OUTPATIENT)
Dept: CASE MANAGEMENT | Facility: HOSPITAL | Age: 65
End: 2025-08-14
Payer: COMMERCIAL

## 2025-08-14 DIAGNOSIS — R93.89 ABNORMAL FINDING OF DIAGNOSTIC IMAGING: ICD-10-CM

## 2025-08-14 SDOH — HEALTH STABILITY: MENTAL HEALTH: HOW OFTEN DO YOU HAVE A DRINK CONTAINING ALCOHOL?: NEVER

## 2025-08-14 SDOH — ECONOMIC STABILITY: HOUSING INSECURITY: IN THE PAST 12 MONTHS, HOW MANY TIMES HAVE YOU MOVED WHERE YOU WERE LIVING?: 0

## 2025-08-14 SDOH — SOCIAL STABILITY: SOCIAL NETWORK: IN A TYPICAL WEEK, HOW MANY TIMES DO YOU TALK ON THE PHONE WITH FAMILY, FRIENDS, OR NEIGHBORS?: THREE TIMES A WEEK

## 2025-08-14 SDOH — SOCIAL STABILITY: SOCIAL NETWORK: HOW OFTEN DO YOU GET TOGETHER WITH FRIENDS OR RELATIVES?: ONCE A WEEK

## 2025-08-14 SDOH — HEALTH STABILITY: MENTAL HEALTH: HOW OFTEN DO YOU HAVE SIX OR MORE DRINKS ON ONE OCCASION?: NEVER

## 2025-08-14 SDOH — SOCIAL STABILITY: SOCIAL INSECURITY: WITHIN THE LAST YEAR, HAVE YOU BEEN HUMILIATED OR EMOTIONALLY ABUSED IN OTHER WAYS BY YOUR PARTNER OR EX-PARTNER?: NO

## 2025-08-14 SDOH — ECONOMIC STABILITY: FOOD INSECURITY: WITHIN THE PAST 12 MONTHS, YOU WORRIED THAT YOUR FOOD WOULD RUN OUT BEFORE YOU GOT THE MONEY TO BUY MORE.: NEVER TRUE

## 2025-08-14 SDOH — ECONOMIC STABILITY: HOUSING INSECURITY: IN THE LAST 12 MONTHS, WAS THERE A TIME WHEN YOU WERE NOT ABLE TO PAY THE MORTGAGE OR RENT ON TIME?: NO

## 2025-08-14 SDOH — ECONOMIC STABILITY: FOOD INSECURITY: WITHIN THE PAST 12 MONTHS, THE FOOD YOU BOUGHT JUST DIDN'T LAST AND YOU DIDN'T HAVE MONEY TO GET MORE.: NEVER TRUE

## 2025-08-14 SDOH — ECONOMIC STABILITY: HOUSING INSECURITY: AT ANY TIME IN THE PAST 12 MONTHS, WERE YOU HOMELESS OR LIVING IN A SHELTER (INCLUDING NOW)?: NO

## 2025-08-14 SDOH — HEALTH STABILITY: PHYSICAL HEALTH: ON AVERAGE, HOW MANY MINUTES DO YOU ENGAGE IN EXERCISE AT THIS LEVEL?: 0 MIN

## 2025-08-14 SDOH — ECONOMIC STABILITY: TRANSPORTATION INSECURITY: IN THE PAST 12 MONTHS, HAS LACK OF TRANSPORTATION KEPT YOU FROM MEDICAL APPOINTMENTS OR FROM GETTING MEDICATIONS?: NO

## 2025-08-14 SDOH — SOCIAL STABILITY: SOCIAL INSECURITY: WITHIN THE LAST YEAR, HAVE YOU BEEN AFRAID OF YOUR PARTNER OR EX-PARTNER?: NO

## 2025-08-14 SDOH — HEALTH STABILITY: PHYSICAL HEALTH: ON AVERAGE, HOW MANY DAYS PER WEEK DO YOU ENGAGE IN MODERATE TO STRENUOUS EXERCISE (LIKE A BRISK WALK)?: 0 DAYS

## 2025-08-14 SDOH — HEALTH STABILITY: MENTAL HEALTH: HOW MANY DRINKS CONTAINING ALCOHOL DO YOU HAVE ON A TYPICAL DAY WHEN YOU ARE DRINKING?: PATIENT DOES NOT DRINK

## 2025-08-14 SDOH — ECONOMIC STABILITY: INCOME INSECURITY: IN THE PAST 12 MONTHS HAS THE ELECTRIC, GAS, OIL, OR WATER COMPANY THREATENED TO SHUT OFF SERVICES IN YOUR HOME?: NO

## 2025-08-14 SDOH — ECONOMIC STABILITY: FOOD INSECURITY: HOW HARD IS IT FOR YOU TO PAY FOR THE VERY BASICS LIKE FOOD, HOUSING, MEDICAL CARE, AND HEATING?: NOT HARD AT ALL

## 2025-08-14 SDOH — SOCIAL STABILITY: SOCIAL NETWORK: HOW OFTEN DO YOU ATTEND CHURCH OR RELIGIOUS SERVICES?: NEVER

## 2025-08-14 SDOH — SOCIAL STABILITY: SOCIAL INSECURITY: ARE YOU MARRIED, WIDOWED, DIVORCED, SEPARATED, NEVER MARRIED, OR LIVING WITH A PARTNER?: WIDOWED

## 2025-08-14 SDOH — SOCIAL STABILITY: SOCIAL NETWORK: HOW OFTEN DO YOU ATTEND MEETINGS OF THE CLUBS OR ORGANIZATIONS YOU BELONG TO?: NEVER

## 2025-08-14 ASSESSMENT — ACTIVITIES OF DAILY LIVING (ADL): LACK_OF_TRANSPORTATION: NO

## 2025-08-14 ASSESSMENT — LIFESTYLE VARIABLES
AUDIT-C TOTAL SCORE: 0
SKIP TO QUESTIONS 9-10: 1

## 2025-08-15 ENCOUNTER — PATIENT OUTREACH (OUTPATIENT)
Dept: CARE COORDINATION | Age: 65
End: 2025-08-15
Payer: COMMERCIAL

## 2025-08-16 ENCOUNTER — APPOINTMENT (OUTPATIENT)
Dept: CARE COORDINATION | Age: 65
End: 2025-08-16
Payer: COMMERCIAL

## 2025-08-16 ENCOUNTER — PATIENT OUTREACH (OUTPATIENT)
Dept: CARE COORDINATION | Age: 65
End: 2025-08-16

## 2025-08-17 ENCOUNTER — PATIENT OUTREACH (OUTPATIENT)
Dept: CARE COORDINATION | Age: 65
End: 2025-08-17

## 2025-08-17 ENCOUNTER — APPOINTMENT (OUTPATIENT)
Dept: RADIOLOGY | Facility: HOSPITAL | Age: 65
End: 2025-08-17
Payer: COMMERCIAL

## 2025-08-17 ENCOUNTER — TELEMEDICINE CLINICAL SUPPORT (OUTPATIENT)
Dept: CARE COORDINATION | Age: 65
End: 2025-08-17
Payer: COMMERCIAL

## 2025-08-17 ENCOUNTER — HOSPITAL ENCOUNTER (EMERGENCY)
Facility: HOSPITAL | Age: 65
Discharge: HOME | End: 2025-08-17
Attending: EMERGENCY MEDICINE
Payer: COMMERCIAL

## 2025-08-17 VITALS
SYSTOLIC BLOOD PRESSURE: 102 MMHG | BODY MASS INDEX: 22.45 KG/M2 | DIASTOLIC BLOOD PRESSURE: 71 MMHG | TEMPERATURE: 98.1 F | RESPIRATION RATE: 18 BRPM | WEIGHT: 122 LBS | HEART RATE: 90 BPM | OXYGEN SATURATION: 99 % | HEIGHT: 62 IN

## 2025-08-17 DIAGNOSIS — L03.115 CELLULITIS OF FOOT, RIGHT: ICD-10-CM

## 2025-08-17 DIAGNOSIS — M79.674 PAIN OF RIGHT GREAT TOE: Primary | ICD-10-CM

## 2025-08-17 DIAGNOSIS — L03.115 CELLULITIS OF FOOT, RIGHT: Primary | ICD-10-CM

## 2025-08-17 LAB
ALBUMIN SERPL BCP-MCNC: 4.6 G/DL (ref 3.4–5)
ALP SERPL-CCNC: 61 U/L (ref 33–136)
ALT SERPL W P-5'-P-CCNC: 30 U/L (ref 7–45)
ANION GAP SERPL CALC-SCNC: 12 MMOL/L (ref 10–20)
AST SERPL W P-5'-P-CCNC: 21 U/L (ref 9–39)
BASOPHILS # BLD AUTO: 0.07 X10*3/UL (ref 0–0.1)
BASOPHILS NFR BLD AUTO: 0.8 %
BILIRUB SERPL-MCNC: 0.4 MG/DL (ref 0–1.2)
BUN SERPL-MCNC: 17 MG/DL (ref 6–23)
CALCIUM SERPL-MCNC: 9.9 MG/DL (ref 8.6–10.3)
CHLORIDE SERPL-SCNC: 103 MMOL/L (ref 98–107)
CO2 SERPL-SCNC: 27 MMOL/L (ref 21–32)
CREAT SERPL-MCNC: 0.8 MG/DL (ref 0.5–1.05)
EGFRCR SERPLBLD CKD-EPI 2021: 83 ML/MIN/1.73M*2
EOSINOPHIL # BLD AUTO: 0.27 X10*3/UL (ref 0–0.7)
EOSINOPHIL NFR BLD AUTO: 3.1 %
ERYTHROCYTE [DISTWIDTH] IN BLOOD BY AUTOMATED COUNT: 13.1 % (ref 11.5–14.5)
GLUCOSE SERPL-MCNC: 133 MG/DL (ref 74–99)
HCT VFR BLD AUTO: 41.7 % (ref 36–46)
HGB BLD-MCNC: 14.3 G/DL (ref 12–16)
IMM GRANULOCYTES # BLD AUTO: 0.03 X10*3/UL (ref 0–0.7)
IMM GRANULOCYTES NFR BLD AUTO: 0.3 % (ref 0–0.9)
LACTATE SERPL-SCNC: 1.4 MMOL/L (ref 0.4–2)
LYMPHOCYTES # BLD AUTO: 3.4 X10*3/UL (ref 1.2–4.8)
LYMPHOCYTES NFR BLD AUTO: 39.2 %
MCH RBC QN AUTO: 33.6 PG (ref 26–34)
MCHC RBC AUTO-ENTMCNC: 34.3 G/DL (ref 32–36)
MCV RBC AUTO: 98 FL (ref 80–100)
MONOCYTES # BLD AUTO: 0.78 X10*3/UL (ref 0.1–1)
MONOCYTES NFR BLD AUTO: 9 %
NEUTROPHILS # BLD AUTO: 4.12 X10*3/UL (ref 1.2–7.7)
NEUTROPHILS NFR BLD AUTO: 47.6 %
NRBC BLD-RTO: 0 /100 WBCS (ref 0–0)
PLATELET # BLD AUTO: 288 X10*3/UL (ref 150–450)
POTASSIUM SERPL-SCNC: 3.6 MMOL/L (ref 3.5–5.3)
PROT SERPL-MCNC: 7.2 G/DL (ref 6.4–8.2)
RBC # BLD AUTO: 4.25 X10*6/UL (ref 4–5.2)
SODIUM SERPL-SCNC: 138 MMOL/L (ref 136–145)
URATE SERPL-MCNC: 3.5 MG/DL (ref 2.3–6.7)
WBC # BLD AUTO: 8.7 X10*3/UL (ref 4.4–11.3)

## 2025-08-17 PROCEDURE — 80053 COMPREHEN METABOLIC PANEL: CPT

## 2025-08-17 PROCEDURE — 83605 ASSAY OF LACTIC ACID: CPT

## 2025-08-17 PROCEDURE — 36415 COLL VENOUS BLD VENIPUNCTURE: CPT

## 2025-08-17 PROCEDURE — 99284 EMERGENCY DEPT VISIT MOD MDM: CPT | Performed by: EMERGENCY MEDICINE

## 2025-08-17 PROCEDURE — 73620 X-RAY EXAM OF FOOT: CPT | Mod: RT

## 2025-08-17 PROCEDURE — 84550 ASSAY OF BLOOD/URIC ACID: CPT | Performed by: EMERGENCY MEDICINE

## 2025-08-17 PROCEDURE — 73620 X-RAY EXAM OF FOOT: CPT | Mod: RIGHT SIDE | Performed by: RADIOLOGY

## 2025-08-17 PROCEDURE — 85025 COMPLETE CBC W/AUTO DIFF WBC: CPT

## 2025-08-17 RX ORDER — PREDNISONE 10 MG/1
20 TABLET ORAL DAILY
Qty: 10 TABLET | Refills: 0 | Status: SHIPPED | OUTPATIENT
Start: 2025-08-17 | End: 2025-08-22

## 2025-08-17 RX ORDER — NEBULIZER AND COMPRESSOR
1 EACH MISCELLANEOUS DAILY
Qty: 1 EACH | Refills: 0 | Status: SHIPPED | OUTPATIENT
Start: 2025-08-17

## 2025-08-17 ASSESSMENT — LIFESTYLE VARIABLES
EVER HAD A DRINK FIRST THING IN THE MORNING TO STEADY YOUR NERVES TO GET RID OF A HANGOVER: NO
EVER FELT BAD OR GUILTY ABOUT YOUR DRINKING: NO
TOTAL SCORE: 0
HAVE YOU EVER FELT YOU SHOULD CUT DOWN ON YOUR DRINKING: NO
HAVE PEOPLE ANNOYED YOU BY CRITICIZING YOUR DRINKING: NO

## 2025-08-17 ASSESSMENT — PAIN - FUNCTIONAL ASSESSMENT: PAIN_FUNCTIONAL_ASSESSMENT: 0-10

## 2025-08-17 ASSESSMENT — PAIN SCALES - GENERAL: PAINLEVEL_OUTOF10: 10 - WORST POSSIBLE PAIN

## 2025-08-18 ENCOUNTER — DOCUMENTATION (OUTPATIENT)
Dept: CASE MANAGEMENT | Facility: HOSPITAL | Age: 65
End: 2025-08-18

## 2025-08-18 ENCOUNTER — PATIENT OUTREACH (OUTPATIENT)
Dept: CARE COORDINATION | Age: 65
End: 2025-08-18

## 2025-08-18 ENCOUNTER — TELEPHONE (OUTPATIENT)
Dept: HOME HEALTH SERVICES | Facility: HOME HEALTH | Age: 65
End: 2025-08-18

## 2025-08-18 ENCOUNTER — PATIENT OUTREACH (OUTPATIENT)
Dept: CASE MANAGEMENT | Facility: HOSPITAL | Age: 65
End: 2025-08-18

## 2025-08-18 ENCOUNTER — APPOINTMENT (OUTPATIENT)
Dept: PRIMARY CARE | Facility: CLINIC | Age: 65
End: 2025-08-18
Payer: COMMERCIAL

## 2025-08-18 SDOH — ECONOMIC STABILITY: GENERAL: WOULD YOU LIKE HELP WITH ANY OF THE FOLLOWING NEEDS?: I DONT NEED HELP WITH ANY OF THESE

## 2025-08-19 ENCOUNTER — PATIENT OUTREACH (OUTPATIENT)
Dept: CARE COORDINATION | Age: 65
End: 2025-08-19
Payer: COMMERCIAL

## 2025-08-20 ENCOUNTER — PATIENT OUTREACH (OUTPATIENT)
Dept: CARE COORDINATION | Age: 65
End: 2025-08-20
Payer: COMMERCIAL

## 2025-08-20 ENCOUNTER — DOCUMENTATION (OUTPATIENT)
Dept: HOME HEALTH SERVICES | Facility: HOME HEALTH | Age: 65
End: 2025-08-20
Payer: COMMERCIAL

## 2025-08-21 ENCOUNTER — PATIENT OUTREACH (OUTPATIENT)
Dept: CARE COORDINATION | Age: 65
End: 2025-08-21
Payer: COMMERCIAL

## 2025-08-22 ENCOUNTER — PATIENT OUTREACH (OUTPATIENT)
Dept: CARE COORDINATION | Age: 65
End: 2025-08-22
Payer: COMMERCIAL

## 2025-08-22 DIAGNOSIS — F40.9 FEAR FOR PERSONAL SAFETY: ICD-10-CM

## 2025-08-22 DIAGNOSIS — Z59.811 HOUSING INSTABILITY DUE TO IMMINENT RISK OF HOMELESSNESS: ICD-10-CM

## 2025-08-22 DIAGNOSIS — Z59.811: ICD-10-CM

## 2025-08-22 DIAGNOSIS — Z65.8 LACK OF SOCIAL SUPPORT: ICD-10-CM

## 2025-08-22 SDOH — ECONOMIC STABILITY - HOUSING INSECURITY: HOUSING INSTABILITY WITH RISK OF HOMELESSNESS: Z59.811

## 2025-08-23 ENCOUNTER — PATIENT OUTREACH (OUTPATIENT)
Dept: CARE COORDINATION | Age: 65
End: 2025-08-23
Payer: COMMERCIAL

## 2025-08-23 DIAGNOSIS — M10.9 GOUT, ARTHRITIS: ICD-10-CM

## 2025-08-23 DIAGNOSIS — I10 HYPERTENSION, UNSPECIFIED TYPE: ICD-10-CM

## 2025-08-23 DIAGNOSIS — L03.115 CELLULITIS OF RIGHT LOWER EXTREMITY: ICD-10-CM

## 2025-08-23 DIAGNOSIS — I10 PRIMARY HYPERTENSION: Primary | ICD-10-CM

## 2025-08-24 ENCOUNTER — HOSPITAL ENCOUNTER (EMERGENCY)
Dept: CARDIOLOGY | Facility: HOSPITAL | Age: 65
Discharge: HOME | End: 2025-08-24
Payer: COMMERCIAL

## 2025-08-24 ENCOUNTER — APPOINTMENT (OUTPATIENT)
Dept: CARDIOLOGY | Facility: HOSPITAL | Age: 65
End: 2025-08-24
Payer: COMMERCIAL

## 2025-08-24 ENCOUNTER — HOSPITAL ENCOUNTER (EMERGENCY)
Facility: HOSPITAL | Age: 65
Discharge: LEFT BEFORE DISCHARGE | End: 2025-08-24
Payer: COMMERCIAL

## 2025-08-24 VITALS
TEMPERATURE: 97.9 F | RESPIRATION RATE: 18 BRPM | WEIGHT: 120 LBS | HEART RATE: 91 BPM | OXYGEN SATURATION: 98 % | BODY MASS INDEX: 22.08 KG/M2 | SYSTOLIC BLOOD PRESSURE: 163 MMHG | DIASTOLIC BLOOD PRESSURE: 81 MMHG | HEIGHT: 62 IN

## 2025-08-24 LAB
ALBUMIN SERPL BCP-MCNC: 4.7 G/DL (ref 3.4–5)
ALP SERPL-CCNC: 57 U/L (ref 33–136)
ALT SERPL W P-5'-P-CCNC: 25 U/L (ref 7–45)
ANION GAP SERPL CALC-SCNC: 11 MMOL/L (ref 10–20)
AST SERPL W P-5'-P-CCNC: 68 U/L (ref 9–39)
BASOPHILS # BLD AUTO: 0.04 X10*3/UL (ref 0–0.1)
BASOPHILS NFR BLD AUTO: 0.4 %
BILIRUB SERPL-MCNC: 0.4 MG/DL (ref 0–1.2)
BUN SERPL-MCNC: 13 MG/DL (ref 6–23)
CALCIUM SERPL-MCNC: 9.4 MG/DL (ref 8.6–10.3)
CARDIAC TROPONIN I PNL SERPL HS: 3 NG/L (ref 0–13)
CHLORIDE SERPL-SCNC: 100 MMOL/L (ref 98–107)
CO2 SERPL-SCNC: 27 MMOL/L (ref 21–32)
CREAT SERPL-MCNC: 0.64 MG/DL (ref 0.5–1.05)
EGFRCR SERPLBLD CKD-EPI 2021: >90 ML/MIN/1.73M*2
EOSINOPHIL # BLD AUTO: 0.26 X10*3/UL (ref 0–0.7)
EOSINOPHIL NFR BLD AUTO: 2.5 %
ERYTHROCYTE [DISTWIDTH] IN BLOOD BY AUTOMATED COUNT: 13.7 % (ref 11.5–14.5)
GLUCOSE SERPL-MCNC: 109 MG/DL (ref 74–99)
HCT VFR BLD AUTO: 41.5 % (ref 36–46)
HGB BLD-MCNC: 14.1 G/DL (ref 12–16)
IMM GRANULOCYTES # BLD AUTO: 0.03 X10*3/UL (ref 0–0.7)
IMM GRANULOCYTES NFR BLD AUTO: 0.3 % (ref 0–0.9)
INR PPP: 1 (ref 0.9–1.1)
LYMPHOCYTES # BLD AUTO: 2.71 X10*3/UL (ref 1.2–4.8)
LYMPHOCYTES NFR BLD AUTO: 26.4 %
MCH RBC QN AUTO: 33.4 PG (ref 26–34)
MCHC RBC AUTO-ENTMCNC: 34 G/DL (ref 32–36)
MCV RBC AUTO: 98 FL (ref 80–100)
MONOCYTES # BLD AUTO: 0.57 X10*3/UL (ref 0.1–1)
MONOCYTES NFR BLD AUTO: 5.5 %
NEUTROPHILS # BLD AUTO: 6.67 X10*3/UL (ref 1.2–7.7)
NEUTROPHILS NFR BLD AUTO: 64.9 %
NRBC BLD-RTO: 0 /100 WBCS (ref 0–0)
PLATELET # BLD AUTO: 283 X10*3/UL (ref 150–450)
POTASSIUM SERPL-SCNC: 5.4 MMOL/L (ref 3.5–5.3)
PROT SERPL-MCNC: 7.4 G/DL (ref 6.4–8.2)
PROTHROMBIN TIME: 10.8 SECONDS (ref 9.8–12.4)
RBC # BLD AUTO: 4.22 X10*6/UL (ref 4–5.2)
SODIUM SERPL-SCNC: 133 MMOL/L (ref 136–145)
WBC # BLD AUTO: 10.3 X10*3/UL (ref 4.4–11.3)

## 2025-08-24 PROCEDURE — 36415 COLL VENOUS BLD VENIPUNCTURE: CPT | Performed by: EMERGENCY MEDICINE

## 2025-08-24 PROCEDURE — 80053 COMPREHEN METABOLIC PANEL: CPT | Performed by: EMERGENCY MEDICINE

## 2025-08-24 PROCEDURE — 85025 COMPLETE CBC W/AUTO DIFF WBC: CPT | Performed by: EMERGENCY MEDICINE

## 2025-08-24 PROCEDURE — 85610 PROTHROMBIN TIME: CPT | Performed by: EMERGENCY MEDICINE

## 2025-08-24 PROCEDURE — 93005 ELECTROCARDIOGRAM TRACING: CPT

## 2025-08-24 PROCEDURE — 84484 ASSAY OF TROPONIN QUANT: CPT | Performed by: EMERGENCY MEDICINE

## 2025-08-24 PROCEDURE — 99284 EMERGENCY DEPT VISIT MOD MDM: CPT

## 2025-08-24 PROCEDURE — 99281 EMR DPT VST MAYX REQ PHY/QHP: CPT

## 2025-08-24 ASSESSMENT — PAIN - FUNCTIONAL ASSESSMENT: PAIN_FUNCTIONAL_ASSESSMENT: 0-10

## 2025-08-24 ASSESSMENT — PAIN DESCRIPTION - DESCRIPTORS: DESCRIPTORS: SHARP;HEAVINESS

## 2025-08-24 ASSESSMENT — PAIN DESCRIPTION - LOCATION: LOCATION: CHEST

## 2025-08-24 ASSESSMENT — PAIN SCALES - GENERAL: PAINLEVEL_OUTOF10: 9

## 2025-08-25 ENCOUNTER — APPOINTMENT (OUTPATIENT)
Dept: CARDIOLOGY | Facility: CLINIC | Age: 65
End: 2025-08-25
Payer: COMMERCIAL

## 2025-08-25 ENCOUNTER — PATIENT OUTREACH (OUTPATIENT)
Dept: CARE COORDINATION | Age: 65
End: 2025-08-25

## 2025-08-25 VITALS
BODY MASS INDEX: 20.43 KG/M2 | DIASTOLIC BLOOD PRESSURE: 88 MMHG | HEIGHT: 62 IN | HEART RATE: 100 BPM | WEIGHT: 111 LBS | SYSTOLIC BLOOD PRESSURE: 142 MMHG

## 2025-08-25 DIAGNOSIS — B02.9 HERPES ZOSTER WITHOUT COMPLICATION: ICD-10-CM

## 2025-08-25 DIAGNOSIS — R93.1 AGATSTON CAC SCORE, <100: ICD-10-CM

## 2025-08-25 DIAGNOSIS — E87.5 HYPERKALEMIA: ICD-10-CM

## 2025-08-25 DIAGNOSIS — R07.89 ATYPICAL CHEST PAIN: ICD-10-CM

## 2025-08-25 DIAGNOSIS — E78.2 MIXED HYPERLIPIDEMIA: Primary | ICD-10-CM

## 2025-08-25 DIAGNOSIS — I10 PRIMARY HYPERTENSION: ICD-10-CM

## 2025-08-25 PROCEDURE — 3008F BODY MASS INDEX DOCD: CPT | Performed by: INTERNAL MEDICINE

## 2025-08-25 PROCEDURE — 99214 OFFICE O/P EST MOD 30 MIN: CPT | Performed by: INTERNAL MEDICINE

## 2025-08-25 PROCEDURE — 93000 ELECTROCARDIOGRAM COMPLETE: CPT | Performed by: INTERNAL MEDICINE

## 2025-08-25 PROCEDURE — 3079F DIAST BP 80-89 MM HG: CPT | Performed by: INTERNAL MEDICINE

## 2025-08-25 PROCEDURE — 3077F SYST BP >= 140 MM HG: CPT | Performed by: INTERNAL MEDICINE

## 2025-08-25 RX ORDER — MULTIVITAMIN WITH FOLIC ACID 400 MCG
1 TABLET ORAL DAILY
Qty: 90 TABLET | Refills: 1 | Status: SHIPPED | OUTPATIENT
Start: 2025-08-25

## 2025-08-25 ASSESSMENT — ENCOUNTER SYMPTOMS
SHORTNESS OF BREATH: 1
ACTIVITY CHANGE: 1
FATIGUE: 1
PALPITATIONS: 0
BACK PAIN: 1
ARTHRALGIAS: 1
GASTROINTESTINAL NEGATIVE: 1

## 2025-08-26 ENCOUNTER — HOSPITAL ENCOUNTER (EMERGENCY)
Facility: HOSPITAL | Age: 65
Discharge: HOME | End: 2025-08-26
Attending: EMERGENCY MEDICINE
Payer: COMMERCIAL

## 2025-08-26 ENCOUNTER — APPOINTMENT (OUTPATIENT)
Dept: CARDIOLOGY | Facility: HOSPITAL | Age: 65
End: 2025-08-26
Payer: COMMERCIAL

## 2025-08-26 ENCOUNTER — PATIENT OUTREACH (OUTPATIENT)
Dept: PRIMARY CARE | Facility: CLINIC | Age: 65
End: 2025-08-26
Payer: COMMERCIAL

## 2025-08-26 ENCOUNTER — APPOINTMENT (OUTPATIENT)
Dept: RADIOLOGY | Facility: HOSPITAL | Age: 65
End: 2025-08-26
Payer: COMMERCIAL

## 2025-08-26 ENCOUNTER — PATIENT OUTREACH (OUTPATIENT)
Dept: CARE COORDINATION | Age: 65
End: 2025-08-26
Payer: COMMERCIAL

## 2025-08-26 VITALS
WEIGHT: 111 LBS | RESPIRATION RATE: 20 BRPM | HEIGHT: 62 IN | TEMPERATURE: 98.2 F | HEART RATE: 79 BPM | BODY MASS INDEX: 20.43 KG/M2 | OXYGEN SATURATION: 97 % | SYSTOLIC BLOOD PRESSURE: 149 MMHG | DIASTOLIC BLOOD PRESSURE: 87 MMHG

## 2025-08-26 DIAGNOSIS — S62.002S SNAC (SCAPHOID NON-UNION ADVANCED COLLAPSE) OF WRIST, LEFT: ICD-10-CM

## 2025-08-26 DIAGNOSIS — T74.91XA DOMESTIC VIOLENCE OF ADULT, INITIAL ENCOUNTER: Primary | ICD-10-CM

## 2025-08-26 DIAGNOSIS — M19.132 SNAC (SCAPHOID NON-UNION ADVANCED COLLAPSE) OF WRIST, LEFT: ICD-10-CM

## 2025-08-26 DIAGNOSIS — L03.818 CELLULITIS OF OTHER SPECIFIED SITE: ICD-10-CM

## 2025-08-26 DIAGNOSIS — R11.0 MILD NAUSEA: ICD-10-CM

## 2025-08-26 DIAGNOSIS — I10 PRIMARY HYPERTENSION: ICD-10-CM

## 2025-08-26 LAB
ALBUMIN SERPL BCP-MCNC: 4.5 G/DL (ref 3.4–5)
ALP SERPL-CCNC: 58 U/L (ref 33–136)
ALT SERPL W P-5'-P-CCNC: 28 U/L (ref 7–45)
ANION GAP SERPL CALC-SCNC: 10 MMOL/L (ref 10–20)
AST SERPL W P-5'-P-CCNC: 36 U/L (ref 9–39)
BASOPHILS # BLD AUTO: 0.03 X10*3/UL (ref 0–0.1)
BASOPHILS NFR BLD AUTO: 0.3 %
BILIRUB SERPL-MCNC: 0.4 MG/DL (ref 0–1.2)
BUN SERPL-MCNC: 10 MG/DL (ref 6–23)
CALCIUM SERPL-MCNC: 9.9 MG/DL (ref 8.6–10.3)
CARDIAC TROPONIN I PNL SERPL HS: 3 NG/L (ref 0–13)
CARDIAC TROPONIN I PNL SERPL HS: 3 NG/L (ref 0–13)
CHLORIDE SERPL-SCNC: 103 MMOL/L (ref 98–107)
CO2 SERPL-SCNC: 30 MMOL/L (ref 21–32)
CREAT SERPL-MCNC: 0.63 MG/DL (ref 0.5–1.05)
EGFRCR SERPLBLD CKD-EPI 2021: >90 ML/MIN/1.73M*2
EOSINOPHIL # BLD AUTO: 0.2 X10*3/UL (ref 0–0.7)
EOSINOPHIL NFR BLD AUTO: 2.1 %
ERYTHROCYTE [DISTWIDTH] IN BLOOD BY AUTOMATED COUNT: 13.5 % (ref 11.5–14.5)
GLUCOSE SERPL-MCNC: 114 MG/DL (ref 74–99)
HCT VFR BLD AUTO: 42.5 % (ref 36–46)
HGB BLD-MCNC: 14.7 G/DL (ref 12–16)
IMM GRANULOCYTES # BLD AUTO: 0.04 X10*3/UL (ref 0–0.7)
IMM GRANULOCYTES NFR BLD AUTO: 0.4 % (ref 0–0.9)
INR PPP: 1 (ref 0.9–1.1)
LYMPHOCYTES # BLD AUTO: 2.01 X10*3/UL (ref 1.2–4.8)
LYMPHOCYTES NFR BLD AUTO: 21.4 %
MCH RBC QN AUTO: 33.3 PG (ref 26–34)
MCHC RBC AUTO-ENTMCNC: 34.6 G/DL (ref 32–36)
MCV RBC AUTO: 96 FL (ref 80–100)
MONOCYTES # BLD AUTO: 0.63 X10*3/UL (ref 0.1–1)
MONOCYTES NFR BLD AUTO: 6.7 %
NEUTROPHILS # BLD AUTO: 6.47 X10*3/UL (ref 1.2–7.7)
NEUTROPHILS NFR BLD AUTO: 69.1 %
NRBC BLD-RTO: 0 /100 WBCS (ref 0–0)
PLATELET # BLD AUTO: 262 X10*3/UL (ref 150–450)
POTASSIUM SERPL-SCNC: 4.3 MMOL/L (ref 3.5–5.3)
PROT SERPL-MCNC: 7.3 G/DL (ref 6.4–8.2)
PROTHROMBIN TIME: 10.7 SECONDS (ref 9.8–12.4)
RBC # BLD AUTO: 4.41 X10*6/UL (ref 4–5.2)
SODIUM SERPL-SCNC: 139 MMOL/L (ref 136–145)
WBC # BLD AUTO: 9.4 X10*3/UL (ref 4.4–11.3)

## 2025-08-26 PROCEDURE — 84484 ASSAY OF TROPONIN QUANT: CPT | Performed by: EMERGENCY MEDICINE

## 2025-08-26 PROCEDURE — 85610 PROTHROMBIN TIME: CPT | Performed by: STUDENT IN AN ORGANIZED HEALTH CARE EDUCATION/TRAINING PROGRAM

## 2025-08-26 PROCEDURE — 85025 COMPLETE CBC W/AUTO DIFF WBC: CPT | Performed by: STUDENT IN AN ORGANIZED HEALTH CARE EDUCATION/TRAINING PROGRAM

## 2025-08-26 PROCEDURE — 93005 ELECTROCARDIOGRAM TRACING: CPT

## 2025-08-26 PROCEDURE — 71045 X-RAY EXAM CHEST 1 VIEW: CPT

## 2025-08-26 PROCEDURE — 85025 COMPLETE CBC W/AUTO DIFF WBC: CPT | Performed by: EMERGENCY MEDICINE

## 2025-08-26 PROCEDURE — 80053 COMPREHEN METABOLIC PANEL: CPT | Performed by: EMERGENCY MEDICINE

## 2025-08-26 PROCEDURE — 73630 X-RAY EXAM OF FOOT: CPT | Mod: RT

## 2025-08-26 PROCEDURE — 85610 PROTHROMBIN TIME: CPT | Performed by: EMERGENCY MEDICINE

## 2025-08-26 PROCEDURE — 99285 EMERGENCY DEPT VISIT HI MDM: CPT | Mod: 25 | Performed by: EMERGENCY MEDICINE

## 2025-08-26 PROCEDURE — 84484 ASSAY OF TROPONIN QUANT: CPT | Performed by: STUDENT IN AN ORGANIZED HEALTH CARE EDUCATION/TRAINING PROGRAM

## 2025-08-26 PROCEDURE — 93005 ELECTROCARDIOGRAM TRACING: CPT | Mod: 59

## 2025-08-26 PROCEDURE — 80053 COMPREHEN METABOLIC PANEL: CPT | Performed by: STUDENT IN AN ORGANIZED HEALTH CARE EDUCATION/TRAINING PROGRAM

## 2025-08-26 PROCEDURE — 36415 COLL VENOUS BLD VENIPUNCTURE: CPT | Performed by: EMERGENCY MEDICINE

## 2025-08-26 PROCEDURE — 36415 COLL VENOUS BLD VENIPUNCTURE: CPT | Performed by: STUDENT IN AN ORGANIZED HEALTH CARE EDUCATION/TRAINING PROGRAM

## 2025-08-26 PROCEDURE — 71045 X-RAY EXAM CHEST 1 VIEW: CPT | Performed by: RADIOLOGY

## 2025-08-26 PROCEDURE — 2500000001 HC RX 250 WO HCPCS SELF ADMINISTERED DRUGS (ALT 637 FOR MEDICARE OP)

## 2025-08-26 RX ORDER — COLCHICINE 0.6 MG/1
0.6 TABLET ORAL DAILY
Qty: 30 TABLET | Refills: 1 | Status: SHIPPED | OUTPATIENT
Start: 2025-08-26

## 2025-08-26 RX ORDER — CYCLOBENZAPRINE HCL 5 MG
5 TABLET ORAL ONCE
Status: COMPLETED | OUTPATIENT
Start: 2025-08-26 | End: 2025-08-26

## 2025-08-26 RX ORDER — HYDROCODONE BITARTRATE AND ACETAMINOPHEN 10; 325 MG/1; MG/1
1 TABLET ORAL ONCE
Refills: 0 | Status: DISCONTINUED | OUTPATIENT
Start: 2025-08-26 | End: 2025-08-26 | Stop reason: HOSPADM

## 2025-08-26 RX ADMIN — CYCLOBENZAPRINE HYDROCHLORIDE 5 MG: 5 TABLET, FILM COATED ORAL at 17:31

## 2025-08-26 RX ADMIN — GABAPENTIN 800 MG: 300 CAPSULE ORAL at 17:31

## 2025-08-26 ASSESSMENT — PAIN SCALES - GENERAL
PAINLEVEL_OUTOF10: 0 - NO PAIN
PAINLEVEL_OUTOF10: 8
PAINLEVEL_OUTOF10: 9

## 2025-08-26 ASSESSMENT — LIFESTYLE VARIABLES
HAVE PEOPLE ANNOYED YOU BY CRITICIZING YOUR DRINKING: NO
EVER FELT BAD OR GUILTY ABOUT YOUR DRINKING: NO
EVER HAD A DRINK FIRST THING IN THE MORNING TO STEADY YOUR NERVES TO GET RID OF A HANGOVER: NO
TOTAL SCORE: 0
HAVE YOU EVER FELT YOU SHOULD CUT DOWN ON YOUR DRINKING: NO

## 2025-08-26 ASSESSMENT — PAIN DESCRIPTION - PAIN TYPE: TYPE: ACUTE PAIN

## 2025-08-26 ASSESSMENT — PAIN - FUNCTIONAL ASSESSMENT: PAIN_FUNCTIONAL_ASSESSMENT: 0-10

## 2025-08-27 ENCOUNTER — PATIENT OUTREACH (OUTPATIENT)
Dept: CARE COORDINATION | Age: 65
End: 2025-08-27
Payer: COMMERCIAL

## 2025-08-27 LAB
ATRIAL RATE: 92 BPM
P AXIS: 66 DEGREES
P OFFSET: 209 MS
P ONSET: 158 MS
PR INTERVAL: 134 MS
Q ONSET: 225 MS
QRS COUNT: 16 BEATS
QRS DURATION: 82 MS
QT INTERVAL: 364 MS
QTC CALCULATION(BAZETT): 450 MS
QTC FREDERICIA: 419 MS
R AXIS: -32 DEGREES
T AXIS: 54 DEGREES
T OFFSET: 407 MS
VENTRICULAR RATE: 92 BPM

## 2025-08-27 RX ORDER — LISINOPRIL 10 MG/1
10 TABLET ORAL 2 TIMES DAILY
Qty: 180 TABLET | Refills: 1 | Status: SHIPPED | OUTPATIENT
Start: 2025-08-27

## 2025-08-30 RX ORDER — ONDANSETRON 8 MG/1
8 TABLET, FILM COATED ORAL EVERY 8 HOURS PRN
Qty: 20 TABLET | Refills: 0 | Status: SHIPPED | OUTPATIENT
Start: 2025-08-30

## 2025-08-31 LAB
ATRIAL RATE: 95 BPM
P AXIS: 72 DEGREES
P OFFSET: 212 MS
P ONSET: 158 MS
PR INTERVAL: 134 MS
Q ONSET: 225 MS
QRS COUNT: 16 BEATS
QRS DURATION: 90 MS
QT INTERVAL: 382 MS
QTC CALCULATION(BAZETT): 480 MS
QTC FREDERICIA: 445 MS
R AXIS: 30 DEGREES
T AXIS: 57 DEGREES
T OFFSET: 416 MS
VENTRICULAR RATE: 95 BPM

## 2025-09-05 ENCOUNTER — OFFICE VISIT (OUTPATIENT)
Dept: PRIMARY CARE | Facility: CLINIC | Age: 65
End: 2025-09-05
Payer: COMMERCIAL

## 2025-09-05 ENCOUNTER — APPOINTMENT (OUTPATIENT)
Dept: PRIMARY CARE | Facility: CLINIC | Age: 65
End: 2025-09-05
Payer: COMMERCIAL

## 2025-09-05 DIAGNOSIS — R11.0 MILD NAUSEA: ICD-10-CM

## 2025-09-05 RX ORDER — ONDANSETRON 8 MG/1
8 TABLET, FILM COATED ORAL EVERY 8 HOURS PRN
Qty: 20 TABLET | Refills: 0 | OUTPATIENT
Start: 2025-09-05

## 2025-09-05 RX ORDER — ONDANSETRON 8 MG/1
8 TABLET, FILM COATED ORAL EVERY 8 HOURS PRN
Qty: 20 TABLET | Refills: 0 | Status: SHIPPED | OUTPATIENT
Start: 2025-09-05

## 2025-09-05 ASSESSMENT — PATIENT HEALTH QUESTIONNAIRE - PHQ9
2. FEELING DOWN, DEPRESSED OR HOPELESS: NOT AT ALL
SUM OF ALL RESPONSES TO PHQ9 QUESTIONS 1 AND 2: 0
1. LITTLE INTEREST OR PLEASURE IN DOING THINGS: NOT AT ALL

## 2025-09-17 ENCOUNTER — APPOINTMENT (OUTPATIENT)
Dept: PRIMARY CARE | Facility: CLINIC | Age: 65
End: 2025-09-17
Payer: COMMERCIAL